# Patient Record
Sex: MALE | Race: WHITE | NOT HISPANIC OR LATINO | Employment: OTHER | URBAN - METROPOLITAN AREA
[De-identification: names, ages, dates, MRNs, and addresses within clinical notes are randomized per-mention and may not be internally consistent; named-entity substitution may affect disease eponyms.]

---

## 2017-01-24 ENCOUNTER — ALLSCRIPTS OFFICE VISIT (OUTPATIENT)
Dept: OTHER | Facility: OTHER | Age: 77
End: 2017-01-24

## 2017-01-24 ENCOUNTER — GENERIC CONVERSION - ENCOUNTER (OUTPATIENT)
Dept: OTHER | Facility: OTHER | Age: 77
End: 2017-01-24

## 2017-01-24 DIAGNOSIS — E11.9 TYPE 2 DIABETES MELLITUS WITHOUT COMPLICATIONS (HCC): ICD-10-CM

## 2017-01-24 DIAGNOSIS — I48.91 ATRIAL FIBRILLATION (HCC): ICD-10-CM

## 2017-01-24 DIAGNOSIS — I10 ESSENTIAL (PRIMARY) HYPERTENSION: ICD-10-CM

## 2017-01-24 DIAGNOSIS — I73.9 PERIPHERAL VASCULAR DISEASE (HCC): ICD-10-CM

## 2017-05-15 ENCOUNTER — ALLSCRIPTS OFFICE VISIT (OUTPATIENT)
Dept: OTHER | Facility: OTHER | Age: 77
End: 2017-05-15

## 2017-05-15 DIAGNOSIS — I10 ESSENTIAL (PRIMARY) HYPERTENSION: ICD-10-CM

## 2017-05-15 DIAGNOSIS — E78.5 HYPERLIPIDEMIA: ICD-10-CM

## 2017-05-15 DIAGNOSIS — E11.9 TYPE 2 DIABETES MELLITUS WITHOUT COMPLICATIONS (HCC): ICD-10-CM

## 2017-09-22 DIAGNOSIS — I48.91 ATRIAL FIBRILLATION (HCC): ICD-10-CM

## 2017-10-03 ENCOUNTER — GENERIC CONVERSION - ENCOUNTER (OUTPATIENT)
Dept: OTHER | Facility: OTHER | Age: 77
End: 2017-10-03

## 2017-12-11 ENCOUNTER — GENERIC CONVERSION - ENCOUNTER (OUTPATIENT)
Dept: OTHER | Facility: OTHER | Age: 77
End: 2017-12-11

## 2017-12-12 ENCOUNTER — GENERIC CONVERSION - ENCOUNTER (OUTPATIENT)
Dept: OTHER | Facility: OTHER | Age: 77
End: 2017-12-12

## 2017-12-18 LAB
LEFT EYE DIABETIC RETINOPATHY: NORMAL
RIGHT EYE DIABETIC RETINOPATHY: NORMAL

## 2018-01-09 ENCOUNTER — GENERIC CONVERSION - ENCOUNTER (OUTPATIENT)
Dept: OTHER | Facility: OTHER | Age: 78
End: 2018-01-09

## 2018-01-13 VITALS
WEIGHT: 248.25 LBS | TEMPERATURE: 98.1 F | BODY MASS INDEX: 33.62 KG/M2 | HEART RATE: 73 BPM | OXYGEN SATURATION: 98 % | HEIGHT: 72 IN | SYSTOLIC BLOOD PRESSURE: 140 MMHG | DIASTOLIC BLOOD PRESSURE: 80 MMHG

## 2018-01-15 VITALS
WEIGHT: 252.8 LBS | RESPIRATION RATE: 18 BRPM | OXYGEN SATURATION: 98 % | HEIGHT: 72 IN | HEART RATE: 78 BPM | BODY MASS INDEX: 34.24 KG/M2 | SYSTOLIC BLOOD PRESSURE: 146 MMHG | TEMPERATURE: 98.3 F | DIASTOLIC BLOOD PRESSURE: 80 MMHG

## 2018-01-15 DIAGNOSIS — E11.9 TYPE 2 DIABETES MELLITUS WITHOUT COMPLICATIONS (HCC): ICD-10-CM

## 2018-01-22 VITALS
TEMPERATURE: 98.1 F | SYSTOLIC BLOOD PRESSURE: 128 MMHG | OXYGEN SATURATION: 98 % | WEIGHT: 254 LBS | DIASTOLIC BLOOD PRESSURE: 70 MMHG | HEART RATE: 79 BPM | BODY MASS INDEX: 33.66 KG/M2 | HEIGHT: 73 IN

## 2018-01-23 NOTE — MISCELLANEOUS
Message   Recorded as Task   Date: 12/11/2017 09:23 AM, Created By: Michael Chirinos   Task Name: Follow Up   Assigned To: Lorena yAoub   Regarding Patient: Rosalia Etienne, Status: In Progress   Raymon Peña - 11 Dec 2017 9:23 AM     TASK CREATED  Caller: Self; [phone] (Home); [phone] (Work)  Pt  insurance no longer covers Lantus RX, please change prescription over to Levemir, and switch pharmacy to [facility]  Thanks   Jayde Engle - 11 Dec 2017 12:27 PM     TASK IN PROGRESS   Jayde Engle - 11 Dec 2017 12:30 PM     TASK REASSIGNED: Previously Assigned To [facility] pa,team  I switched the patient from Lantus to Levemir  Please let the patient know that the instructions remain the same, with the same dosing  Thank you   Sharath Jin - 11 Dec 2017 1:08 PM     TASK REASSIGNED: Previously Assigned To Sravanthi Hays - 11 Dec 2017 5:02 PM     TASK EDITED  Called patient with these instructions  He verbalized understanding and will start Levemir after the New Year  Active Problems    1  Advance directive in chart (V49 89) (Z78 9)   2  Atrial fibrillation (427 31) (I48 91)   3  Benign essential hypertension (401 1) (I10)   4  Bilateral impacted cerumen (380 4) (H61 23)   5  Hyperlipidemia (272 4) (E78 5)   6  Peripheral vascular disease (443 9) (I73 9)   7  Persistent proteinuria (791 0) (R80 1)   8  Screening for other and unspecified genitourinary condition (V81 6) (Z13 89)   9  Special screening for other neurological conditions (V80 09) (Z13 89)   10  Type 2 diabetes mellitus (250 00) (E11 9)    Current Meds   1  Juan Carlos Contour Next Test In [employer]; test blood sugar qd dx:250 00; Therapy: [id number] to (Last Rx:19Oct2017)  Requested for: 19Oct2017 Ordered   2  Levemir FlexTouch 100 UNIT/ML Subcutaneous Solution Pen-injector; INJECT 40 UNIT   Daily before dinner;    Therapy: 70XIE9014 to (Last Rx:[id number])  Requested for: 53Wwe2973 Ordered 3  Lisinopril-Hydrochlorothiazide 20-12 5 MG Oral Tablet; one in the am and half in the pm    Requested for: 82HXF1575; Last Rx:[id number] Ordered   4  MetFORMIN HCl ER (OSM) 500 MG Oral Tablet Extended Release 24 Hour; TAKE 1   TABLET BY MOUTH TWICE DAILY  Requested for: 07VTE5383; Last Rx:[id number]   Ordered   5  NovoFine 32G X 6 MM Miscellaneous; Use to inject insulin once daily dx:250 00; Therapy: [id number] to (Last Rx:[id number])  Requested for: 33BVY8342 Ordered   6  Simvastatin 40 MG Oral Tablet; TAKE 1 TABLET DAILY; Therapy: 38YMP5422 to (Evaluate:01Apr2018)  Requested for: 99XSU5559; Last   Rx:03Oct2017 Ordered   7  Warfarin Sodium 5 MG Oral Tablet; take as directed  Requested for: 47DOI7190; Last   Rx:[id number] Ordered    Allergies    1  No Known Drug Allergies    2  No Known Environmental Allergies   3   No Known Food Allergies    Signatures   Electronically signed by : Axel Spears RN; Dec 11 2017  5:06PM EST                       (Author)

## 2018-01-25 ENCOUNTER — OFFICE VISIT (OUTPATIENT)
Dept: INTERNAL MEDICINE CLINIC | Age: 78
End: 2018-01-25
Payer: COMMERCIAL

## 2018-01-25 VITALS
HEART RATE: 61 BPM | SYSTOLIC BLOOD PRESSURE: 124 MMHG | WEIGHT: 248.2 LBS | OXYGEN SATURATION: 97 % | TEMPERATURE: 97.2 F | HEIGHT: 72 IN | BODY MASS INDEX: 33.62 KG/M2 | DIASTOLIC BLOOD PRESSURE: 62 MMHG

## 2018-01-25 DIAGNOSIS — E78.2 MIXED HYPERLIPIDEMIA: ICD-10-CM

## 2018-01-25 DIAGNOSIS — E11.8 TYPE 2 DIABETES MELLITUS WITH COMPLICATION, UNSPECIFIED LONG TERM INSULIN USE STATUS: ICD-10-CM

## 2018-01-25 DIAGNOSIS — I10 HYPERTENSION, UNSPECIFIED TYPE: Primary | ICD-10-CM

## 2018-01-25 PROCEDURE — 99214 OFFICE O/P EST MOD 30 MIN: CPT | Performed by: INTERNAL MEDICINE

## 2018-01-25 RX ORDER — METFORMIN HYDROCHLORIDE EXTENDED-RELEASE TABLETS 500 MG/1
500 TABLET, FILM COATED, EXTENDED RELEASE ORAL 2 TIMES DAILY
Qty: 180 TABLET | Refills: 3 | Status: SHIPPED | OUTPATIENT
Start: 2018-01-25 | End: 2018-04-02 | Stop reason: SDUPTHER

## 2018-01-25 RX ORDER — LISINOPRIL AND HYDROCHLOROTHIAZIDE 20; 12.5 MG/1; MG/1
1.5 TABLET ORAL 2 TIMES DAILY
Qty: 180 TABLET | Refills: 3 | Status: ON HOLD | OUTPATIENT
Start: 2018-01-25 | End: 2018-04-27

## 2018-01-25 RX ORDER — SIMVASTATIN 40 MG
1 TABLET ORAL DAILY
COMMUNITY
Start: 2013-10-08 | End: 2018-04-27 | Stop reason: HOSPADM

## 2018-01-25 RX ORDER — LISINOPRIL AND HYDROCHLOROTHIAZIDE 20; 12.5 MG/1; MG/1
1.5 TABLET ORAL 2 TIMES DAILY
COMMUNITY
End: 2018-01-25 | Stop reason: SDUPTHER

## 2018-01-25 RX ORDER — METFORMIN HYDROCHLORIDE EXTENDED-RELEASE TABLETS 500 MG/1
1 TABLET, FILM COATED, EXTENDED RELEASE ORAL 2 TIMES DAILY
COMMUNITY
End: 2018-01-25 | Stop reason: SDUPTHER

## 2018-01-25 RX ORDER — WARFARIN SODIUM 5 MG/1
1.5 TABLET ORAL DAILY
COMMUNITY
End: 2018-04-27 | Stop reason: HOSPADM

## 2018-01-25 NOTE — PATIENT INSTRUCTIONS
Colonoscopy is suggested risk and benefit discussed  But patient is not interested to pursue at this time

## 2018-01-25 NOTE — PROGRESS NOTES
Assessment/Plan:      Hyperlipidemia   continue present dose of statins  Increase physical activity at least 30 minutes exercise 3 times a week  Low-fat diet recommended  Diagnoses and all orders for this visit:    Hypertension, unspecified type  -     lisinopril-hydrochlorothiazide (PRINZIDE,ZESTORETIC) 20-12 5 MG per tablet; Take 1 5 tablets by mouth 2 (two) times a day  -     Basic metabolic panel; Future  -     CBC; Future  -     Basic metabolic panel  -     CBC    Type 2 diabetes mellitus with complication, unspecified long term insulin use status (HCC)  -     metFORMIN (FORTAMET) 500 MG (OSM) 24 hr tablet; Take 1 tablet by mouth 2 (two) times a day  -     Insulin Pen Needle (NOVOFINE) 32G X 6 MM MISC; 1 strip by Does not apply route daily  -     Hemoglobin A1c; Future  -     Lipid panel; Future  -     Hemoglobin A1c  -     Lipid panel    Mixed hyperlipidemia    Other orders  -     Discontinue: lisinopril-hydrochlorothiazide (PRINZIDE,ZESTORETIC) 20-12 5 MG per tablet; Take 1 5 tablets by mouth 2 (two) times a day  -     Discontinue: metFORMIN (FORTAMET) 500 MG (OSM) 24 hr tablet; Take 1 tablet by mouth 2 (two) times a day  -     warfarin (COUMADIN) 5 mg tablet; Take 1 5 mg by mouth daily  -     simvastatin (ZOCOR) 40 mg tablet; Take 1 tablet by mouth daily  -     insulin detemir (LEVEMIR) 100 units/mL subcutaneous injection; Inject 40 Int'l Units/100 mL under the skin Daily  -     JEANNE CONTOUR NEXT TEST test strip; 3 applicators by Does not apply route 3 (three) times a week  -     Discontinue: Insulin Pen Needle (NOVOFINE) 32G X 6 MM MISC; 1 strip by Does not apply route daily          Subjective:      Patient ID: Kirsten Burnett is a 69 y o  male  Patient is here for routine medical follow-up  Denied any particular complaints  Hypertension   This is a chronic problem  The current episode started more than 1 year ago  The problem is controlled   Pertinent negatives include no chest pain, headaches, malaise/fatigue, neck pain, palpitations, peripheral edema or shortness of breath  Risk factors for coronary artery disease include dyslipidemia and obesity  Compliance problems include exercise and diet  Identifiable causes of hypertension include chronic renal disease  Diabetes   He presents for his follow-up diabetic visit  He has type 2 diabetes mellitus  His disease course has been fluctuating  There are no hypoglycemic associated symptoms  Pertinent negatives for hypoglycemia include no dizziness, headaches or nervousness/anxiousness  There are no diabetic associated symptoms  Pertinent negatives for diabetes include no chest pain, no fatigue, no polyuria and no weakness  There are no hypoglycemic complications  Symptoms are stable  Risk factors for coronary artery disease include dyslipidemia and diabetes mellitus  Current diabetic treatment includes oral agent (monotherapy) and insulin injections  He is compliant with treatment most of the time  He is following a diabetic and low fat/cholesterol diet  He has not had a previous visit with a dietitian  He rarely participates in exercise  His home blood glucose trend is fluctuating minimally  An ACE inhibitor/angiotensin II receptor blocker is being taken  He does not see a podiatrist Eye exam is current  The following portions of the patient's history were reviewed and updated as appropriate: allergies, current medications, past family history, past medical history, past social history, past surgical history and problem list     Review of Systems   Constitutional: Negative for fatigue, fever and malaise/fatigue  HENT: Negative for congestion, ear discharge, ear pain, postnasal drip, sinus pressure, sore throat, tinnitus and trouble swallowing  Eyes: Negative for discharge, itching and visual disturbance  Respiratory: Negative for cough and shortness of breath  Cardiovascular: Negative for chest pain and palpitations  Gastrointestinal: Negative for abdominal pain, diarrhea, nausea and vomiting  Endocrine: Negative for cold intolerance and polyuria  Genitourinary: Negative for difficulty urinating, dysuria and urgency  Musculoskeletal: Negative for arthralgias and neck pain  Skin: Negative for rash  Allergic/Immunologic: Negative for environmental allergies  Neurological: Negative for dizziness, weakness and headaches  Hematological: Does not bruise/bleed easily  Psychiatric/Behavioral: Negative  The patient is not nervous/anxious  Objective:      Zeina Burow Zeina Burow /62 (BP Location: Left arm, Patient Position: Sitting, Cuff Size: Standard)   Pulse 61   Temp (!) 97 2 °F (36 2 °C) (Tympanic)   Ht 6' (1 829 m)   Wt 113 kg (248 lb 3 2 oz)   SpO2 97%   BMI 33 66 kg/m²      Physical Exam   Constitutional: He appears well-developed  HENT:   Head: Normocephalic  Mouth/Throat: Oropharynx is clear and moist    Eyes: Pupils are equal, round, and reactive to light  No scleral icterus  Neck: Normal range of motion  Neck supple  No tracheal deviation present  No thyromegaly present  Cardiovascular: Normal rate, regular rhythm and normal heart sounds  Pulmonary/Chest: Effort normal and breath sounds normal  No respiratory distress  He exhibits no tenderness  Abdominal: Soft  Bowel sounds are normal  He exhibits no mass  There is no tenderness  Musculoskeletal: Normal range of motion  Lymphadenopathy:     He has no cervical adenopathy  Neurological: He is alert  No cranial nerve deficit  Skin: Skin is warm  No erythema  Psychiatric: He has a normal mood and affect   His behavior is normal  Judgment and thought content normal

## 2018-02-13 LAB — INR PPP: 3 (ref 0.86–1.16)

## 2018-02-15 ENCOUNTER — ANTICOAG VISIT (OUTPATIENT)
Dept: INTERNAL MEDICINE CLINIC | Age: 78
End: 2018-02-15

## 2018-02-15 DIAGNOSIS — I48.20 CHRONIC ATRIAL FIBRILLATION (HCC): Primary | ICD-10-CM

## 2018-03-12 LAB — INR PPP: 2.9 (ref 0.86–1.16)

## 2018-03-13 ENCOUNTER — ANTICOAG VISIT (OUTPATIENT)
Dept: INTERNAL MEDICINE CLINIC | Age: 78
End: 2018-03-13

## 2018-03-26 LAB
HBA1C MFR BLD HPLC: 8.8 %
MICROALBUM.,U,RANDOM (HISTORICAL): 55.8 MG/L
MICROALBUMIN/CREATININE RATIO (HISTORICAL): 192 MG/G CREATININE

## 2018-03-27 ENCOUNTER — OFFICE VISIT (OUTPATIENT)
Dept: INTERNAL MEDICINE CLINIC | Age: 78
End: 2018-03-27
Payer: COMMERCIAL

## 2018-03-27 VITALS
BODY MASS INDEX: 33.6 KG/M2 | RESPIRATION RATE: 24 BRPM | WEIGHT: 240 LBS | DIASTOLIC BLOOD PRESSURE: 70 MMHG | TEMPERATURE: 97.3 F | OXYGEN SATURATION: 96 % | HEART RATE: 82 BPM | HEIGHT: 71 IN | SYSTOLIC BLOOD PRESSURE: 134 MMHG

## 2018-03-27 DIAGNOSIS — J06.9 URTI (ACUTE UPPER RESPIRATORY INFECTION): ICD-10-CM

## 2018-03-27 DIAGNOSIS — J20.9 ACUTE BRONCHITIS, UNSPECIFIED ORGANISM: Primary | ICD-10-CM

## 2018-03-27 PROCEDURE — 99213 OFFICE O/P EST LOW 20 MIN: CPT | Performed by: INTERNAL MEDICINE

## 2018-03-27 RX ORDER — ALBUTEROL SULFATE 90 UG/1
2 AEROSOL, METERED RESPIRATORY (INHALATION) EVERY 6 HOURS PRN
Qty: 1 INHALER | Refills: 0 | Status: SHIPPED | OUTPATIENT
Start: 2018-03-27 | End: 2018-07-16 | Stop reason: ALTCHOICE

## 2018-03-27 RX ORDER — GUAIFENESIN 600 MG
600 TABLET, EXTENDED RELEASE 12 HR ORAL EVERY 12 HOURS SCHEDULED
Qty: 20 TABLET | Refills: 0 | Status: SHIPPED | OUTPATIENT
Start: 2018-03-27 | End: 2018-04-27 | Stop reason: HOSPADM

## 2018-03-27 RX ORDER — GUAIFENESIN 600 MG
600 TABLET, EXTENDED RELEASE 12 HR ORAL EVERY 12 HOURS SCHEDULED
Qty: 20 TABLET | Refills: 0 | Status: SHIPPED | OUTPATIENT
Start: 2018-03-27 | End: 2018-03-27 | Stop reason: SDUPTHER

## 2018-03-27 RX ORDER — AZITHROMYCIN 250 MG/1
TABLET, FILM COATED ORAL
Qty: 6 TABLET | Refills: 0 | Status: SHIPPED | OUTPATIENT
Start: 2018-03-27 | End: 2018-03-27

## 2018-03-27 RX ORDER — CEPHALEXIN 500 MG/1
500 CAPSULE ORAL EVERY 12 HOURS SCHEDULED
Qty: 20 CAPSULE | Refills: 0 | Status: SHIPPED | OUTPATIENT
Start: 2018-03-27 | End: 2018-04-06

## 2018-03-27 NOTE — PATIENT INSTRUCTIONS
- Drink plenty of fluids  - Get plenty of rest  - You may use salt water gargles for your sore throat  - You may use over the counter tylenol or Ibuprofen (motrin or Advil) for any headache, muscle aches and fever  - Call the office if your symptoms persist beyond a total of 7-10 days        Acute Bronchitis   WHAT YOU NEED TO KNOW:   Acute bronchitis is swelling and irritation in the air passages of your lungs  This irritation may cause you to cough or have other breathing problems  Acute bronchitis often starts because of another illness, such as a cold or the flu  The illness spreads from your nose and throat to your windpipe and airways  Bronchitis is often called a chest cold  Acute bronchitis lasts about 3 to 6 weeks and is usually not a serious illness  Your cough can last for several weeks  DISCHARGE INSTRUCTIONS:   Return to the emergency department if:   · You cough up blood  · Your lips or fingernails turn blue  · You feel like you are not getting enough air when you breathe  Contact your healthcare provider if:   · You have a fever  · Your breathing problems do not go away or get worse  · Your cough does not get better within 4 weeks  · You have questions or concerns about your condition or care  Self-care:   · Get more rest   Rest helps your body to heal  Slowly start to do more each day  Rest when you feel it is needed  · Avoid irritants in the air  Avoid chemicals, fumes, and dust  Wear a face mask if you must work around dust or fumes  Stay inside on days when air pollution levels are high  If you have allergies, stay inside when pollen counts are high  Do not use aerosol products, such as spray-on deodorant, bug spray, and hair spray  · Do not smoke or be around others who smoke  Nicotine and other chemicals in cigarettes and cigars damages the cilia that move mucus out of your lungs   Ask your healthcare provider for information if you currently smoke and need help to quit  E-cigarettes or smokeless tobacco still contain nicotine  Talk to your healthcare provider before you use these products  · Drink liquids as directed  Liquids help keep your air passages moist and help you cough up mucus  You may need to drink more liquids when you have acute bronchitis  Ask how much liquid to drink each day and which liquids are best for you  · Use a humidifier or vaporizer  Use a cool mist humidifier or a vaporizer to increase air moisture in your home  This may make it easier for you to breathe and help decrease your cough  Decrease risk for acute bronchitis:   · Get the vaccinations you need  Ask your healthcare provider if you should get vaccinated against the flu or pneumonia  · Prevent the spread of germs  You can decrease your risk of acute bronchitis and other illnesses by doing the following:     [facility] your hands often with soap and water  Carry germ-killing hand lotion or gel with you  You can use the lotion or gel to clean your hands when soap and water are not available  ¨ Do not touch your eyes, nose, or mouth unless you have washed your hands first     ¨ Always cover your mouth when you cough to prevent the spread of germs  It is best to cough into a tissue or your shirt sleeve instead of into your hand  Ask those around you cover their mouths when they cough  ¨ Try to avoid people who have a cold or the flu  If you are sick, stay away from others as much as possible  Medicines: Your healthcare provider may  give you any of the following:  · Ibuprofen or acetaminophen  are medicines that help lower your fever  They are available without a doctor's order  Ask your healthcare provider which medicine is right for you  Ask how much to take and how often to take it  Follow directions  These medicines can cause stomach bleeding if not taken correctly  Ibuprofen can cause kidney damage   Do not take ibuprofen if you have kidney disease, an ulcer, or allergies to aspirin  Acetaminophen can cause liver damage  Do not take more than 4,000 milligrams in 24 hours  · Decongestants  help loosen mucus in your lungs and make it easier to cough up  This can help you breathe easier  · Cough suppressants  decrease your urge to cough  If your cough produces mucus, do not take a cough suppressant unless your healthcare provider tells you to  Your healthcare provider may suggest that you take a cough suppressant at night so you can rest     · Inhalers  may be given  Your healthcare provider may give you one or more inhalers to help you breathe easier and cough less  An inhaler gives your medicine to open your airways  Ask your healthcare provider to show you how to use your inhaler correctly  · Take your medicine as directed  Contact your healthcare provider if you think your medicine is not helping or if you have side effects  Tell him of her if you are allergic to any medicine  Keep a list of the medicines, vitamins, and herbs you take  Include the amounts, and when and why you take them  Bring the list or the pill bottles to follow-up visits  Carry your medicine list with you in case of an emergency  Follow up with your healthcare provider as directed:  Write down questions you have so you will remember to ask them during your follow-up visits  © 2017 [employer] Information is for End User's use only and may not be sold, redistributed or otherwise used for commercial purposes  All illustrations and images included in CareNotes® are the copyrighted property of [employer]  or Jigar Jenkins  The above information is an  only  It is not intended as medical advice for individual conditions or treatments  Talk to your doctor, nurse or pharmacist before following any medical regimen to see if it is safe and effective for you

## 2018-03-27 NOTE — PROGRESS NOTES
Assessment/Plan:   Acute bronchitis with upper respiratory tract infection  -  Patient is on warfarin which will interact with azithromycin  -  So will give him Keflex,  Mucinex for cough, saline nasal spray for nasal congestion  -  We have counseled him to:  - Drink plenty of fluids  - Get plenty of rest  -  use salt water gargles for  sore throat  -  use over the counter tylenol  any headache, muscle aches and fever  - Call the office if  symptoms persist beyond a total of 7-10 days       Diagnoses and all orders for this visit:    Acute bronchitis, unspecified organism  -     albuterol (VENTOLIN HFA) 90 mcg/act inhaler; Inhale 2 puffs every 6 (six) hours as needed for wheezing  -     Discontinue: azithromycin (ZITHROMAX) 250 mg tablet; Take two tablets on day one and then take one table daily till you finish the medicine  -     cephalexin (KEFLEX) 500 mg capsule; Take 1 capsule (500 mg total) by mouth every 12 (twelve) hours for 10 days  -     Discontinue: guaiFENesin (MUCINEX) 600 mg 12 hr tablet; Take 1 tablet (600 mg total) by mouth every 12 (twelve) hours  -     guaiFENesin (MUCINEX) 600 mg 12 hr tablet; Take 1 tablet (600 mg total) by mouth every 12 (twelve) hours    URTI (acute upper respiratory infection)  -     sodium chloride (OCEAN) 0 65 % nasal spray; 1 spray into each nostril as needed for congestion  -     cephalexin (KEFLEX) 500 mg capsule; Take 1 capsule (500 mg total) by mouth every 12 (twelve) hours for 10 days  -     Discontinue: guaiFENesin (MUCINEX) 600 mg 12 hr tablet; Take 1 tablet (600 mg total) by mouth every 12 (twelve) hours  -     guaiFENesin (MUCINEX) 600 mg 12 hr tablet; Take 1 tablet (600 mg total) by mouth every 12 (twelve) hours          Subjective:      Patient ID: Chidi Tracey is a 69 y o  male  HPI  Patient complains of cough productive of yellow phlegm that has been going on for the past 2 weeks    There is associated rhinorrhea, bilateral  back pain,  Wheezing, Occasional chest pain when he coughs a lot  He denies sore throat, headache, fever, chills, night sweats,  Sneezing,  Itchy nose , eyes and throat  The following portions of the patient's history were reviewed and updated as appropriate: allergies, current medications, past family history, past medical history, past social history, past surgical history and problem list     Review of Systems   Constitutional: Positive for fatigue  Negative for activity change, chills, fever and unexpected weight change  HENT: Positive for nosebleeds and rhinorrhea  Negative for ear pain, postnasal drip, sinus pressure and sore throat  Eyes: Negative for pain  Respiratory: Positive for cough, shortness of breath and wheezing  Negative for choking and chest tightness  Cough is productive of yellow phlegm  Patient has occasional shortness of breath when he coughs a lot  Occasional wheezing   Cardiovascular: Negative for chest pain, palpitations and leg swelling  Gastrointestinal: Negative for abdominal pain, constipation, diarrhea, nausea and vomiting  Genitourinary: Negative for dysuria and hematuria  Musculoskeletal: Positive for back pain  Negative for arthralgias, gait problem, joint swelling, myalgias and neck stiffness  He has bilateral back pain which he suspects is because of the position he has been sleeping  in since he cannot lie down flat to sleep because of excessive coughing  any time he lies down flat  Skin: Negative for pallor and rash  Neurological: Negative for dizziness, tremors, seizures, syncope, light-headedness and headaches  Hematological: Negative for adenopathy  Psychiatric/Behavioral: Negative for behavioral problems           Past Medical History:   Diagnosis Date    Diabetes mellitus ([facility] [address] )     Hypercholesteremia     Hypertension     Proteinuria     LAST ASSESSED [id number]         Current Outpatient Prescriptions:     JEANNE CONTOUR NEXT TEST test strip, 3 applicators by Roque not apply route 3 (three) times a week, Disp: , Rfl:     insulin detemir (LEVEMIR) 100 units/mL subcutaneous injection, Inject 40 Int'l Units/100 mL under the skin Daily, Disp: , Rfl:     Insulin Pen Needle (NOVOFINE) 32G X 6 MM MISC, 1 strip by Does not apply route daily, Disp: 100 each, Rfl: 1    lisinopril-hydrochlorothiazide (PRINZIDE,ZESTORETIC) 20-12 5 MG per tablet, Take 1 5 tablets by mouth 2 (two) times a day, Disp: 180 tablet, Rfl: 3    metFORMIN (FORTAMET) 500 MG (OSM) 24 hr tablet, Take 1 tablet by mouth 2 (two) times a day, Disp: 180 tablet, Rfl: 3    simvastatin (ZOCOR) 40 mg tablet, Take 1 tablet by mouth daily, Disp: , Rfl:     warfarin (COUMADIN) 5 mg tablet, Take 1 5 mg by mouth daily, Disp: , Rfl:     albuterol (VENTOLIN HFA) 90 mcg/act inhaler, Inhale 2 puffs every 6 (six) hours as needed for wheezing, Disp: 1 Inhaler, Rfl: 0    cephalexin (KEFLEX) 500 mg capsule, Take 1 capsule (500 mg total) by mouth every 12 (twelve) hours for 10 days, Disp: 20 capsule, Rfl: 0    guaiFENesin (MUCINEX) 600 mg 12 hr tablet, Take 1 tablet (600 mg total) by mouth every 12 (twelve) hours, Disp: 20 tablet, Rfl: 0    sodium chloride (OCEAN) 0 65 % nasal spray, 1 spray into each nostril as needed for congestion, Disp: 15 mL, Rfl: 0    No Known Allergies    Social History   No past surgical history on file  Family History   Problem Relation Age of Onset    Diabetes Mother     Diabetes Father     Diabetes Family        Objective:  /70 (BP Location: Left arm, Patient Position: Sitting, Cuff Size: Adult)   Pulse 82   Temp (!) 97 3 °F (36 3 °C) (Oral)   Resp (!) 24   Ht 5' 11 42" (1 814 m)   Wt 109 kg (240 lb)   SpO2 96% Comment: Room Air  BMI 33 08 kg/m²        Physical Exam   Constitutional: He is oriented to person, place, and time  He appears well-developed and well-nourished  No distress  Obese   HENT:   Head: Normocephalic and atraumatic     Nose: Nose normal    Mouth/Throat: Oropharynx is clear and moist  No oropharyngeal exudate  Swollen nasal turbinates, nasal mucosa erythema bilaterally  Bilateral cerumen with almost obstructed tympanic membranes  Mild oropharyngeal erythema   Eyes: Conjunctivae and EOM are normal  Pupils are equal, round, and reactive to light  Right eye exhibits no discharge  Left eye exhibits no discharge  No scleral icterus  Neck: Normal range of motion  Neck supple  No JVD present  No tracheal deviation present  No thyromegaly present  Cardiovascular: Normal rate, regular rhythm, normal heart sounds and intact distal pulses  Exam reveals no gallop and no friction rub  No murmur heard  Pulmonary/Chest: Effort normal  No respiratory distress  He has wheezes  He has no rales  He exhibits no tenderness  few scattered wheezes   Abdominal: Soft  Bowel sounds are normal  He exhibits no distension and no mass  There is no tenderness  There is no rebound and no guarding  Musculoskeletal: Normal range of motion  He exhibits no edema, tenderness or deformity  Lymphadenopathy:     He has cervical adenopathy  Neurological: He is alert and oriented to person, place, and time  He has normal reflexes  No cranial nerve deficit  He exhibits normal muscle tone  Coordination normal    Skin: Skin is warm and dry  No rash noted  He is not diaphoretic  No erythema  No pallor  Psychiatric: He has a normal mood and affect   His behavior is normal

## 2018-04-02 DIAGNOSIS — E11.8 TYPE 2 DIABETES MELLITUS WITH COMPLICATION, UNSPECIFIED LONG TERM INSULIN USE STATUS: ICD-10-CM

## 2018-04-02 RX ORDER — METFORMIN HYDROCHLORIDE EXTENDED-RELEASE TABLETS 500 MG/1
500 TABLET, FILM COATED, EXTENDED RELEASE ORAL 2 TIMES DAILY
Qty: 180 TABLET | Refills: 1 | Status: SHIPPED | OUTPATIENT
Start: 2018-04-02 | End: 2018-07-16 | Stop reason: ALTCHOICE

## 2018-04-18 DIAGNOSIS — I48.91 ATRIAL FIBRILLATION, UNSPECIFIED TYPE (HCC): Primary | ICD-10-CM

## 2018-04-18 LAB — INR PPP: 3.5 (ref 0.86–1.16)

## 2018-04-20 ENCOUNTER — ANTICOAG VISIT (OUTPATIENT)
Dept: INTERNAL MEDICINE CLINIC | Age: 78
End: 2018-04-20

## 2018-04-21 ENCOUNTER — HOSPITAL ENCOUNTER (INPATIENT)
Facility: HOSPITAL | Age: 78
LOS: 6 days | Discharge: HOME | DRG: 167 | End: 2018-04-27
Attending: EMERGENCY MEDICINE | Admitting: INTERNAL MEDICINE
Payer: COMMERCIAL

## 2018-04-21 ENCOUNTER — APPOINTMENT (EMERGENCY)
Dept: RADIOLOGY | Facility: HOSPITAL | Age: 78
DRG: 167 | End: 2018-04-21
Payer: COMMERCIAL

## 2018-04-21 DIAGNOSIS — R06.00 DYSPNEA: ICD-10-CM

## 2018-04-21 DIAGNOSIS — C79.31 BRAIN METASTASES (HCC): ICD-10-CM

## 2018-04-21 DIAGNOSIS — I10 HYPERTENSION, UNSPECIFIED TYPE: ICD-10-CM

## 2018-04-21 DIAGNOSIS — D49.1 LUNG NEOPLASM: ICD-10-CM

## 2018-04-21 DIAGNOSIS — J90 PLEURAL EFFUSION: Primary | ICD-10-CM

## 2018-04-21 DIAGNOSIS — C79.9 METASTATIC DISEASE (HCC): ICD-10-CM

## 2018-04-21 DIAGNOSIS — D49.1 NEOPLASM OF LUNG: ICD-10-CM

## 2018-04-21 DIAGNOSIS — C34.90 METASTATIC PRIMARY LUNG CANCER (HCC): ICD-10-CM

## 2018-04-21 PROBLEM — N17.9 ACUTE KIDNEY INJURY (HCC): Status: ACTIVE | Noted: 2018-04-21

## 2018-04-21 PROBLEM — R80.1 PERSISTENT PROTEINURIA: Status: ACTIVE | Noted: 2017-01-24

## 2018-04-21 PROBLEM — J93.9 PNEUMOTHORAX: Status: ACTIVE | Noted: 2018-04-21

## 2018-04-21 PROBLEM — R06.02 SHORTNESS OF BREATH: Status: ACTIVE | Noted: 2018-04-21

## 2018-04-21 LAB
ALBUMIN SERPL BCP-MCNC: 3.4 G/DL (ref 3.5–5)
ALP SERPL-CCNC: 128 U/L (ref 46–116)
ALT SERPL W P-5'-P-CCNC: 18 U/L (ref 12–78)
ANION GAP SERPL CALCULATED.3IONS-SCNC: 6 MMOL/L (ref 4–13)
APTT PPP: 49 SECONDS (ref 23–35)
AST SERPL W P-5'-P-CCNC: 61 U/L (ref 5–45)
BASOPHILS # BLD AUTO: 0.01 THOUSANDS/ΜL (ref 0–0.1)
BASOPHILS NFR BLD AUTO: 0 % (ref 0–1)
BILIRUB SERPL-MCNC: 0.49 MG/DL (ref 0.2–1)
BUN SERPL-MCNC: 26 MG/DL (ref 5–25)
CALCIUM SERPL-MCNC: 9.2 MG/DL (ref 8.3–10.1)
CHLORIDE SERPL-SCNC: 96 MMOL/L (ref 100–108)
CO2 SERPL-SCNC: 30 MMOL/L (ref 21–32)
CREAT SERPL-MCNC: 1.46 MG/DL (ref 0.6–1.3)
EOSINOPHIL # BLD AUTO: 0.13 THOUSAND/ΜL (ref 0–0.61)
EOSINOPHIL NFR BLD AUTO: 2 % (ref 0–6)
ERYTHROCYTE [DISTWIDTH] IN BLOOD BY AUTOMATED COUNT: 15 % (ref 11.6–15.1)
GFR SERPL CREATININE-BSD FRML MDRD: 46 ML/MIN/1.73SQ M
GLUCOSE SERPL-MCNC: 113 MG/DL (ref 65–140)
GLUCOSE SERPL-MCNC: 184 MG/DL (ref 65–140)
GLUCOSE SERPL-MCNC: 228 MG/DL (ref 65–140)
GLUCOSE SERPL-MCNC: 230 MG/DL (ref 65–140)
HCT VFR BLD AUTO: 35.7 % (ref 36.5–49.3)
HGB BLD-MCNC: 11.9 G/DL (ref 12–17)
INR PPP: 2.98 (ref 0.86–1.16)
LYMPHOCYTES # BLD AUTO: 0.91 THOUSANDS/ΜL (ref 0.6–4.47)
LYMPHOCYTES NFR BLD AUTO: 11 % (ref 14–44)
MCH RBC QN AUTO: 28.3 PG (ref 26.8–34.3)
MCHC RBC AUTO-ENTMCNC: 33.3 G/DL (ref 31.4–37.4)
MCV RBC AUTO: 85 FL (ref 82–98)
MONOCYTES # BLD AUTO: 0.69 THOUSAND/ΜL (ref 0.17–1.22)
MONOCYTES NFR BLD AUTO: 9 % (ref 4–12)
NEUTROPHILS # BLD AUTO: 6.31 THOUSANDS/ΜL (ref 1.85–7.62)
NEUTS SEG NFR BLD AUTO: 78 % (ref 43–75)
NRBC BLD AUTO-RTO: 0 /100 WBCS
NT-PROBNP SERPL-MCNC: 772 PG/ML
PLATELET # BLD AUTO: 262 THOUSANDS/UL (ref 149–390)
PMV BLD AUTO: 9.1 FL (ref 8.9–12.7)
POTASSIUM SERPL-SCNC: 4.2 MMOL/L (ref 3.5–5.3)
PROT SERPL-MCNC: 7 G/DL (ref 6.4–8.2)
PROTHROMBIN TIME: 31.4 SECONDS (ref 12.1–14.4)
RBC # BLD AUTO: 4.2 MILLION/UL (ref 3.88–5.62)
SODIUM SERPL-SCNC: 132 MMOL/L (ref 136–145)
TROPONIN I SERPL-MCNC: <0.02 NG/ML
WBC # BLD AUTO: 8.07 THOUSAND/UL (ref 4.31–10.16)

## 2018-04-21 PROCEDURE — 71046 X-RAY EXAM CHEST 2 VIEWS: CPT

## 2018-04-21 PROCEDURE — 82948 REAGENT STRIP/BLOOD GLUCOSE: CPT

## 2018-04-21 PROCEDURE — 80053 COMPREHEN METABOLIC PANEL: CPT | Performed by: EMERGENCY MEDICINE

## 2018-04-21 PROCEDURE — 85610 PROTHROMBIN TIME: CPT | Performed by: EMERGENCY MEDICINE

## 2018-04-21 PROCEDURE — 99223 1ST HOSP IP/OBS HIGH 75: CPT | Performed by: INTERNAL MEDICINE

## 2018-04-21 PROCEDURE — 36415 COLL VENOUS BLD VENIPUNCTURE: CPT | Performed by: EMERGENCY MEDICINE

## 2018-04-21 PROCEDURE — 83880 ASSAY OF NATRIURETIC PEPTIDE: CPT | Performed by: EMERGENCY MEDICINE

## 2018-04-21 PROCEDURE — 74176 CT ABD & PELVIS W/O CONTRAST: CPT

## 2018-04-21 PROCEDURE — 93005 ELECTROCARDIOGRAM TRACING: CPT | Performed by: EMERGENCY MEDICINE

## 2018-04-21 PROCEDURE — 71250 CT THORAX DX C-: CPT

## 2018-04-21 PROCEDURE — 85025 COMPLETE CBC W/AUTO DIFF WBC: CPT | Performed by: EMERGENCY MEDICINE

## 2018-04-21 PROCEDURE — 85730 THROMBOPLASTIN TIME PARTIAL: CPT | Performed by: EMERGENCY MEDICINE

## 2018-04-21 PROCEDURE — 84484 ASSAY OF TROPONIN QUANT: CPT | Performed by: EMERGENCY MEDICINE

## 2018-04-21 PROCEDURE — 99285 EMERGENCY DEPT VISIT HI MDM: CPT

## 2018-04-21 PROCEDURE — 94760 N-INVAS EAR/PLS OXIMETRY 1: CPT

## 2018-04-21 RX ORDER — ALBUTEROL SULFATE 2.5 MG/3ML
2.5 SOLUTION RESPIRATORY (INHALATION) EVERY 6 HOURS PRN
Status: DISCONTINUED | OUTPATIENT
Start: 2018-04-21 | End: 2018-04-21

## 2018-04-21 RX ORDER — ACETAMINOPHEN 325 MG/1
650 TABLET ORAL EVERY 6 HOURS PRN
Status: DISCONTINUED | OUTPATIENT
Start: 2018-04-21 | End: 2018-04-27 | Stop reason: HOSPADM

## 2018-04-21 RX ORDER — LABETALOL HYDROCHLORIDE 5 MG/ML
10 INJECTION, SOLUTION INTRAVENOUS EVERY 6 HOURS PRN
Status: DISCONTINUED | OUTPATIENT
Start: 2018-04-21 | End: 2018-04-27 | Stop reason: HOSPADM

## 2018-04-21 RX ORDER — SIMVASTATIN 40 MG
40 TABLET ORAL DAILY
Status: DISCONTINUED | OUTPATIENT
Start: 2018-04-21 | End: 2018-04-23

## 2018-04-21 RX ORDER — FUROSEMIDE 10 MG/ML
20 INJECTION INTRAMUSCULAR; INTRAVENOUS ONCE
Status: COMPLETED | OUTPATIENT
Start: 2018-04-21 | End: 2018-04-21

## 2018-04-21 RX ORDER — ALBUTEROL SULFATE 90 UG/1
2 AEROSOL, METERED RESPIRATORY (INHALATION) EVERY 6 HOURS PRN
Status: DISCONTINUED | OUTPATIENT
Start: 2018-04-21 | End: 2018-04-27 | Stop reason: HOSPADM

## 2018-04-21 RX ORDER — HEPARIN SODIUM 5000 [USP'U]/ML
5000 INJECTION, SOLUTION INTRAVENOUS; SUBCUTANEOUS EVERY 8 HOURS SCHEDULED
Status: DISCONTINUED | OUTPATIENT
Start: 2018-04-21 | End: 2018-04-27 | Stop reason: HOSPADM

## 2018-04-21 RX ORDER — ECHINACEA PURPUREA EXTRACT 125 MG
1 TABLET ORAL AS NEEDED
Status: DISCONTINUED | OUTPATIENT
Start: 2018-04-21 | End: 2018-04-27 | Stop reason: HOSPADM

## 2018-04-21 RX ORDER — WARFARIN SODIUM 1 MG/1
1.5 TABLET ORAL
Status: DISCONTINUED | OUTPATIENT
Start: 2018-04-21 | End: 2018-04-21

## 2018-04-21 RX ORDER — ONDANSETRON 2 MG/ML
4 INJECTION INTRAMUSCULAR; INTRAVENOUS EVERY 6 HOURS PRN
Status: DISCONTINUED | OUTPATIENT
Start: 2018-04-21 | End: 2018-04-27 | Stop reason: HOSPADM

## 2018-04-21 RX ADMIN — FUROSEMIDE 20 MG: 10 INJECTION, SOLUTION INTRAMUSCULAR; INTRAVENOUS at 14:58

## 2018-04-21 RX ADMIN — HEPARIN SODIUM 5000 UNITS: 5000 INJECTION, SOLUTION INTRAVENOUS; SUBCUTANEOUS at 21:01

## 2018-04-21 RX ADMIN — HEPARIN SODIUM 5000 UNITS: 5000 INJECTION, SOLUTION INTRAVENOUS; SUBCUTANEOUS at 17:32

## 2018-04-21 NOTE — RESPIRATORY THERAPY NOTE
RT Protocol Note  Don Ferrer 69 y o  male MRN: [id number]  Unit/Bed#: ED 05 Encounter: [id number]    Assessment    Principal Problem:    Lung neoplasm  Active Problems:    Hypertension    Type 2 diabetes mellitus with complication (HCC)    Mixed hyperlipidemia    Atrial fibrillation (HCC)    Peripheral vascular disease (HCC)    Shortness of breath    Pleural effusion    Acute kidney injury ([facility] [address] )    Metastatic disease (HCC)      Home Pulmonary Medications:  Albuterol MDI PRN - recently prescribed <1 month ago       Past Medical History:   Diagnosis Date    Diabetes mellitus ([facility] )     Hypercholesteremia     Hypertension     Proteinuria     LAST ASSESSED [id number]     Social History     Social History    Marital status: /Civil Union     Spouse name: N/A    Number of children: N/A    Years of education: N/A     Occupational History    RETIRED      Social History Main Topics    Smoking status: Former Smoker    Smokeless tobacco: Never Used    Alcohol use No      Comment: OCCASIONAL     Drug use: No    Sexual activity: Not Asked     Other Topics Concern    None     Social History Narrative    ADVANCED DIRECTIVE IN CHART     CA[employer]EINE USE VIA COFFEE 3 SERVINGS PER DAY            Subjective    Subjective Data: (P) Patient states his breathing is better on the oxygen  Objective    Physical Exam:   Assessment Type: Assess only  General Appearance: Alert, Awake  Respiratory Pattern: Dyspnea with exertion  Chest Assessment: Chest expansion symmetrical  Bilateral Breath Sounds: Clear  R Breath Sounds: Diminished (Decreased RLL)  Cough: (P) None  O2 Device: NC    Vitals:  Blood pressure 132/85, pulse 91, temperature (!) 97 4 °F (36 3 °C), resp  rate 22, weight 109 kg (240 lb), SpO2 93 %  Imaging and other studies: I have personally reviewed pertinent reports        O2 Device: NC     Plan    Respiratory Plan: No distress/Pulmonary history        Resp Comments: (P) Patient was assessed per Respiratory Protocol  Patient has no pulmonary history and was recently prescribed an Albuterol MDI PRN for SOB a little less than a month ago  BS are clear, but decreased in RLL  Patient has a large right pleural effusion on today's CXR (4/21/18)  Will continue Albuterol MDI PRN at this time

## 2018-04-21 NOTE — ED ATTENDING ATTESTATION
Sridhar Soriano MD, saw and evaluated the patient  I have discussed the patient with the resident/non-physician practitioner and agree with the resident's/non-physician practitioner's findings, Plan of Care, and MDM as documented in the resident's/non-physician practitioner's note, except where noted  All available labs and Radiology studies were reviewed  At this point I agree with the current assessment done in the Emergency Department  I have conducted an independent evaluation of this patient a history and physical is as follows:      Critical Care Time  CritCare Time    Procedures     68 yo male c/o two months of worsening sob, worse with exertion  Pt states he was shopping today and it worsened  Pt with orthopnea, cough  No cp, no fever, no abdominal pain  Pt with increased leg swelling  pmh afib, dm coumadin, no chf   Pt recently put on keflex for these symptoms with no relief  Vss, afebrile, lungs with decreased sounds on right, rrr, abdomen soft nontender, no pedal edema  Ct c/a/p, right pleural effusion on right, cardiac workup

## 2018-04-21 NOTE — ED PROVIDER NOTES
History  Chief Complaint   Patient presents with    Shortness of Breath     Sob for months, worse on exertion , +cough     27-year-old male presenting for evaluation of 3 months of worsening shortness of breath  Patient reports worsening dyspnea on exertion, orthopnea and paroxysmal nocturnal dyspnea  He also reports a cough, occasionally productive of clear/yellow mucus  He reports that he was out shopping today and had worsening of these symptoms and therefore came in for evaluation  Patient was seen in clinic on  for the symptoms and was prescribed Keflex/Mucinex/saline nasal spray and he reports having no improvement of his symptoms  He denies any fevers, chills, CP, abdominal pain, nausea, vomiting, changes in stool, urinary complaints  He does report that he has noticed some bilateral lower extremity swelling which he has never had the past   No prior history of heart failure  H/o insulin-dependent diabetic, atrial fibrillation on Coumadin, HLD  No known lung disease  Not a smoker  A/P:  27-year-old male with worsening respiratory systems, will get EKG to rule out STEMI/ischemic changes, troponin to evaluate for ischemia, CBC to rule out anemia, BMP to assess renal function/electrolytes, CXR to rule out pneumonia/CHF/effusion, admit           3/27 Clinic note: started on Keflex/Mucinex/saline nasal spray    Prior to Admission Medications   Prescriptions Last Dose Informant Patient Reported? Taking?    JEANNE CONTOUR NEXT TEST test strip Unknown at Unknown time Self Yes No   Sig: 3 applicators by Does not apply route 3 (three) times a week   Insulin Pen Needle (NOVOFINE) 32G X 6 MM MISC 2018 at Unknown time Self No Yes   Si strip by Does not apply route daily   albuterol (VENTOLIN HFA) 90 mcg/act inhaler Unknown at Unknown time  No No   Sig: Inhale 2 puffs every 6 (six) hours as needed for wheezing   guaiFENesin (MUCINEX) 600 mg 12 hr tablet Past Week at Unknown time  No Yes   Sig: Take 1 tablet (600 mg total) by mouth every 12 (twelve) hours   insulin detemir (LEVEMIR) 100 units/mL subcutaneous injection 2018 at Unknown time Self Yes Yes   Sig: Inject 34 Int'l Units/100 mL under the skin Daily     lisinopril-hydrochlorothiazide (PRINZIDE,ZESTORETIC) 20-12 5 MG per tablet 2018 at Unknown time Self No Yes   Sig: Take 1 5 tablets by mouth 2 (two) times a day   metFORMIN (FORTAMET) 500 MG (OSM) 24 hr tablet 2018 at Unknown time  No Yes   Sig: Take 1 tablet (500 mg total) by mouth 2 (two) times a day   simvastatin (ZOCOR) 40 mg tablet 2018 at Unknown time Self Yes Yes   Sig: Take 1 tablet by mouth daily   sodium chloride (OCEAN) 0 65 % nasal spray 2018 at Unknown time  No Yes   Si spray into each nostril as needed for congestion   warfarin (COUMADIN) 5 mg tablet Past Week at Unknown time Self Yes Yes   Sig: Take 1 5 mg by mouth daily      Facility-Administered Medications: None       Past Medical History:   Diagnosis Date    Diabetes mellitus ([facility] [address] )     Hypercholesteremia     Hypertension     Proteinuria     LAST ASSESSED [id number]       History reviewed  No pertinent surgical history  Family History   Problem Relation Age of Onset    Diabetes Mother     Diabetes Father     Diabetes Family      I have reviewed and agree with the history as documented  Social History   Substance Use Topics    Smoking status: Former Smoker    Smokeless tobacco: Never Used    Alcohol use No      Comment: OCCASIONAL         Review of Systems   Constitutional: Negative for chills and fever  HENT: Negative for rhinorrhea and sore throat  Respiratory: Positive for cough and shortness of breath  Cardiovascular: Negative for chest pain and leg swelling  Gastrointestinal: Negative for abdominal pain, diarrhea, nausea and vomiting  Genitourinary: Negative for hematuria and urgency  Musculoskeletal: Negative for back pain and neck pain     Skin: Negative for color change and rash    Allergic/Immunologic: Negative for environmental allergies and immunocompromised state  Neurological: Negative for dizziness, weakness, light-headedness, numbness and headaches  Hematological: Negative for adenopathy  Does not bruise/bleed easily  Psychiatric/Behavioral: Negative for agitation and confusion  All other systems reviewed and are negative  Physical Exam  ED Triage Vitals   Temperature Pulse Respirations Blood Pressure SpO2   04/21/18 1355 04/21/18 1337 04/21/18 1337 04/21/18 1337 04/21/18 1337   (!) 97 4 °F (36 3 °C) 88 20 (!) 188/84 94 %      Temp src Heart Rate Source Patient Position - Orthostatic VS BP Location FiO2 (%)   -- 04/21/18 1345 04/21/18 1345 04/21/18 1345 --    Monitor Lying Right arm       Pain Score       04/21/18 1337       No Pain           Orthostatic Vital Signs  Vitals:    04/21/18 1345 04/21/18 1415 04/21/18 1459 04/21/18 1553   BP: (!) 188/84 138/77 126/64 132/85   Pulse: 86 94 92 91   Patient Position - Orthostatic VS: Lying Lying Lying Lying       Physical Exam   Constitutional: He is oriented to person, place, and time  He appears well-developed and well-nourished  HENT:   Head: Normocephalic and atraumatic  Nose: Nose normal    Mouth/Throat: Oropharynx is clear and moist    Eyes: Conjunctivae and EOM are normal    Neck: Normal range of motion  Neck supple  Cardiovascular: Normal rate, regular rhythm, normal heart sounds and intact distal pulses  Pulmonary/Chest: No respiratory distress  He exhibits no tenderness  Decreased breath sounds on R  Slight tachypnea  No respiratory distress   Abdominal: Soft  He exhibits no distension  There is no tenderness  Musculoskeletal: He exhibits no edema or deformity  Neurological: He is alert and oriented to person, place, and time  He exhibits normal muscle tone  Coordination normal    Skin: Skin is warm and dry  No rash noted  Psychiatric: He has a normal mood and affect   Thought content normal  Nursing note and vitals reviewed  ED Medications  Medications   albuterol (PROVENTIL HFA,VENTOLIN HFA) inhaler 2 puff (not administered)   insulin detemir (LEVEMIR) subcutaneous injection 34 Units (not administered)   simvastatin (ZOCOR) tablet 40 mg (not administered)   sodium chloride (OCEAN) 0 65 % nasal spray 1 spray (not administered)   warfarin (COUMADIN) tablet 1 5 mg (not administered)   ondansetron (ZOFRAN) injection 4 mg (not administered)   heparin (porcine) subcutaneous injection 5,000 Units (not administered)   acetaminophen (TYLENOL) tablet 650 mg (not administered)   furosemide (LASIX) injection 20 mg (20 mg Intravenous Given 4/21/18 9191)       Diagnostic Studies  Results Reviewed     Procedure Component Value Units Date/Time    Platelet count [[id number]]     Lab Status:  No result Specimen:  Blood     Body fluid culture and Gram stain [[id number]]     Lab Status:  No result Specimen: Body Fluid from Pleural, Right     Total Protein, Fluid [[id number]]     Lab Status:  No result Specimen: Body Fluid     Albumin, fluid [[id number]]     Lab Status:  No result Specimen: Body Fluid     Albumin [[id number]]     Lab Status:  No result Specimen:  Blood     LD (LDH), Body Fluid [[id number]]     Lab Status:  No result Specimen:   Body Fluid     LD,Blood [[id number]]     Lab Status:  No result Specimen:  Blood     NT-BNP PRO [[id number]]  (Abnormal) Collected:  04/21/18 1343    Lab Status:  Final result Specimen:  Blood from Arm, Right Updated:  04/21/18 1514     NT-proBNP 772 (H) pg/mL     Protime-INR [[id number]]  (Abnormal) Collected:  04/21/18 1343    Lab Status:  Final result Specimen:  Blood from Arm, Right Updated:  04/21/18 1417     Protime 31 4 (H) seconds      INR 2 98 (H)    APTT [[id number]]  (Abnormal) Collected:  04/21/18 1343    Lab Status:  Final result Specimen:  Blood from Arm, Right Updated:  04/21/18 1417     PTT 49 (H) seconds     Troponin I [[id number]]  (Normal) Collected:  04/21/18 1343    Lab Status:  Final result Specimen:  Blood from Arm, Right Updated:  04/21/18 1417     Troponin I <0 02 ng/mL     Narrative:         [employer] Chemistry analyzer 99% cutoff is > 0 04 ng/mL in network labs    o cTnI 99% cutoff is useful only when applied to patients in the clinical setting of myocardial ischemia  o cTnI 99% cutoff should be interpreted in the context of clinical history, ECG findings and possibly cardiac imaging to establish correct diagnosis  o cTnI 99% cutoff may be suggestive but clearly not indicative of a coronary event without the clinical setting of myocardial ischemia  Comprehensive metabolic panel [[id number]]  (Abnormal) Collected:  04/21/18 1343    Lab Status:  Final result Specimen:  Blood from Arm, Right Updated:  04/21/18 1414     Sodium 132 (L) mmol/L      Potassium 4 2 mmol/L      Chloride 96 (L) mmol/L      CO2 30 mmol/L      Anion Gap 6 mmol/L      BUN 26 (H) mg/dL      Creatinine 1 46 (H) mg/dL      Glucose 230 (H) mg/dL      Calcium 9 2 mg/dL      AST 61 (H) U/L      ALT 18 U/L      Alkaline Phosphatase 128 (H) U/L      Total Protein 7 0 g/dL      Albumin 3 4 (L) g/dL      Total Bilirubin 0 49 mg/dL      eGFR 46 ml/min/1 73sq m     Narrative:         National Kidney Disease Education Program recommendations are as follows:  GFR calculation is accurate only with a steady state creatinine  Chronic Kidney disease less than 60 ml/min/1 73 sq  meters  Kidney failure less than 15 ml/min/1 73 sq  meters      CBC and differential [[id number]]  (Abnormal) Collected:  04/21/18 1343    Lab Status:  Final result Specimen:  Blood from Arm, Right Updated:  04/21/18 1404     WBC 8 07 Thousand/uL      RBC 4 20 Million/uL      Hemoglobin 11 9 (L) g/dL      Hematocrit 35 7 (L) %      MCV 85 fL      MCH 28 3 pg      MCHC 33 3 g/dL      RDW 15 0 %      MPV 9 1 fL      Platelets 205 Thousands/uL      nRBC 0 /100 WBCs      Neutrophils Relative 78 (H) %      Lymphocytes Relative 11 (L) %      Monocytes Relative 9 %      Eosinophils Relative 2 %      Basophils Relative 0 %      Neutrophils Absolute 6 31 Thousands/µL      Lymphocytes Absolute 0 91 Thousands/µL      Monocytes Absolute 0 69 Thousand/µL      Eosinophils Absolute 0 13 Thousand/µL      Basophils Absolute 0 01 Thousands/µL     Fingerstick Glucose (POCT) [[id number]]  (Abnormal) Collected:  04/21/18 1341    Lab Status:  Final result Updated:  04/21/18 1341     POC Glucose 228 (H) mg/dl                  CT chest abdomen pelvis wo contrast   ED Interpretation by Deandre Villalta DO (04/21 1550)   CT CHEST, ABDOMEN AND PELVIS WITHOUT IV CONTRAST       INDICATION:   Right pleural effusion        COMPARISON: CT of the abdomen/pelvis dated 12/24/2004        TECHNIQUE: CT examination of the chest, abdomen and pelvis was performed without intravenous contrast   Axial, sagittal, and coronal 2D reformatted images were created from the source data and submitted for interpretation         Radiation dose length product (DLP) for this visit:  1637 02 mGy-cm   This examination, like all CT scans performed in the [facility], was performed utilizing techniques to minimize radiation dose exposure, including the use of    iterative reconstruction and automated exposure control         Enteric contrast was administered        Note: Image numbers reported for findings (if any) are taken from axial series 2 and 3 unless otherwise indicated        FINDINGS:       CHEST       LUNGS:  Soft tissue filling both the right middle and lower lobar arteries  Perihilar masslike consolidative opacity, obscured by consolidations in the right middle lobe  There appears to be associated satellite nodularity measuring up to 8 mm  Associated interstitial thickening involving both the interlobular septae and the peribronchial vascular interstitium  Findings consistent with lymphangitic carcinomatosis         3 mm pulmonary nodules in the anterior left upper lobe on image 18   4 mm nodule in the left upper lobe on image 23   4 mm nodule in the left lower lobe on image 28  Multiple larger lobular nodules in the left lower lobe measuring up to 19 mm x 14 mm on    image 44        PLEURA:  Moderate sized pleural effusion  Posttraumatic changes of the left pleural from prior healed rib fractures        HEART/GREAT VESSELS:  Small pericardial effusion  Aortic valvular and coronary calcifications  Heart size is normal   Ascending aortic aneurysm measuring 4 6 cm        MEDIASTINUM AND DWIGHT:  Pathologically enlarged lymph nodes in the mediastinum  Largest index nodes measure 28 mm x 20 mm in the left paratracheal location on image 24 and in a subcarinal location measuring 32 mm x 20 mm on image 34        CHEST WALL AND LOWER NECK:   Unremarkable        ABDOMEN       LIVER/BILIARY TREE:  Unremarkable        GALLBLADDER:  Stones or sludge layering at the gallbladder fundus  No pericholecystic inflammation or wall thickening        SPLEEN:  Unremarkable        PANCREAS:  Unremarkable        ADRENAL GLANDS:  Diffuse nodular thickening of left adrenal gland up to 2 5 cm in thickness  The gland  Mildly thickened in 2004, this represents a significant increase  Right gland is unremarkable        KIDNEYS/URETERS:  Right and left atrophic changes  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       Right renal lower pole  Renal cysts left kidney  No suspicious renal masses  No hydronephrosis        STOMACH AND BOWEL:  Unremarkable        APPENDIX:  A normal appendix was visualized        ABDOMINOPELVIC CAVITY:  No ascites or free intraperitoneal air  No lymphadenopathy        VESSELS:  Unremarkable for patient's age        PELVIS       REPRODUCTIVE ORGANS:  Unremarkable for patient's age        URINARY BLADDER:  Unremarkable        ABDOMINAL WALL/INGUINAL REGIONS:  Diastasis recti no hernias  No inguinal lymphadenopathy        OSSEOUS STRUCTURES:  Multiple healed left-sided rib fractures    No acute fractures demonstrated  Lytic lesions demonstrated in the lateral 1, L2, L4, and L5 vertebral bodies with areas of anterior cortical erosion and low-grade fragmentation at L5        IMPRESSION:       1  Right perihilar mass with occlusion of the right middle and lower lobe bronchi consistent with primary pulmonary malignancy  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, pathologic mediastinal lymphadenopathy, and    multiple metastatic nodules in the left lung        2  Obstructive collapse of the right middle lobe and lower lobe        3  Increasing thickened, nodular appearance of the left adrenal gland  This could represent progression of hypertrophy or metastasis  Recommend further evaluation on PET/CT        4  Ascending aortic aneurysm measuring up to 4 5 cm        5   Small pericardial effusion        6   Osseous metastases in the L1, L2, L4, and L5 vertebral bodies  Final Result by Zeke Kinsey MD (04/21 8745)      1  Right perihilar mass with occlusion of the right middle and lower lobe bronchi consistent with primary pulmonary malignancy  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, pathologic mediastinal lymphadenopathy, and    multiple metastatic nodules in the left lung  2   Obstructive collapse of the right middle lobe and lower lobe  3   Increasing thickened, nodular appearance of the left adrenal gland  This could represent progression of hypertrophy or metastasis  Recommend further evaluation on PET/CT  4   Ascending aortic aneurysm measuring up to 4 5 cm       5   Small pericardial effusion  6   Osseous metastases in the L1, L2, L4, and L5 vertebral bodies           I personally discussed this study with Samina Jefferson on 4/21/2018 at 3:42 PM             Workstation performed: [id number]         X-ray chest 2 views   ED Interpretation by Siddharth Pathak DO (04/21 3168)   Interpreted by myself: large R pleural effusion      IR consult    (Results Pending)   MRI inpatient order    (Results Pending)         Procedures  ECG 12 Lead Documentation  Date/Time: 4/21/2018 2:04 PM  Performed by: Ashu Servin  Authorized by: Gibran MENCHACA     Indications / Diagnosis:  Sob  ECG reviewed by me, the ED Provider: yes    Patient location:  ED  Previous ECG:     Previous ECG:  Unavailable  Rate:     ECG rate:  89  Rhythm:     Rhythm: atrial fibrillation    QRS:     QRS axis:  Normal  ST segments:     ST segments:  Normal  T waves:     T waves: normal              Phone Consults  ED Phone Contact    ED Course  ED Course as of Apr 21 1602   Sat Apr 21, 2018   1419 Therapeutic for afib INR: (!) 2 98   1420 Creatinine: (!) 1 46   1533 NT-proBNP: (!) 772                               MDM  Number of Diagnoses or Management Options  Dyspnea:   Lung neoplasm:   Metastatic disease ([facility] [address] ):   Metastatic primary lung cancer [facility]):   Pleural effusion:   Diagnosis management comments: 70 yo M with worsening dyspnea/BULL/PND/orthopnea, found to have large R pleural effusion from newly found metastatic lung disease  - admitted to [place] for further workup/management       Amount and/or Complexity of Data Reviewed  Clinical lab tests: reviewed and ordered  Tests in the radiology section of CPT®: ordered and reviewed  Tests in the medicine section of CPT®: ordered and reviewed      CritCare Time    Disposition  Final diagnoses:   Pleural effusion   Dyspnea   Metastatic primary lung cancer ([facility] [address] )   Lung neoplasm   Metastatic disease ([facility] [address] )     Time reflects when diagnosis was documented in both MDM as applicable and the Disposition within this note     Time User Action Codes Description Comment    4/21/2018  2:53 PM [facility] [place] A Add [J90] Pleural effusion     4/21/2018  2:54 PM [facility] [place] A Add [R06 00] Dyspnea     4/21/2018  3:52 PM [facility] [place] A Add [C34 90] Metastatic primary lung cancer ([facility] [address] )     4/21/2018  3:54 PM [facility], [address] [D49 1] Lung neoplasm     4/21/2018  3:54 PM [facility] Modify [D49 1] Lung neoplasm     4/21/2018  3:54 PM [facility] Add [C79 9] Metastatic disease ([address] )     4/21/2018  3:54 PM [facility] Modify [C79 9] Metastatic disease [facility])       ED Disposition     ED Disposition Condition Comment    Admit  Case was discussed with SOD and the patient's admission status was agreed to be Admission Status: inpatient status to the service of Dr Alejandro Houston    None       Patient's Medications   Discharge Prescriptions    No medications on file     No discharge procedures on file  ED Provider  Attending physically available and evaluated [facility] managed the patient along with the ED Attending      Electronically Signed by         Andrés Verduzco DO  04/21/18 7209

## 2018-04-21 NOTE — H&P
INTERNAL MEDICINE HISTORY AND PHYSICAL  ED 05 SOD Team C     NAME: Imelda Perea  AGE: 69 y o  SEX: male  : 1940   MRN: [id number]  ENCOUNTER: [id number]    DATE: 2018  TIME: 4:21 PM    Primary Care Physician: July Schreiber MD  Admitting Provider: Sharon Suh MD    Chief complaint:  Shortness of breath with exertion    History of Present Illness     Imelda Perea is a 69 y o  male with a past medical history history of hypertension, hyperlipidemia, diabetes mellitus, atrial fibrillation on Coumadin the comes to the emergency department today due to increasing shortness of breath for the past 2 months  Patient was seen in his doctor's office outpatient very was diagnosed with acute bronchitis  Azithromycin was not given at the time as the patient is on Coumadin and this can affect levels in the blood the patient was placed on Keflex and Mucinex  On evaluation, patient refers dyspnea on exertion which improves with rest   Patient states he is only able to walk 10 ft before becoming short of breath and having to sit down  He also refers orthopnea and states this improves with lying on his side  Patient also refers swelling of his ankles which is not regular for him  Leonaelyssa Solanokatty He states he gets lightheaded and dizzy when he becomes dyspneic  Patient refers a cough during this 2 months that is associated with yellow sputum production  He denies hemoptysis  He also states a right-sided chest pain that radiates to the left side  He states that this pain is worse at rest and gets better with movement  Patient denies any recent illness, sick contacts, syncope, recent falls, trauma, headache, palpitations, diarrhea, constipation, abdominal pain, abdominal distension, fever, weight loss, chills    Patient states he does not use illicit drugs, only drinks alcohol very occasionally and has not smoked cigarettes for the past 40 years though he did smoke cigarettes 40 years ago for a total of 20 years at a rate of 2 packs per day  Family history positive for maternal grandfather with multiple strokes  In the emergency department, patient was found to be hypertensive with a blood pressure of 188/84 but otherwise hemodynamically stable  Labs were notable for INR 2 98, proBNP 772, hemoglobin 11 9 with MCV 85, sodium 132, chloride 96, creatinine 1 46 (unknown baseline), BUN 26, glucose 230, AST 61, ALT 18, alk-phos 128, albumin 3 4, GFR 46  Troponin negative x1  EKG showed atrial fibrillation with ventricular rate of 89  There is low voltage QRS  No acute ischemic changes were found  Chest x-ray showed large right pleural effusion  Given findings of chest x-ray, CT of the chest abdomen and pelvis without contrast was ordered and found right perihilar mass with occlusion of the right middle and lower lobe bronchi consistent with primary pulmonary malignancy  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 extended into the right apex, pathologic mediastinal lymphadenopathy and multiple metastatic nodules of in the left lung  CT also found obstructive collapse of the right middle lobe and lower lobe  There is increasing thickened/nodular appearing left adrenal gland which could represent progression of hypertrophy or metastasis  There is an ascending aortic aneurysm measuring 4 5 cm  Also noted was a small pericardial effusion  Finally, there are osseous metastases in the L1, L2, L4, L5 vertebral bodies  Review of Systems   Review of Systems   Constitutional: Positive for activity change and fatigue  Negative for chills, diaphoresis, fever and unexpected weight change  Eyes: Negative for visual disturbance  Respiratory: Positive for cough and shortness of breath  Negative for chest tightness and wheezing  Cardiovascular: Positive for chest pain and leg swelling  Negative for palpitations     Gastrointestinal: Negative for abdominal distention, abdominal pain, blood in stool, constipation, diarrhea, nausea and vomiting  Genitourinary: Negative for dysuria, frequency, hematuria and urgency  Musculoskeletal: Negative for arthralgias and myalgias  Skin: Negative for color change, pallor and rash  Neurological: Positive for dizziness, weakness and light-headedness  Negative for syncope, numbness and headaches  Past Medical History     Past Medical History:   Diagnosis Date    Diabetes mellitus ([facility] [address] )     Hypercholesteremia     Hypertension     Proteinuria     LAST ASSESSED [id number]       Past Surgical History   History reviewed  No pertinent surgical history  Social History     History   Alcohol Use No     Comment: OCCASIONAL      History   Drug Use No     History   Smoking Status    Former Smoker   Smokeless Tobacco    Never Used       Family History     Family History   Problem Relation Age of Onset    Diabetes Mother     Diabetes Father     Diabetes Family        Medications Prior to Admission     Prior to Admission medications    Medication Sig Start Date End Date Taking?  Authorizing Provider   guaiFENesin (MUCINEX) 600 mg 12 hr tablet Take 1 tablet (600 mg total) by mouth every 12 (twelve) hours 3/27/18  Yes Erica Bettencourt DO   insulin detemir (LEVEMIR) 100 units/mL subcutaneous injection Inject 34 Int'l Units/100 mL under the skin Daily   12/11/17  Yes Historical Provider, MD   Insulin Pen Needle (NOVOFINE) 32G X 6 MM MISC 1 strip by Does not apply route daily 1/25/18  Yes Michi Amezquita MD   lisinopril-hydrochlorothiazide (PRINZIDE,ZESTORETIC) 20-12 5 MG per tablet Take 1 5 tablets by mouth 2 (two) times a day 1/25/18  Yes Michi Amezquita MD   metFORMIN (FORTAMET) 500 MG (OSM) 24 hr tablet Take 1 tablet (500 mg total) by mouth 2 (two) times a day 4/2/18  Yes Jasson Rizzo DO   simvastatin (ZOCOR) 40 mg tablet Take 1 tablet by mouth daily 10/8/13  Yes Historical Provider, MD   sodium chloride (OCEAN) 0 65 % nasal spray 1 spray into each nostril as needed for congestion 3/27/18  Yes Erica Bettencourt DO   warfarin (COUMADIN) 5 mg tablet Take 1 5 mg by mouth daily   Yes Historical Provider, MD   albuterol (VENTOLIN HFA) 90 mcg/act inhaler Inhale 2 puffs every 6 (six) hours as needed for wheezing 3/27/18   Erica Bettencourt DO   JEANNE CONTOUR NEXT TEST test strip 3 applicators by Does not apply route 3 (three) times a week 12/5/17   Historical Provider, MD       Allergies   No Known Allergies    Objective     Vitals:    04/21/18 1355 04/21/18 1415 04/21/18 1459 04/21/18 1553   BP:  138/77 126/64 132/85   BP Location:  Right arm     Pulse:  94 92 91   Resp:  22 (!) 24 22   Temp: (!) 97 4 °F (36 3 °C)      SpO2:  94% 90% 93%   Weight:         Body mass index is 33 08 kg/m²  No intake or output data in the 24 hours ending 04/21/18 1621  Invasive Devices          No matching active lines, drains, or airways          Physical Exam   Constitutional: He is oriented to person, place, and time  He appears well-developed and well-nourished  No distress  Obese   HENT:   Head: Normocephalic and atraumatic  Mouth/Throat: No oropharyngeal exudate  Eyes: Conjunctivae and EOM are normal  Pupils are equal, round, and reactive to light  No scleral icterus  Neck: Normal range of motion  Neck supple  No JVD present  No thyromegaly present  Cardiovascular: Normal rate, normal heart sounds and intact distal pulses  Exam reveals no gallop and no friction rub  No murmur heard  Irregularly irregular rhythm with a heart rate of 98   Pulmonary/Chest: Effort normal and breath sounds normal  He has no wheezes  He has no rales  He exhibits no tenderness  Diminished breath sounds on right side   Abdominal: Soft  Bowel sounds are normal  He exhibits distension and mass (Small nodule right mid abdominal wall)  There is no tenderness  There is no guarding  Musculoskeletal: Normal range of motion  He exhibits edema (1 to 2+ pitting edema around the ankles bilaterally)  He exhibits no tenderness or deformity  Neurological: He is alert and oriented to person, place, and time  No cranial nerve deficit  Skin: Skin is warm and dry  No rash noted  He is not diaphoretic  No erythema  No pallor  Psychiatric: He has a normal mood and affect  His behavior is normal    Nursing note and vitals reviewed  Lab Results: I have personally reviewed pertinent reports  CBC:   Results from last 7 days  Lab Units 04/21/18  1343   WBC Thousand/uL 8 07   RBC Million/uL 4 20   HEMOGLOBIN g/dL 11 9*   HEMATOCRIT % 35 7*   MCV fL 85   MCH pg 28 3   MCHC g/dL 33 3   RDW % 15 0   MPV fL 9 1   PLATELETS Thousands/uL 262   NRBC AUTO /100 WBCs 0   NEUTROS PCT % 78*   LYMPHS PCT % 11*   MONOS PCT % 9   EOS PCT % 2   BASOS PCT % 0   NEUTROS ABS Thousands/µL 6 31   LYMPHS ABS Thousands/µL 0 91   MONOS ABS Thousand/µL 0 69   EOS ABS Thousand/µL 0 13   , Chemistry Profile:   Results from last 7 days  Lab Units 04/21/18  1343   SODIUM mmol/L 132*   POTASSIUM mmol/L 4 2   CHLORIDE mmol/L 96*   CO2 mmol/L 30   ANION GAP mmol/L 6   BUN mg/dL 26*   CREATININE mg/dL 1 46*   GLUCOSE RANDOM mg/dL 230*   CALCIUM mg/dL 9 2   AST U/L 61*   ALT U/L 18   ALK PHOS U/L 128*   TOTAL PROTEIN g/dL 7 0   BILIRUBIN TOTAL mg/dL 0 49   EGFR ml/min/1 73sq m 46   , Coagulation Studies:   Results from last 7 days  Lab Units 04/21/18  1343   PROTIME seconds 31 4*   INR  2 98*   PTT seconds 49*   , Cardiac Studies:   Results from last 7 days  Lab Units 04/21/18  1343   TROPONIN I ng/mL <0 02       Imaging: I have personally reviewed pertinent films in PACS  Ct Chest Abdomen Pelvis Wo Contrast    Result Date: 4/21/2018  Narrative: CT CHEST, ABDOMEN AND PELVIS WITHOUT IV CONTRAST INDICATION:   Right pleural effusion  COMPARISON: CT of the abdomen/pelvis dated 12/24/2004   TECHNIQUE: CT examination of the chest, abdomen and pelvis was performed without intravenous contrast   Axial, sagittal, and coronal 2D reformatted images were created from the source data and submitted for interpretation  Radiation dose length product (DLP) for this visit:  1637 02 mGy-cm   This examination, like all CT scans performed in the [facility], was performed utilizing techniques to minimize radiation dose exposure, including the use of iterative reconstruction and automated exposure control  Enteric contrast was administered  Note: Image numbers reported for findings (if any) are taken from axial series 2 and 3 unless otherwise indicated  FINDINGS: CHEST LUNGS:  Soft tissue filling both the right middle and lower lobar arteries  Perihilar masslike consolidative opacity, obscured by consolidations in the right middle lobe  There appears to be associated satellite nodularity measuring up to 8 mm  Associated interstitial thickening involving both the interlobular septae and the peribronchial vascular interstitium  Findings consistent with lymphangitic carcinomatosis  3 mm pulmonary nodules in the anterior left upper lobe on image 18   4 mm nodule in the left upper lobe on image 23   4 mm nodule in the left lower lobe on image 28  Multiple larger lobular nodules in the left lower lobe measuring up to 19 mm x 14 mm on  image 44  PLEURA:  Moderate sized pleural effusion  Posttraumatic changes of the left pleural from prior healed rib fractures  HEART/GREAT VESSELS:  Small pericardial effusion  Aortic valvular and coronary calcifications  Heart size is normal   Ascending aortic aneurysm measuring 4 6 cm  MEDIASTINUM AND DWIGHT:  Pathologically enlarged lymph nodes in the mediastinum  Largest index nodes measure 28 mm x 20 mm in the left paratracheal location on image 24 and in a subcarinal location measuring 32 mm x 20 mm on image 34  CHEST WALL AND LOWER NECK:   Unremarkable  ABDOMEN LIVER/BILIARY TREE:  Unremarkable  GALLBLADDER:  Stones or sludge layering at the gallbladder fundus  No pericholecystic inflammation or wall thickening  SPLEEN:  Unremarkable  PANCREAS:  Unremarkable  ADRENAL GLANDS:  Diffuse nodular thickening of left adrenal gland up to 2 5 cm in thickness  The gland  Mildly thickened in 2004, this represents a significant increase  Right gland is unremarkable  KIDNEYS/URETERS:  Right and left atrophic changes  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  Renal cysts left kidney  No suspicious renal masses  No hydronephrosis  STOMACH AND BOWEL:  Unremarkable  APPENDIX:  A normal appendix was visualized  ABDOMINOPELVIC CAVITY:  No ascites or free intraperitoneal air  No lymphadenopathy  VESSELS:  Unremarkable for patient's age  PELVIS REPRODUCTIVE ORGANS:  Unremarkable for patient's age  URINARY BLADDER:  Unremarkable  ABDOMINAL WALL/INGUINAL REGIONS:  Diastasis recti no hernias  No inguinal lymphadenopathy  OSSEOUS STRUCTURES:  Multiple healed left-sided rib fractures  No acute fractures demonstrated  Lytic lesions demonstrated in the lateral 1, L2, L4, and L5 vertebral bodies with areas of anterior cortical erosion and low-grade fragmentation at L5  Impression: 1  Right perihilar mass with occlusion of the right middle and lower lobe bronchi consistent with primary pulmonary malignancy  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, pathologic mediastinal lymphadenopathy, and multiple metastatic nodules in the left lung  2   Obstructive collapse of the right middle lobe and lower lobe  3   Increasing thickened, nodular appearance of the left adrenal gland  This could represent progression of hypertrophy or metastasis  Recommend further evaluation on PET/CT  4   Ascending aortic aneurysm measuring up to 4 5 cm  5   Small pericardial effusion  6   Osseous metastases in the L1, L2, L4, and L5 vertebral bodies  I personally discussed this study with Neida Castillo on 4/21/2018 at 3:42 PM  Workstation performed: [id number]       EKG, Pathology, and Other Studies: I have personally reviewed pertinent reports        Medications given in Emergency Department     Medication Administration - last 24 hours from 04/20/2018 1621 to 04/21/2018 1621       Date/Time Order Dose Route Action Action by     04/21/2018 5701 furosemide (LASIX) injection 20 mg 20 mg Intravenous Given Harika Jackson RN          Assessment and Plan     Problem List     * (Principal)Shortness of breath    Hypertension    Type 2 diabetes mellitus with complication ([facility] )    Mixed hyperlipidemia    Atrial fibrillation ([facility] )    Peripheral vascular disease ([facility] )    Persistent proteinuria    Pleural effusion    Acute kidney injury ([facility] )    Lung neoplasm    Metastatic disease ([facility] )        1  Lung neoplasm - patient with shortness of breath, dizziness, lightheadedness, cough with sputum production for the past 2 months  Chest x-ray shows large pleural effusion on the right side  CT scan of the chest abdomen pelvis without contrast showed right perihilar mass with occlusion of the right middle and lower lobe bronchi consistent with primary pulmonary malignancy  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 extended into the right apex, pathologic mediastinal lymphadenopathy and multiple metastatic nodules of in the left lung  CT also found obstructive collapse of the right middle lobe and lower lobe  There is increasing thickened/nodular appearing left adrenal gland which could represent progression of hypertrophy or metastasis  Also noted was a small pericardial effusion  Finally, there are osseous metastases in the L1, L2, L4, L5 vertebral bodies    -will consult IR for thoracentesis for diagnostic and therapeutic purposes  -patient may need chest tube as patient with pneumothorax which may not expand after fluid drainage and concern for quick reaccumulation if malignant effusion  -patient given 1 time dose of 20 mg IV Lasix in the emergency department with suspicion of acute decompensated heart failure  -will not diurese further as patient does not clinically appear to be in heart failure and proBNP only 772  -will not consult pulmonology at this point as bronchoscopy is not a valid source of tissue collection given this is not an endobronchial lesion  -will order Tylenol p r n  for pain and Zofran p r n  for nausea and vomiting  -will order respiratory protocol  -will continue home albuterol p r n   -consult with PT/OT  -consult with Oncology; patient will require formal tissue diagnosis and possible PET scan to further evaluate adrenal lesions  -will order MRI for the lung neoplasm with metastases to bone and possible abdomen and to rule out metastases to brain  -out of bed with assistance to prevent falls while patient is on Coumadin given that patient is lightheaded with standing  -will order CBC with differential and CMP for morning labs  -will order the following exams from pleural fluid:  Total protein, blood fluid culture and Gram stain, lactate dehydrogenase, cytology, pH; serum labs will also be collected at the time of thoracentesis including total protein and lactate dehydrogenase to evaluate for Light's criteria      2  Elevated creatinine  -creatinine 1 46 on admission with unknown baseline  -possible component of CKD stage III given underlying diabetes and hypertension  -will hold home lisinopril/hydrochlorothiazide for now  -will will not order IV fluids for now to prevent worsening of pleural effusion    3  Atrial fibrillation  -patient currently on Coumadin 1 5 mg every day except for Monday and Friday on which days he takes 5 mg  -patient is not taking Coumadin for past 2 days given elevated INR  -will hold Coumadin for now for thoracentesis; unable to discuss with IR department at this time   -patient will currently not on rate or rhythm control medication    4    Hypertension  -patient takes hydrochlorothiazide/lisinopril at home  -will hold for now in the setting of possible JUANA  -will order p r n  labetalol for SBP greater than 160 with hold parameters of heart rate less than 50    5  Diabetes mellitus  -patient takes metformin in the morning and Levemir 34 units daily around dinnertime at home  -will order Levemir 34 units basal insulin daily at dinnertime  -will order sliding scale correctional insulin with meals and at bedtime  -will order POCT blood glucose testing with meals at bedtime    6  Hyperlipidemia  -patient takes simvastatin 40 mg daily at home  -will continue home regimen    7  Ascending aortic aneurysm  -incidental finding found on CT of the chest/abdomen/pelvis  -currently measures 4 5 cm  -biannual CTA or MRA recommended for surveillance, may also consider echocardiogram  -patient also should be started on beta-blocker when possible      Code Status: Level 1 - Full Code  VTE Pharmacologic Prophylaxis: Warfarin (Coumadin)   VTE Mechanical Prophylaxis: sequential compression device  Admission Status: INPATIENT     Admission Time  I spent 45 minutes admitting the patient  This involved direct patient contact where I performed a full history and physical, reviewing previous records, and reviewing laboratory and other diagnostic studies      Monroe Aguirre MD  Internal Medicine  PGY-1

## 2018-04-21 NOTE — PROGRESS NOTES
[facility] Admission Note   Unit/Bed # @DBLINK (AUS,31396)@ Encounter: [id number]  SOD Team C           Davis Patterson 69 y o  male [id number]       Patient seen and examined  Reviewed H&P per Dr Anastaica Vick   Please see Dr Alie Sheikh H&P  Agree with the assessment and plan as outlined in his H&P    Assessment/Plan:   Principal Problem:    Lung neoplasm  Active Problems:    Hypertension    Type 2 diabetes mellitus with complication (HCC)    Mixed hyperlipidemia    Atrial fibrillation (HCC)    Peripheral vascular disease (HCC)    Shortness of breath    Pleural effusion    Acute kidney injury ([address] )    Metastatic disease ([facility] [address] )    Pneumothorax        Disposition:  INPATIENT     Expected LOS: Erika Canales MD

## 2018-04-21 NOTE — ED NOTES
Pt provided dinner menu, pt sitting at edge of bed, wife remains at bedside  VSS, pt continues to deny any complaints    Pt provided medication which is available in ED, pharmacy aware of other medications needed     Kota Chacon RN  04/21/18 4071

## 2018-04-22 ENCOUNTER — APPOINTMENT (INPATIENT)
Dept: RADIOLOGY | Facility: HOSPITAL | Age: 78
DRG: 167 | End: 2018-04-22
Payer: COMMERCIAL

## 2018-04-22 LAB
ALBUMIN SERPL BCP-MCNC: 3.1 G/DL (ref 3.5–5)
ALP SERPL-CCNC: 108 U/L (ref 46–116)
ALT SERPL W P-5'-P-CCNC: 16 U/L (ref 12–78)
ANION GAP SERPL CALCULATED.3IONS-SCNC: 5 MMOL/L (ref 4–13)
AST SERPL W P-5'-P-CCNC: 61 U/L (ref 5–45)
BASOPHILS # BLD AUTO: 0.02 THOUSANDS/ΜL (ref 0–0.1)
BASOPHILS NFR BLD AUTO: 0 % (ref 0–1)
BILIRUB SERPL-MCNC: 0.71 MG/DL (ref 0.2–1)
BUN SERPL-MCNC: 25 MG/DL (ref 5–25)
CALCIUM SERPL-MCNC: 9.1 MG/DL (ref 8.3–10.1)
CHLORIDE SERPL-SCNC: 96 MMOL/L (ref 100–108)
CO2 SERPL-SCNC: 32 MMOL/L (ref 21–32)
CREAT SERPL-MCNC: 1.17 MG/DL (ref 0.6–1.3)
EOSINOPHIL # BLD AUTO: 0.16 THOUSAND/ΜL (ref 0–0.61)
EOSINOPHIL NFR BLD AUTO: 2 % (ref 0–6)
ERYTHROCYTE [DISTWIDTH] IN BLOOD BY AUTOMATED COUNT: 15 % (ref 11.6–15.1)
GFR SERPL CREATININE-BSD FRML MDRD: 60 ML/MIN/1.73SQ M
GLUCOSE SERPL-MCNC: 120 MG/DL (ref 65–140)
GLUCOSE SERPL-MCNC: 133 MG/DL (ref 65–140)
GLUCOSE SERPL-MCNC: 148 MG/DL (ref 65–140)
GLUCOSE SERPL-MCNC: 155 MG/DL (ref 65–140)
GLUCOSE SERPL-MCNC: 160 MG/DL (ref 65–140)
HCT VFR BLD AUTO: 38 % (ref 36.5–49.3)
HGB BLD-MCNC: 12.1 G/DL (ref 12–17)
INR PPP: 2.24 (ref 0.86–1.16)
LYMPHOCYTES # BLD AUTO: 0.93 THOUSANDS/ΜL (ref 0.6–4.47)
LYMPHOCYTES NFR BLD AUTO: 13 % (ref 14–44)
MCH RBC QN AUTO: 28 PG (ref 26.8–34.3)
MCHC RBC AUTO-ENTMCNC: 31.8 G/DL (ref 31.4–37.4)
MCV RBC AUTO: 88 FL (ref 82–98)
MONOCYTES # BLD AUTO: 0.79 THOUSAND/ΜL (ref 0.17–1.22)
MONOCYTES NFR BLD AUTO: 11 % (ref 4–12)
NEUTROPHILS # BLD AUTO: 5 THOUSANDS/ΜL (ref 1.85–7.62)
NEUTS SEG NFR BLD AUTO: 74 % (ref 43–75)
NRBC BLD AUTO-RTO: 0 /100 WBCS
PLATELET # BLD AUTO: 253 THOUSANDS/UL (ref 149–390)
PMV BLD AUTO: 8.9 FL (ref 8.9–12.7)
POTASSIUM SERPL-SCNC: 4.2 MMOL/L (ref 3.5–5.3)
PROT SERPL-MCNC: 6.7 G/DL (ref 6.4–8.2)
PROTHROMBIN TIME: 25 SECONDS (ref 12.1–14.4)
RBC # BLD AUTO: 4.32 MILLION/UL (ref 3.88–5.62)
SODIUM SERPL-SCNC: 133 MMOL/L (ref 136–145)
WBC # BLD AUTO: 6.93 THOUSAND/UL (ref 4.31–10.16)

## 2018-04-22 PROCEDURE — 85025 COMPLETE CBC W/AUTO DIFF WBC: CPT | Performed by: INTERNAL MEDICINE

## 2018-04-22 PROCEDURE — 99233 SBSQ HOSP IP/OBS HIGH 50: CPT | Performed by: INTERNAL MEDICINE

## 2018-04-22 PROCEDURE — 99253 IP/OBS CNSLTJ NEW/EST LOW 45: CPT | Performed by: INTERNAL MEDICINE

## 2018-04-22 PROCEDURE — 94760 N-INVAS EAR/PLS OXIMETRY 1: CPT | Performed by: SOCIAL WORKER

## 2018-04-22 PROCEDURE — A9585 GADOBUTROL INJECTION: HCPCS | Performed by: INTERNAL MEDICINE

## 2018-04-22 PROCEDURE — 82948 REAGENT STRIP/BLOOD GLUCOSE: CPT

## 2018-04-22 PROCEDURE — 70553 MRI BRAIN STEM W/O & W/DYE: CPT

## 2018-04-22 PROCEDURE — 99222 1ST HOSP IP/OBS MODERATE 55: CPT | Performed by: INTERNAL MEDICINE

## 2018-04-22 PROCEDURE — 80053 COMPREHEN METABOLIC PANEL: CPT | Performed by: INTERNAL MEDICINE

## 2018-04-22 PROCEDURE — 85610 PROTHROMBIN TIME: CPT | Performed by: INTERNAL MEDICINE

## 2018-04-22 RX ADMIN — INSULIN DETEMIR 34 UNITS: 100 INJECTION, SOLUTION SUBCUTANEOUS at 10:38

## 2018-04-22 RX ADMIN — INSULIN LISPRO 1 UNITS: 100 INJECTION, SOLUTION INTRAVENOUS; SUBCUTANEOUS at 21:54

## 2018-04-22 RX ADMIN — HEPARIN SODIUM 5000 UNITS: 5000 INJECTION, SOLUTION INTRAVENOUS; SUBCUTANEOUS at 21:53

## 2018-04-22 RX ADMIN — HEPARIN SODIUM 5000 UNITS: 5000 INJECTION, SOLUTION INTRAVENOUS; SUBCUTANEOUS at 15:01

## 2018-04-22 RX ADMIN — INSULIN LISPRO 1 UNITS: 100 INJECTION, SOLUTION INTRAVENOUS; SUBCUTANEOUS at 18:08

## 2018-04-22 RX ADMIN — GADOBUTROL 10 ML: 604.72 INJECTION INTRAVENOUS at 17:18

## 2018-04-22 RX ADMIN — HEPARIN SODIUM 5000 UNITS: 5000 INJECTION, SOLUTION INTRAVENOUS; SUBCUTANEOUS at 05:18

## 2018-04-22 NOTE — PROGRESS NOTES
IM Residency Progress Note   [facility]: [id number]  SOD Team C       Shaylee Pearson 69 y o  male [id number]    Hospital Stay Days: 1      Assessment/Plan:    Principal Problem:    Lung neoplasm  Active Problems:    Hypertension    Type 2 diabetes mellitus with complication (HCC)    Mixed hyperlipidemia    Atrial fibrillation (HCC)    Peripheral vascular disease (HCC)    Shortness of breath    Pleural effusion    Acute kidney injury ([address] )    Metastatic disease (HCC)    Pneumothorax    1  Lung neoplasm - stage IV   -consult IR for thoracentesis for diagnostic and therapeutic purposes  -will order Tylenol p r n  for pain and Zofran p r n  for nausea and vomiting  -respiratory protocol  -albuterol p r n   -consult with PT/OT  -consult with Oncology; patient will require formal tissue diagnosis and possible PET scan to further evaluate adrenal lesions  -out of bed with assistance to prevent falls while patient is on Coumadin given that patient is lightheaded with standing  -evaluate pleural fluid         2  Elevated creatinine  -creatinine 1 46 on admission with unknown baseline  -possible component of CKD stage III given underlying diabetes and hypertension  -will hold home lisinopril/hydrochlorothiazide for now  -trend daily      3  Atrial fibrillation  -patient currently on Coumadin 1 5 mg every day except for Monday and Friday on which days he takes 5 mg  -not taking Coumadin for past 2 days given elevated INR  -hold Coumadin for now for thoracentesis; unable to discuss with IR department at this time   -patient will currently not on rate or rhythm control medication     4  Hypertension  -patient takes hydrochlorothiazide/lisinopril at home  -ACEI held due to elevated creatinine   - p r n  labetalol for SBP greater than 160 with hold parameters of heart rate less than 50     5    Diabetes mellitus  -patient takes metformin in the morning and Levemir 34 units daily around dinnertime at home  -Levemir 34 units basal insulin daily at dinnertime  -sliding scale correctional insulin with meals and at bedtime  - POCT blood glucose testing with meals at bedtime     6  Hyperlipidemia  -patient takes simvastatin 40 mg daily at home  -will continue home regimen     7  Ascending aortic aneurysm  -incidental finding found on CT of the chest/abdomen/pelvis  -currently measures 4 5 cm  -biannual CTA or MRA recommended for surveillance, may also consider echocardiogram       Disposition: Oncology consult, biopsy, possible need for head imaging  Subjective:   Patient with few complaints this morning  Mild dyspnea  No fever or chills  No nausea or vomiting  Vitals: Temp (24hrs), Av 8 °F (36 6 °C), Min:97 4 °F (36 3 °C), Max:98 2 °F (36 8 °C)  Current: Temperature: 97 8 °F (36 6 °C)  Vitals:    18 1800 18 1815 18 1901 18 2256   BP: 121/70  130/69 118/57   BP Location: Right arm  Left arm Left arm   Pulse: 90 90 89 78   Resp: (!) 24 18 19 20   Temp:   98 2 °F (36 8 °C) 97 8 °F (36 6 °C)   TempSrc:   Oral Oral   SpO2: 94% 95% 94% 98%   Weight:   107 kg (235 lb 14 3 oz)    Height:   6' 2" (1 88 m)     Body mass index is 30 29 kg/m²  I/O last 24 hours: In: 400 [P O :400]  Out: 1875 [Urine:1875]      Physical Exam:   Gen: Sitting, NC in place, NAD  HEENT: Anicteric, no injection, membranes moist  CV: RRR  Pulm: Transmitted breath sounds right lung with ronchi  Abd: NT/ND  Extr: No C/C/E       Invasive Devices     Peripheral Intravenous Line            Peripheral IV 18 Right Forearm 1 day                      Labs:   Recent Results (from the past 24 hour(s))   Fingerstick Glucose (POCT)    Collection Time: 18  1:41 PM   Result Value Ref Range    POC Glucose 228 (H) 65 - 140 mg/dl   Comprehensive metabolic panel    Collection Time: 18  1:43 PM   Result Value Ref Range    Sodium 132 (L) 136 - 145 mmol/L    Potassium 4 2 3 5 - 5 3 mmol/L    Chloride 96 (L) 100 - 108 mmol/L    CO2 30 21 - 32 mmol/L    Anion Gap 6 4 - 13 mmol/L    BUN 26 (H) 5 - 25 mg/dL    Creatinine 1 46 (H) 0 60 - 1 30 mg/dL    Glucose 230 (H) 65 - 140 mg/dL    Calcium 9 2 8 3 - 10 1 mg/dL    AST 61 (H) 5 - 45 U/L    ALT 18 12 - 78 U/L    Alkaline Phosphatase 128 (H) 46 - 116 U/L    Total Protein 7 0 6 4 - 8 2 g/dL    Albumin 3 4 (L) 3 5 - 5 0 g/dL    Total Bilirubin 0 49 0 20 - 1 00 mg/dL    eGFR 46 ml/min/1 73sq m   CBC and differential    Collection Time: 04/21/18  1:43 PM   Result Value Ref Range    WBC 8 07 4 31 - 10 16 Thousand/uL    RBC 4 20 3 88 - 5 62 Million/uL    Hemoglobin 11 9 (L) 12 0 - 17 0 g/dL    Hematocrit 35 7 (L) 36 5 - 49 3 %    MCV 85 82 - 98 fL    MCH 28 3 26 8 - 34 3 pg    MCHC 33 3 31 4 - 37 4 g/dL    RDW 15 0 11 6 - 15 1 %    MPV 9 1 8 9 - 12 7 fL    Platelets 003 101 - 316 Thousands/uL    nRBC 0 /100 WBCs    Neutrophils Relative 78 (H) 43 - 75 %    Lymphocytes Relative 11 (L) 14 - 44 %    Monocytes Relative 9 4 - 12 %    Eosinophils Relative 2 0 - 6 %    Basophils Relative 0 0 - 1 %    Neutrophils Absolute 6 31 1 85 - 7 62 Thousands/µL    Lymphocytes Absolute 0 91 0 60 - 4 47 Thousands/µL    Monocytes Absolute 0 69 0 17 - 1 22 Thousand/µL    Eosinophils Absolute 0 13 0 00 - 0 61 Thousand/µL    Basophils Absolute 0 01 0 00 - 0 10 Thousands/µL   Protime-INR    Collection Time: 04/21/18  1:43 PM   Result Value Ref Range    Protime 31 4 (H) 12 1 - 14 4 seconds    INR 2 98 (H) 0 86 - 1 16   APTT    Collection Time: 04/21/18  1:43 PM   Result Value Ref Range    PTT 49 (H) 23 - 35 seconds   Troponin I    Collection Time: 04/21/18  1:43 PM   Result Value Ref Range    Troponin I <0 02 <=0 04 ng/mL   NT-BNP PRO    Collection Time: 04/21/18  1:43 PM   Result Value Ref Range    NT-proBNP 772 (H) <450 pg/mL   Fingerstick Glucose (POCT)    Collection Time: 04/21/18  6:42 PM   Result Value Ref Range    POC Glucose 113 65 - 140 mg/dl   Fingerstick Glucose (POCT)    Collection Time: 04/21/18  9:29 PM   Result Value Ref Range    POC Glucose 184 (H) 65 - 140 mg/dl       Radiology Results: I have personally reviewed pertinent reports  Ct Chest Abdomen Pelvis Wo Contrast    Result Date: 4/21/2018  Impression: 1  Right perihilar mass with occlusion of the right middle and lower lobe bronchi consistent with primary pulmonary malignancy  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, pathologic mediastinal lymphadenopathy, and multiple metastatic nodules in the left lung  2   Obstructive collapse of the right middle lobe and lower lobe  3   Increasing thickened, nodular appearance of the left adrenal gland  This could represent progression of hypertrophy or metastasis  Recommend further evaluation on PET/CT  4   Ascending aortic aneurysm measuring up to 4 5 cm  5   Small pericardial effusion  6   Osseous metastases in the L1, L2, L4, and L5 vertebral bodies   I personally discussed this study with HERSON BAJWA on 4/21/2018 at 3:42 PM  Workstation performed: [id number]       Active Meds:   Current Facility-Administered Medications   Medication Dose Route Frequency    acetaminophen (TYLENOL) tablet 650 mg  650 mg Oral Q6H PRN    albuterol (PROVENTIL HFA,VENTOLIN HFA) inhaler 2 puff  2 puff Inhalation Q6H PRN    heparin (porcine) subcutaneous injection 5,000 Units  5,000 Units Subcutaneous Q8H [facility]    insulin detemir (LEVEMIR) subcutaneous injection 34 Units  34 Units Subcutaneous Daily    insulin lispro (HumaLOG) 100 units/mL subcutaneous injection 1-6 Units  1-6 Units Subcutaneous TID AC    insulin lispro (HumaLOG) 100 units/mL subcutaneous injection 1-6 Units  1-6 Units Subcutaneous HS    labetalol (NORMODYNE) injection 10 mg  10 mg Intravenous Q6H PRN    ondansetron (ZOFRAN) injection 4 mg  4 mg Intravenous Q6H PRN    simvastatin (ZOCOR) tablet 40 mg  40 mg Oral Daily    sodium chloride (OCEAN) 0 65 % nasal spray 1 spray  1 spray Each Nare PRN VTE Pharmacologic Prophylaxis: Held for possible biopsy    VTE Mechanical Prophylaxis: sequential compression device    Erenest Homans

## 2018-04-22 NOTE — PLAN OF CARE
Problem: DISCHARGE PLANNING - CARE MANAGEMENT  Goal: Discharge to post-acute care or home with appropriate resources  INTERVENTIONS:  - Conduct assessment to determine patient/family and health care team treatment goals, and need for post-acute services based on payer coverage, community resources, and patient preferences, and barriers to discharge  - Address psychosocial, clinical, and financial barriers to discharge as identified in assessment in conjunction with the patient/family and health care team  - Arrange appropriate level of post-acute services according to patient's   needs and preference and payer coverage in collaboration with the physician and health care team  - Communicate with and update the patient/family, physician, and health care team regarding progress on the discharge plan  - Arrange appropriate transportation to post-acute venues  Pt will go home to family when medically clear for discharge  Outcome: Progressing

## 2018-04-22 NOTE — CONSULTS
Oncology Consult Note  Roxie Villegas 69 y o  male MRN: [id number]  Unit/Bed#: [facility] 425-83 Encounter: [id number]      Presenting Complaint:  Right hilar mass and possible lumbar spine metastatic disease  History of Presenting Illness:  A 71-year-old gentleman who smoked 2 pack per day for 20 years until 40 years ago  In the last 2 months, he had progressive shortness of breath  He has no hemoptysis  He lost 8-10 lb in the last 1 month  He also has intermittent anterior chest pain shifted to the right  Therefore, he was hospitalized  CT scan showed right hilar mass which caused bronchial obstruction as well as some right pleural effusion  CT also showed probable multiple lumbar spine metastatic disease  Therefore, I was asked to see him  He appeared to be well  He has no distress  He has mild shortness of breath at rest   He has intermittent low back pain  His performance status is 1/4 on the ECOG scale  IR was consulted for right thoracentesis  Review of Systems - As stated in the HPI otherwise the fourteen point review of systems was negative      Past Medical History:   Diagnosis Date    [facility]     Hypercholesteremia     Hypertension     Proteinuria     LAST ASSESSED [id number]       Social History     Social History    Marital status: /Civil Union     Spouse name: N/A    Number of children: N/A    Years of education: N/A     Occupational History    RETIRED      Social History Main Topics    Smoking status: Former Smoker    Smokeless tobacco: Never Used    Alcohol use No      Comment: OCCASIONAL     Drug use: No    Sexual activity: Not Asked     Other Topics Concern    None     Social History Narrative    ADVANCED DIRECTIVE IN CHART     CAFFEINE USE VIA COFFEE 3 SERVINGS PER DAY            Family History   Problem Relation Age of Onset    Diabetes Mother     Diabetes Father     Diabetes Family        No Known Allergies      Current Facility-Administered Medications:     acetaminophen (TYLENOL) tablet 650 mg, 650 mg, Oral, Q6H PRN, Corina Martines MD    albuterol (PROVENTIL HFA,VENTOLIN HFA) inhaler 2 puff, 2 puff, Inhalation, Q6H PRN, Corina Martines MD    heparin (porcine) subcutaneous injection 5,000 Units, 5,000 Units, Subcutaneous, Q8H [facility], 5,000 Units at 04/22/18 0518 **AND** Platelet count, , , Once, Corina Martines MD    insulin detemir (LEVEMIR) subcutaneous injection 34 Units, 34 Units, Subcutaneous, Daily, Corina Martines MD    insulin lispro (HumaLOG) 100 units/mL subcutaneous injection 1-6 Units, 1-6 Units, Subcutaneous, TID AC **AND** Fingerstick Glucose (POCT), , , TID AC, Samantha Sykes MD    insulin lispro (HumaLOG) 100 units/mL subcutaneous injection 1-6 Units, 1-6 Units, Subcutaneous, HS, Samantha Sykes MD    labetalol (NORMODYNE) injection 10 mg, 10 mg, Intravenous, Q6H PRN, Corina Martines MD    ond[facility]etron [facility]) injection 4 mg, 4 mg, Intravenous, Q6H PRN, Corina Martines MD    simvastatin (ZOCOR) tablet 40 mg, 40 mg, Oral, Daily, Corina Martines MD    sodium chloride (OCEAN) 0 65 % nasal spray 1 spray, 1 spray, Each Nare, PRN, Corina Martines MD      /71 (BP Location: Right arm)   Pulse 70   Temp 97 9 °F (36 6 °C) (Oral)   Resp 18   Ht 6' 2" (1 88 m)   Wt 108 kg (238 lb 8 6 oz)   SpO2 95%   BMI 30 63 kg/m²     General Appearance:    Alert, oriented        Eyes:    PERRL   Ears:    Normal external ear canals, both ears   Nose:   Nares normal, septum midline   Throat:   Mucosa moist  Pharynx without injection  Neck:   Supple       Lungs:     Slightly decreased year intake in the right lung base  Chest Wall:    No tenderness or deformity    Heart:    Regular rate and rhythm       Abdomen:     Soft, non-tender, bowel sounds +, no organomegaly           Extremities:   Extremities no cyanosis or edema       Skin:   no rash or icterus      Lymph nodes:   Cervical, supraclavicular, and axillary nodes normal   Neurologic:   CNII-XII intact, normal strength, sensation and reflexes     Throughout               Recent Results (from the past 48 hour(s))   Fingerstick Glucose (POCT)    Collection Time: 04/21/18  1:41 PM   Result Value Ref Range    POC Glucose 228 (H) 65 - 140 mg/dl   Comprehensive metabolic panel    Collection Time: 04/21/18  1:43 PM   Result Value Ref Range    Sodium 132 (L) 136 - 145 mmol/L    Potassium 4 2 3 5 - 5 3 mmol/L    Chloride 96 (L) 100 - 108 mmol/L    CO2 30 21 - 32 mmol/L    Anion Gap 6 4 - 13 mmol/L    BUN 26 (H) 5 - 25 mg/dL    Creatinine 1 46 (H) 0 60 - 1 30 mg/dL    Glucose 230 (H) 65 - 140 mg/dL    Calcium 9 2 8 3 - 10 1 mg/dL    AST 61 (H) 5 - 45 U/L    ALT 18 12 - 78 U/L    Alkaline Phosphatase 128 (H) 46 - 116 U/L    Total Protein 7 0 6 4 - 8 2 g/dL    Albumin 3 4 (L) 3 5 - 5 0 g/dL    Total Bilirubin 0 49 0 20 - 1 00 mg/dL    eGFR 46 ml/min/1 73sq m   CBC and differential    Collection Time: 04/21/18  1:43 PM   Result Value Ref Range    WBC 8 07 4 31 - 10 16 Thousand/uL    RBC 4 20 3 88 - 5 62 Million/uL    Hemoglobin 11 9 (L) 12 0 - 17 0 g/dL    Hematocrit 35 7 (L) 36 5 - 49 3 %    MCV 85 82 - 98 fL    MCH 28 3 26 8 - 34 3 pg    MCHC 33 3 31 4 - 37 4 g/dL    RDW 15 0 11 6 - 15 1 %    MPV 9 1 8 9 - 12 7 fL    Platelets 908 948 - 507 Thousands/uL    nRBC 0 /100 WBCs    Neutrophils Relative 78 (H) 43 - 75 %    Lymphocytes Relative 11 (L) 14 - 44 %    Monocytes Relative 9 4 - 12 %    Eosinophils Relative 2 0 - 6 %    Basophils Relative 0 0 - 1 %    Neutrophils Absolute 6 31 1 85 - 7 62 Thousands/µL    Lymphocytes Absolute 0 91 0 60 - 4 47 Thousands/µL    Monocytes Absolute 0 69 0 17 - 1 22 Thousand/µL    Eosinophils Absolute 0 13 0 00 - 0 61 Thousand/µL    Basophils Absolute 0 01 0 00 - 0 10 Thousands/µL   Protime-INR    Collection Time: 04/21/18  1:43 PM   Result Value Ref Range    Protime 31 4 (H) 12 1 - 14 4 seconds    INR 2 98 (H) 0 86 - 1 16   APTT    Collection Time: 04/21/18  1:43 PM   Result Value Ref Range    PTT 49 (H) 23 - 35 seconds   Troponin I    Collection Time: 04/21/18  1:43 PM   Result Value Ref Range    Troponin I <0 02 <=0 04 ng/mL   NT-BNP PRO    Collection Time: 04/21/18  1:43 PM   Result Value Ref Range    NT-proBNP 772 (H) <450 pg/mL   Fingerstick Glucose (POCT)    Collection Time: 04/21/18  6:42 PM   Result Value Ref Range    POC Glucose 113 65 - 140 mg/dl   Fingerstick Glucose (POCT)    Collection Time: 04/21/18  9:29 PM   Result Value Ref Range    POC Glucose 184 (H) 65 - 140 mg/dl   Comprehensive metabolic panel    Collection Time: 04/22/18  5:16 AM   Result Value Ref Range    Sodium 133 (L) 136 - 145 mmol/L    Potassium 4 2 3 5 - 5 3 mmol/L    Chloride 96 (L) 100 - 108 mmol/L    CO2 32 21 - 32 mmol/L    Anion Gap 5 4 - 13 mmol/L    BUN 25 5 - 25 mg/dL    Creatinine 1 17 0 60 - 1 30 mg/dL    Glucose 120 65 - 140 mg/dL    Calcium 9 1 8 3 - 10 1 mg/dL    AST 61 (H) 5 - 45 U/L    ALT 16 12 - 78 U/L    Alkaline Phosphatase 108 46 - 116 U/L    Total Protein 6 7 6 4 - 8 2 g/dL    Albumin 3 1 (L) 3 5 - 5 0 g/dL    Total Bilirubin 0 71 0 20 - 1 00 mg/dL    eGFR 60 ml/min/1 73sq m   CBC and differential    Collection Time: 04/22/18  5:16 AM   Result Value Ref Range    WBC 6 93 4 31 - 10 16 Thousand/uL    RBC 4 32 3 88 - 5 62 Million/uL    Hemoglobin 12 1 12 0 - 17 0 g/dL    Hematocrit 38 0 36 5 - 49 3 %    MCV 88 82 - 98 fL    MCH 28 0 26 8 - 34 3 pg    MCHC 31 8 31 4 - 37 4 g/dL    RDW 15 0 11 6 - 15 1 %    MPV 8 9 8 9 - 12 7 fL    Platelets 265 317 - 630 Thousands/uL    nRBC 0 /100 WBCs    Neutrophils Relative 74 43 - 75 %    Lymphocytes Relative 13 (L) 14 - 44 %    Monocytes Relative 11 4 - 12 %    Eosinophils Relative 2 0 - 6 %    Basophils Relative 0 0 - 1 %    Neutrophils Absolute 5 00 1 85 - 7 62 Thousands/µL    Lymphocytes Absolute 0 93 0 60 - 4 47 Thousands/µL    Monocytes Absolute 0 79 0 17 - 1 22 Thousand/µL    Eosinophils Absolute 0 16 0 00 - 0 61 Thousand/µL    Basophils Absolute 0 02 0 00 - 0 10 Thousands/µL   Protime-INR    Collection Time: 04/22/18  5:16 AM   Result Value Ref Range    Protime 25 0 (H) 12 1 - 14 4 seconds    INR 2 24 (H) 0 86 - 1 16   Fingerstick Glucose (POCT)    Collection Time: 04/22/18  8:37 AM   Result Value Ref Range    POC Glucose 133 65 - 140 mg/dl         Ct Chest Abdomen Pelvis Wo Contrast    Result Date: 4/21/2018  Narrative: CT CHEST, ABDOMEN AND PELVIS WITHOUT IV CONTRAST INDICATION:   Right pleural effusion  COMPARISON: CT of the abdomen/pelvis dated 12/24/2004  TECHNIQUE: CT examination of the chest, abdomen and pelvis was performed without intravenous contrast   Axial, sagittal, and coronal 2D reformatted images were created from the source data and submitted for interpretation  Radiation dose length product (DLP) for this visit:  1637 02 mGy-cm   This examination, like all CT scans performed in the [facility], was performed utilizing techniques to minimize radiation dose exposure, including the use of iterative reconstruction and automated exposure control  Enteric contrast was administered  Note: Image numbers reported for findings (if any) are taken from axial series 2 and 3 unless otherwise indicated  FINDINGS: CHEST LUNGS:  Soft tissue filling both the right middle and lower lobar arteries  Perihilar masslike consolidative opacity, obscured by consolidations in the right middle lobe  There appears to be associated satellite nodularity measuring up to 8 mm  Associated interstitial thickening involving both the interlobular septae and the peribronchial vascular interstitium  Findings consistent with lymphangitic carcinomatosis  3 mm pulmonary nodules in the anterior left upper lobe on image 18   4 mm nodule in the left upper lobe on image 23   4 mm nodule in the left lower lobe on image 28  Multiple larger lobular nodules in the left lower lobe measuring up to 19 mm x 14 mm on  image 44  PLEURA:  Moderate sized pleural effusion  Posttraumatic changes of the left pleural from prior healed rib fractures  HEART/GREAT VESSELS:  Small pericardial effusion  Aortic valvular and coronary calcifications  Heart size is normal   Ascending aortic aneurysm measuring 4 6 cm  MEDIASTINUM AND DWIGHT:  Pathologically enlarged lymph nodes in the mediastinum  Largest index nodes measure 28 mm x 20 mm in the left paratracheal location on image 24 and in a subcarinal location measuring 32 mm x 20 mm on image 34  CHEST WALL AND LOWER NECK:   Unremarkable  ABDOMEN LIVER/BILIARY TREE:  Unremarkable  GALLBLADDER:  Stones or sludge layering at the gallbladder fundus  No pericholecystic inflammation or wall thickening  SPLEEN:  Unremarkable  PANCREAS:  Unremarkable  ADRENAL GLANDS:  Diffuse nodular thickening of left adrenal gland up to 2 5 cm in thickness  The gland  Mildly thickened in 2004, this represents a significant increase  Right gland is unremarkable  KIDNEYS/URETERS:  Right and left atrophic changes  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  Renal cysts left kidney  No suspicious renal masses  No hydronephrosis  STOMACH AND BOWEL:  Unremarkable  APPENDIX:  A normal appendix was visualized  ABDOMINOPELVIC CAVITY:  No ascites or free intraperitoneal air  No lymphadenopathy  VESSELS:  Unremarkable for patient's age  PELVIS REPRODUCTIVE ORGANS:  Unremarkable for patient's age  URINARY BLADDER:  Unremarkable  ABDOMINAL WALL/INGUINAL REGIONS:  Diastasis recti no hernias  No inguinal lymphadenopathy  OSSEOUS STRUCTURES:  Multiple healed left-sided rib fractures  No acute fractures demonstrated  Lytic lesions demonstrated in the lateral 1, L2, L4, and L5 vertebral bodies with areas of anterior cortical erosion and low-grade fragmentation at L5  Impression: 1  Right perihilar mass with occlusion of the right middle and lower lobe bronchi consistent with primary pulmonary malignancy    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, pathologic mediastinal lymphadenopathy, and multiple metastatic nodules in the left lung  2   Obstructive collapse of the right middle lobe and lower lobe  3   Increasing thickened, nodular appearance of the left adrenal gland  This could represent progression of hypertrophy or metastasis  Recommend further evaluation on PET/CT  4   Ascending aortic aneurysm measuring up to 4 5 cm  5   Small pericardial effusion  6   Osseous metastases in the L1, L2, L4, and L5 vertebral bodies  I personally discussed this study with Purvi Cook on 4/21/2018 at 3:42 PM  Workstation performed: [id number]       Assessment:  Right hilar mass and multiple lumbar spine metastatic disease, highly suspicious for an lung neoplasm  Plan:  A 67-year-old gentleman with history as described above  I agree with IR consultation for thoracentesis to evaluate cytology  However, he appeared to have right bronchial obstruction which may be the cause of right pleural effusion  Cytology of right pleural effusion may or may not be diagnostic for cancer  Therefore, I am going to ask Pulmonary specialist for possible bronchoscopy and biopsy  I would also order bone scan  Once we obtain final pathology report of any biopsy report, we will discuss treatment options  He and his wife are in agreement with my recommendations

## 2018-04-22 NOTE — CASE MANAGEMENT
Initial Clinical Review    Admission: Date/Time/Statement: 4/21/18 @ 1455     Orders Placed This Encounter   Procedures    Inpatient Admission (expected length of stay for this patient is greater than two midnights)     Standing Status:   Standing     Number of Occurrences:   1     Order Specific Question:   Admitting Physician     Answer:   Apple La     Order Specific Question:   Level of Care     Answer:   Med Surg [16]     Order Specific Question:   Estimated length of stay     Answer:   More than 2 Midnights     Order Specific Question:   Certification     Answer:   I certify that inpatient services are medically necessary for this patient for a duration of greater than two midnights  See H&P and MD Progress Notes for additional information about the patient's course of treatment  ED: Date/Time/Mode of Arrival:   ED Arrival Information     Expected Arrival Acuity Means of Arrival Escorted By Service Admission Type    - 4/21/2018 13:21 Emergent Walk-In Self General Medicine Emergency    Arrival Complaint    SOB          Chief Complaint:   Chief Complaint   Patient presents with    Shortness of Breath     Sob for months, worse on exertion , +cough       History of Illness: 69 y o  male with a past medical history history of hypertension, hyperlipidemia, diabetes mellitus, atrial fibrillation on Coumadin the comes to the emergency department today due to increasing shortness of breath for the past 2 months  Patient was seen in his doctor's office outpatient office was diagnosed with acute bronchitis  Azithromycin was not given at the time as the patient is on Coumadin and this can affect levels in the blood the patient was placed on Keflex and Mucinex  On evaluation, patient refers dyspnea on exertion which improves with rest   Patient states he is only able to walk 10 ft before becoming short of breath and having to sit down    He also refers orthopnea and states this improves with lying on his side  Patient also refers swelling of his ankles which is not regular for him  Bartholome Pinks He states he gets lightheaded and dizzy when he becomes dyspneic  Patient refers a cough during this 2 months that is associated with yellow sputum production  He denies hemoptysis  He also states a right-sided chest pain that radiates to the left side  He states that this pain is worse at rest and gets better with movement  ED Vital Signs:   ED Triage Vitals   Temperature Pulse Respirations Blood Pressure SpO2   04/21/18 1355 04/21/18 1337 04/21/18 1337 04/21/18 1337 04/21/18 1337   (!) 97 4 °F (36 3 °C) 88 20 (!) 188/84 94 %      Temp Source Heart Rate Source Patient Position - Orthostatic VS BP Location FiO2 (%)   04/21/18 1901 04/21/18 1345 04/21/18 1345 04/21/18 1345 --   Oral Monitor Lying Right arm       Pain Score       04/21/18 1337       No Pain        Wt Readings from Last 1 Encounters:   04/22/18 108 kg (238 lb 8 6 oz)       Vital Signs:  04/21 0701  04/22 0700 04/22 0701  04/22 1844  Most Recent     Temperature (°F) 97 498 2 97 697 9  97 6 (36 4)    Pulse 7894 7087  87    Respirations 1831 1820  20    Blood Pressure 118/57188/84 110/71147/63  147/63    SpO2 (%) 9098 9495  94        Abnormal Labs/Diagnostic Test Results: INR 2 98, proBNP 772, hemoglobin 11 9 with MCV 85, sodium 132, chloride 96, creatinine 1 46 (unknown baseline), BUN 26, glucose 230, AST 61, ALT 18, alk-phos 128, albumin 3 4, GFR 46  Troponin negative x1  EKG -- atrial fibrillation with ventricular rate of 89  There is low voltage QRS  No acute ischemic changes were found  CXR -- large right pleural effusion  Ct c/a/p --  1  Right perihilar mass with occlusion of the right middle and lower lobe bronchi consistent with primary pulmonary malignancy  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, pathologic mediastinal lymphadenopathy, and multiple metastatic nodules in the left lung   2  Obstructive collapse of the right middle lobe and lower lobe  3   Increasing thickened, nodular appearance of the left adrenal gland  This could represent progression of hypertrophy or metastasis  Recommend further evaluation on PET/CT  4   Ascending aortic aneurysm measuring up to 4 5 cm  5   Small pericardial effusion  6   Osseous metastases in the L1, L2, L4, and L5 vertebral bodies  ED Treatment:   Medication Administration from 04/21/2018 1321 to 04/21/2018 1837       Date/Time Order Dose Route Action Action by Comments     04/21/2018 1458 furosemide (LASIX) injection 20 mg 20 mg Intravenous Given Elizabeth Arrington RN      04/21/2018 1732 heparin (porcine) subcutaneous injection 5,000 Units 5,000 Units Subcutaneous Given Patrick Ramos RN           Past Medical/Surgical History: Active Ambulatory Problems     Diagnosis Date Noted    Hypertension 01/25/2018    Type 2 diabetes mellitus with complication ([facility] [address] ) 12/98/0248    Mixed hyperlipidemia 01/25/2018    Persistent proteinuria 01/24/2017     Past Medical History:   Diagnosis Date    Diabetes mellitus ([address] )     Hypercholesteremia     Hypertension     Proteinuria        Admitting Diagnosis: Dyspnea [R06 00]  SOB (shortness of breath) [R06 02]  Pleural effusion [J90]  Lung neoplasm [D49 1]  Metastatic primary lung cancer ([address] ) [C34 90]  Metastatic disease (HCC) [C79 9]    Age/Sex: 69 y o  male    Assessment/Plan:   Problem List      * (Principal)Shortness of breath     Hypertension     Type 2 diabetes mellitus with complication (HCC)     Mixed hyperlipidemia     Atrial fibrillation (HCC)     Peripheral vascular disease (HCC)     Persistent proteinuria     Pleural effusion     Acute kidney injury ([address] )     Lung neoplasm     Metastatic disease ([address] )          1  Lung neoplasm - patient with shortness of breath, dizziness, lightheadedness, cough with sputum production for the past 2 months    Chest x-ray shows large pleural effusion on the right side   CT scan of the chest abdomen pelvis without contrast showed right perihilar mass with occlusion of the right middle and lower lobe bronchi consistent with primary pulmonary malignancy  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 extended into the right apex, pathologic mediastinal lymphadenopathy and multiple metastatic nodules of in the left lung  CT also found obstructive collapse of the right middle lobe and lower lobe  There is increasing thickened/nodular appearing left adrenal gland which could represent progression of hypertrophy or metastasis  Also noted was a small pericardial effusion  Finally, there are osseous metastases in the L1, L2, L4, L5 vertebral bodies    -will consult IR for thoracentesis for diagnostic and therapeutic purposes  -patient may need chest tube as patient with pneumothorax which may not expand after fluid drainage and concern for quick reaccumulation if malignant effusion  -patient given 1 time dose of 20 mg IV Lasix in the emergency department with suspicion of acute decompensated heart failure  -will not diurese further as patient does not clinically appear to be in heart failure and proBNP only 772  -will not consult pulmonology at this point as bronchoscopy is not a valid source of tissue collection given this is not an endobronchial lesion  -will order Tylenol p r n  for pain and Zofran p r n  for nausea and vomiting  -will order respiratory protocol  -will continue home albuterol p r n   -consult with PT/OT  -consult with Oncology; patient will require formal tissue diagnosis and possible PET scan to further evaluate adrenal lesions  -will order MRI for the lung neoplasm with metastases to bone and possible abdomen and to rule out metastases to brain  -out of bed with assistance to prevent falls while patient is on Coumadin given that patient is lightheaded with standing  -will order CBC with differential and CMP for morning labs  -will order the following exams from pleural fluid:  Total protein, blood fluid culture and Gram stain, lactate dehydrogenase, cytology, pH; serum labs will also be collected at the time of thoracentesis including total protein and lactate dehydrogenase to evaluate for Light's criteria        2  Elevated creatinine  -creatinine 1 46 on admission with unknown baseline  -possible component of CKD stage III given underlying diabetes and hypertension  -will hold home lisinopril/hydrochlorothiazide for now  -will will not order IV fluids for now to prevent worsening of pleural effusion     3  Atrial fibrillation  -patient currently on Coumadin 1 5 mg every day except for Monday and Friday on which days he takes 5 mg  -patient is not taking Coumadin for past 2 days given elevated INR  -will hold Coumadin for now for thoracentesis; unable to discuss with IR department at this time   -patient will currently not on rate or rhythm control medication     4  Hypertension  -patient takes hydrochlorothiazide/lisinopril at home  -will hold for now in the setting of possible JUANA  -will order p r n  labetalol for SBP greater than 160 with hold parameters of heart rate less than 50     5  Diabetes mellitus  -patient takes metformin in the morning and Levemir 34 units daily around dinnertime at home  -will order Levemir 34 units basal insulin daily at dinnertime  -will order sliding scale correctional insulin with meals and at bedtime  -will order POCT blood glucose testing with meals at bedtime     6  Hyperlipidemia  -patient takes simvastatin 40 mg daily at home  -will continue home regimen     7    Ascending aortic aneurysm  -incidental finding found on CT of the chest/abdomen/pelvis  -currently measures 4 5 cm  -biannual CTA or MRA recommended for surveillance, may also consider echocardiogram  -patient also should be started on beta-blocker when possible          Admission Orders:  M/S/Tele unit  Telem  O2 to keep sat >90% currently on 2lpm nc  IS q 1h  Respiratory protocol  Cons carb diet  accuchecks qid w/ ssi  SCD's  Up with assistance  PT/OT angelo  MRI brain ordered  Bone scan ordered  Consult oncology  Consult -- pulmonology -- possible bronchoscopy w/ biopsy  Consult IR -- possible thoracentesis    Scheduled Meds:   Current Facility-Administered Medications:  acetaminophen 650 mg Oral Q6H PRN Jasmeet Self MD   albuterol 2 puff Inhalation Q6H PRN Jasmeet Self MD   heparin (porcine) 5,000 Units Subcutaneous Q8H [address] [facility] [address], MD   insulin detemir 34 Units Subcutaneous Daily Jasmeet Self MD   insulin lispro 1-6 Units Subcutaneous TID AC Mahesh Ayala MD   insulin lispro 1-6 Units Subcutaneous HS Mahesh Ayala MD   labetalol 10 mg Intravenous Q6H PRN Jasmeet Self MD   ondansetron 4 mg Intravenous Q6H PRN Jasmeet Self MD   simvastatin 40 mg Oral Daily Jasmeet Self MD   sodium chloride 1 spray Each Nare PRN Jasmeet Self MD     Continuous Infusions:    PRN Meds:   acetaminophen    Albuterol udn x1 4/22    labetalol    ondansetron    sodium chloride      Oncology consult 4/22 --   Assessment:  Right hilar mass and multiple lumbar spine metastatic disease, highly suspicious for an lung neoplasm      Plan:  A 56-year-old gentleman with history as described above  I agree with IR consultation for thoracentesis to evaluate cytology  However, he appeared to have right bronchial obstruction which may be the cause of right pleural effusion  Cytology of right pleural effusion may or may not be diagnostic for cancer  Therefore, I am going to ask Pulmonary specialist for possible bronchoscopy and biopsy  I would also order bone scan  Once we obtain final pathology report of any biopsy report, we will discuss treatment options    He and his wife are in agreement with my recommendations

## 2018-04-22 NOTE — SOCIAL WORK
CM met pt at bedside and made aware of CM role at d/c  Pt denies having a LW or POA  Pt reported that he lived with his wife Sierra Chacko in a 1 story house with 3 MIN  Pt was IPTA with all ADL's, drive and retired  Pt has a walker at home but does not use it  Pt denies hx with HHC, STR, alc, drug and psych tx  Pharmacy is [facility] in [place]  Pt's wife will transprtort pt home when d/c    CM reviewed d/c planning process including the following: identifying help at home, patient preference for d/c planning needs, Discharge Lounge, [facility] Meds to Bed program, availability of treatment team to discuss questions or concerns patient and/or family may have regarding understanding medications and recognizing signs and symptoms once discharged  CM also encouraged patient to follow up with all recommended appointments after discharge  Patient advised of importance for patient and family to participate in managing patients medical well being

## 2018-04-22 NOTE — CONSULTS
Pulmonary Consultation   Marisol Cook 69 y o  male MRN: [id number]  Unit/Bed#: [facility] 073-73 Encounter: [id number]      Reason for consultation: Lung mass and effusion     Requesting physician: Dr Blanquita Torres    Impressions/Recommendations:     · Right perihilar mass with endobronchial obstruction and right effusion  With mediastinal adenopathy and metastatic nodules in the lung most concerning for lung cancer  He will need an airway inspection, biopsy and thoracentesis for diagnostic and therapeutic reasons  I will plan for this early next week off coumadin when INR is 1 5  Discussed with patient and nurse in the room  · Remote history of smoking  · Atrial fibrillation- rate control but hold on coumadin   I would hold off on lasix administration as it is unlikely to help in this scenario  History of Present Illness   HPI:  Marisol Cook is a 69 y o  male who presents with 2 month history of 6lb weight loss, cough without hemoptysis , dyspnea and right sided dull chest pain  No fevers chills palpitations   no previous pulmonary history to report  Good appetite and sleep  Review of systems:  Patient denies headache or vision changes  Denies sore throat or runny nose  Denies fever, chills or sweats  Denies palpitations  Denies nausea, vomiting or diarrhea  Denies hemoptysis hematemesis or melena    Denies lower extremity edema  Denies skin rashes  Denies dysuria or hematuria  All other 12-point review of systems are negative  Historical Information   Past Medical History:   Diagnosis Date    Diabetes mellitus ([address] )     Hypercholesteremia     Hypertension     Proteinuria     LAST ASSESSED [id number]     History reviewed  No pertinent surgical history  Family History   Problem Relation Age of Onset    Diabetes Mother     Diabetes Father     Diabetes Family        Tobacco history: 25 pack year quit 30 years ago  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    Retired many years   Family history: [place]    Meds/Allergies   Current Facility-Administered Medications   Medication Dose Route Frequency    acetaminophen (TYLENOL) tablet 650 mg  650 mg Oral Q6H PRN    albuterol (PROVENTIL HFA,VENTOLIN HFA) inhaler 2 puff  2 puff Inhalation Q6H PRN    heparin (porcine) subcutaneous injection 5,000 Units  5,000 Units Subcutaneous Q8H [address]    insulin detemir (LEVEMIR) subcutaneous injection 34 Units  34 Units Subcutaneous Daily    insulin lispro (HumaLOG) 100 units/mL subcutaneous injection 1-6 Units  1-6 Units Subcutaneous TID AC    insulin lispro (HumaLOG) 100 units/mL subcutaneous injection 1-6 Units  1-6 Units Subcutaneous HS    labetalol (NORMODYNE) injection 10 mg  10 mg Intravenous Q6H PRN    ondansetron (ZOFRAN) injection 4 mg  4 mg Intravenous Q6H PRN    simvastatin (ZOCOR) tablet 40 mg  40 mg Oral Daily    sodium chloride (OCEAN) 0 65 % nasal spray 1 spray  1 spray Each Nare PRN     No Known Allergies    Vitals: Blood pressure 110/71, pulse 70, temperature 97 9 °F (36 6 °C), temperature source Oral, resp  rate 18, height 6' 2" (1 88 m), weight 108 kg (238 lb 8 6 oz), SpO2 95 % , , Body mass index is 30 63 kg/m²  Intake/Output Summary (Last 24 hours) at 04/22/18 1245  Last data filed at 04/22/18 1001   Gross per 24 hour   Intake              760 ml   Output             2525 ml   Net            -1765 ml       Physical exam:    General Appearance:    Alert, cooperative, no conversational dyspnea or   accessory muscle use       Uses oxygen    Head/eyes:    Normocephalic, without obvious abnormality, atraumatic,         PERRL, extraocular muscles intact, no scleral icterus    Nose:   Nares normal, septum midline, mucosa normal, no drainage    or sinus tenderness   Throat:   Moist mucous membranes, no thrush   Neck:   Supple, trachea midline, no adenopathy; no carotid    bruit or JVD   Lungs:      Diminished breath sounds entire right chest except in the right supraclavicular area    Chest Wall:    No tenderness or deformity    Heart:    Regular rate and rhythm, S1 and S2 normal, no murmur, rub   or gallop   Abdomen:     Soft, non-tender, bowel sounds active all four quadrants,     no masses, no organomegaly   Extremities:   Extremities normal, atraumatic, no cyanosis or edema   Skin:   Warm, dry, turgor normal, no rashes or lesions   Lymph nodes:   Cervical and supraclavicular nodes normal         Labs: I have personally reviewed pertinent lab results  Results from last 7 days  Lab Units 04/22/18  0516 04/21/18  1343   WBC Thousand/uL 6 93 8 07   HEMOGLOBIN g/dL 12 1 11 9*   HEMATOCRIT % 38 0 35 7*   PLATELETS Thousands/uL 253 262           Results from last 7 days  Lab Units 04/22/18  0516 04/21/18  1343   SODIUM mmol/L 133* 132*   POTASSIUM mmol/L 4 2 4 2   CHLORIDE mmol/L 96* 96*   CO2 mmol/L 32 30   BUN mg/dL 25 26*   CREATININE mg/dL 1 17 1 46*   CALCIUM mg/dL 9 1 9 2   TOTAL PROTEIN g/dL 6 7 7 0   BILIRUBIN TOTAL mg/dL 0 71 0 49   ALK PHOS U/L 108 128*   ALT U/L 16 18   AST U/L 61* 61*   GLUCOSE RANDOM mg/dL 120 230*       Results from last 7 days  Lab Units 04/22/18  0516 04/21/18  1343 04/18/18   INR  2 24* 2 98* 3 50*   PTT seconds  --  49*  --          1  Right perihilar mass with occlusion of the right middle and lower lobe bronchi consistent with primary pulmonary malignancy  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, pathologic mediastinal lymphadenopathy, and   multiple metastatic nodules in the left lung      2  Obstructive collapse of the right middle lobe and lower lobe      3  Increasing thickened, nodular appearance of the left adrenal gland  This could represent progression of hypertrophy or metastasis  Recommend further evaluation on PET/CT      4  Ascending aortic aneurysm measuring up to 4 5 cm      5   Small pericardial effusion      6   Osseous metastases in the L1, L2, L4, and L5 vertebral bodies        Imaging and other studies: I have personally reviewed pertinent reports  Code Status: Level 1 - Full Code    Thank you for allowing us to participate in the care of your patient      Cristo Jamison MD

## 2018-04-23 ENCOUNTER — APPOINTMENT (INPATIENT)
Dept: RADIOLOGY | Facility: HOSPITAL | Age: 78
DRG: 167 | End: 2018-04-23
Payer: COMMERCIAL

## 2018-04-23 PROBLEM — R06.00 DYSPNEA: Status: ACTIVE | Noted: 2018-04-21

## 2018-04-23 LAB
ANION GAP SERPL CALCULATED.3IONS-SCNC: 4 MMOL/L (ref 4–13)
ATRIAL RATE: 115 BPM
BUN SERPL-MCNC: 26 MG/DL (ref 5–25)
CALCIUM SERPL-MCNC: 8.9 MG/DL (ref 8.3–10.1)
CHLORIDE SERPL-SCNC: 97 MMOL/L (ref 100–108)
CO2 SERPL-SCNC: 33 MMOL/L (ref 21–32)
CREAT SERPL-MCNC: 1.17 MG/DL (ref 0.6–1.3)
GFR SERPL CREATININE-BSD FRML MDRD: 60 ML/MIN/1.73SQ M
GLUCOSE SERPL-MCNC: 108 MG/DL (ref 65–140)
GLUCOSE SERPL-MCNC: 123 MG/DL (ref 65–140)
GLUCOSE SERPL-MCNC: 186 MG/DL (ref 65–140)
GLUCOSE SERPL-MCNC: 232 MG/DL (ref 65–140)
GLUCOSE SERPL-MCNC: 88 MG/DL (ref 65–140)
INR PPP: 1.46 (ref 0.86–1.16)
POTASSIUM SERPL-SCNC: 4.4 MMOL/L (ref 3.5–5.3)
PROTHROMBIN TIME: 17.8 SECONDS (ref 12.1–14.4)
QRS AXIS: -11 DEGREES
QRSD INTERVAL: 82 MS
QT INTERVAL: 334 MS
QTC INTERVAL: 406 MS
SODIUM SERPL-SCNC: 134 MMOL/L (ref 136–145)
T WAVE AXIS: 60 DEGREES
VENTRICULAR RATE: 89 BPM

## 2018-04-23 PROCEDURE — 78306 BONE IMAGING WHOLE BODY: CPT

## 2018-04-23 PROCEDURE — 85610 PROTHROMBIN TIME: CPT | Performed by: INTERNAL MEDICINE

## 2018-04-23 PROCEDURE — 99232 SBSQ HOSP IP/OBS MODERATE 35: CPT | Performed by: INTERNAL MEDICINE

## 2018-04-23 PROCEDURE — G8988 SELF CARE GOAL STATUS: HCPCS

## 2018-04-23 PROCEDURE — 80048 BASIC METABOLIC PNL TOTAL CA: CPT | Performed by: INTERNAL MEDICINE

## 2018-04-23 PROCEDURE — 82948 REAGENT STRIP/BLOOD GLUCOSE: CPT

## 2018-04-23 PROCEDURE — 93010 ELECTROCARDIOGRAM REPORT: CPT | Performed by: INTERNAL MEDICINE

## 2018-04-23 PROCEDURE — 0B9F8ZX DRAINAGE OF RIGHT LOWER LUNG LOBE, VIA NATURAL OR ARTIFICIAL OPENING ENDOSCOPIC, DIAGNOSTIC: ICD-10-PCS | Performed by: INTERNAL MEDICINE

## 2018-04-23 PROCEDURE — A9503 TC99M MEDRONATE: HCPCS

## 2018-04-23 PROCEDURE — 97166 OT EVAL MOD COMPLEX 45 MIN: CPT

## 2018-04-23 PROCEDURE — G8987 SELF CARE CURRENT STATUS: HCPCS

## 2018-04-23 PROCEDURE — 99233 SBSQ HOSP IP/OBS HIGH 50: CPT | Performed by: INTERNAL MEDICINE

## 2018-04-23 RX ORDER — SIMVASTATIN 40 MG
40 TABLET ORAL
Status: DISCONTINUED | OUTPATIENT
Start: 2018-04-23 | End: 2018-04-27 | Stop reason: HOSPADM

## 2018-04-23 RX ADMIN — HEPARIN SODIUM 5000 UNITS: 5000 INJECTION, SOLUTION INTRAVENOUS; SUBCUTANEOUS at 21:39

## 2018-04-23 RX ADMIN — Medication 40 MG: at 18:10

## 2018-04-23 RX ADMIN — INSULIN LISPRO 3 UNITS: 100 INJECTION, SOLUTION INTRAVENOUS; SUBCUTANEOUS at 21:39

## 2018-04-23 RX ADMIN — HEPARIN SODIUM 5000 UNITS: 5000 INJECTION, SOLUTION INTRAVENOUS; SUBCUTANEOUS at 13:54

## 2018-04-23 RX ADMIN — INSULIN LISPRO 1 UNITS: 100 INJECTION, SOLUTION INTRAVENOUS; SUBCUTANEOUS at 13:11

## 2018-04-23 RX ADMIN — INSULIN DETEMIR 34 UNITS: 100 INJECTION, SOLUTION SUBCUTANEOUS at 18:10

## 2018-04-23 RX ADMIN — HEPARIN SODIUM 5000 UNITS: 5000 INJECTION, SOLUTION INTRAVENOUS; SUBCUTANEOUS at 06:01

## 2018-04-23 NOTE — PROGRESS NOTES
Progress Note - Pulmonary   Shelton Irizarry 69 y o  male MRN: [id number]  Unit/Bed#: [facility] 607-01 Encounter: [id number]    Assessment:  Acute hypoxic respiratory failure  Metastatic lung cancer  Pleural effusion  Brain and bone metastasis    Plan: We will proceed with thoracentesis for therapeutic purposes the patient clearly has widespread metastasis from this primary lung cancer  Tomorrow 1 o'clock will proceed with bronchoscopy for tissue sampling  Follow up with Heme-Onc the patient will probably require at least radiation for right middle and lower lobe occlusion and possibly brain metastasis as well  Chief Complaint:   Short of breath    Subjective:   Patient denies any new symptoms he still short of breath  He denies any hemoptysis or mucus production but has had ongoing dyspnea and weight loss    Objective:     Vitals: Blood pressure 121/70, pulse 78, temperature (!) 97 4 °F (36 3 °C), temperature source Oral, resp  rate 20, height 6' 2" (1 88 m), weight 107 kg (235 lb 14 3 oz), SpO2 95 %  ,Body mass index is 30 29 kg/m²        Intake/Output Summary (Last 24 hours) at 04/23/18 1439  Last data filed at 04/23/18 1315   Gross per 24 hour   Intake             1920 ml   Output             2750 ml   Net             -830 ml       Invasive Devices     Peripheral Intravenous Line            Peripheral IV 04/21/18 Right Forearm 2 days                Physical Exam: General appearance: alert and oriented, in no acute distress  Lungs: Clear to auscultation on the left decreased and diminished throughout the right lung  Heart: regular rate and rhythm, S1, S2 normal, no murmur, click, rub or gallop  Abdomen: soft, non-tender; bowel sounds normal; no masses,  no organomegaly  Extremities: extremities normal, warm and well-perfused; no cyanosis, clubbing, or edema     Labs:   CBC: No results found for: WBC, HGB, HCT, MCV, PLT, ADJUSTEDWBC, MCH, MCHC, RDW, MPV, NRBC, CMP:   Lab Results   Component Value Date     (L) 04/23/2018    K 4 4 04/23/2018    CL 97 (L) 04/23/2018    CO2 33 (H) 04/23/2018    ANIONGAP 4 04/23/2018    BUN 26 (H) 04/23/2018    CREATININE 1 17 04/23/2018    GLUCOSE 88 04/23/2018    CALCIUM 8 9 04/23/2018    EGFR 60 04/23/2018   , PT/INR:   Lab Results   Component Value Date    INR 1 46 (H) 04/23/2018     Imaging and other studies: I have personally reviewed pertinent films in PACS

## 2018-04-23 NOTE — PLAN OF CARE
Problem: OCCUPATIONAL THERAPY ADULT  Goal: Performs self-care activities at highest level of function for planned discharge setting  See evaluation for individualized goals  Treatment Interventions: ADL retraining, Functional transfer training, Endurance training, Patient/family training, Equipment evaluation/education, Compensatory technique education, Energy conservation          See flowsheet documentation for full assessment, interventions and recommendations  Limitation: Decreased ADL status, Decreased endurance, Decreased self-care trans, Decreased high-level ADLs  Prognosis: Fair  Assessment: Pt is a 69 y o  male who was [facility] 4/21[facility]2018 with Lung neoplasm w/ probable lumbar spine metastatic diease  Pt pending bronchoscopy for tissue sampling  Pt's active problems include HTN, DM2, HLD, AFIB, PVD, SOB, pleural effusion, JUANA, and pneumothorax  At baseline pt I with ADLs, IADLs, and driving  No DME use at baseline  Pt lives w/ spouse in one story home  Currently pt requires S-min A for overall ADLS and S for functional mobility/transfers w/ Rw  Educated pt on energy conservation strategies and pt verbalized understanding  Pt currently presents with impairments in the following categories -difficulty performing ADLS, difficulty performing IADLS, activity tolerance, endurance, standing balance/tolerance, and sitting balance/tolerance  These impairments, as well as pt's fatigue and risk for falls limit pt's ability to safely engage in all baseline areas of occupation, including grooming, bathing, dressing, toileting, functional mobility/transfers and leisure activities  From OT standpoint, recommend inpatient rehab vs home w/ family support and home therapy pending pt's prorgress upon D/C  OT will continue to follow to address the below stated goals        OT Discharge Recommendation: Other (Comment) (STR vs home w/ support and home therapy pending progress)

## 2018-04-23 NOTE — CASE MANAGEMENT
Initial Clinical Review    Admission: Date/Time/Statement: 4/21/18 @ 1455     Orders Placed This Encounter   Procedures    Inpatient Admission (expected length of stay for this patient is greater than two midnights)     Standing Status:   Standing     Number of Occurrences:   1     Order Specific Question:   Admitting Physician     Answer:   Jamie Holm     Order Specific Question:   Level of Care     Answer:   Med Surg [16]     Order Specific Question:   Estimated length of stay     Answer:   More than 2 Midnights     Order Specific Question:   Certification     Answer:   I certify that inpatient services are medically necessary for this patient for a duration of greater than two midnights  See H&P and MD Progress Notes for additional information about the patient's course of treatment  ED: Date/Time/Mode of Arrival:   ED Arrival Information     Expected Arrival Acuity Means of Arrival Escorted By Service Admission Type    - 4/21/2018 13:21 Emergent Walk-In Self General Medicine Emergency    Arrival Complaint    SOB          Chief Complaint:   Chief Complaint   Patient presents with    Shortness of Breath     Sob for months, worse on exertion , +cough       History of Illness: 69 y o  male who comes to the emergency department today due to increasing shortness of breath for the past 2 months  Patient was seen in his doctor's office outpatient office was diagnosed with acute bronchitis  Azithromycin was not given at the time as the patient is on Coumadin and this can affect levels in the blood the patient was placed on Keflex and Mucinex  On evaluation, patient refers dyspnea on exertion which improves with rest   Patient states he is only able to walk 10 ft before becoming short of breath and having to sit down  He also refers orthopnea and states this improves with lying on his side  Patient also refers swelling of his ankles which is not regular for him  Rosella Goldmann   He states he gets lightheaded and dizzy when he becomes dyspneic  Patient refers a cough during this 2 months that is associated with yellow sputum production  He also states a right-sided chest pain that radiates to the left side  He states that this pain is worse at rest and gets better with movement  ED Vital Signs:   ED Triage Vitals   Temperature Pulse Respirations Blood Pressure SpO2   04/21/18 1355 04/21/18 1337 04/21/18 1337 04/21/18 1337 04/21/18 1337   (!) 97 4 °F (36 3 °C) 88 20 (!) 188/84 94 %      Temp Source Heart Rate Source Patient Position - Orthostatic VS BP Location FiO2 (%)   04/21/18 1901 04/21/18 1345 04/21/18 1345 04/21/18 1345 --   Oral Monitor Lying Right arm       Pain Score       04/21/18 1337       No Pain          Wt Readings from Last 1 Encounters:   04/23/18 107 kg (235 lb 14 3 oz)       Vital Signs:  04/21 0701  04/22 0700 04/22 0701  04/22 1844  4/23    Temperature (°F) 97 498 2 97 697 9 97 4 (36 3)    Pulse 7894 7087 78    Respirations 1831 1820 20    Blood Pressure 118/57188/84 110/71147/63 121/70    SpO2 (%) 9098 on 2 L NC O2  9495 95 on 2 L NC O2     RA sat to low of 90%  Placed on 2L NC O2     Abnormal Labs/Diagnostic Test Results: INR 2 98, proBNP 772, hemoglobin 11 9 with MCV 85, sodium 132, chloride 96, creatinine 1 46 (unknown baseline), BUN 26, glucose 230, AST 61, ALT 18, alk-phos 128, albumin 3 4, GFR 46  Troponin negative x1  Pt/Inr 4/21 - 31 4/2 98 - 4/22 - 25 0/2 24 - 4/23 - 17 8/1 46    EKG -- atrial fibrillation with ventricular rate of 89  There is low voltage QRS  No acute ischemic changes were found  CXR -- large right pleural effusion  Ct c/a/p --  1  Right perihilar mass with occlusion of the right middle and lower lobe bronchi consistent with primary pulmonary malignancy  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, pathologic mediastinal lymphadenopathy, and multiple metastatic nodules in the left lung   2   Obstructive collapse of the right middle lobe and lower lobe  3   Increasing thickened, nodular appearance of the left adrenal gland  This could represent progression of hypertrophy or metastasis  Recommend further evaluation on PET/CT  4   Ascending aortic aneurysm measuring up to 4 5 cm  5   Small pericardial effusion  6   Osseous metastases in the L1, L2, L4, and L5 vertebral bodies  MRI Brain - Widespread brain metastases involving the supra and infratentorial compartments of the brain    ED Treatment:   Medication Administration from 04/21/2018 1321 to 04/21/2018 1837       Date/Time Order Dose Route Action     04/21/2018 1458 furosemide (LASIX) injection 20 mg 20 mg Intravenous Given     04/21/2018 1732 heparin (porcine) subcutaneous injection 5,000 Units 5,000 Units Subcutaneous Given       Past Medical/Surgical History:   Diagnosis    Diabetes mellitus ([address] )    Hypercholesteremia    Hypertension    Proteinuria       Admitting Diagnosis: Dyspnea [R06 00]  SOB (shortness of breath) [R06 02]  Pleural effusion [J90]  Lung neoplasm [D49 1]  Metastatic primary lung cancer ([address] ) [C34 90]  Metastatic disease ([address] ) [C79 9]    Age/Sex: 69 y o  male    Assessment/Plan:   Problem List      * (Principal)Shortness of breath     Hypertension     Type 2 diabetes mellitus with complication (HCC)     Mixed hyperlipidemia     Atrial fibrillation (HCC)     Peripheral vascular disease (HCC)     Persistent proteinuria     Pleural effusion     Acute kidney injury ([address] )     Lung neoplasm     Metastatic disease ([address] )          1  Lung neoplasm - patient with shortness of breath, dizziness, lightheadedness, cough with sputum production for the past 2 months  Chest x-ray shows large pleural effusion on the right side  CT scan of the chest abdomen pelvis without contrast showed right perihilar mass with occlusion of the right middle and lower lobe bronchi consistent with primary pulmonary malignancy    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 extended into the right apex, pathologic mediastinal lymphadenopathy and multiple metastatic nodules of in the left lung  CT also found obstructive collapse of the right middle lobe and lower lobe  There is increasing thickened/nodular appearing left adrenal gland which could represent progression of hypertrophy or metastasis  Also noted was a small pericardial effusion  Finally, there are osseous metastases in the L1, L2, L4, L5 vertebral bodies    -will consult IR for thoracentesis for diagnostic and therapeutic purposes  -patient may need chest tube as patient with pneumothorax which may not expand after fluid drainage and concern for quick reaccumulation if malignant effusion  -patient given 1 time dose of 20 mg IV Lasix in the emergency department with suspicion of acute decompensated heart failure  -will not diurese further as patient does not clinically appear to be in heart failure and proBNP only 772  -will not consult pulmonology at this point as bronchoscopy is not a valid source of tissue collection given this is not an endobronchial lesion  -will order Tylenol p r n  for pain and Zofran p r n  for nausea and vomiting  -will order respiratory protocol  -will continue home albuterol p r n   -consult with PT/OT  -consult with Oncology; patient will require formal tissue diagnosis and possible PET scan to further evaluate adrenal lesions  -will order MRI for the lung neoplasm with metastases to bone and possible abdomen and to rule out metastases to brain  -out of bed with assistance to prevent falls while patient is on Coumadin given that patient is lightheaded with standing  -will order CBC with differential and CMP for morning labs  -will order the following exams from pleural fluid:  Total protein, blood fluid culture and Gram stain, lactate dehydrogenase, cytology, pH; serum labs will also be collected at the time of thoracentesis including total protein and lactate dehydrogenase to evaluate for Light's criteria        2  Elevated creatinine  -creatinine 1 46 on admission with unknown baseline  -possible component of CKD stage III given underlying diabetes and hypertension  -will hold home lisinopril/hydrochlorothiazide for now  -will will not order IV fluids for now to prevent worsening of pleural effusion     3  Atrial fibrillation  -patient currently on Coumadin 1 5 mg every day except for Monday and Friday on which days he takes 5 mg  -patient is not taking Coumadin for past 2 days given elevated INR  -will hold Coumadin for now for thoracentesis; unable to discuss with IR department at this time   -patient will currently not on rate or rhythm control medication     4  Hypertension  -patient takes hydrochlorothiazide/lisinopril at home  -will hold for now in the setting of possible JUANA  -will order p r n  labetalol for SBP greater than 160 with hold parameters of heart rate less than 50     5  Diabetes mellitus  -patient takes metformin in the morning and Levemir 34 units daily around dinnertime at home  -will order Levemir 34 units basal insulin daily at dinnertime  -will order sliding scale correctional insulin with meals and at bedtime  -will order POCT blood glucose testing with meals at bedtime     6  Hyperlipidemia  -patient takes simvastatin 40 mg daily at home  -will continue home regimen     7    Ascending aortic aneurysm  -incidental finding found on CT of the chest/abdomen/pelvis  -currently measures 4 5 cm  -biannual CTA or MRA recommended for surveillance, may also consider echocardiogram  -patient also should be started on beta-blocker when possible          Admission Orders:  M/S/Tele unit  Telem  O2 to keep sat >90% currently on 2lpm nc  IS q 1h  Respiratory protocol  Cons carb diet  accuchecks qid w/ ssi  SCD's  Up with assistance  PT/OT angelo  MRI brain ordered  Bone scan ordered  Consult oncology  Consult -- pulmonology -- possible bronchoscopy w/ biopsy  Consult IR -- possible thoracentesis    Scheduled Meds:     Current Facility-Administered Medications:  acetaminophen 650 mg Oral Q6H PRN   albuterol 2 puff Inhalation Q6H PRN   heparin (porcine) 5,000 Units Subcutaneous Q8H [facility]   insulin detemir 34 Units Subcutaneous Daily   insulin lispro 1-6 Units Subcutaneous TID AC   insulin lispro 1-6 Units Subcutaneous HS   labetalol 10 mg Intravenous Q6H PRN   ondansetron 4 mg Intravenous Q6H PRN   simvastatin 40 mg Oral Daily   sodium chloride 1 spray Each Nare PRN     Continuous Infusions:    PRN Meds:   acetaminophen    Albuterol udn x1 4/22    Labetalol  10 mg iv  - 4/22 X 1    ondansetron    sodium chloride      Oncology consult 4/22 --   Assessment:  Right hilar mass and multiple lumbar spine metastatic disease, highly suspicious for an lung neoplasm      Plan:  A 81-year-old gentleman with history as described above  I agree with IR consultation for thoracentesis to evaluate cytology  However, he appeared to have right bronchial obstruction which may be the cause of right pleural effusion  Cytology of right pleural effusion may or may not be diagnostic for cancer  Therefore, I am going to ask Pulmonary specialist for possible bronchoscopy and biopsy  I would also order bone scan  Once we obtain final pathology report of any biopsy report, we will discuss treatment options  He and his wife are in agreement with my recommendations    Pulmonary Consult - 4/22 - Impressions/Recommendations:      · Right perihilar mass with endobronchial obstruction and right effusion  With mediastinal adenopathy and metastatic nodules in the lung most concerning for lung cancer  He will need an airway inspection, biopsy and thoracentesis for diagnostic and therapeutic reasons  I will plan for this early next week off coumadin when INR is 1 5   Discussed with patient and nurse in the room  · Remote history of smoking  · Atrial fibrillation- rate control but hold on coumadin   I would hold off on lasix administration as it is unlikely to help in this scenario

## 2018-04-23 NOTE — OCCUPATIONAL THERAPY NOTE
[address] Evaluation     Patient Name: Erin Barnes  PMASJ'Z Date: 4/23/2018  Problem List  Patient Active Problem List   Diagnosis    Hypertension    Type [facility] diabetes mellitus with complication ([facility] )    Mixed hyperlipidemia    Atrial fibrillation (HCC)    Peripheral vascular disease (HCC)    Persistent proteinuria    Dyspnea    Pleural effusion    Acute kidney injury ([facility] )    Lung neoplasm    Metastatic disease ([facility] )    Pneumothorax    Metastatic primary lung cancer ([facility] )     Past Medical History  Past Medical History:   Diagnosis Date    Diabetes mellitus ([place] [address] )     Hypercholesteremia     Hypertension     Proteinuria     LAST ASSESSED [id number]      04/23/18 1715   Note Type   Note type Eval/Treat   Restrictions/Precautions   Weight Bearing Precautions Per Order No   Other Precautions Fall Risk;O2   Pain Assessment   Pain Assessment No/denies pain   Pain Score No Pain   Home Living   Type of [address] [place] [address] One level;Stairs to enter with rails  (3 MIN)   Bathroom Shower/Tub Tub/shower unit   Bathroom Toilet Standard   Bathroom Equipment Commode   Bathroom Accessibility Accessible   Home Equipment Walker   Prior Function   Level of [place] Independent with ADLs and functional mobility   Lives With Dottie Help From Family   ADL Assistance Independent   IADLs Independent   Falls in the last 6 months 0   Vocational Retired   Lifestyle   Autonomy Pt reports I with ADLs, IADLs, and driving  NO DME use  Pt reports he has become increasingly SOB w/ activity the past couple of weeks  He was unable to carry a light grocery bag into the house without needing a rest break      Reciprocal Relationships Pt lives w/ spouse who can assist PRN   Service to Others Retired   Intrinsic Gratification Going for walks   Psychosocial   Psychosocial (WDL) WDL   ADL   Where Assessed Chair   Eating Assistance 5  Supervision/Setup   Eating Deficit Setup   Grooming Assistance 5 Supervision/Setup   UB Bathing Assistance 5  Supervision/Setup   LB Bathing Assistance 4  Minimal Assistance   UB Dressing Assistance 5  Supervision/Setup   LB Dressing Assistance 4  [address] Assistance  4  Minimal Assistance   Functional Assistance 4  Minimal Assistance   Bed Mobility   Additional Comments Pt seated in chair upon arrival   Transfers   Sit to Stand 5  Supervision   Additional items Increased time required   Stand to Sit 5  Supervision   Additional items Increased time required   Additional Comments Pt able to take a few steps forward and back from chair  Functional Mobility   Additional items Rolling walker   Balance   Static Sitting Fair +   Dynamic Sitting Fair   Static Standing Fair -   Dynamic Standing Poor +   Activity Tolerance   Activity Tolerance Patient limited by fatigue   Nurse Made Aware Okay to see per RN   RUE Assessment   RUE Assessment WFL   LUE Assessment   LUE Assessment WFL   Hand Function   Gross Motor Coordination Functional   Fine Motor Coordination Functional   Vision-Basic Assessment   Current Vision Wears glasses only for reading   Cognition   Overall Cognitive Status [facility]   Arousal/Participation Alert; Responsive; Cooperative   Attention Within functional limits   Orientation Level Oriented X4   Memory Within functional limits   Following Commands Follows all commands and directions without difficulty   Comments Pt is pleasant and cooperative  He was tearful t/o eval 2* news of new diagnosis and its metastatic nature  Provided pt w/ emotional support  Assessment   Limitation Decreased ADL status; Decreased endurance;Decreased self-care trans;Decreased high-level ADLs   Prognosis Fair   Assessment Pt is a 69 y o  male who was [facility] to [facility] 4/21/2018 with Lung neoplasm w/ probable lumbar spine metastatic diease  Pt pending bronchoscopy for tissue sampling   Pt's active problems include HTN, DM2, HLD, AFIB, PVD, SOB, pleural effusion, JUANA, and pneumothorax  At baseline pt I with ADLs, IADLs, and driving  No DME use at baseline  Pt lives w/ spouse in one story home  Currently pt requires S-min A for overall ADLS and S for functional mobility/transfers w/ Rw  Educated pt on energy conservation strategies and pt verbalized understanding  Pt currently presents with impairments in the following categories -difficulty performing ADLS, difficulty performing IADLS, activity tolerance, endurance, standing balance/tolerance, and sitting balance/tolerance  These impairments, as well as pt's fatigue and risk for falls limit pt's ability to safely engage in all baseline areas of occupation, including grooming, bathing, dressing, toileting, functional mobility/transfers and leisure activities  From OT standpoint, recommend inpatient rehab vs home w/ family support and home therapy pending pt's prorgress upon D/C  OT will continue to follow to address the below stated goals  Goals   Patient Goals none expressed   LTG Time Frame 7-10   Long Term Goal see below goals    Plan   Treatment Interventions ADL retraining;Functional transfer training; Endurance training;Patient/family training;Equipment evaluation/education; Compensatory technique education; Energy conservation   Goal Expiration Date 05/03/18   OT Frequency 3-5x/wk   Recommendation   OT Discharge Recommendation Other (Comment)  (STR vs home w/ support and home therapy pending progress)   Barthel Index   Feeding 10   Bathing 0   Grooming Score 5   Dressing Score 5   Bladder Score 10   Bowels Score 10   Toilet Use Score 5   Transfers (Bed/Chair) Score 10   Mobility (Level Surface) Score 0   Stairs Score 0   Barthel Index Score 55   Modified Pallavi Scale   Modified [place] Scale 4     Pt will perform all ADL's at mod I level with G balance and DME/AE as needed      Pt will perform functional transfers, including toilet transfer, at mod I level w/ use of DME/AE as needed       Pt will perform functional mobility at a mod I level w/ use of DME and G balance      Pt will demonstrate good carry over of RW safety and energy conservation techniques      Pt will demonstrate good carry over of pt/family education and training w/ 100% attention to task to assist w/ safe d/c planning      Pt will improve activity tolerance to G for 30 min treatment session      Pt will tolerate standing at sink for 8-10 minutes w/ G balance and endurance for grooming activity            Kalia Buck, OTR/L

## 2018-04-23 NOTE — PROGRESS NOTES
IM Residency Progress Note   Unit/Bed#: [facility] 607-01 Encounter: [id number]  SOD Team C       Ignacia Hudson 69 y o  male [id number]    Hospital Stay Days: 2      Assessment/Plan:    Principal Problem:    Lung neoplasm  Active Problems:    Hypertension    Type 2 diabetes mellitus with complication (HCC)    Mixed hyperlipidemia    Atrial fibrillation (HCC)    Peripheral vascular disease (HCC)    Shortness of breath    Pleural effusion    Acute kidney injury ([address] )    Metastatic disease (HCC)    Pneumothorax    1  Lung neoplasm - stage IV  -Planned biopsy when INR < 1 5   - Tylenol p r n  for pain and Zofran p r n  for nausea and vomiting  -out of bed with assistance to prevent falls while patient is on Coumadin given that patient is lightheaded with standing  -evaluate pleural fluid         2  Elevated creatinine  -creatinine 1 46 on admission with unknown baseline  -possible component of CKD stage III given underlying diabetes and hypertension  -Continue to hold home lisinopril/hydrochlorothiazide  -Improved with IVF, continue to trend      3  Atrial fibrillation  -At home currently on Coumadin 1 5 mg every day except for Monday and Friday on which days he takes 5 mg  -not takeing 2 days prior to admission given high INR  -hold Coumadin for now for thoracentesis  -Check INR in AM  -patient will currently not on rate or rhythm control medication     4  Hypertension  -patient takes hydrochlorothiazide/lisinopril at home  -ACEI held due to elevated creatinine   - p r n  labetalol for SBP greater than 160 with hold parameters of heart rate less than 50     5  Diabetes mellitus  -patient takes metformin in the morning and Levemir 34 units daily around dinnertime at home  -Levemir 34 units basal insulin daily at dinnertime  -sliding scale correctional insulin with meals and at bedtime  - POCT blood glucose testing with meals at bedtime     6    Hyperlipidemia  -patient takes simvastatin 40 mg daily at home  -will continue home regimen     7  Ascending aortic aneurysm  -incidental finding found on CT of the chest/abdomen/pelvis  -currently measures 4 5 cm  -biannual CTA or MRA recommended for surveillance, may also consider echocardiogram       Disposition: Need for biopsy, trend INR  Subjective:   Patient with stable dyspnea and fatigue  He reports some anxiety related to his probable diagnosis  Vitals: Temp (24hrs), Av 1 °F (36 7 °C), Min:97 4 °F (36 3 °C), Max:99 4 °F (37 4 °C)  Current: Temperature: (!) 97 4 °F (36 3 °C)  Vitals:    18 1500 18 2250 18 0600 18 0610   BP: 147/63 118/55  121/70   BP Location: Right arm Left arm  Right arm   Pulse: 87 78  78   Resp: 20 18  20   Temp: 97 6 °F (36 4 °C) 99 4 °F (37 4 °C)  (!) 97 4 °F (36 3 °C)   TempSrc: Oral Oral  Oral   SpO2: 94% 96%  95%   Weight:   107 kg (235 lb 14 3 oz)    Height:        Body mass index is 30 29 kg/m²  I/O last 24 hours: In: 1440 [P O :1440]  Out: 2700 [Urine:2700]      Physical Exam:   Gen: Sitting in chair, NC, no distress  HEENT: No injection, membranes moist   Pulm: Dull sound RLL, sounds right lung with ronchi, cough on deep inspiration  Abd: No distention    Extr: No edema    Invasive Devices     Peripheral Intravenous Line            Peripheral IV 18 Right Forearm 2 days                      Labs:   Recent Results (from the past 24 hour(s))   Fingerstick Glucose (POCT)    Collection Time: 18  8:37 AM   Result Value Ref Range    POC Glucose 133 65 - 140 mg/dl   Fingerstick Glucose (POCT)    Collection Time: 18 12:17 PM   Result Value Ref Range    POC Glucose 148 (H) 65 - 140 mg/dl   Fingerstick Glucose (POCT)    Collection Time: 18  5:37 PM   Result Value Ref Range    POC Glucose 155 (H) 65 - 140 mg/dl   Fingerstick Glucose (POCT)    Collection Time: 18  9:17 PM   Result Value Ref Range    POC Glucose 160 (H) 65 - 140 mg/dl   Basic metabolic panel    Collection Time: 04/23/18  5:02 AM   Result Value Ref Range    Sodium 134 (L) 136 - 145 mmol/L    Potassium 4 4 3 5 - 5 3 mmol/L    Chloride 97 (L) 100 - 108 mmol/L    CO2 33 (H) 21 - 32 mmol/L    Anion Gap 4 4 - 13 mmol/L    BUN 26 (H) 5 - 25 mg/dL    Creatinine 1 17 0 60 - 1 30 mg/dL    Glucose 88 65 - 140 mg/dL    Calcium 8 9 8 3 - 10 1 mg/dL    eGFR 60 ml/min/1 73sq m       Radiology Results: I have personally reviewed pertinent reports  Ct Chest Abdomen Pelvis Wo Contrast    Result Date: 4/21/2018  Impression: 1  Right perihilar mass with occlusion of the right middle and lower lobe bronchi consistent with primary pulmonary malignancy  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, pathologic mediastinal lymphadenopathy, and multiple metastatic nodules in the left lung  2   Obstructive collapse of the right middle lobe and lower lobe  3   Increasing thickened, nodular appearance of the left adrenal gland  This could represent progression of hypertrophy or metastasis  Recommend further evaluation on PET/CT  4   Ascending aortic aneurysm measuring up to 4 5 cm  5   Small pericardial effusion  6   Osseous metastases in the L1, L2, L4, and L5 vertebral bodies   I personally discussed this study with HERSON BAJWA on 4/21/2018 at 3:42 PM  Workstation performed: [id number]       Active Meds:   Current Facility-Administered Medications   Medication Dose Route Frequency    acetaminophen (TYLENOL) tablet 650 mg  650 mg Oral Q6H PRN    albuterol (PROVENTIL HFA,VENTOLIN HFA) inhaler 2 puff  2 puff Inhalation Q6H PRN    heparin (porcine) subcutaneous injection 5,000 Units  5,000 Units Subcutaneous Q8H [address]    insulin detemir (LEVEMIR) subcutaneous injection 34 Units  34 Units Subcutaneous Daily    insulin lispro (HumaLOG) 100 units/mL subcutaneous injection 1-6 Units  1-6 Units Subcutaneous TID AC    insulin lispro (HumaLOG) 100 units/mL subcutaneous injection 1-6 Units  1-6 Units Subcutaneous HS  labetalol (NORMODYNE) injection 10 mg  10 mg Intravenous Q6H PRN    ondansetron (ZOFRAN) injection 4 mg  4 mg Intravenous Q6H PRN    simvastatin (ZOCOR) tablet 40 mg  40 mg Oral Daily    sodium chloride (OCEAN) 0 65 % nasal spray 1 spray  1 spray Each Nare PRN       VTE Pharmacologic Prophylaxis: Held for possible biopsy    VTE Mechanical Prophylaxis: sequential compression device    Ted Godinez

## 2018-04-24 ENCOUNTER — ANESTHESIA (INPATIENT)
Dept: GASTROENTEROLOGY | Facility: HOSPITAL | Age: 78
DRG: 167 | End: 2018-04-24
Payer: COMMERCIAL

## 2018-04-24 ENCOUNTER — ANESTHESIA EVENT (INPATIENT)
Dept: GASTROENTEROLOGY | Facility: HOSPITAL | Age: 78
DRG: 167 | End: 2018-04-24
Payer: COMMERCIAL

## 2018-04-24 ENCOUNTER — APPOINTMENT (INPATIENT)
Dept: RADIOLOGY | Facility: HOSPITAL | Age: 78
DRG: 167 | End: 2018-04-24
Payer: COMMERCIAL

## 2018-04-24 LAB
ANION GAP SERPL CALCULATED.3IONS-SCNC: 2 MMOL/L (ref 4–13)
BUN SERPL-MCNC: 25 MG/DL (ref 5–25)
CALCIUM SERPL-MCNC: 9.6 MG/DL (ref 8.3–10.1)
CHLORIDE SERPL-SCNC: 96 MMOL/L (ref 100–108)
CO2 SERPL-SCNC: 34 MMOL/L (ref 21–32)
CREAT SERPL-MCNC: 1.19 MG/DL (ref 0.6–1.3)
GFR SERPL CREATININE-BSD FRML MDRD: 59 ML/MIN/1.73SQ M
GLUCOSE SERPL-MCNC: 104 MG/DL (ref 65–140)
GLUCOSE SERPL-MCNC: 116 MG/DL (ref 65–140)
GLUCOSE SERPL-MCNC: 76 MG/DL (ref 65–140)
GLUCOSE SERPL-MCNC: 82 MG/DL (ref 65–140)
GLUCOSE SERPL-MCNC: 85 MG/DL (ref 65–140)
INR PPP: 1.32 (ref 0.86–1.16)
LDH FLD L TO P-CCNC: 313 U/L
PH BODY FLUID: 7.6
POTASSIUM SERPL-SCNC: 4.5 MMOL/L (ref 3.5–5.3)
PROT FLD-MCNC: 4 G/DL
PROTHROMBIN TIME: 16.5 SECONDS (ref 12.1–14.4)
SODIUM SERPL-SCNC: 132 MMOL/L (ref 136–145)

## 2018-04-24 PROCEDURE — 88342 IMHCHEM/IMCYTCHM 1ST ANTB: CPT | Performed by: PATHOLOGY

## 2018-04-24 PROCEDURE — 88305 TISSUE EXAM BY PATHOLOGIST: CPT | Performed by: PATHOLOGY

## 2018-04-24 PROCEDURE — 0BD68ZX EXTRACTION OF RIGHT LOWER LOBE BRONCHUS, VIA NATURAL OR ARTIFICIAL OPENING ENDOSCOPIC, DIAGNOSTIC: ICD-10-PCS | Performed by: INTERNAL MEDICINE

## 2018-04-24 PROCEDURE — 80048 BASIC METABOLIC PNL TOTAL CA: CPT | Performed by: INTERNAL MEDICINE

## 2018-04-24 PROCEDURE — 88112 CYTOPATH CELL ENHANCE TECH: CPT | Performed by: PATHOLOGY

## 2018-04-24 PROCEDURE — 82948 REAGENT STRIP/BLOOD GLUCOSE: CPT

## 2018-04-24 PROCEDURE — 31623 DX BRONCHOSCOPE/BRUSH: CPT | Performed by: INTERNAL MEDICINE

## 2018-04-24 PROCEDURE — 88341 IMHCHEM/IMCYTCHM EA ADD ANTB: CPT | Performed by: PATHOLOGY

## 2018-04-24 PROCEDURE — 85610 PROTHROMBIN TIME: CPT | Performed by: INTERNAL MEDICINE

## 2018-04-24 PROCEDURE — 87070 CULTURE OTHR SPECIMN AEROBIC: CPT | Performed by: INTERNAL MEDICINE

## 2018-04-24 PROCEDURE — 84157 ASSAY OF PROTEIN OTHER: CPT | Performed by: INTERNAL MEDICINE

## 2018-04-24 PROCEDURE — 87205 SMEAR GRAM STAIN: CPT | Performed by: INTERNAL MEDICINE

## 2018-04-24 PROCEDURE — 83986 ASSAY PH BODY FLUID NOS: CPT | Performed by: STUDENT IN AN ORGANIZED HEALTH CARE EDUCATION/TRAINING PROGRAM

## 2018-04-24 PROCEDURE — 31629 BRONCHOSCOPY/NEEDLE BX EACH: CPT | Performed by: INTERNAL MEDICINE

## 2018-04-24 PROCEDURE — 99233 SBSQ HOSP IP/OBS HIGH 50: CPT | Performed by: INTERNAL MEDICINE

## 2018-04-24 PROCEDURE — 97163 PT EVAL HIGH COMPLEX 45 MIN: CPT

## 2018-04-24 PROCEDURE — 32555 ASPIRATE PLEURA W/ IMAGING: CPT | Performed by: RADIOLOGY

## 2018-04-24 PROCEDURE — 83615 LACTATE (LD) (LDH) ENZYME: CPT | Performed by: INTERNAL MEDICINE

## 2018-04-24 PROCEDURE — 99232 SBSQ HOSP IP/OBS MODERATE 35: CPT | Performed by: PHYSICIAN ASSISTANT

## 2018-04-24 PROCEDURE — G8978 MOBILITY CURRENT STATUS: HCPCS

## 2018-04-24 PROCEDURE — 0W994ZX DRAINAGE OF RIGHT PLEURAL CAVITY, PERCUTANEOUS ENDOSCOPIC APPROACH, DIAGNOSTIC: ICD-10-PCS | Performed by: INTERNAL MEDICINE

## 2018-04-24 PROCEDURE — 31624 DX BRONCHOSCOPE/LAVAGE: CPT | Performed by: INTERNAL MEDICINE

## 2018-04-24 PROCEDURE — 32555 ASPIRATE PLEURA W/ IMAGING: CPT

## 2018-04-24 PROCEDURE — G8979 MOBILITY GOAL STATUS: HCPCS

## 2018-04-24 RX ORDER — SODIUM CHLORIDE 9 MG/ML
INJECTION, SOLUTION INTRAVENOUS CONTINUOUS PRN
Status: DISCONTINUED | OUTPATIENT
Start: 2018-04-24 | End: 2018-04-24 | Stop reason: SURG

## 2018-04-24 RX ORDER — PROPOFOL 10 MG/ML
INJECTION, EMULSION INTRAVENOUS AS NEEDED
Status: DISCONTINUED | OUTPATIENT
Start: 2018-04-24 | End: 2018-04-24 | Stop reason: SURG

## 2018-04-24 RX ORDER — SODIUM CHLORIDE 9 MG/ML
125 INJECTION, SOLUTION INTRAVENOUS CONTINUOUS
Status: DISCONTINUED | OUTPATIENT
Start: 2018-04-24 | End: 2018-04-24

## 2018-04-24 RX ORDER — FENTANYL CITRATE 50 UG/ML
INJECTION, SOLUTION INTRAMUSCULAR; INTRAVENOUS AS NEEDED
Status: DISCONTINUED | OUTPATIENT
Start: 2018-04-24 | End: 2018-04-24 | Stop reason: SURG

## 2018-04-24 RX ADMIN — Medication 40 MG: at 18:53

## 2018-04-24 RX ADMIN — PROPOFOL 10 MG: 10 INJECTION, EMULSION INTRAVENOUS at 13:40

## 2018-04-24 RX ADMIN — SODIUM CHLORIDE: 0.9 INJECTION, SOLUTION INTRAVENOUS at 13:01

## 2018-04-24 RX ADMIN — PROPOFOL 50 MG: 10 INJECTION, EMULSION INTRAVENOUS at 13:20

## 2018-04-24 RX ADMIN — HEPARIN SODIUM 5000 UNITS: 5000 INJECTION, SOLUTION INTRAVENOUS; SUBCUTANEOUS at 15:18

## 2018-04-24 RX ADMIN — HEPARIN SODIUM 5000 UNITS: 5000 INJECTION, SOLUTION INTRAVENOUS; SUBCUTANEOUS at 22:36

## 2018-04-24 RX ADMIN — PROPOFOL 10 MG: 10 INJECTION, EMULSION INTRAVENOUS at 13:35

## 2018-04-24 RX ADMIN — PROPOFOL 10 MG: 10 INJECTION, EMULSION INTRAVENOUS at 13:44

## 2018-04-24 RX ADMIN — HEPARIN SODIUM 5000 UNITS: 5000 INJECTION, SOLUTION INTRAVENOUS; SUBCUTANEOUS at 05:26

## 2018-04-24 RX ADMIN — PROPOFOL 10 MG: 10 INJECTION, EMULSION INTRAVENOUS at 13:27

## 2018-04-24 RX ADMIN — PROPOFOL 10 MG: 10 INJECTION, EMULSION INTRAVENOUS at 13:31

## 2018-04-24 RX ADMIN — SODIUM CHLORIDE 125 ML/HR: 0.9 INJECTION, SOLUTION INTRAVENOUS at 11:57

## 2018-04-24 RX ADMIN — FENTANYL CITRATE 25 MCG: 50 INJECTION, SOLUTION INTRAMUSCULAR; INTRAVENOUS at 13:20

## 2018-04-24 NOTE — BRIEF OP NOTE (RAD/CATH)
Thoracentesis Procedure Note    PATIENT NAME: Marisol Murillor  : 1940  MRN: [id number]     Pre-op Diagnosis:   1  Pleural effusion    2  Dyspnea    3  Metastatic primary lung cancer ([address] )    4  Lung neoplasm    5  Metastatic disease ([address] )    6  Neoplasm of lung      Post-op Diagnosis:   1  Pleural effusion    2  Dyspnea    3  Metastatic primary lung cancer ([address] )    4  Lung neoplasm    5  Metastatic disease ([facility] )    6   Neoplasm of lung        Surgeon:   Chon Brumfield MD  Assistants:     No qualified resident was available, Resident is only observing    Estimated Blood Loss: none  Findings: 1500 ml cloudy, yellow fluid from right chest    Specimens: pleural fluid    Complications:  none    Anesthesia: Local    Chon Brumfield MD     Date: 2018  Time: 4:29 PM

## 2018-04-24 NOTE — ANESTHESIA PREPROCEDURE EVALUATION
Review of Systems/Medical History  Patient summary reviewed  Chart reviewed  No history of anesthetic complications     Cardiovascular  EKG reviewed, Hyperlipidemia, Hypertension , Dysrhythmias, atrial fibrillation,   Comment: Atrial fibrillation    Confirmed by JONE ALANIZ MD ([id number]) on 4/23/2018 4:43:38 PM    Specimen Collected: 04/21/18 13:46  Last Resulted: 04/23/18 16:43      ,  Pulmonary  Smoker ex-smoker  , Shortness of breath, Pneumothorax:   Comment: H o lung cancer     GI/Hepatic       Kidney disease ARF,        Endo/Other  Diabetes poorly controlled type 2 ,      GYN       Hematology   Musculoskeletal       Neurology   Psychology           Physical Exam    Airway    Mallampati score: III  TM Distance: >3 FB  Neck ROM: full     Dental   upper dentures,     Cardiovascular  Cardiovascular exam normal    Pulmonary  Pulmonary exam normal Breath sounds clear to auscultation,     Other Findings        Anesthesia Plan  ASA Score- 3     Anesthesia Type- IV sedation with anesthesia with ASA Monitors  Additional Monitors:   Airway Plan:         Plan Factors- Patient instructed to abstain from smoking on day of procedure  Patient did not smoke on day of surgery  Induction- intravenous  Postoperative Plan-     Informed Consent- Anesthetic plan and risks discussed with patient  I personally reviewed this patient with the CRNA  Discussed and agreed on the Anesthesia Plan with the CRNA           Lab Results   Component Value Date    WBC 6 93 04/22/2018    HGB 12 1 04/22/2018    HCT 38 0 04/22/2018    MCV 88 04/22/2018     04/22/2018     Lab Results   Component Value Date    GLUCOSE 85 04/24/2018    CALCIUM 9 6 04/24/2018     (L) 04/24/2018    K 4 5 04/24/2018    CO2 34 (H) 04/24/2018    CL 96 (L) 04/24/2018    BUN 25 04/24/2018    CREATININE 1 19 04/24/2018     Lab Results   Component Value Date    INR 1 32 (H) 04/24/2018    INR 1 46 (H) 04/23/2018    INR 2 24 (H) 04/22/2018    PROTIME 16 5 (H) 04/24/2018    PROTIME 17 8 (H) 04/23/2018    PROTIME 25 0 (H) 04/22/2018     Lab Results   Component Value Date    PTT 49 (H) 04/21/2018         I, Dr Nii Aguilar, the attending physician, have personally seen and evaluated the patient prior to anesthetic care  I have reviewed the pre-anesthetic record, and other medical records if appropriate to the anesthetic care  If a CRNA is involved in the case, I have reviewed the CRNA assessment, if present, and agree  The patient is in a suitable condition to proceed with my formulated anesthetic plan

## 2018-04-24 NOTE — ANESTHESIA POSTPROCEDURE EVALUATION
Post-Op Assessment Note      CV Status:  Stable    Mental Status:  Alert and awake    Hydration Status:  Euvolemic    PONV Controlled:  Controlled    Airway Patency:  Patent    Post Op Vitals Reviewed: Yes          Staff: JULIET           BP (!) 153/109 (04/24/18 1400)    Temp 98 °F (36 7 °C) (04/24/18 1400)    Pulse 84 (04/24/18 1400)   Resp 16 (04/24/18 1400)    SpO2 96 % (04/24/18 1400)

## 2018-04-24 NOTE — SEDATION DOCUMENTATION
Report given to [facility] RN  Vital signs stable and 1500 ml of cloudy yellow fluid drained from right lung

## 2018-04-24 NOTE — OP NOTE
OPERATIVE REPORT  PATIENT NAME: Caridad Grider    :  1940  MRN: [id number]  Pt Location: [facility] GI ROOM 01    SURGERY DATE: 2018    Surgeon(s) and Role:     * Elian Hernandez MD - Primary    Preop Diagnosis:   RLL lung mass and pleural effusion    Post op diagnosis:  Same    Procedure(s) (LRB):  BRONCHOSCOPY FLEXIBLE (N/A)  THORACENTESIS (N/A)    Specimen(s):  ID Type Source Tests Collected by Time Destination   1 :  Washing Lung, Right Lower Lobe Bronchial Washing NON-GYNECOLOGIC CYTOLOGY Elian Hernandez MD 2018 1351    2 :  Brushing Lung, Right Lower Lobe Bronchial Brushing NON-GYNECOLOGIC CYTOLOGY Elian Hernandez MD 2018 1351    3 :  FNA Lung, Right Lower Lobe FINE NEEDLE ASPIRATION Elian Hernandez MD 2018 1352        Estimated Blood Loss:   <50 mL      Anesthesia Type:   IV sedation under anesthesia supervision    Operative Indications:   See above pre op diagnosis     Operative Findings:  Significant extrinsic compression of the right lower lobe distal airways , right middle lobe and posterior right upper lobe  No endobronchial disease noted  All airways patent - no indication for interventions    Complications:   None    MONITORING:  Cardiac rhythm, pulse and pulse oximetry were monitored continuously during the procedure  Blood pressure was monitored every 3 minutes during the procedure  Informed consent was obtained  Images reviewed prior to the procedure  A Time Out was performed  PROCEDURE:  Standard airway preparation completed per respiratory therapy protocol  After appropriate level of sedation was achieved, a standard bronchoscope was inserted thru mouth with a  Bite block in place  and advanced to the level of the vocal cords  The vocal cords were normal in appearance and with symmetric motion during phonation  Topical lidocaine 2% applied to the vocal cords   The bronchoscope was then advanced past the vocal cords into the trachea and advanced to the main pepper   1 % lidocaine used for the lower airways  See totals in Rt/nursing records  The trachea was normal in appearance  The main pepper was normal in appearance  Airway survey was completed bilaterally to at least the second segmental level  Left sided airway anatomy was normal  Right upper lobe airway airway was extrinsically compressed posteriorly as was the right middle lobe airway however airways were patent >50%  Right lower lobe basilar segments were significantly extrinsically compressed with what looked like submucosal disease  No gross endobronchial lesions, secretions or blood  Bronchoalveolar Lavage performed: RLL basilar segments     Cytobrush performed: RLL basilar segments     Transbronchial Needle Aspiration performed: RLL basilar segments   21 gauge needle     Bronchoscope removed  PLAN:  · Sedation recovery per protocol  · Await results of specimens obtained  · Return to prior diet once topical analgesia has resolved  · Standard post bronchoscopy recovery instructions          Garry Borges MD    Patient Disposition:  hemodynamically stable and returned to the floor     SIGNATURE: Garry Borges MD  DATE: April 24, 2018  TIME: 3:09 PM

## 2018-04-24 NOTE — PROGRESS NOTES
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        *  Likely lung primary as pt with right perihilar mass with endobronchial obstruction & mediastinal adenopathy & lung noduels        *  For bronchoscopy with biopsy this afternoon        *  Will need follow up with oncology as well as possible radiation oncology for XRT to brain  3  Right pleural effusion        *  S/P right thoracentesis by IR today --> 1500mL removed        *  Appears exudative        *  Await final cytology    ~Family updated at bedside    Subjective:   Mr Mary Montague is seen sitting on the edge of his bed with family at his bedside  He is hungry as he is currently NPO for schedule bronchoscopy this afternoon  Earlier this morning, he underwent a right-sided thoracentesis in Interventional Radiology that yielded 1500 mL of cloudy yellow fluid  Fluid was sent for analysis  He does have some increased coughed after being tapped  His cough is nonproductive  He still complains of dyspnea on exertion as well as some pain in his lower back and lower right-sided ribs  He denies hemoptysis, chest pain, resting shortness of breath, bronchospasm or fevers  Objective:     Vitals: Blood pressure 102/58, pulse 93, temperature (!) 97 4 °F (36 3 °C), temperature source Oral, resp  rate 18, height 6' 2" (1 88 m), weight 107 kg (235 lb 14 3 oz), SpO2 94 %  , 2LNC, Body mass index is 30 29 kg/m²        Intake/Output Summary (Last 24 hours) at 04/24/18 1054  Last data filed at 04/24/18 0021   Gross per 24 hour   Intake             1020 ml   Output             1200 ml   Net -180 ml         Physical Exam  Gen: Awake, alert, oriented x 3, no acute distress  HEENT: Mucous membranes moist, no oral lesions, no thrush, wearing O2 via NC  NECK: No accessory muscle use, JVP not elevated  Cardiac: Regular, single S1, single S2, no murmurs, no rubs, no gallops  Lungs: Decreased breath sounds on the right  Left lung is clear to ausculation  No wheezes, rhonchi or rales are heard  Abdomen: normoactive bowel sounds, soft nontender, nondistended, no rebound or rigidity, no guarding  Extremities: no cyanosis, no clubbing, no edema    Labs: I have personally reviewed pertinent lab results  , ABG: No results found for: PHART, TKO4XYE, PO2ART, QWA3DZV, Z5UDLYKT, BEART, SOURCE, BNP: No results found for: BNP, CBC: No results found for: WBC, HGB, HCT, MCV, PLT, ADJUSTEDWBC, MCH, MCHC, RDW, MPV, NRBC, CMP:   Lab Results   Component Value Date     (L) 04/24/2018    K 4 5 04/24/2018    CL 96 (L) 04/24/2018    CO2 34 (H) 04/24/2018    ANIONGAP 2 (L) 04/24/2018    BUN 25 04/24/2018    CREATININE 1 19 04/24/2018    GLUCOSE 85 04/24/2018    CALCIUM 9 6 04/24/2018    EGFR 59 04/24/2018   , PT/INR:   Lab Results   Component Value Date    INR 1 32 (H) 04/24/2018   , Troponin: No results found for: TROPONINI     Pleural Fluid analysis  LDH - 313  TP - 4 0  Culture & gram stain - PENDING  PH - 7 6  Cytology - PENDING    Serum TP - 6 7    Imaging and other studies: I have personally reviewed pertinent films in PACS    No new pulmonary imaging since April 21, 2018    [place], [place]

## 2018-04-24 NOTE — PROGRESS NOTES
IM Residency Progress Note   Unit/Bed#: [facility] 607-01 Encounter: [id number]  SOD Team C       Arthur Ash 69 y o  male [id number]    Hospital Stay Days: 3      Assessment/Plan:    Principal Problem:    Lung neoplasm  Active Problems:    Hypertension    Type 2 diabetes mellitus with complication (HCC)    Mixed hyperlipidemia    Atrial fibrillation (HCC)    Peripheral vascular disease (HCC)    Dyspnea    Pleural effusion    Acute kidney injury ([facility] )    Metastatic disease (HCC)    Pneumothorax    Metastatic primary lung cancer ([facility] )    1  Lung neoplasm - stage IV   -metastasis to bone per bone scan, as well as diffusely in brain per MRI  -Planned biopsy by bronchoscopy as INR < 1 5   -Pleurocentesis with cytology  - Tylenol p r n  for pain and Zofran p r n  for nausea and vomiting  -out of bed with assistance to prevent falls while patient is on Coumadin given that patient is lightheaded with standing        2  Elevated creatinine  -creatinine 1 46 on admission with unknown baseline  -possible component of CKD stage III given underlying diabetes and hypertension  -Continue to hold home lisinopril/hydrochlorothiazide  -Improved with IVF, continue to trend      3  Atrial fibrillation  -At home currently on Coumadin 1 5 mg every day except for Monday and Friday on which days he takes 5 mg  -not takeing 2 days prior to admission given high INR  -hold Coumadin for now for thoracentesis  -patient will currently not on rate or rhythm control medication     4  Hypertension  -patient takes hydrochlorothiazide/lisinopril at home  -ACEI held due to elevated creatinine   - p r n  labetalol for SBP greater than 160 with hold parameters of heart rate less than 50     5    Diabetes mellitus  -patient takes metformin in the morning and Levemir 34 units daily around dinnertime at home  -Levemir 34 units basal insulin daily at dinnertime  -sliding scale correctional insulin with meals and at bedtime  - POCT blood glucose testing with meals at bedtime     6  Hyperlipidemia  -patient takes simvastatin 40 mg daily at home  -will continue home regimen     7  Ascending aortic aneurysm  -incidental finding found on CT of the chest/abdomen/pelvis  -currently measures 4 5 cm  -biannual CTA or MRA recommended for surveillance, may also consider echocardiogram       Disposition: Biopsy, continued medical care  Subjective:   Patient reports continued dyspnea on exertion  He understands that he has lesions on his brain and in his bones  He has minimal pain  He denies focal weakness or paresthesias  Vitals: Temp (24hrs), Av 9 °F (36 6 °C), Min:97 4 °F (36 3 °C), Max:98 4 °F (36 9 °C)  Current: Temperature: 97 8 °F (36 6 °C)  Vitals:    18 1400 18 1413 18 1422 18 1500   BP: (!) 153/109 128/59 128/59 144/68   BP Location:    Right arm   Pulse: 84 93 80 98   Resp: 16 16 20 20   Temp: 98 °F (36 7 °C)   97 8 °F (36 6 °C)   TempSrc: Temporal   Oral   SpO2: 96% 95% 96% 96%   Weight:       Height:        Body mass index is 30 29 kg/m²  I/O last 24 hours: In: 1700 [P O :1500; I V :200]  Out: 1700 [Urine:1700]      Physical Exam:   Gen: Sitting, NAD  HEENT: Membranes Moist  Pulm: Dull sound RLL  Extr: No peripheral edema      Invasive Devices     Peripheral Intravenous Line            Peripheral IV 18 Right Forearm 3 days                      Labs:   Recent Results (from the past 24 hour(s))   Fingerstick Glucose (POCT)    Collection Time: 18  9:38 PM   Result Value Ref Range    POC Glucose 232 (H) 65 - 140 mg/dl   Protime-INR    Collection Time: 18  6:11 AM   Result Value Ref Range    Protime 16 5 (H) 12 1 - 14 4 seconds    INR 1 32 (H) 0 86 - 7 48   Basic metabolic panel    Collection Time: 18  6:11 AM   Result Value Ref Range    Sodium 132 (L) 136 - 145 mmol/L    Potassium 4 5 3 5 - 5 3 mmol/L    Chloride 96 (L) 100 - 108 mmol/L    CO2 34 (H) 21 - 32 mmol/L    Anion Gap 2 (L) 4 - 13 mmol/L    BUN 25 5 - 25 mg/dL    Creatinine 1 19 0 60 - 1 30 mg/dL    Glucose 85 65 - 140 mg/dL    Calcium 9 6 8 3 - 10 1 mg/dL    eGFR 59 ml/min/1 73sq m   Fingerstick Glucose (POCT)    Collection Time: 04/24/18  8:09 AM   Result Value Ref Range    POC Glucose 82 65 - 140 mg/dl   Body fluid culture and Gram stain    Collection Time: 04/24/18  9:19 AM   Result Value Ref Range    Gram Stain Result Rare Polys     Gram Stain Result No organisms seen    Total Protein, Fluid    Collection Time: 04/24/18  9:19 AM   Result Value Ref Range    Protein, Fluid 4 0 g/dL   LD (LDH), Body Fluid    Collection Time: 04/24/18  9:19 AM   Result Value Ref Range    LD, Fluid 313 U/L   pH, body fluid    Collection Time: 04/24/18  9:19 AM   Result Value Ref Range    PH BODY FLUID 7 6    Fingerstick Glucose (POCT)    Collection Time: 04/24/18 12:17 PM   Result Value Ref Range    POC Glucose 76 65 - 140 mg/dl       Radiology Results: I have personally reviewed pertinent reports  Ct Chest Abdomen Pelvis Wo Contrast    Result Date: 4/21/2018  Impression: 1  Right perihilar mass with occlusion of the right middle and lower lobe bronchi consistent with primary pulmonary malignancy  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, pathologic mediastinal lymphadenopathy, and multiple metastatic nodules in the left lung  2   Obstructive collapse of the right middle lobe and lower lobe  3   Increasing thickened, nodular appearance of the left adrenal gland  This could represent progression of hypertrophy or metastasis  Recommend further evaluation on PET/CT  4   Ascending aortic aneurysm measuring up to 4 5 cm  5   Small pericardial effusion  6   Osseous metastases in the L1, L2, L4, and L5 vertebral bodies   I personally discussed this study with Job Early on 4/21/2018 at 3:42 PM  Workstation performed: [id number]     X-ray Chest 2 Views    Result Date: 4/22/2018  Impression: Right lower lung opacity corresponding to patient's known right perihilar mass with right effusion No acute airspace consolidation seen Mild interlobular septal thickening seen in the right upper lung as seen on the CT of April 21, 2018 corresponding to the suspected lymphangitic spread Workstation performed: [id number]     Mri Brain W Wo Contrast    Result Date: 4/23/2018  Impression: Widespread brain metastases involving the supra and infratentorial compartments of the brain Workstation performed: [id number]     Nm Bone Scan Whole Body    Result Date: 4/23/2018  Impression: 1  Widespread osseous metastases  PET CT evaluation may also be considered  Workstation performed: [id number]     Ir Thoracentesis    Result Date: 4/24/2018  Impression: Impression: Successful ultrasound-guided thoracentesis  Workstation performed: [id number]         Active Meds:   Current Facility-Administered Medications   Medication Dose Route Frequency    acetaminophen (TYLENOL) tablet 650 mg  650 mg Oral Q6H PRN    albuterol (PROVENTIL HFA,VENTOLIN HFA) inhaler 2 puff  2 puff Inhalation Q6H PRN    heparin (porcine) subcutaneous injection 5,000 Units  5,000 Units Subcutaneous Q8H [facility]    insulin detemir (LEVEMIR) subcutaneous injection 34 Units  34 Units Subcutaneous Daily    insulin lispro (HumaLOG) 100 units/mL subcutaneous injection 1-6 Units  1-6 Units Subcutaneous TID AC    insulin lispro (HumaLOG) 100 units/mL subcutaneous injection 1-6 Units  1-6 Units Subcutaneous HS    labetalol (NORMODYNE) injection 10 mg  10 mg Intravenous Q6H PRN    ondansetron (ZOFRAN) injection 4 mg  4 mg Intravenous Q6H PRN    simvastatin (ZOCOR) tablet 40 mg  40 mg Oral Daily With Dinner    sodium chloride (OCEAN) 0 65 % nasal spray 1 spray  1 spray Each Nare PRN       VTE Pharmacologic Prophylaxis: Held for possible biopsy    VTE Mechanical Prophylaxis: sequential compression device    Arjun Soto

## 2018-04-24 NOTE — PHYSICAL THERAPY NOTE
Physical Therapy Evaluation:    2 forms of pt ID verified:name,birthdate and pt ID jessica    Patient's Name: Keke Levi    Admitting Diagnosis  Dyspnea [R06 00]  SOB (shortness of breath) [R06 02]  Pleural effusion [J90]  Lung neoplasm [D49 1]  Metastatic primary lung cancer ([facility] ) [C34 90]  Metastatic disease ([place] [address] ) [C79 9]    Problem List  Patient Active Problem List   Diagnosis    Hypertension    Type 2 diabetes mellitus with complication ([place] [address] )    Mixed hyperlipidemia    Atrial fibrillation (HCC)    Peripheral vascular disease (HCC)    Persistent proteinuria    Dyspnea    Pleural effusion    Acute kidney injury ([place] [address] )    Lung neoplasm    Metastatic disease ([place] [address] )    Pneumothorax    Metastatic primary lung cancer ([place] [address] )       Past Medical History  Past Medical History:   Diagnosis Date    Atrial fibrillation ([place] [address] )     Diabetes mellitus ([place] [address] )     Hypercholesteremia     Hypertension     Lung neoplasm     Proteinuria     LAST ASSESSED [id number]    PVD (peripheral vascular disease) ([place] [address] )        Past Surgical History  History reviewed  No pertinent surgical history  04/24/18 1130   Note Type   Note type Eval only   Pain Assessment   Pain Assessment 0-10   Pain Score 4  (discomfort around rib area and acroos chest)   Pain Type Acute pain  (pt reports removal of excess fluid (procedure) recently)   Pain Location Chest;Rib cage   Pain Orientation Upper   Hospital Pain Intervention(s) Repositioned; Ambulation/increased activity; Emotional support; Environmental changes; Rest   Home Living   Type of [address] [place] [address] One level;Stairs to enter with rails  (3 MIN)   Home Equipment Walker  (owns RW;no use of DME PTA)   Additional Comments pt reports being completely I PTA;no use of DME PTA;pt reports no recent falls;lives with spouse who is available to provide A upon D/C    Prior Function   Level of [place] Independent with ADLs and functional mobility  (per pt PTA)   Lives With Spouse Receives Help From Family  (as needed per pt PTA)   ADL Assistance Independent   IADLs Independent   Falls in the last 6 months 0   Restrictions/Precautions   Other Precautions O2;Fall Risk;Pain; Impulsive;Hard of hearing  (use of 2 L NC O2)   General   Additional Pertinent History lung neoplasm   Family/Caregiver Present Yes  (wife and daughter)   Cognition   Overall Cognitive Status Impaired   Arousal/Participation Cooperative   Attention Attends with cues to redirect   Orientation Level Oriented X4   Following Commands Follows one step commands with increased time or repetition  (2* inc pain and discomfort and impulsive)   RLE Assessment   RLE Assessment (4/5 grossly throughout)   LLE Assessment   LLE Assessment (4/5 grossly throughout)   Coordination   Movements are Fluid and Coordinated 0   Coordination and Movement Description ataxic and unsteady,dec BLE step length,shuffling gait pattern at times   Sensation [facility] [place]   Light Touch   RLE Light Touch Grossly intact   LLE Light Touch Grossly intact   Bed Mobility   Supine to Sit Unable to assess  (pt sitting at EOB pre and post mobility)   Transfers   Sit to Stand 4  Minimal assistance   Additional items Assist x 1;Bedrails; Impulsive;Verbal cues   Stand to Sit 4  Minimal assistance   Additional items Assist x 1;Bedrails; Impulsive;Verbal cues  (for safety and education)   Ambulation/Elevation   Gait pattern Improper Weight shift; Antalgic; Forward Flexion; Shuffling; Inconsistent tori; Foward flexed; Short stride; Ataxia   Gait Assistance 5  Supervision   Additional items Assist x 1;Verbal cues   Assistive Device Rolling walker  (room air SpO2:95% during and following mobility,NSG aware)   Distance 120 feet with use of RW on tile and hardwood nico;slow tori and mobility with flexed posture   Balance   Static Sitting Good  (at EOB)   Dynamic Sitting Poor +   Static Standing Poor +   Dynamic Standing Fair   Ambulatory Fair   Endurance Deficit   Endurance Deficit Yes Endurance Deficit Description weakness,deconditioning,pain   Activity Tolerance   Activity Tolerance Patient limited by fatigue;Patient limited by pain  (good)   Nurse Made Aware yes Kamilla Conn)   Assessment   Prognosis Good   Problem List Decreased strength;Decreased endurance; Impaired balance;Decreased mobility; Decreased cognition;Decreased safety awareness; Impaired hearing;Decreased skin integrity;Pain   Assessment pt is a 70 y/o male admitted to B 2* lung neoplasm  Pt lives with wife in one story home,3 MIN,owns RW,no recent falls,reports being completely I PTA,no use of DME PTA and likes to be outside  Pt currently is not at functional mobility baseline,needs new DME (RW) for mobility,use of 2 L NC O2,ataxic and unsteady gait pattern,multiple lines,reports going for procedure today,ongoing testing and ongoing medical care  Pt demonstrates minimal deficits during functional mobility and gait including dec endurance,dec balance,dec BLE strength,ataxic and unsteady gait pattern,inc pain rib cage and upper chest area (NSG aware) and needs minAx1 for transfers and S for gait with use of RW  Pt would cont to benefit from skilled inpt PT services to maximize functional independence   Barriers to Discharge Inaccessible home environment  (MIN)   Goals   Patient Goals to get this procedure over with   STG Expiration Date 05/04/18   Short Term Goal #1 in 7-10 days:pt will be able to ambulate >250 feet with use of RW on various surfaces S->completely I,activity tolerance:45mins/45mins,inc balance 1 grade,BM and transfers completely I to and from various surfaces consistently,up and down 3 steps with use of rail S,I with BLE HEP   Treatment Day 0   Plan   Treatment/Interventions Functional transfer training; Endurance training;Patient/family training;Equipment eval/education;Gait training;Spoke to nursing;Family   PT Frequency 5x/wk   Recommendation   Recommendation Home with family support;Home PT  (use of personal DME) Equipment Recommended Walker  (RW)   Barthel Index   Feeding 10   Bathing 0   Grooming Score 5   Dressing Score 5   Bladder Score 10   Bowels Score 10   Toilet Use Score 5   Transfers (Bed/Chair) Score 10   Mobility (Level Surface) Score 0   Stairs Score 0   Barthel Index Score 55     @Sveta Llanes, PT, DPT@

## 2018-04-24 NOTE — PLAN OF CARE
Problem: PHYSICAL THERAPY ADULT  Goal: Performs mobility at highest level of function for planned discharge setting  See evaluation for individualized goals  Treatment/Interventions: Functional transfer training, Endurance training, Patient/family training, Equipment eval/education, Gait training, Spoke to nursing, Family  Equipment Recommended: Benoit Letters (RW)       See flowsheet documentation for full assessment, interventions and recommendations  Prognosis: Good  Problem List: Decreased strength, Decreased endurance, Impaired balance, Decreased mobility, Decreased cognition, Decreased safety awareness, Impaired hearing, Decreased skin integrity, Pain  Assessment: pt is a 68 y/o male admitted to \[facility]\"" 2* lung neoplasm  Pt lives with wife in one story home,3 MIN,owns RW,no recent falls,reports being completely I PTA,no use of DME PTA and likes to be outside  Pt currently is not at functional mobility baseline,needs new DME (RW) for mobility,use of 2 L NC O2,ataxic and unsteady gait pattern,multiple lines,reports going for procedure today,ongoing testing and ongoing medical care  Pt demonstrates minimal deficits during functional mobility and gait including dec endurance,dec balance,dec BLE strength,ataxic and unsteady gait pattern,inc pain rib cage and upper chest area (NSG aware) and needs minAx1 for transfers and S for gait with use of RW  Pt would cont to benefit from skilled inpt PT services to maximize functional independence  Barriers to Discharge: Inaccessible home environment (MIN)     Recommendation: Home with family support, Home PT (use of personal DME)          See flowsheet documentation for full assessment

## 2018-04-24 NOTE — SEDATION DOCUMENTATION
Right thoracentesis performed by Dr Anegl Acuña  1500 mL of cloudy yellow fluid drained by right lung

## 2018-04-25 ENCOUNTER — TRANSCRIBE ORDERS (OUTPATIENT)
Dept: OTHER | Facility: HOSPITAL | Age: 78
End: 2018-04-25

## 2018-04-25 ENCOUNTER — APPOINTMENT (INPATIENT)
Dept: RADIOLOGY | Facility: HOSPITAL | Age: 78
DRG: 167 | End: 2018-04-25
Attending: INTERNAL MEDICINE
Payer: COMMERCIAL

## 2018-04-25 DIAGNOSIS — C79.31 BRAIN METASTASES (HCC): Primary | ICD-10-CM

## 2018-04-25 LAB
ANION GAP SERPL CALCULATED.3IONS-SCNC: 7 MMOL/L (ref 4–13)
BUN SERPL-MCNC: 32 MG/DL (ref 5–25)
CALCIUM SERPL-MCNC: 9.3 MG/DL (ref 8.3–10.1)
CHLORIDE SERPL-SCNC: 97 MMOL/L (ref 100–108)
CO2 SERPL-SCNC: 31 MMOL/L (ref 21–32)
CREAT SERPL-MCNC: 1.21 MG/DL (ref 0.6–1.3)
ERYTHROCYTE [DISTWIDTH] IN BLOOD BY AUTOMATED COUNT: 14.9 % (ref 11.6–15.1)
GFR SERPL CREATININE-BSD FRML MDRD: 57 ML/MIN/1.73SQ M
GLUCOSE SERPL-MCNC: 135 MG/DL (ref 65–140)
GLUCOSE SERPL-MCNC: 146 MG/DL (ref 65–140)
GLUCOSE SERPL-MCNC: 148 MG/DL (ref 65–140)
GLUCOSE SERPL-MCNC: 279 MG/DL (ref 65–140)
GLUCOSE SERPL-MCNC: 94 MG/DL (ref 65–140)
HCT VFR BLD AUTO: 38.8 % (ref 36.5–49.3)
HGB BLD-MCNC: 12.5 G/DL (ref 12–17)
MCH RBC QN AUTO: 28.4 PG (ref 26.8–34.3)
MCHC RBC AUTO-ENTMCNC: 32.2 G/DL (ref 31.4–37.4)
MCV RBC AUTO: 88 FL (ref 82–98)
PLATELET # BLD AUTO: 242 THOUSANDS/UL (ref 149–390)
PMV BLD AUTO: 9 FL (ref 8.9–12.7)
POTASSIUM SERPL-SCNC: 4.8 MMOL/L (ref 3.5–5.3)
RBC # BLD AUTO: 4.4 MILLION/UL (ref 3.88–5.62)
SODIUM SERPL-SCNC: 135 MMOL/L (ref 136–145)
WBC # BLD AUTO: 7.92 THOUSAND/UL (ref 4.31–10.16)

## 2018-04-25 PROCEDURE — 71045 X-RAY EXAM CHEST 1 VIEW: CPT

## 2018-04-25 PROCEDURE — 85027 COMPLETE CBC AUTOMATED: CPT | Performed by: INTERNAL MEDICINE

## 2018-04-25 PROCEDURE — G8989 SELF CARE D/C STATUS: HCPCS

## 2018-04-25 PROCEDURE — 80048 BASIC METABOLIC PNL TOTAL CA: CPT | Performed by: INTERNAL MEDICINE

## 2018-04-25 PROCEDURE — 82948 REAGENT STRIP/BLOOD GLUCOSE: CPT

## 2018-04-25 PROCEDURE — 99233 SBSQ HOSP IP/OBS HIGH 50: CPT | Performed by: INTERNAL MEDICINE

## 2018-04-25 PROCEDURE — 97535 SELF CARE MNGMENT TRAINING: CPT

## 2018-04-25 PROCEDURE — 99232 SBSQ HOSP IP/OBS MODERATE 35: CPT | Performed by: INTERNAL MEDICINE

## 2018-04-25 RX ORDER — OXYCODONE HYDROCHLORIDE 10 MG/1
10 TABLET ORAL EVERY 6 HOURS PRN
Status: DISCONTINUED | OUTPATIENT
Start: 2018-04-25 | End: 2018-04-27 | Stop reason: HOSPADM

## 2018-04-25 RX ADMIN — HEPARIN SODIUM 5000 UNITS: 5000 INJECTION, SOLUTION INTRAVENOUS; SUBCUTANEOUS at 06:40

## 2018-04-25 RX ADMIN — HEPARIN SODIUM 5000 UNITS: 5000 INJECTION, SOLUTION INTRAVENOUS; SUBCUTANEOUS at 14:44

## 2018-04-25 RX ADMIN — INSULIN LISPRO 4 UNITS: 100 INJECTION, SOLUTION INTRAVENOUS; SUBCUTANEOUS at 22:10

## 2018-04-25 RX ADMIN — Medication 40 MG: at 16:49

## 2018-04-25 RX ADMIN — ACETAMINOPHEN 650 MG: 325 TABLET, FILM COATED ORAL at 09:28

## 2018-04-25 RX ADMIN — INSULIN DETEMIR 34 UNITS: 100 INJECTION, SOLUTION SUBCUTANEOUS at 18:30

## 2018-04-25 RX ADMIN — HEPARIN SODIUM 5000 UNITS: 5000 INJECTION, SOLUTION INTRAVENOUS; SUBCUTANEOUS at 22:09

## 2018-04-25 NOTE — PROGRESS NOTES
Progress Note - Pulmonary   Ignacia Hudson 69 y o  male MRN: [id number]  Unit/Bed#: [facility] 607-01 Encounter: [id number]    Assessment:  Acute hypoxic respiratory failure  Metastatic cancer to brain and bones  Atrial fibrillation    Plan:  Follow up Xray and cytology  Likley risk of intracranial bleed outweighs benefit with know brain mets  Ambulatory pulse ox prior to d/c to evaluate O2 needs    Chief Complaint:   No complaints    Subjective:   Patient reports breathing is improved after thoracentesis yesterday, no cough or hemoptysis    Objective:     Vitals: Blood pressure 118/65, pulse 89, temperature 97 5 °F (36 4 °C), temperature source Oral, resp  rate 18, height 6' 2" (1 88 m), weight 107 kg (235 lb 14 3 oz), SpO2 96 %  ,Body mass index is 30 29 kg/m²        Intake/Output Summary (Last 24 hours) at 04/25/18 1018  Last data filed at 04/25/18 0943   Gross per 24 hour   Intake              790 ml   Output              850 ml   Net              -60 ml       Invasive Devices     Peripheral Intravenous Line            Peripheral IV 04/21/18 Right Forearm 4 days                Physical Exam: General appearance: alert and oriented, in no acute distress  Neck: no adenopathy, no carotid bruit, no JVD, supple, symmetrical, trachea midline and thyroid not enlarged, symmetric, no tenderness/mass/nodules  Lungs: diminished breath sounds  Heart: regular rate and rhythm, S1, S2 normal, no murmur, click, rub or gallop  Abdomen: soft, non-tender; bowel sounds normal; no masses,  no organomegaly  Extremities: extremities normal, warm and well-perfused; no cyanosis, clubbing, or edema     Labs:   CBC:   Lab Results   Component Value Date    WBC 7 92 04/25/2018    HGB 12 5 04/25/2018    HCT 38 8 04/25/2018    MCV 88 04/25/2018     04/25/2018    MCH 28 4 04/25/2018    MCHC 32 2 04/25/2018    RDW 14 9 04/25/2018    MPV 9 0 04/25/2018   , CMP:   Lab Results   Component Value Date     (L) 04/25/2018    K 4 8 04/25/2018    CL 97 (L) 04/25/2018    CO2 31 04/25/2018    ANIONGAP 7 04/25/2018    BUN 32 (H) 04/25/2018    CREATININE 1 21 04/25/2018    GLUCOSE 94 04/25/2018    CALCIUM 9 3 04/25/2018    EGFR 57 04/25/2018     Imaging and other studies: I have personally reviewed pertinent films in PACS

## 2018-04-25 NOTE — PLAN OF CARE
DISCHARGE PLANNING - CARE MANAGEMENT     Discharge to post-acute care or home with appropriate resources Progressing        INFECTION - ADULT     Absence or prevention of progression during hospitalization Progressing        Knowledge Deficit     Patient/family/caregiver demonstrates understanding of disease process, treatment plan, medications, and discharge instructions Progressing        Potential for Falls     Patient will remain free of falls Progressing        RESPIRATORY - ADULT     Achieves optimal ventilation and oxygenation Progressing        SAFETY ADULT     Patient will remain free of falls Progressing

## 2018-04-25 NOTE — PROGRESS NOTES
Oncology Progress Note  Da Lagos 69 y o  male MRN: [id number]  Unit/Bed#: [facility] 607-01 Encounter: [id number]      /65 (BP Location: Left arm)   Pulse 89   Temp 97 5 °F (36 4 °C) (Oral)   Resp 18   Ht 6' 2" (1 88 m)   Wt 107 kg (235 lb 14 3 oz)   SpO2 96%   BMI 30 29 kg/m²     Subjective:  He underwent a thoracentesis as well as bronchoscopy  Biopsy result is pending at this time  Bone scan showed diffuse bony metastasis  MRI showed brain metastasis  He has very minimal neurological symptoms  He denied double vision or dysarthria  He has no ambulatory dysfunction  He has mild shortness of breath  He denied any pain  Objective:    General Appearance:    Alert, oriented        Eyes:    PERRL   Ears:    Normal external ear canals, both ears   Nose:   Nares normal, septum midline   Throat:   Mucosa moist  Pharynx without injection  Neck:   Supple       Lungs:     Clear to auscultation bilaterally   Chest Wall:    No tenderness or deformity    Heart:    Regular rate and rhythm       Abdomen:     Soft, non-tender, bowel sounds +, no organomegaly           Extremities:   Extremities no cyanosis or edema       Skin:   no rash or icterus      Lymph nodes:   Cervical, supraclavicular, and axillary nodes normal   Neurologic:   CNII-XII intact, normal strength, sensation and reflexes     throughout        Recent Results (from the past 48 hour(s))   Fingerstick Glucose (POCT)    Collection Time: 04/23/18  5:08 PM   Result Value Ref Range    POC Glucose 123 65 - 140 mg/dl   Fingerstick Glucose (POCT)    Collection Time: 04/23/18  9:38 PM   Result Value Ref Range    POC Glucose 232 (H) 65 - 140 mg/dl   Protime-INR    Collection Time: 04/24/18  6:11 AM   Result Value Ref Range    Protime 16 5 (H) 12 1 - 14 4 seconds    INR 1 32 (H) 0 86 - 5 82   Basic metabolic panel    Collection Time: 04/24/18  6:11 AM   Result Value Ref Range    Sodium 132 (L) 136 - 145 mmol/L    Potassium 4 5 3 5 - 5 3 mmol/L Chloride 96 (L) 100 - 108 mmol/L    CO2 34 (H) 21 - 32 mmol/L    Anion Gap 2 (L) 4 - 13 mmol/L    BUN 25 5 - 25 mg/dL    Creatinine 1 19 0 60 - 1 30 mg/dL    Glucose 85 65 - 140 mg/dL    Calcium 9 6 8 3 - 10 1 mg/dL    eGFR 59 ml/min/1 73sq m   Fingerstick Glucose (POCT)    Collection Time: 04/24/18  8:09 AM   Result Value Ref Range    POC Glucose 82 65 - 140 mg/dl   Body fluid culture and Gram stain    Collection Time: 04/24/18  9:19 AM   Result Value Ref Range    Body Fluid Culture, Sterile No growth     Gram Stain Result Rare Polys     Gram Stain Result No organisms seen    Non-gynecologic cytology    Collection Time: 04/24/18  9:19 AM   Result Value Ref Range    Case Report       Non-gynecologic Cytology                          Case: [id number]                                  Authorizing Provider:  Kali Salamanca MD          Collected:           04/24/2018 0919              Ordering Location:     [address] [place] [address]      Received:            04/24/2018 [address] [place] [address] Emergency                                                                                  Department                                                                   Pathologist:           Coral Stoll MD                                                        Specimen:    Pleural Fluid, right                                                                       Final Diagnosis       A  Pleural Fluid, right (ThinPrep): Atypical cellular changes  Rare highly atypical cells present in a background of mixed lymphocytes and histiocytes, cannot exclude neoplasia (additional sampling may be of assistance if clinically indicated)  Satisfactory for evaluation  Gross Description       A  Pleural Fluid, right: 45ml   Yellow, clear, received in [employer]            Additional Information       [employer]'s FDA approved ,  and ThinPrep Imaging System are utilized with strict adherence to the 's instruction manual to prepare gynecologic and non-gynecologic cytology specimens for the production of ThinPrep slides as well as for gynecologic ThinPrep imaging  These processes have been validated by our laboratory and/or by the   These tests were developed and their performance characteristics determined by [facility] [address] [place] [address] Laboratory or [facility]  They may not be cleared or approved by the [place]  Food and Drug Administration  The FDA has determined that such clearance or approval is not necessary  These tests are used for clinical purposes  They should not be regarded as investigational or for research  This laboratory has been approved by [place] [id number], designated as a high-complexity laboratory and is qualified to perform these tests      Interpretation performed at [facility], [address] [place] [id number]       Total Protein, Fluid    Collection Time: 04/24/18  9:19 AM   Result Value Ref Range    Protein, Fluid 4 0 g/dL   LD (LDH), Body Fluid    Collection Time: 04/24/18  9:19 AM   Result Value Ref Range    LD, Fluid 313 U/L   pH, body fluid    Collection Time: 04/24/18  9:19 AM   Result Value Ref Range    PH BODY FLUID 7 6    Fingerstick Glucose (POCT)    Collection Time: 04/24/18 12:17 PM   Result Value Ref Range    POC Glucose 76 65 - 140 mg/dl   Fingerstick Glucose (POCT)    Collection Time: 04/24/18  5:33 PM   Result Value Ref Range    POC Glucose 116 65 - 140 mg/dl   Fingerstick Glucose (POCT)    Collection Time: 04/24/18  9:18 PM   Result Value Ref Range    POC Glucose 104 65 - 140 mg/dl   Basic metabolic panel    Collection Time: 04/25/18  5:48 AM   Result Value Ref Range    Sodium 135 (L) 136 - 145 mmol/L    Potassium 4 8 3 5 - 5 3 mmol/L    Chloride 97 (L) 100 - 108 mmol/L    CO2 31 21 - 32 mmol/L    Anion Gap 7 4 - 13 mmol/L    BUN 32 (H) 5 - 25 mg/dL    Creatinine 1 21 0 60 - 1 30 mg/dL    Glucose 94 65 - 140 mg/dL    Calcium 9 3 8 3 - 10 1 mg/dL eGFR 57 ml/min/1 73sq m   CBC    Collection Time: 04/25/18  5:48 AM   Result Value Ref Range    WBC 7 92 4 31 - 10 16 Thousand/uL    RBC 4 40 3 88 - 5 62 Million/uL    Hemoglobin 12 5 12 0 - 17 0 g/dL    Hematocrit 38 8 36 5 - 49 3 %    MCV 88 82 - 98 fL    MCH 28 4 26 8 - 34 3 pg    MCHC 32 2 31 4 - 37 4 g/dL    RDW 14 9 11 6 - 15 1 %    Platelets 540 342 - 415 Thousands/uL    MPV 9 0 8 9 - 12 7 fL   Fingerstick Glucose (POCT)    Collection Time: 04/25/18  7:44 AM   Result Value Ref Range    POC Glucose 135 65 - 140 mg/dl   Fingerstick Glucose (POCT)    Collection Time: 04/25/18 12:23 PM   Result Value Ref Range    POC Glucose 146 (H) 65 - 140 mg/dl         Ct Chest Abdomen Pelvis Wo Contrast    Result Date: 4/21/2018  Narrative: CT CHEST, ABDOMEN AND PELVIS WITHOUT IV CONTRAST INDICATION:   Right pleural effusion  COMPARISON: CT of the abdomen/pelvis dated 12/24/2004  TECHNIQUE: CT examination of the chest, abdomen and pelvis was performed without intravenous contrast   Axial, sagittal, and coronal 2D reformatted images were created from the source data and submitted for interpretation  Radiation dose length product (DLP) for this visit:  1637 02 mGy-cm   This examination, like all CT scans performed in the [facility], was performed utilizing techniques to minimize radiation dose exposure, including the use of iterative reconstruction and automated exposure control  Enteric contrast was administered  Note: Image numbers reported for findings (if any) are taken from axial series 2 and 3 unless otherwise indicated  FINDINGS: CHEST LUNGS:  Soft tissue filling both the right middle and lower lobar arteries  Perihilar masslike consolidative opacity, obscured by consolidations in the right middle lobe  There appears to be associated satellite nodularity measuring up to 8 mm  Associated interstitial thickening involving both the interlobular septae and the peribronchial vascular interstitium  Findings consistent with lymphangitic carcinomatosis  3 mm pulmonary nodules in the anterior left upper lobe on image 18   4 mm nodule in the left upper lobe on image 23   4 mm nodule in the left lower lobe on image 28  Multiple larger lobular nodules in the left lower lobe measuring up to 19 mm x 14 mm on  image 44  PLEURA:  Moderate sized pleural effusion  Posttraumatic changes of the left pleural from prior healed rib fractures  HEART/GREAT VESSELS:  Small pericardial effusion  Aortic valvular and coronary calcifications  Heart size is normal   Ascending aortic aneurysm measuring 4 6 cm  MEDIASTINUM AND DWIGHT:  Pathologically enlarged lymph nodes in the mediastinum  Largest index nodes measure 28 mm x 20 mm in the left paratracheal location on image 24 and in a subcarinal location measuring 32 mm x 20 mm on image 34  CHEST WALL AND LOWER NECK:   Unremarkable  ABDOMEN LIVER/BILIARY TREE:  Unremarkable  GALLBLADDER:  Stones or sludge layering at the gallbladder fundus  No pericholecystic inflammation or wall thickening  SPLEEN:  Unremarkable  PANCREAS:  Unremarkable  ADRENAL GLANDS:  Diffuse nodular thickening of left adrenal gland up to 2 5 cm in thickness  The gland  Mildly thickened in 2004, this represents a significant increase  Right gland is unremarkable  KIDNEYS/URETERS:  Right and left atrophic changes  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  Renal cysts left kidney  No suspicious renal masses  No hydronephrosis  STOMACH AND BOWEL:  Unremarkable  APPENDIX:  A normal appendix was visualized  ABDOMINOPELVIC CAVITY:  No ascites or free intraperitoneal air  No lymphadenopathy  VESSELS:  Unremarkable for patient's age  PELVIS REPRODUCTIVE ORGANS:  Unremarkable for patient's age  URINARY BLADDER:  Unremarkable  ABDOMINAL WALL/INGUINAL REGIONS:  Diastasis recti no hernias  No inguinal lymphadenopathy  OSSEOUS STRUCTURES:  Multiple healed left-sided rib fractures    No acute fractures demonstrated  Lytic lesions demonstrated in the lateral 1, L2, L4, and L5 vertebral bodies with areas of anterior cortical erosion and low-grade fragmentation at L5  Impression: 1  Right perihilar mass with occlusion of the right middle and lower lobe bronchi consistent with primary pulmonary malignancy  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, pathologic mediastinal lymphadenopathy, and multiple metastatic nodules in the left lung  2   Obstructive collapse of the right middle lobe and lower lobe  3   Increasing thickened, nodular appearance of the left adrenal gland  This could represent progression of hypertrophy or metastasis  Recommend further evaluation on PET/CT  4   Ascending aortic aneurysm measuring up to 4 5 cm  5   Small pericardial effusion  6   Osseous metastases in the L1, L2, L4, and L5 vertebral bodies  I personally discussed this study with Purvi Cook on 4/21/2018 at 3:42 PM  Workstation performed: [id number]     X-ray Chest 2 Views    Result Date: 4/22/2018  Narrative: CHEST INDICATION:   chest pain  Cough with shortness of breath COMPARISON:  January 3, 2005, CT from April 21, 2018 EXAM PERFORMED/VIEWS:  XR CHEST PA & LATERAL FINDINGS: Cardiomediastinal silhouette appears unremarkable  There is a right-sided effusion  The right basilar opacity Multiple old left rib fracture seen     Impression: Right lower lung opacity corresponding to patient's known right perihilar mass with right effusion No acute airspace consolidation seen Mild interlobular septal thickening seen in the right upper lung as seen on the CT of April 21, 2018 corresponding to the suspected lymphangitic spread Workstation performed: [id number]     Mri Brain W Wo Contrast    Result Date: 4/23/2018  Narrative: MRI BRAIN WITH AND WITHOUT CONTRAST INDICATION: new diagnosis of lung cancer  Evaluate for brain metastases   COMPARISON:  12/22/2004 TECHNIQUE: Sagittal T1, axial T2, axial FLAIR, axial T1, axial Destin, axial diffusion  Sagittal, axial and coronal T1 postcontrast   Axial BRAVO post contrast   IV Contrast:  10 mL of gadobutrol injection (MULTI-DOSE)  IMAGE QUALITY:   Diagnostic  FINDINGS: BRAIN PARENCHYMA:  There are at least a dozen peripherally ring enhancing masses throughout the supra and infratentorial brain  Several have associated vasogenic edema especially in the occipital lobes bilaterally  No acute intracranial hemorrhage  No extra-axial fluid collection  Cerebellar tonsils are normally positioned  Elsewhere there are a few scattered subcortical foci of FLAIR hyperintensity, likely superimposed mild, chronic microangiopathy  VENTRICLES:  Normal  SELLA AND PITUITARY GLAND:  Normal  ORBITS:  Normal  PARANASAL SINUSES:  Normal  VASCULATURE:  Evaluation of the major intracranial vasculature demonstrates appropriate flow voids  CALVARIUM AND SKULL BASE:  Normal  EXTRACRANIAL SOFT TISSUES:  Normal      Impression: Widespread brain metastases involving the supra and infratentorial compartments of the brain Workstation performed: [id number]     Nm Bone Scan Whole Body    Result Date: 4/23/2018  Narrative: BONE SCAN  WHOLE BODY INDICATION:  Osseous metastases, D49 1: Neoplasm of unspecified behavior of respiratory system PREVIOUS FILM CORRELATION:    CT 4/21/2018 TECHNIQUE:   This study was performed following the intravenous administration of 27 3 mCi Tc-99m labeled MDP  Delayed, anterior and posterior whole body images were acquired, 2-3 hours after radiopharmaceutical administration  FINDINGS: Widespread osseous metastases are visualized, involving the thoracic and lumbosacral spine, bilateral ribs, pelvis, and possibly proximal femurs  There are likely old left rib fractures as well  Asymmetric intense activity in the distal right  clavicle region may be degenerative or metastatic as well  Symmetric renal uptake  Impression: 1  Widespread osseous metastases    PET CT evaluation may also be considered  Workstation performed: [id number]     Ir Thoracentesis    Result Date: 4/24/2018  Narrative: Ultrasound-guided thoracentesis Clinical History: Right pleural effusion Procedure: After explaining the risks and benefits of the procedure to the patient, informed consent was obtained  Ultrasound was used to localize the pleural effusion  The overlying skin was prepped and draped in usual sterile fashion and local anesthesia was obtained with the 1% lidocaine solution  A 5-Telugu Gamervisioneh needle was advanced until fluid was aspirated  Approximately 1500 cc of cloudy, yellow fluid was aspirated  The fluid was sent for the requested laboratory tests  The patient tolerated the procedure well and left the department in stable condition  Impression: Impression: Successful ultrasound-guided thoracentesis  Workstation performed: [id number]         Assessment :  Clinically, metastatic lung cancer with brain and bone metastasis  Pathology is pending  Plan:  Clinically, he has primary lung cancer with brain and bone metastasis  I discussed with patient regarding radiographic findings  Biopsy result is pending at this time  At this moment, I am going to ask radiation oncologist to evaluate him for possible whole-brain radiation therapy

## 2018-04-25 NOTE — PLAN OF CARE
INFECTION - ADULT     Absence or prevention of progression during hospitalization Progressing        Knowledge Deficit     Patient/family/caregiver demonstrates understanding of disease process, treatment plan, medications, and discharge instructions Progressing        SAFETY ADULT     Patient will remain free of falls Progressing

## 2018-04-25 NOTE — OCCUPATIONAL THERAPY NOTE
[address] Progress Note     Patient Name: Annabel Bailon Date: 4/25/2018  Problem List  Patient Active Problem List   Diagnosis    Hypertension    Type 2 diabetes mellitus with complication ([facility] )    Mixed hyperlipidemia    Atrial fibrillation (HCC)    Peripheral vascular disease (HCC)    Persistent proteinuria    Dyspnea    Pleural effusion    [facility] kidney injury ([facility] )    Lung neoplasm    Metastatic disease ([facility] )    Pneumothorax    Metastatic primary lung cancer ([facility] )           04/25/18 1334   Note Type   Note type Progress   Restrictions/Precautions   Weight Bearing Precautions Per Order No   Other Precautions O2   Pain Assessment   Pain Assessment No/denies pain   Pain Score No Pain   ADL   Where Assessed Chair   Grooming Assistance 6  Modified Independent   Grooming Deficit Increased time to complete   LB Dressing Assistance 6  Modified independent   LB Dressing Deficit Increased time to complete   Additional Comments Pt participated in grooming and LB dressing ADL  Pt stood at sink to wash face and hands unsupported w/ good standing balance  Pt's SPO2 on room air during task was 95%  Pt sat to LB dress  He was able to don/doff socks using foot-to-opposite knee technique and good sitting balance  Pt stood for pants pull up w/ good balance  Bed Mobility   Additional Comments Pt OOB upon arrival   Transfers   Sit to Stand 6  Modified independent   Additional items Armrests   Stand to Sit 6  Modified independent   Additional items Armrests   Toilet transfer 6  Modified independent   Additional items (toilet grab bar)   Balance   Static Sitting Good   Dynamic Sitting Fair +   Static Standing Fair   Dynamic Standing Fair   Activity Tolerance   Activity Tolerance Patient tolerated treatment well   Nurse Made Aware Okay to see per RN   Cognition   Overall Cognitive Status [facility]   Arousal/Participation Alert; Responsive; Cooperative   Attention Within functional limits   Orientation Level Oriented X4   Memory Within functional limits   Following Commands Follows all commands and directions without difficulty   Comments Pt pleasant and agreeable to session  Assessment   Limitation Decreased endurance   Prognosis Fair   Assessment Patient participated in Skilled OT session this date with interventions consisting of ADL re training with the use of correct body mechanics, energy conservation techniques, safety awareness and fall prevention techniques, and increase dynamic sit/ stand balance during functional activity  Patient agreeable to OT treatment session, upon arrival patient was found seated OOB to Recliner  In comparison to previous session, patient with improvements in ADL status, standing balance/tolerance, self care transfers, and activity tolerance  Patient has attained all treatment goals and is now demonstrating ability to complete UB/LB ADLs at mod I level for increased time to complete  Patient was educated on energy conservation strategies and fall prevention strategies  Pt verbalized understanding and denies any questions or concerns from an OT standpoint  Pt's limitation includes decreased endurance, therefore rec pt cont participation in self care w/ nrsg staff as needed while in the hospital, as well as cont func mobility/walking w/ nrsg, restorative, and PT while in the hospital  Pt is functioning close to his baseline and has family support at home if needed upon d/c  No further acute OT needs identified at this time to warrant continuation of services  From OT standpoint, recommendation at time of d/c would be home w/ increased family support  D/C OT     Goals   Patient Goals to go home   Plan   Goal Expiration Date 05/03/18   Treatment Day 1   Recommendation   OT Discharge Recommendation Home with family support   OT - OK to Discharge Yes   Barthel Index   Feeding 10   Bathing 5   Grooming Score 5   Dressing Score 10   Bladder Score 10   Bowels Score 10   Toilet Use Score 10 Transfers (Bed/Chair) Score 15   Mobility (Level Surface) Score 10   Stairs Score 0   Barthel Index Score 85   Modified Pallavi Scale   Modified [place] Scale 3     Leia Nix, OTR/L

## 2018-04-25 NOTE — PROGRESS NOTES
IM Residency Progress Note   Unit/Bed#: [facility] 607-01 Encounter: [id number]  SOD Team C       Marely Wilson 69 y o  male [id number]    Hospital Stay Days: 4      Assessment/Plan:    Principal Problem:    Lung neoplasm  Active Problems:    Hypertension    Type 2 diabetes mellitus with complication (HCC)    Mixed hyperlipidemia    Atrial fibrillation (HCC)    Peripheral vascular disease (HCC)    Dyspnea    Pleural effusion    Acute kidney injury ([facility] [address] )    Metastatic disease (HCC)    Pneumothorax    Metastatic primary lung cancer ([facility] [address] )    1  Lung neoplasm - stage IV   -metastasis to bone per bone scan, as well as diffusely in brain per MRI  -Cytology from bronchoscopy and pleurocentesis pending   - Tylenol p r n  for pain and Zofran p r n  for nausea and vomiting  -Symptoms remain stable         2  Elevated creatinine  -creatinine 1 46 on admission with unknown baseline  -Improved with IVF, continue to trend   -Continue to hold home lisinopril      3  Atrial fibrillation  -At home currently on Coumadin 1 5 mg every day except for Monday and Friday on which days he takes 5 mg  -Coumadin currently held  Will restart anticoagulant today      4  Hypertension  -patient takes hydrochlorothiazide/lisinopril at home  -ACEI held due to elevated creatinine   - p r n  labetalol for SBP greater than 160 with hold parameters of heart rate less than 50     5  Diabetes mellitus  -patient takes metformin in the morning and Levemir 34 units daily around dinnertime at home  -Levemir 34 units basal insulin daily at dinnertime  -sliding scale correctional insulin with meals and at bedtime  - POCT blood glucose testing with meals at bedtime     6  Hyperlipidemia  -patient takes simvastatin 40 mg daily at home  -will continue home regimen     7    Ascending aortic aneurysm  -incidental finding found on CT of the chest/abdomen/pelvis  -currently measures 4 5 cm  -biannual CTA or MRA recommended for surveillance, may also consider echocardiogram       Disposition: Interpretation of biopsy  Possible anticoagulation  Subjective:   Patient feels he has stable dyspnea  He does have some increased discomfort in right anterior chest, but this is mild  Vitals: Temp (24hrs), Av 9 °F (36 6 °C), Min:97 4 °F (36 3 °C), Max:98 8 °F (37 1 °C)  Current: Temperature: 98 8 °F (37 1 °C)  Vitals:    18 1500 18 1540 18 2324 18 0600   BP: 144/68  111/56    BP Location: Right arm  Left arm    Pulse: 98  97    Resp: 20  20    Temp: 97 8 °F (36 6 °C)  98 8 °F (37 1 °C)    TempSrc: Oral  Oral    SpO2: 96% 96% 95%    Weight:    107 kg (235 lb 14 3 oz)   Height:        Body mass index is 30 29 kg/m²  I/O last 24 hours: In: 430 [P O :230; I V :200]  Out: 850 [Urine:850]      Physical Exam:   Gen: Sitting in chair, NC in place, NAD  HEENT: membranes moist   Pulm: Dull sound RLL  Ronchi R>L lung fields  Extr: No peripheral edema      Invasive Devices     Peripheral Intravenous Line            Peripheral IV 18 Right Forearm 4 days                      Labs:   Recent Results (from the past 24 hour(s))   Fingerstick Glucose (POCT)    Collection Time: 18  8:09 AM   Result Value Ref Range    POC Glucose 82 65 - 140 mg/dl   Body fluid culture and Gram stain    Collection Time: 18  9:19 AM   Result Value Ref Range    Gram Stain Result Rare Polys     Gram Stain Result No organisms seen    Total Protein, Fluid    Collection Time: 18  9:19 AM   Result Value Ref Range    Protein, Fluid 4 0 g/dL   LD (LDH), Body Fluid    Collection Time: 18  9:19 AM   Result Value Ref Range    LD, Fluid 313 U/L   pH, body fluid    Collection Time: 18  9:19 AM   Result Value Ref Range    PH BODY FLUID 7 6    Fingerstick Glucose (POCT)    Collection Time: 18 12:17 PM   Result Value Ref Range    POC Glucose 76 65 - 140 mg/dl   Fingerstick Glucose (POCT)    Collection Time: 18  5:33 PM   Result Value Ref Range POC Glucose 116 65 - 140 mg/dl   Fingerstick Glucose (POCT)    Collection Time: 04/24/18  9:18 PM   Result Value Ref Range    POC Glucose 104 65 - 140 mg/dl   Basic metabolic panel    Collection Time: 04/25/18  5:48 AM   Result Value Ref Range    Sodium 135 (L) 136 - 145 mmol/L    Potassium 4 8 3 5 - 5 3 mmol/L    Chloride 97 (L) 100 - 108 mmol/L    CO2 31 21 - 32 mmol/L    Anion Gap 7 4 - 13 mmol/L    BUN 32 (H) 5 - 25 mg/dL    Creatinine 1 21 0 60 - 1 30 mg/dL    Glucose 94 65 - 140 mg/dL    Calcium 9 3 8 3 - 10 1 mg/dL    eGFR 57 ml/min/1 73sq m   CBC    Collection Time: 04/25/18  5:48 AM   Result Value Ref Range    WBC 7 92 4 31 - 10 16 Thousand/uL    RBC 4 40 3 88 - 5 62 Million/uL    Hemoglobin 12 5 12 0 - 17 0 g/dL    Hematocrit 38 8 36 5 - 49 3 %    MCV 88 82 - 98 fL    MCH 28 4 26 8 - 34 3 pg    MCHC 32 2 31 4 - 37 4 g/dL    RDW 14 9 11 6 - 15 1 %    Platelets 001 901 - 467 Thousands/uL    MPV 9 0 8 9 - 12 7 fL       Radiology Results: I have personally reviewed pertinent reports  Ct Chest Abdomen Pelvis Wo Contrast    Result Date: 4/21/2018  Impression: 1  Right perihilar mass with occlusion of the right middle and lower lobe bronchi consistent with primary pulmonary malignancy  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, pathologic mediastinal lymphadenopathy, and multiple metastatic nodules in the left lung  2   Obstructive collapse of the right middle lobe and lower lobe  3   Increasing thickened, nodular appearance of the left adrenal gland  This could represent progression of hypertrophy or metastasis  Recommend further evaluation on PET/CT  4   Ascending aortic aneurysm measuring up to 4 5 cm  5   Small pericardial effusion  6   Osseous metastases in the L1, L2, L4, and L5 vertebral bodies   I personally discussed this study with Watson De La Cruz on 4/21/2018 at 3:42 PM  Workstation performed: [id number]     X-ray Chest 2 Views    Result Date: 4/22/2018  Impression: Right lower lung opacity corresponding to patient's known right perihilar mass with right effusion No acute airspace consolidation seen Mild interlobular septal thickening seen in the right upper lung as seen on the CT of April 21, 2018 corresponding to the suspected lymphangitic spread Workstation performed: [id number]     Mri Brain W Wo Contrast    Result Date: 4/23/2018  Impression: Widespread brain metastases involving the supra and infratentorial compartments of the brain Workstation performed: [id number]     Nm Bone Scan Whole Body    Result Date: 4/23/2018  Impression: 1  Widespread osseous metastases  PET CT evaluation may also be considered  Workstation performed: [id number]     Ir Thoracentesis    Result Date: 4/24/2018  Impression: Impression: Successful ultrasound-guided thoracentesis  Workstation performed: [id number]         Active Meds:   Current Facility-Administered Medications   Medication Dose Route Frequency    acetaminophen (TYLENOL) tablet 650 mg  650 mg Oral Q6H PRN    albuterol (PROVENTIL HFA,VENTOLIN HFA) inhaler 2 puff  2 puff Inhalation Q6H PRN    heparin (porcine) subcutaneous injection 5,000 Units  5,000 Units Subcutaneous Q8H [address]    insulin detemir (LEVEMIR) subcutaneous injection 34 Units  34 Units Subcutaneous Daily    insulin lispro (HumaLOG) 100 units/mL subcutaneous injection 1-6 Units  1-6 Units Subcutaneous TID AC    insulin lispro (HumaLOG) 100 units/mL subcutaneous injection 1-6 Units  1-6 Units Subcutaneous HS    labetalol (NORMODYNE) injection 10 mg  10 mg Intravenous Q6H PRN    ondansetron (ZOFRAN) injection 4 mg  4 mg Intravenous Q6H PRN    simvastatin (ZOCOR) tablet 40 mg  40 mg Oral Daily With Dinner    sodium chloride (OCEAN) 0 65 % nasal spray 1 spray  1 spray Each Nare PRN       VTE Pharmacologic Prophylaxis: Held for possible biopsy    VTE Mechanical Prophylaxis: sequential compression device    Hollice Showers

## 2018-04-25 NOTE — PLAN OF CARE
Problem: OCCUPATIONAL THERAPY ADULT  Goal: Performs self-care activities at highest level of function for planned discharge setting  See evaluation for individualized goals  Treatment Interventions: ADL retraining, Functional transfer training, Endurance training, Patient/family training, Equipment evaluation/education, Compensatory technique education, Energy conservation          See flowsheet documentation for full assessment, interventions and recommendations  Outcome: Completed Date Met: 04/25/18  Limitation: Decreased endurance  Prognosis: Fair  Assessment: Patient participated in Skilled OT session this date with interventions consisting of ADL re training with the use of correct body mechanics, energy conservation techniques, safety awareness and fall prevention techniques, and increase dynamic sit/ stand balance during functional activity  Patient agreeable to OT treatment session, upon arrival patient was found seated OOB to Recliner  In comparison to previous session, patient with improvements in ADL status, standing balance/tolerance, self care transfers, and activity tolerance  Patient has attained all treatment goals and is now demonstrating ability to complete UB/LB ADLs at mod I level for increased time to complete  Patient was educated on energy conservation strategies and fall prevention strategies  Pt verbalized understanding and denies any questions or concerns from an OT standpoint  Pt's limitation includes decreased endurance, therefore rec pt cont participation in self care w/ nrsg staff as needed while in the hospital, as well as cont func mobility/walking w/ nrsg, restorative, and PT while in the hospital  Pt is functioning close to his baseline and has family support at home if needed upon d/c  No further acute OT needs identified at this time to warrant continuation of services  From OT standpoint, recommendation at time of d/c would be home w/ increased family support  D/C OT  OT Discharge Recommendation: Home with family support  OT - OK to Discharge:  Yes

## 2018-04-26 ENCOUNTER — APPOINTMENT (INPATIENT)
Dept: RADIOLOGY | Facility: HOSPITAL | Age: 78
DRG: 167 | End: 2018-04-26
Attending: RADIOLOGY
Payer: COMMERCIAL

## 2018-04-26 LAB
GLUCOSE SERPL-MCNC: 102 MG/DL (ref 65–140)
GLUCOSE SERPL-MCNC: 166 MG/DL (ref 65–140)
GLUCOSE SERPL-MCNC: 170 MG/DL (ref 65–140)
GLUCOSE SERPL-MCNC: 71 MG/DL (ref 65–140)

## 2018-04-26 PROCEDURE — 77334 RADIATION TREATMENT AID(S): CPT | Performed by: RADIOLOGY

## 2018-04-26 PROCEDURE — 77412 RADIATION TX DELIVERY LVL 3: CPT

## 2018-04-26 PROCEDURE — 77014 HB CT SCAN FOR THERAPY GUIDE: CPT

## 2018-04-26 PROCEDURE — 77290 THER RAD SIMULAJ FIELD CPLX: CPT | Performed by: RADIOLOGY

## 2018-04-26 PROCEDURE — 77300 RADIATION THERAPY DOSE PLAN: CPT | Performed by: RADIOLOGY

## 2018-04-26 PROCEDURE — 99233 SBSQ HOSP IP/OBS HIGH 50: CPT | Performed by: INTERNAL MEDICINE

## 2018-04-26 PROCEDURE — 82948 REAGENT STRIP/BLOOD GLUCOSE: CPT

## 2018-04-26 PROCEDURE — 77295 3-D RADIOTHERAPY PLAN: CPT | Performed by: RADIOLOGY

## 2018-04-26 RX ADMIN — INSULIN DETEMIR 34 UNITS: 100 INJECTION, SOLUTION SUBCUTANEOUS at 18:31

## 2018-04-26 RX ADMIN — HEPARIN SODIUM 5000 UNITS: 5000 INJECTION, SOLUTION INTRAVENOUS; SUBCUTANEOUS at 13:18

## 2018-04-26 RX ADMIN — HEPARIN SODIUM 5000 UNITS: 5000 INJECTION, SOLUTION INTRAVENOUS; SUBCUTANEOUS at 21:37

## 2018-04-26 RX ADMIN — INSULIN LISPRO 1 UNITS: 100 INJECTION, SOLUTION INTRAVENOUS; SUBCUTANEOUS at 21:38

## 2018-04-26 RX ADMIN — INSULIN LISPRO 1 UNITS: 100 INJECTION, SOLUTION INTRAVENOUS; SUBCUTANEOUS at 18:32

## 2018-04-26 RX ADMIN — HEPARIN SODIUM 5000 UNITS: 5000 INJECTION, SOLUTION INTRAVENOUS; SUBCUTANEOUS at 06:03

## 2018-04-26 NOTE — PLAN OF CARE
DISCHARGE PLANNING - CARE MANAGEMENT     Discharge to post-acute care or home with appropriate resources Adequate for Discharge        INFECTION - ADULT     Absence or prevention of progression during hospitalization Adequate for Discharge        Knowledge Deficit     Patient/family/caregiver demonstrates understanding of disease process, treatment plan, medications, and discharge instructions Adequate for Discharge        Potential for Falls     Patient will remain free of falls Adequate for Discharge        RESPIRATORY - ADULT     Achieves optimal ventilation and oxygenation Adequate for Discharge        SAFETY ADULT     Patient will remain free of falls Adequate for Discharge

## 2018-04-26 NOTE — CONSULTS
Consultation - Radiation Oncology   Colby Arteaga 69 y o  male MRN: [id number]  Unit/Bed#: [facility] 812-86 Encounter: [id number]        History of Present Illness   Physician Requesting Consult: Alecia Perera MD  Reason for Consult / Principal Problem:  Multiple brain Metastasis  Hx and PE limited by: No limitations  HPI: Colby Arteaga is a 69y o  year old male who presents with the a 3 month history of progressively worsening shortness of breath on exertion with an occasional cough but no hemoptysis  He denies any neurologic symptoms with no headaches, dizziness, visual changes, nor any confusion  He also denies any bony aches or pains  Chest x-ray revealed a right pleural effusion the patient had thoracentesis as well as bronchoscopy performed yesterday  Biopsy results are pending  CT Scans of the chest, abdomen, and pelvis revealed a right perihilar mass with occlusion of the right middle and lower lobe bronchi consistent with a primary pulmonary malignancy  There was 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  There was pathologic mediastinal lymphadenopathy and multiple metastatic nodules in the left lung  There osseous metastases at L1, L2, L4 and L5 vertebral bodies  Whole-body bone scan revealed widespread osseous metastasis  MRI of the brain with and without contrast revealed widespread brain metastasis involving the supra and infratentorial compartments  The patient has no prior history of any malignancy  He stopped smoking tobacco 39 years ago  He did work at the [employer] for many years and was exposed to asbestos  He lives with his wife in [place], [place]  Consults    Review of Systems   Fourteen point review of systems is negative except for as noted in history of the present illness  Historical Information   Previous Oncology History:  No prior radiation therapy nor chemotherapy    Past Medical History:   Diagnosis Date    Atrial fibrillation ([facility] [address] )  Diabetes mellitus ([facility] )     Hypercholesteremia     Hypertension     Lung neoplasm     Proteinuria     LAST ASSESSED [id number]    PVD (peripheral vascular disease) ([facility] )      Past Surgical History:   Procedure Laterality Date    LA BRONCHOSCOPY,DIAGNOSTIC N/A 4/24/2018    Procedure: Malaika Brooksats;  Surgeon: Adelita Leos MD;  Location: [facility]; Service: Pulmonary    THORACENTESIS N/A 4/24/2018    Procedure: Melo Carroll;  Surgeon: Adelita Leos MD;  Location: [facility]; Service: Pulmonary     Family History   Problem Relation Age of Onset    Diabetes Mother     Diabetes Father     Diabetes Family      Social History   History   Alcohol Use No     Comment: OCCASIONAL      History   Drug Use No     History   Smoking Status    Former Smoker   Smokeless Tobacco    Never Used       Meds/Allergies   all current active meds have been reviewed    No Known Allergies    Objective     Intake/Output Summary (Last 24 hours) at 04/25/18 2225  Last data filed at 04/25/18 2024   Gross per 24 hour   Intake              990 ml   Output              500 ml   Net              490 ml     Invasive Devices          No matching active lines, drains, or airways        Physical Exam   Constitutional: He is oriented to person, place, and time  He appears well-developed and well-nourished  No distress  HENT:   Head: Normocephalic and atraumatic  Mouth/Throat: No oropharyngeal exudate  Eyes: Conjunctivae and EOM are normal  Pupils are equal, round, and reactive to light  No scleral icterus  Neck: Normal range of motion  Neck supple  No tracheal deviation present  No thyromegaly present  Cardiovascular: Normal rate, regular rhythm and normal heart sounds  Pulmonary/Chest: Effort normal and breath sounds normal  No stridor  No respiratory distress  He has no wheezes  He has no rales  He exhibits no tenderness  Abdominal: Soft  Bowel sounds are normal  He exhibits no distension and no mass   There is no tenderness  There is no rebound  Musculoskeletal: Normal range of motion  He exhibits no edema or tenderness  Lymphadenopathy:     He has no cervical adenopathy  Neurological: He is alert and oriented to person, place, and time  No cranial nerve deficit  Coordination normal    Skin: Skin is warm and dry  No rash noted  He is not diaphoretic  No erythema  No pallor  Psychiatric: He has a normal mood and affect  His behavior is normal  Judgment and thought content normal    Nursing note and vitals reviewed  Lab Results: I have personally reviewed pertinent reports  Imaging Studies: I have personally reviewed pertinent reports  and I have personally reviewed pertinent films in PACS  EKG, Pathology, and Other Studies: I have personally reviewed pertinent reports  Assessment/Plan     Assessment and Plan:  Ignacia Hudson is a 69y o  year old male who presents with a stage IV right lung carcinoma with widespread metastasis involving the mediastinum, left lung, multiple bones, and multiple brain metastasis  He does have some symptoms from his lung primary with coughing and shortness of breath but does not have symptoms from his bone metastasis nor his multiple brain metastasis  His bronchoscopic biopsy results from yesterday are pending at this time  We discussed with him that he has stage IV disease and that his disease is not curable  We discussed palliative whole brain radiation therapy which will help to slow progression of his multiple brain metastasis and maintain his current cognitive function  We discussed the acute side effects and the potential chronic complications of whole brain radiation therapy and he agrees to proceed with treatment  He will have simulation and start treatment to the whole brain tomorrow  We plan on a dose of 3000 cGy in 10 fractions    He was seen by Dr Shen Espinal to consider his systemic treatment options will which will be determined after his final pathology is available  Code Status: Level 1 - Full Code  Advance Directive and Living Will: Yes    Power of :    POLST:      Counseling / Coordination of Care  Total floor / unit time spent today 60 minutes  Greater than 50% of total time was spent with the patient and / or family counseling and / or coordination of care   A description of the counseling / coordination of care: See Above

## 2018-04-26 NOTE — PROGRESS NOTES
IM Residency Progress Note   Unit/Bed#: [facility] 607-01 Encounter: [id number]  SOD Team C       Adebayo Epps 69 y o  male [id number]    Hospital Stay Days: 5      Assessment/Plan:    Principal Problem:    Lung neoplasm  Active Problems:    Hypertension    Type 2 diabetes mellitus with complication (HCC)    Mixed hyperlipidemia    Atrial fibrillation (HCC)    Peripheral vascular disease (HCC)    Dyspnea    Pleural effusion    Acute kidney injury ([facility] [address] )    Metastatic disease (HCC)    Pneumothorax    Metastatic primary lung cancer ([facility] [address] )    1  Lung neoplasm - stage IV   -metastasis to bone per bone scan, as well as diffusely in brain per MRI  -Planned whole brain radiation to start today   -Cytology from bronchoscopy and pleurocentesis pending   - Tylenol p r n  for pain and Zofran p r n  for nausea and vomiting  -Symptoms remain stable         2  Elevated creatinine  -creatinine 1 46 on admission with unknown baseline  -Improved with IVF, continue to trend   -Continue to hold home lisinopril      3  Atrial fibrillation  -At home currently on Coumadin 1 5 mg every day except for Monday and Friday on which days he takes 5 mg  -Coumadin currently held  Will continue to hold given metastatic disease and radiation treatment with high risk of hemorrhagic conversion      4  Hypertension  -patient takes hydrochlorothiazide/lisinopril at home  -ACEI held due to elevated creatinine   - p r n  labetalol for SBP greater than 160 with hold parameters of heart rate less than 50     5  Diabetes mellitus  -patient takes metformin in the morning and Levemir 34 units daily around dinnertime at home  -Levemir 34 units basal insulin daily at dinnertime  -sliding scale correctional insulin with meals and at bedtime  - POCT blood glucose testing with meals at bedtime     6  Hyperlipidemia  -patient takes simvastatin 40 mg daily at home  -will continue home regimen     7    Ascending aortic aneurysm  -incidental finding found on CT of the chest/abdomen/pelvis  -currently measures 4 5 cm  -biannual CTA or MRA recommended for surveillance, may also consider echocardiogram       Disposition: Starting radiation today  Anticipate discharge home after treatment  Subjective:   Patient feels he has stable dyspnea  He continues to have some discomfort and cough with deep inhalation  Vitals: Temp (24hrs), Av 1 °F (36 7 °C), Min:97 5 °F (36 4 °C), Max:98 5 °F (36 9 °C)  Current: Temperature: 98 5 °F (36 9 °C)  Vitals:    18 0725 18 1500 18 0003 18 0600   BP: 118/65 130/72 156/77    BP Location: Left arm  Left arm    Pulse: 89 84 90    Resp: 18 18 20    Temp: 97 5 °F (36 4 °C) 98 4 °F (36 9 °C) 98 5 °F (36 9 °C)    TempSrc: Oral Oral Oral    SpO2: 96% 95% 98%    Weight:    107 kg (235 lb 14 3 oz)   Height:        Body mass index is 30 29 kg/m²  I/O last 24 hours: In: 200 [P O :1240]  Out: 900 [Urine:900]      Physical Exam:   Gen: Sitting in chair, NAD  HEENT: Anicteric, membranes moist   Extr: No LE edema  Neuro: AAOx3, spontaneously moves extremities  Invasive Devices          No matching active lines, drains, or airways                Labs:   Recent Results (from the past 24 hour(s))   Fingerstick Glucose (POCT)    Collection Time: 18  7:44 AM   Result Value Ref Range    POC Glucose 135 65 - 140 mg/dl   Fingerstick Glucose (POCT)    Collection Time: 18 12:23 PM   Result Value Ref Range    POC Glucose 146 (H) 65 - 140 mg/dl   Fingerstick Glucose (POCT)    Collection Time: 18  5:00 PM   Result Value Ref Range    POC Glucose 148 (H) 65 - 140 mg/dl   Fingerstick Glucose (POCT)    Collection Time: 18  9:12 PM   Result Value Ref Range    POC Glucose 279 (H) 65 - 140 mg/dl       Radiology Results: I have personally reviewed pertinent reports  Ct Chest Abdomen Pelvis Wo Contrast    Result Date: 2018  Impression: 1    Right perihilar mass with occlusion of the right middle and lower lobe bronchi consistent with primary pulmonary malignancy  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, pathologic mediastinal lymphadenopathy, and multiple metastatic nodules in the left lung  2   Obstructive collapse of the right middle lobe and lower lobe  3   Increasing thickened, nodular appearance of the left adrenal gland  This could represent progression of hypertrophy or metastasis  Recommend further evaluation on PET/CT  4   Ascending aortic aneurysm measuring up to 4 5 cm  5   Small pericardial effusion  6   Osseous metastases in the L1, L2, L4, and L5 vertebral bodies  I personally discussed this study with Angely Thomas on 4/21/2018 at 3:42 PM  Workstation performed: [id number]     X-ray Chest 2 Views    Result Date: 4/22/2018  Impression: Right lower lung opacity corresponding to patient's known right perihilar mass with right effusion No acute airspace consolidation seen Mild interlobular septal thickening seen in the right upper lung as seen on the CT of April 21, 2018 corresponding to the suspected lymphangitic spread Workstation performed: [id number]     Mri Brain W Wo Contrast    Result Date: 4/23/2018  Impression: Widespread brain metastases involving the supra and infratentorial compartments of the brain Workstation performed: [id number]     Nm Bone Scan Whole Body    Result Date: 4/23/2018  Impression: 1  Widespread osseous metastases  PET CT evaluation may also be considered  Workstation performed: [id number]     Ir Thoracentesis    Result Date: 4/24/2018  Impression: Impression: Successful ultrasound-guided thoracentesis   Workstation performed: [id number]         Active Meds:   Current Facility-Administered Medications   Medication Dose Route Frequency    acetaminophen (TYLENOL) tablet 650 mg  650 mg Oral Q6H PRN    albuterol (PROVENTIL HFA,VENTOLIN HFA) inhaler 2 puff  2 puff Inhalation Q6H PRN    heparin (porcine) subcutaneous injection 5,000 Units  5,000 Units Subcutaneous Q8H [facility]    insulin detemir (LEVEMIR) subcutaneous injection 34 Units  34 Units Subcutaneous Daily    insulin lispro (HumaLOG) 100 units/mL subcutaneous injection 1-6 Units  1-6 Units Subcutaneous TID AC    insulin lispro (HumaLOG) 100 units/mL subcutaneous injection 1-6 Units  1-6 Units Subcutaneous HS    labetalol (NORMODYNE) injection 10 mg  10 mg Intravenous Q6H PRN    ondansetron (ZOFRAN) injection 4 mg  4 mg Intravenous Q6H PRN    oxyCODONE (ROXICODONE) immediate release tablet 10 mg  10 mg Oral Q6H PRN    simvastatin (ZOCOR) tablet 40 mg  40 mg Oral Daily With Dinner    sodium chloride (OCEAN) 0 65 % nasal spray 1 spray  1 spray Each Nare PRN       VTE Pharmacologic Prophylaxis: SQH  VTE Mechanical Prophylaxis: sequential compression device    [employer]

## 2018-04-27 ENCOUNTER — TELEPHONE (OUTPATIENT)
Dept: CCU | Facility: HOSPITAL | Age: 78
End: 2018-04-27

## 2018-04-27 ENCOUNTER — TRANSITIONAL CARE MANAGEMENT (OUTPATIENT)
Dept: INTERNAL MEDICINE CLINIC | Facility: CLINIC | Age: 78
End: 2018-04-27

## 2018-04-27 VITALS
TEMPERATURE: 97.6 F | RESPIRATION RATE: 18 BRPM | HEART RATE: 86 BPM | WEIGHT: 229.72 LBS | BODY MASS INDEX: 29.48 KG/M2 | SYSTOLIC BLOOD PRESSURE: 133 MMHG | HEIGHT: 74 IN | DIASTOLIC BLOOD PRESSURE: 70 MMHG | OXYGEN SATURATION: 92 %

## 2018-04-27 LAB
BACTERIA SPEC BFLD CULT: NO GROWTH
GLUCOSE SERPL-MCNC: 95 MG/DL (ref 65–140)
GRAM STN SPEC: NORMAL
GRAM STN SPEC: NORMAL

## 2018-04-27 PROCEDURE — 99239 HOSP IP/OBS DSCHRG MGMT >30: CPT | Performed by: INTERNAL MEDICINE

## 2018-04-27 PROCEDURE — 77412 RADIATION TX DELIVERY LVL 3: CPT | Performed by: RADIOLOGY

## 2018-04-27 PROCEDURE — 82948 REAGENT STRIP/BLOOD GLUCOSE: CPT

## 2018-04-27 PROCEDURE — 99232 SBSQ HOSP IP/OBS MODERATE 35: CPT | Performed by: PHYSICIAN ASSISTANT

## 2018-04-27 PROCEDURE — DW011ZZ BEAM RADIATION OF HEAD AND NECK USING PHOTONS 1 - 10 MEV: ICD-10-PCS | Performed by: RADIOLOGY

## 2018-04-27 RX ORDER — LISINOPRIL AND HYDROCHLOROTHIAZIDE 20; 12.5 MG/1; MG/1
1 TABLET ORAL DAILY
Qty: 90 TABLET | Refills: 0 | Status: SHIPPED | OUTPATIENT
Start: 2018-04-27 | End: 2018-07-16 | Stop reason: ALTCHOICE

## 2018-04-27 RX ADMIN — HEPARIN SODIUM 5000 UNITS: 5000 INJECTION, SOLUTION INTRAVENOUS; SUBCUTANEOUS at 05:30

## 2018-04-27 NOTE — SOCIAL WORK
Called [facility] for pt's OP schedule  It will be 1015 M-F for 8 more treatments after today  Family to transport to appointments  Placed schedule in [facility]

## 2018-04-27 NOTE — DISCHARGE SUMMARY
IMR Discharge Summary - Medical Marely Wilson 69 y o  male MRN: [id number]    [address] [facility] 6 Room / Bed: [facility] 607/[facility] 038-02 Encounter: [id number]    BRIEF OVERVIEW    Admitting Provider: Sharath Mcadams MD  Discharge Provider: Magda Martin MD  Primary Care Physician at Discharge: Magda Martin MD    Discharge To: Home    Admission Date: 4/21/2018     Discharge Date: 4/27/2018 12:19 PM    Primary Discharge Diagnosis  Principal Problem:    Lung neoplasm  Active Problems:    Hypertension    Type 2 diabetes mellitus with complication (HCC)    Mixed hyperlipidemia    Atrial fibrillation (HCC)    Peripheral vascular disease (HCC)    Dyspnea    Pleural effusion    Acute kidney injury ([address] )    Metastatic disease ([address] )    Pneumothorax    Metastatic primary lung cancer ([address] )  Resolved Problems:    * No resolved hospital problems  *      Consulting Providers   Oncology  Pulmonology  Radiation Oncology           Diagnostic Procedures Performed    IR Thoracentesis 4/24/18  Bronchoscopy with biopsy, brushing, washing 4/24/18    Ct Chest Abdomen Pelvis Wo Contrast    Result Date: 4/21/2018  Impression: 1  Right perihilar mass with occlusion of the right middle and lower lobe bronchi consistent with primary pulmonary malignancy  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, pathologic mediastinal lymphadenopathy, and multiple metastatic nodules in the left lung  2   Obstructive collapse of the right middle lobe and lower lobe  3   Increasing thickened, nodular appearance of the left adrenal gland  This could represent progression of hypertrophy or metastasis  Recommend further evaluation on PET/CT  4   Ascending aortic aneurysm measuring up to 4 5 cm  5   Small pericardial effusion  6   Osseous metastases in the L1, L2, L4, and L5 vertebral bodies   I personally discussed this study with Marquita Ocampo on 4/21/2018 at 3:42 PM  Workstation performed: [id number]     Xr Chest Portable    Result Date: 4/25/2018  Impression: Stable right lung opacity representing pleural effusion and underlying consolidation  Workstation performed: [id number]     X-ray Chest 2 Views    Result Date: 4/22/2018  Impression: Right lower lung opacity corresponding to patient's known right perihilar mass with right effusion No acute airspace consolidation seen Mild interlobular septal thickening seen in the right upper lung as seen on the CT of April 21, 2018 corresponding to the suspected lymphangitic spread Workstation performed: [id number]     Mri Brain W Wo Contrast    Result Date: 4/23/2018  Impression: Widespread brain metastases involving the supra and infratentorial compartments of the brain Workstation performed: [id number]     Nm Bone Scan Whole Body    Result Date: 4/23/2018  Impression: 1  Widespread osseous metastases  PET CT evaluation may also be considered  Workstation performed: [id number]     Ir Thoracentesis    Result Date: 4/24/2018  Impression: Impression: Successful ultrasound-guided thoracentesis  Workstation performed: [id number]     Ct Radiation Therapy    Result Date: 4/26/2018  Impression: Radiation planning images of the brain show no gross changes or acute abnormality  Known metastatic disease  Workstation performed: [id number]       Discharge Disposition: Home/Self Care  Discharged With Lines: None    Test Results Pending at Discharge: Final pathology on biopsy, cytology, washing and brushing  Outpatient Follow-Up  To be scheduled with PCP within 2 weeks time  Follow up with consulting providers  Oncology   Active Issues Requiring Follow-up   Metastatic cancer  Code Status: Level 1 - Full Code  Advance Directive and Living Will: Received    Medications   See after visit summary for reconciled discharge medications provided to patient and family      Allergies  No Known Allergies  Discharge Diet: diabetic diet  Activity restrictions: none    [address] [facility] Course  Patient presented with insidious dyspnea on exertion and fatigue as noted in HPI  Imagning in the emergency department revealed pleural effusion and hilar mass with likely metastatic disease to the spine  Follow up imagining on day after admission demonstrated metastases to brain on MRI, and diffuse osseous metastasis by bone scan  On admission the patient had noted JUANA, which improved with light IVF and holding of ACEI through 48 hours  After review with oncology consultant and pulmonology a decision for thoracentesis and biopsy by bronchoscopy  This was deferred for 24 hours for correction of INR  The patient tolerated the procedure well  Formal consult by radiation oncology was performed and the patient underwent 2 of 10 scheduled whole brain radiation sessions prior to discharge  On day of discharge his exam is as follows:  Gen: Sitting, NAD  HENT: Anicteric, no injection, MMM  CV: RRR  Pulm: Decreased breath sounds RLL  Abd: NT/ND  Extr: No edema  He is to continue to hold his coumadin anticoagulation for atrial fibrillation at time of discharge, due to brain metastases and active radiation  Presenting Problem/History of Present Illness  Principal Problem:    Lung neoplasm  Active Problems:    Hypertension    Type 2 diabetes mellitus with complication (HCC)    Mixed hyperlipidemia    Atrial fibrillation (HCC)    Peripheral vascular disease (HCC)    Dyspnea    Pleural effusion    Acute kidney injury ([address] )    Metastatic disease ([address] )    Pneumothorax    Metastatic primary lung cancer ([address] )  Resolved Problems:    * No resolved hospital problems  *            Discharge Condition: stable      Discharge  Statement   I spent 45 minutes minutes discharging the patient  This time was spent on the day of discharge  I had direct contact with the patient on the day of discharge   Additional documentation is required if more than 30 minutes were spent on discharge

## 2018-04-27 NOTE — PROGRESS NOTES
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        *  Likely lung primary as pt with right perihilar mass with endobronchial obstruction & mediastinal adenopathy & lung           nodules        *  s/p bronchoscopy with biopsy 4/24/18 --> await final pathology        *  For daily brain XRT  3  Right, exudative pleural effusion        *  S/P right thoracentesis by IR 4/24/18 --> 1500mL removed        *  Cytology shows atypical cellular changes    ~Family updated at bedside  ~Stable for D/C from our standpoint once O2 ambulatory needs are established  ~Outpatient pulmonary follow up as per discharge instructions      Subjective:   Mr Ralf Neumann is seen sitting out of bed in the chair this morning  His family is at his bedside  He is anxious as he is going to be going home later today  He is feeling well and denies resting shortness of breath, chest pain, fevers or bronchospasm  He does have occasional cough that is productive of clear to white sputum  He denies hemoptysis  Pathology from bronchoscopy is still pending and pleural fluid cytology shows atypical cellular changes  He has been scheduled to undergo brain radiation daily for 10 treatments  Objective:     Vitals: Blood pressure 133/70, pulse 86, temperature 97 6 °F (36 4 °C), temperature source Oral, resp  rate 18, height 6' 2" (1 88 m), weight 104 kg (229 lb 11 5 oz), SpO2 93 % , RA, Body mass index is 29 49 kg/m²        Intake/Output Summary (Last 24 hours) at 04/27/18 1013  Last data filed at 04/27/18 0256   Gross per 24 hour   Intake             1020 ml   Output 0 ml   Net             1020 ml         Physical Exam  Gen: Awake, alert, oriented x 3, no acute distress  HEENT: Mucous membranes moist, no oral lesions, no thrush  NECK: No accessory muscle use, JVP not elevated  Cardiac: Regular, single S1, single S2, no murmurs, no rubs, no gallops  Lungs: Decreased breath sounds at the right base  Left lung clear  No wheezes, rhonchi or rales noted  Abdomen: normoactive bowel sounds, soft nontender, nondistended, no rebound or rigidity, no guarding  Extremities: no cyanosis, no clubbing, no edema    Labs: I have personally reviewed pertinent lab results  , ABG: No results found for: PHART, NIC4JLB, PO2ART, RWJ2GWH, J3PTDEQT, BEART, SOURCE, BNP: No results found for: BNP, CBC: No results found for: WBC, HGB, HCT, MCV, PLT, ADJUSTEDWBC, MCH, MCHC, RDW, MPV, NRBC, CMP: No results found for: NA, K, CL, CO2, ANIONGAP, BUN, CREATININE, GLUCOSE, CALCIUM, AST, ALT, ALKPHOS, PROT, ALBUMIN, BILITOT, EGFR, PT/INR: No results found for: PT, INR, Troponin: No results found for: TROPONINI     Imaging and other studies: I have personally reviewed pertinent films in PACS    PCXR 4/25/18  FINDINGS:     Heart shadow is obscured by adjacent opacity      No change in diffuse opacity in the lower two thirds of the right hemithorax, representing pleural effusion and underlying consolidation  Left lung is clear    No pneumothorax      Multiple old left rib fractures again identified      IMPRESSION:     Stable right lung opacity representing pleural effusion and underlying consolidation        Ciara Polk PA-C

## 2018-04-27 NOTE — TELEPHONE ENCOUNTER
I called Mr Moo Adkins to discuss the results of the biopsy which came back today as non small cell lung cancer favoring adenocarcinoma  I informed him of this  I also checked to make sure he had an appointment with oncology  He is seeing radiation oncology to continue treatments but did not have an appointment yet with medical oncology  I called Dr Partida [place] office to let them know and they will call him with an appointment later today  Mr Moo Adkins was informed

## 2018-04-30 ENCOUNTER — APPOINTMENT (OUTPATIENT)
Dept: RADIATION ONCOLOGY | Facility: HOSPITAL | Age: 78
End: 2018-04-30
Attending: RADIOLOGY
Payer: COMMERCIAL

## 2018-04-30 PROCEDURE — 77412 RADIATION TX DELIVERY LVL 3: CPT | Performed by: RADIOLOGY

## 2018-04-30 PROCEDURE — 77331 SPECIAL RADIATION DOSIMETRY: CPT | Performed by: RADIOLOGY

## 2018-05-01 ENCOUNTER — APPOINTMENT (OUTPATIENT)
Dept: RADIATION ONCOLOGY | Facility: HOSPITAL | Age: 78
End: 2018-05-01
Attending: RADIOLOGY
Payer: COMMERCIAL

## 2018-05-01 PROCEDURE — 77412 RADIATION TX DELIVERY LVL 3: CPT | Performed by: RADIOLOGY

## 2018-05-02 PROCEDURE — 77336 RADIATION PHYSICS CONSULT: CPT | Performed by: RADIOLOGY

## 2018-05-02 PROCEDURE — 77412 RADIATION TX DELIVERY LVL 3: CPT | Performed by: RADIOLOGY

## 2018-05-03 PROCEDURE — 77412 RADIATION TX DELIVERY LVL 3: CPT | Performed by: RADIOLOGY

## 2018-05-03 PROCEDURE — 77417 THER RADIOLOGY PORT IMAGE(S): CPT | Performed by: RADIOLOGY

## 2018-05-04 ENCOUNTER — HOSPITAL ENCOUNTER (OUTPATIENT)
Dept: RADIOLOGY | Facility: HOSPITAL | Age: 78
Discharge: HOME | End: 2018-05-04
Attending: INTERNAL MEDICINE | Admitting: RADIOLOGY
Payer: COMMERCIAL

## 2018-05-04 ENCOUNTER — HOSPITAL ENCOUNTER (OUTPATIENT)
Dept: INFUSION CENTER | Facility: HOSPITAL | Age: 78
Discharge: HOME | End: 2018-05-04
Payer: COMMERCIAL

## 2018-05-04 ENCOUNTER — OFFICE VISIT (OUTPATIENT)
Dept: HEMATOLOGY ONCOLOGY | Facility: CLINIC | Age: 78
End: 2018-05-04
Payer: COMMERCIAL

## 2018-05-04 VITALS
OXYGEN SATURATION: 94 % | SYSTOLIC BLOOD PRESSURE: 113 MMHG | DIASTOLIC BLOOD PRESSURE: 55 MMHG | RESPIRATION RATE: 16 BRPM | HEART RATE: 90 BPM

## 2018-05-04 VITALS
HEIGHT: 71 IN | HEART RATE: 83 BPM | WEIGHT: 231 LBS | DIASTOLIC BLOOD PRESSURE: 80 MMHG | OXYGEN SATURATION: 93 % | RESPIRATION RATE: 16 BRPM | SYSTOLIC BLOOD PRESSURE: 140 MMHG | TEMPERATURE: 98.1 F | BODY MASS INDEX: 32.34 KG/M2

## 2018-05-04 DIAGNOSIS — C34.91 PRIMARY MALIGNANT NEOPLASM OF RIGHT LUNG METASTATIC TO OTHER SITE (HCC): ICD-10-CM

## 2018-05-04 DIAGNOSIS — C34.91 PRIMARY MALIGNANT NEOPLASM OF RIGHT LUNG METASTATIC TO OTHER SITE (HCC): Primary | ICD-10-CM

## 2018-05-04 DIAGNOSIS — C79.51 BONE METASTASES (HCC): ICD-10-CM

## 2018-05-04 DIAGNOSIS — C34.91 MALIGNANT NEOPLASM OF RIGHT LUNG, UNSPECIFIED PART OF LUNG (HCC): Primary | ICD-10-CM

## 2018-05-04 DIAGNOSIS — C79.31 BRAIN METASTASES (HCC): ICD-10-CM

## 2018-05-04 PROCEDURE — 1111F DSCHRG MED/CURRENT MED MERGE: CPT | Performed by: INTERNAL MEDICINE

## 2018-05-04 PROCEDURE — 96372 THER/PROPH/DIAG INJ SC/IM: CPT

## 2018-05-04 PROCEDURE — 32555 ASPIRATE PLEURA W/ IMAGING: CPT | Performed by: RADIOLOGY

## 2018-05-04 PROCEDURE — 99215 OFFICE O/P EST HI 40 MIN: CPT | Performed by: INTERNAL MEDICINE

## 2018-05-04 PROCEDURE — 32555 ASPIRATE PLEURA W/ IMAGING: CPT

## 2018-05-04 PROCEDURE — 77412 RADIATION TX DELIVERY LVL 3: CPT | Performed by: RADIOLOGY

## 2018-05-04 RX ORDER — CYANOCOBALAMIN 1000 UG/ML
1000 INJECTION INTRAMUSCULAR; SUBCUTANEOUS ONCE
Status: COMPLETED | OUTPATIENT
Start: 2018-05-04 | End: 2018-05-04

## 2018-05-04 RX ADMIN — CYANOCOBALAMIN 1000 MCG: 1000 INJECTION, SOLUTION INTRAMUSCULAR at 11:08

## 2018-05-04 NOTE — PROGRESS NOTES
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 with obstruction of the right middle and right lower lobe, with right pleural effusion, multiple bony metastases proven by bone scan, brain metastases proven by MRI of the brain involving the supra and the infratentorial compartments, paracentesis was not conclusive the patient underwent bronchoscopy in April 2018, brushing of the right lower lobe showed cells positive for TTF 1, Napsin, consistent with adenocarcinoma of the lung, the patient used to smoke 2 pack of cigarette for 15 years and quit 40 years ago  1  I will send for molecular tests and I will send for liquid biopsy to rule out mutational status  2  Patient is symptomatic he is receiving whole-brain radiation  3  I will start treatment with Alimta at 500 milligram/meter subcutaneously, carboplatin AUC 5, Pembrolizumab 200 mg flat dose every 3 weeks this is an FDA approved dose for first-line metastatic adenocarcinoma of the lung regardless of the PD L1 status  4  The patient to receive vitamin B12 today 1000 mcg IM shot and every chemotherapy, patient to initiate folic acid 1 mg p o  Daily   5  Contact IR for thoracentesis prior of Alimta to prevent pancytopenia and mucositis  6   He is aware this is incurable disease           HPI: 71-year-old  male who used to smoke 2 pack of cigarettes daily for 20 years, quit 40 years ago, he noticed in the beginning of 2018 dyspnea with cough without hemoptysis, he noticed 10 lb weight loss with intermittent anterior chest pain with radiation to the right lateral side, CT scan in April 2018 showed dried large hilar mass with bronchial obstruction, small right pleural effusion, multiple lumbar spine metastatic disease status post right thoracentesis with atypical cellular changes cannot exclude neoplasia, the patient had bronchoscopy and right lower lobe bronchial brushing showed conclusive evidence of malignancy with non-small cell lung cancer favor adenocarcinoma that stained positive for TTF 1, napsin and absent for p 40CT scan of the abdomen and pelvis showed small pericardial effusion, osseous metastases at L1, L2, L4, L5, obstructive collapse of the right middle lobe and the lower lobe  MRI of the brain showed widespread brain metastases involving the supra and the infratentorial compartments  The patient underwent radiation therapy to the brain          Interval History:        Previous Treatment:         Test Results:    Imaging: Ct Chest Abdomen Pelvis Wo Contrast    Result Date: 4/21/2018  Narrative: CT CHEST, ABDOMEN AND PELVIS WITHOUT IV CONTRAST INDICATION:   Right pleural effusion  COMPARISON: CT of the abdomen/pelvis dated 12/24/2004  TECHNIQUE: CT examination of the chest, abdomen and pelvis was performed without intravenous contrast   Axial, sagittal, and coronal 2D reformatted images were created from the source data and submitted for interpretation  Radiation dose length product (DLP) for this visit:  1637 02 mGy-cm   This examination, like all CT scans performed in the [facility], was performed utilizing techniques to minimize radiation dose exposure, including the use of iterative reconstruction and automated exposure control  Enteric contrast was administered  Note: Image numbers reported for findings (if any) are taken from axial series 2 and 3 unless otherwise indicated  FINDINGS: CHEST LUNGS:  Soft tissue filling both the right middle and lower lobar arteries  Perihilar masslike consolidative opacity, obscured by consolidations in the right middle lobe  There appears to be associated satellite nodularity measuring up to 8 mm  Associated interstitial thickening involving both the interlobular septae and the peribronchial vascular interstitium    Findings consistent with lymphangitic carcinomatosis  3 mm pulmonary nodules in the anterior left upper lobe on image 18   4 mm nodule in the left upper lobe on image 23   4 mm nodule in the left lower lobe on image 28  Multiple larger lobular nodules in the left lower lobe measuring up to 19 mm x 14 mm on  image 44  PLEURA:  Moderate sized pleural effusion  Posttraumatic changes of the left pleural from prior healed rib fractures  HEART/GREAT VESSELS:  Small pericardial effusion  Aortic valvular and coronary calcifications  Heart size is normal   Ascending aortic aneurysm measuring 4 6 cm  MEDIASTINUM AND DWIGHT:  Pathologically enlarged lymph nodes in the mediastinum  Largest index nodes measure 28 mm x 20 mm in the left paratracheal location on image 24 and in a subcarinal location measuring 32 mm x 20 mm on image 34  CHEST WALL AND LOWER NECK:   Unremarkable  ABDOMEN LIVER/BILIARY TREE:  Unremarkable  GALLBLADDER:  Stones or sludge layering at the gallbladder fundus  No pericholecystic inflammation or wall thickening  SPLEEN:  Unremarkable  PANCREAS:  Unremarkable  ADRENAL GLANDS:  Diffuse nodular thickening of left adrenal gland up to 2 5 cm in thickness  The gland  Mildly thickened in 2004, this represents a significant increase  Right gland is unremarkable  KIDNEYS/URETERS:  Right and left atrophic changes  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  Renal cysts left kidney  No suspicious renal masses  No hydronephrosis  STOMACH AND BOWEL:  Unremarkable  APPENDIX:  A normal appendix was visualized  ABDOMINOPELVIC CAVITY:  No ascites or free intraperitoneal air  No lymphadenopathy  VESSELS:  Unremarkable for patient's age  PELVIS REPRODUCTIVE ORGANS:  Unremarkable for patient's age  URINARY BLADDER:  Unremarkable  ABDOMINAL WALL/INGUINAL REGIONS:  Diastasis recti no hernias  No inguinal lymphadenopathy  OSSEOUS STRUCTURES:  Multiple healed left-sided rib fractures  No acute fractures demonstrated    Lytic lesions demonstrated in the lateral 1, L2, L4, and L5 vertebral bodies with areas of anterior cortical erosion and low-grade fragmentation at L5  Impression: 1  Right perihilar mass with occlusion of the right middle and lower lobe bronchi consistent with primary pulmonary malignancy  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, pathologic mediastinal lymphadenopathy, and multiple metastatic nodules in the left lung  2   Obstructive collapse of the right middle lobe and lower lobe  3   Increasing thickened, nodular appearance of the left adrenal gland  This could represent progression of hypertrophy or metastasis  Recommend further evaluation on PET/CT  4   Ascending aortic aneurysm measuring up to 4 5 cm  5   Small pericardial effusion  6   Osseous metastases in the L1, L2, L4, and L5 vertebral bodies  I personally discussed this study with Axel Olson on 4/21/2018 at 3:42 PM  Workstation performed: [id number]     Xr Chest Portable    Result Date: 4/25/2018  Narrative: CHEST INDICATION:   hypoxia  COMPARISON:  4/21/2018 EXAM PERFORMED/VIEWS:  XR CHEST PORTABLE FINDINGS: Heart shadow is obscured by adjacent opacity  No change in diffuse opacity in the lower two thirds of the right hemithorax, representing pleural effusion and underlying consolidation  Left lung is clear  No pneumothorax  Multiple old left rib fractures again identified  Impression: Stable right lung opacity representing pleural effusion and underlying consolidation  Workstation performed: [id number]     X-ray Chest 2 Views    Result Date: 4/22/2018  Narrative: CHEST INDICATION:   chest pain  Cough with shortness of breath COMPARISON:  January 3, 2005, CT from April 21, 2018 EXAM PERFORMED/VIEWS:  XR CHEST PA & LATERAL FINDINGS: Cardiomediastinal silhouette appears unremarkable  There is a right-sided effusion    The right basilar opacity Multiple old left rib fracture seen     Impression: Right lower lung opacity corresponding to patient's known right perihilar mass with right effusion No acute airspace consolidation seen Mild interlobular septal thickening seen in the right upper lung as seen on the CT of April 21, 2018 corresponding to the suspected lymphangitic spread Workstation performed: [id number]     Mri Brain W Wo Contrast    Result Date: 4/23/2018  Narrative: MRI BRAIN WITH AND WITHOUT CONTRAST INDICATION: new diagnosis of lung cancer  Evaluate for brain metastases  COMPARISON:  12/22/2004 TECHNIQUE: Sagittal T1, axial T2, axial FLAIR, axial T1, axial [place], axial diffusion  Sagittal, axial and coronal T1 postcontrast   Axial BRAVO post contrast   IV Contrast:  10 mL of gadobutrol injection (MULTI-DOSE)  IMAGE QUALITY:   Diagnostic  FINDINGS: BRAIN PARENCHYMA:  There are at least a dozen peripherally ring enhancing masses throughout the supra and infratentorial brain  Several have associated vasogenic edema especially in the occipital lobes bilaterally  No acute intracranial hemorrhage  No extra-axial fluid collection  Cerebellar tonsils are normally positioned  Elsewhere there are a few scattered subcortical foci of FLAIR hyperintensity, likely superimposed mild, chronic microangiopathy  VENTRICLES:  Normal  SELLA AND PITUITARY GLAND:  Normal  ORBITS:  Normal  PARANASAL SINUSES:  Normal  VASCULATURE:  Evaluation of the major intracranial vasculature demonstrates appropriate flow voids   CALVARIUM AND SKULL BASE:  Normal  EXTRACRANIAL SOFT TISSUES:  Normal      Impression: Widespread brain metastases involving the supra and infratentorial compartments of the brain Workstation performed: [id number]     Nm Bone Scan Whole Body    Result Date: 4/23/2018  Narrative: BONE SCAN  WHOLE BODY INDICATION:  Osseous metastases, D49 1: Neoplasm of unspecified behavior of respiratory system PREVIOUS FILM CORRELATION:    CT 4/21/2018 TECHNIQUE:   This study was performed following the intravenous administration of 27 3 mCi Tc-99m labeled MDP  Delayed, anterior and posterior whole body images were acquired, 2-3 hours after radiopharmaceutical administration  FINDINGS: Widespread osseous metastases are visualized, involving the thoracic and lumbosacral spine, bilateral ribs, pelvis, and possibly proximal femurs  There are likely old left rib fractures as well  Asymmetric intense activity in the distal right  clavicle region may be degenerative or metastatic as well  Symmetric renal uptake  Impression: 1  Widespread osseous metastases  PET CT evaluation may also be considered  Workstation performed: [id number]     Ir Thoracentesis    Result Date: 4/24/2018  Narrative: Ultrasound-guided thoracentesis Clinical History: Right pleural effusion Procedure: After explaining the risks and benefits of the procedure to the patient, informed consent was obtained  Ultrasound was used to localize the pleural effusion  The overlying skin was prepped and draped in usual sterile fashion and local anesthesia was obtained with the 1% lidocaine solution  A 5-English Imindi needle was advanced until fluid was aspirated  Approximately 1500 cc of cloudy, yellow fluid was aspirated  The fluid was sent for the requested laboratory tests  The patient tolerated the procedure well and left the department in stable condition  Impression: Impression: Successful ultrasound-guided thoracentesis  Workstation performed: [id number]     Ct Radiation Therapy    Result Date: 4/26/2018  Narrative: CT BRAIN - WITHOUT CONTRAST INDICATION:   C79 31: Secondary malignant neoplasm of brain  COMPARISON:  April 22, 2018 TECHNIQUE: A CT scan of the brain was performed without intravenous contrast using low radiation dose technique  Examination was performed according to a protocol specifically designed for the purposes of radiation therapy planning and is of limited diagnostic sensitivity  IMAGE QUALITY:  Limited radiation planning images   FINDINGS: PARENCHYMA: Limited imaging through the brain parenchyma again demonstrates none metastatic disease, best appreciated in the right posterior temporal region with focal lesion and edema  Grossly stable compared to prior  No new or acute abnormality grossly evident  VENTRICLES AND EXTRA-AXIAL SPACES:  Normal for the patient's age  VISUALIZED ORBITS AND PARANASAL SINUSES:  Unremarkable  CALVARIUM AND EXTRACRANIAL SOFT TISSUES:  Superior, anterior and right hilar adenopathy and partially imaged dense consolidation right parahilar and throughout the right upper and lower lobe  Moderate dependent right pleural effusion, mildly decreased, intervening thoracentesis     Prior healed rib fractures noted  Impression: Radiation planning images of the brain show no gross changes or acute abnormality  Known metastatic disease  Workstation performed: [id number]       Labs:   Lab Results   Component Value Date    WBC 7 92 04/25/2018    HGB 12 5 04/25/2018    HCT 38 8 04/25/2018    MCV 88 04/25/2018     04/25/2018     Lab Results   Component Value Date     (L) 04/25/2018    K 4 8 04/25/2018    CL 97 (L) 04/25/2018    CO2 31 04/25/2018    ANIONGAP 7 04/25/2018    BUN 32 (H) 04/25/2018    CREATININE 1 21 04/25/2018    GLUCOSE 94 04/25/2018    CALCIUM 9 3 04/25/2018    AST 61 (H) 04/22/2018    ALT 16 04/22/2018    ALKPHOS 108 04/22/2018    PROT 6 7 04/22/2018    BILITOT 0 71 04/22/2018    EGFR 57 04/25/2018       No results found for: IRON, TIBC, FERRITIN    No results found for: [id number]      ROS:   Review of Systems   Constitutional: Positive for unexpected weight change  Negative for activity change, appetite change, chills, diaphoresis, fatigue and fever  HENT: Negative for congestion, dental problem, facial swelling, hearing loss, mouth sores, nosebleeds, postnasal drip, rhinorrhea, sore throat, trouble swallowing and voice change      Eyes: Negative for photophobia, pain, discharge, redness, itching and visual disturbance  Respiratory: Positive for cough, chest tightness and shortness of breath  Negative for choking and wheezing  Cardiovascular: Negative for chest pain, palpitations and leg swelling  Gastrointestinal: Negative for abdominal distention, abdominal pain, anal bleeding, blood in stool, constipation, diarrhea, nausea, rectal pain and vomiting  Endocrine: Negative for cold intolerance and heat intolerance  Genitourinary: Negative for decreased urine volume, difficulty urinating, dysuria, flank pain, frequency, hematuria and urgency  Musculoskeletal: Positive for back pain (Responding to Tylenol intermittently)  Negative for arthralgias, gait problem, joint swelling, myalgias, neck pain and neck stiffness  Skin: Negative for color change, pallor, rash and wound  Allergic/Immunologic: Negative for immunocompromised state  Neurological: Negative for dizziness, tremors, seizures, syncope, facial asymmetry, speech difficulty, weakness, light-headedness, numbness and headaches  Hematological: Negative for adenopathy  Does not bruise/bleed easily  Psychiatric/Behavioral: Negative for agitation, confusion, decreased concentration, dysphoric mood and sleep disturbance  The patient is not nervous/anxious  All other systems reviewed and are negative  Current Medications: Reviewed  Allergies: Reviewed  PMH/FH/SH:  Reviewed      Physical Exam:    Body surface area is 2 24 meters squared      Wt Readings from Last 3 Encounters:   05/04/18 105 kg (231 lb)   04/27/18 104 kg (229 lb 11 5 oz)   03/27/18 109 kg (240 lb)        Temp Readings from Last 3 Encounters:   05/04/18 98 1 °F (36 7 °C) (Tympanic)   04/27/18 97 6 °F (36 4 °C) (Oral)   03/27/18 (!) 97 3 °F (36 3 °C) (Oral)        BP Readings from Last 3 Encounters:   05/04/18 113/55   05/04/18 140/80   04/27/18 133/70         Pulse Readings from Last 3 Encounters:   05/04/18 90   05/04/18 83   04/27/18 86        Physical Exam   Constitutional: He is oriented to person, place, and time  He appears well-developed and well-nourished  No distress  ECOG score 1   HENT:   Head: Normocephalic and atraumatic  Mouth/Throat: Oropharynx is clear and moist  No oropharyngeal exudate  Eyes: Conjunctivae and EOM are normal  Pupils are equal, round, and reactive to light  Neck: Normal range of motion  Neck supple  No tracheal deviation present  No thyromegaly present  Cardiovascular: Normal rate and regular rhythm  Exam reveals no gallop and no friction rub  No murmur heard  Pulmonary/Chest: Effort normal  No respiratory distress  He has no wheezes  He has no rales  He exhibits no tenderness  Decreased breath sounds in the right base   Abdominal: Soft  Bowel sounds are normal  He exhibits no distension and no mass  There is no tenderness  There is no rebound and no guarding  Musculoskeletal: Normal range of motion  He exhibits no edema or tenderness  Lymphadenopathy:     He has no cervical adenopathy  Neurological: He is alert and oriented to person, place, and time  Skin: Skin is warm and dry  No rash noted  He is not diaphoretic  No erythema  No pallor  Psychiatric: He has a normal mood and affect  His behavior is normal  Judgment and thought content normal    Vitals reviewed  Goals and Barriers:  Current Goal: Minimize effects of disease  Barriers: None  Patient's Capacity to Self Care:  Patient is able to self care      Code Status: [unfilled]

## 2018-05-04 NOTE — PLAN OF CARE
Problem: Potential for Falls  Goal: Patient will remain free of falls  INTERVENTIONS:  - Assess patient frequently for physical needs  -  Identify cognitive and physical deficits and behaviors that affect risk of falls    -  [place] fall precautions as indicated by assessment   - Educate patient/family on patient safety including physical limitations  - Instruct patient to call for assistance with activity based on assessment  - Modify environment to reduce risk of injury  - Consider OT/PT consult to assist with strengthening/mobility   Outcome: Progressing

## 2018-05-04 NOTE — LETTER
May 4, 2018     Bruce Machuca MD  [address] [place] [address]    Patient: Jung Comment   YOB: 1940   Date of Visit: 5/4/2018       Dear Dr Eric Oconnell: Thank you for referring Jung Comment to me for evaluation  Below are my notes for this consultation  If you have questions, please do not hesitate to call me  I look forward to following your patient along with you  Sincerely,        Zuleika Leija MD        CC: MD Zuleika Pro MD  5/4/2018 12:51 PM  Sign at close encounter  Hematology Outpatient Follow - Up Note  Jung Comment 69 y o  male MRN: @ Encounter: [id number]        Date:  5/4/2018        Assessment/ Plan:   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 with obstruction of the right middle and right lower lobe, with right pleural effusion, multiple bony metastases proven by bone scan, brain metastases proven by MRI of the brain involving the supra and the infratentorial compartments, paracentesis was not conclusive the patient underwent bronchoscopy in April 2018, brushing of the right lower lobe showed cells positive for TTF 1, Napsin, consistent with adenocarcinoma of the lung, the patient used to smoke 2 pack of cigarette for 15 years and quit 40 years ago  1  I will send for molecular tests and I will send for liquid biopsy to rule out mutational status  2  Patient is symptomatic he is receiving whole-brain radiation  3  I will start treatment with Alimta at 500 milligram/meter subcutaneously, carboplatin AUC 5, Pembrolizumab 200 mg flat dose every 3 weeks this is an FDA approved dose for first-line metastatic adenocarcinoma of the lung regardless of the PD L1 status  4  The patient to receive vitamin B12 today 1000 mcg IM shot and every chemotherapy, patient to initiate folic acid 1 mg p o  Daily   5  Contact IR for thoracentesis prior of Alimta to prevent pancytopenia and mucositis  6   He is aware this is incurable disease           HPI: 71-year-old  male who used to smoke 2 pack of cigarettes daily for 20 years, quit 40 years ago, he noticed in the beginning of 2018 dyspnea with cough without hemoptysis, he noticed 10 lb weight loss with intermittent anterior chest pain with radiation to the right lateral side, CT scan in April 2018 showed dried large hilar mass with bronchial obstruction, small right pleural effusion, multiple lumbar spine metastatic disease status post right thoracentesis with atypical cellular changes cannot exclude neoplasia, the patient had bronchoscopy and right lower lobe bronchial brushing showed conclusive evidence of malignancy with non-small cell lung cancer favor adenocarcinoma that stained positive for TTF 1, napsin and absent for p 40CT scan of the abdomen and pelvis showed small pericardial effusion, osseous metastases at L1, L2, L4, L5, obstructive collapse of the right middle lobe and the lower lobe  MRI of the brain showed widespread brain metastases involving the supra and the infratentorial compartments  The patient underwent radiation therapy to the brain          Interval History:        Previous Treatment:         Test Results:    Imaging: Ct Chest Abdomen Pelvis Wo Contrast    Result Date: 4/21/2018  Narrative: CT CHEST, ABDOMEN AND PELVIS WITHOUT IV CONTRAST INDICATION:   Right pleural effusion  COMPARISON: CT of the abdomen/pelvis dated 12/24/2004  TECHNIQUE: CT examination of the chest, abdomen and pelvis was performed without intravenous contrast   Axial, sagittal, and coronal 2D reformatted images were created from the source data and submitted for interpretation  Radiation dose length product (DLP) for this visit:  1637 02 mGy-cm   This examination, like all CT scans performed in the [facility], was performed utilizing techniques to minimize radiation dose exposure, including the use of iterative reconstruction and automated exposure control   Enteric contrast was administered  Note: Image numbers reported for findings (if any) are taken from axial series 2 and 3 unless otherwise indicated  FINDINGS: CHEST LUNGS:  Soft tissue filling both the right middle and lower lobar arteries  Perihilar masslike consolidative opacity, obscured by consolidations in the right middle lobe  There appears to be associated satellite nodularity measuring up to 8 mm  Associated interstitial thickening involving both the interlobular septae and the peribronchial vascular interstitium  Findings consistent with lymphangitic carcinomatosis  3 mm pulmonary nodules in the anterior left upper lobe on image 18   4 mm nodule in the left upper lobe on image 23   4 mm nodule in the left lower lobe on image 28  Multiple larger lobular nodules in the left lower lobe measuring up to 19 mm x 14 mm on  image 44  PLEURA:  Moderate sized pleural effusion  Posttraumatic changes of the left pleural from prior healed rib fractures  HEART/GREAT VESSELS:  Small pericardial effusion  Aortic valvular and coronary calcifications  Heart size is normal   Ascending aortic aneurysm measuring 4 6 cm  MEDIASTINUM AND DWIGHT:  Pathologically enlarged lymph nodes in the mediastinum  Largest index nodes measure 28 mm x 20 mm in the left paratracheal location on image 24 and in a subcarinal location measuring 32 mm x 20 mm on image 34  CHEST WALL AND LOWER NECK:   Unremarkable  ABDOMEN LIVER/BILIARY TREE:  Unremarkable  GALLBLADDER:  Stones or sludge layering at the gallbladder fundus  No pericholecystic inflammation or wall thickening  SPLEEN:  Unremarkable  PANCREAS:  Unremarkable  ADRENAL GLANDS:  Diffuse nodular thickening of left adrenal gland up to 2 5 cm in thickness  The gland  Mildly thickened in 2004, this represents a significant increase  Right gland is unremarkable  KIDNEYS/URETERS:  Right and left atrophic changes  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  Renal cysts left kidney    No suspicious renal masses  No hydronephrosis  STOMACH AND BOWEL:  Unremarkable  APPENDIX:  A normal appendix was visualized  ABDOMINOPELVIC CAVITY:  No ascites or free intraperitoneal air  No lymphadenopathy  VESSELS:  Unremarkable for patient's age  PELVIS REPRODUCTIVE ORGANS:  Unremarkable for patient's age  URINARY BLADDER:  Unremarkable  ABDOMINAL WALL/INGUINAL REGIONS:  Diastasis recti no hernias  No inguinal lymphadenopathy  OSSEOUS STRUCTURES:  Multiple healed left-sided rib fractures  No acute fractures demonstrated  Lytic lesions demonstrated in the lateral 1, L2, L4, and L5 vertebral bodies with areas of anterior cortical erosion and low-grade fragmentation at L5  Impression: 1  Right perihilar mass with occlusion of the right middle and lower lobe bronchi consistent with primary pulmonary malignancy  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, pathologic mediastinal lymphadenopathy, and multiple metastatic nodules in the left lung  2   Obstructive collapse of the right middle lobe and lower lobe  3   Increasing thickened, nodular appearance of the left adrenal gland  This could represent progression of hypertrophy or metastasis  Recommend further evaluation on PET/CT  4   Ascending aortic aneurysm measuring up to 4 5 cm  5   Small pericardial effusion  6   Osseous metastases in the L1, L2, L4, and L5 vertebral bodies  I personally discussed this study with Neymar Morton on 4/21/2018 at 3:42 PM  Workstation performed: [id number]     Xr Chest Portable    Result Date: 4/25/2018  Narrative: CHEST INDICATION:   hypoxia  COMPARISON:  4/21/2018 EXAM PERFORMED/VIEWS:  XR CHEST PORTABLE FINDINGS: Heart shadow is obscured by adjacent opacity  No change in diffuse opacity in the lower two thirds of the right hemithorax, representing pleural effusion and underlying consolidation  Left lung is clear  No pneumothorax  Multiple old left rib fractures again identified  Impression: Stable right lung opacity representing pleural effusion and underlying consolidation  Workstation performed: [id number]     X-ray Chest 2 Views    Result Date: 4/22/2018  Narrative: CHEST INDICATION:   chest pain  Cough with shortness of breath COMPARISON:  January 3, 2005, CT from April 21, 2018 EXAM PERFORMED/VIEWS:  XR CHEST PA & LATERAL FINDINGS: Cardiomediastinal silhouette appears unremarkable  There is a right-sided effusion  The right basilar opacity Multiple old left rib fracture seen     Impression: Right lower lung opacity corresponding to patient's known right perihilar mass with right effusion No acute airspace consolidation seen Mild interlobular septal thickening seen in the right upper lung as seen on the CT of April 21, 2018 corresponding to the suspected lymphangitic spread Workstation performed: [id number]     Mri Brain W Wo Contrast    Result Date: 4/23/2018  Narrative: MRI BRAIN WITH AND WITHOUT CONTRAST INDICATION: new diagnosis of lung cancer  Evaluate for brain metastases  COMPARISON:  12/22/2004 TECHNIQUE: Sagittal T1, axial T2, axial FLAIR, axial T1, axial [place], axial diffusion  Sagittal, axial and coronal T1 postcontrast   Axial BRAVO post contrast   IV Contrast:  10 mL of gadobutrol injection (MULTI-DOSE)  IMAGE QUALITY:   Diagnostic  FINDINGS: BRAIN PARENCHYMA:  There are at least a dozen peripherally ring enhancing masses throughout the supra and infratentorial brain  Several have associated vasogenic edema especially in the occipital lobes bilaterally  No acute intracranial hemorrhage  No extra-axial fluid collection  Cerebellar tonsils are normally positioned  Elsewhere there are a few scattered subcortical foci of FLAIR hyperintensity, likely superimposed mild, chronic microangiopathy   VENTRICLES:  Normal  SELLA AND PITUITARY GLAND:  Normal  ORBITS:  Normal  PARANASAL SINUSES:  Normal  VASCULATURE:  Evaluation of the major intracranial vasculature demonstrates appropriate flow voids  CALVARIUM AND SKULL BASE:  Normal  EXTRACRANIAL SOFT TISSUES:  Normal      Impression: Widespread brain metastases involving the supra and infratentorial compartments of the brain Workstation performed: [id number]     Nm Bone Scan Whole Body    Result Date: 4/23/2018  Narrative: BONE SCAN  WHOLE BODY INDICATION:  Osseous metastases, D49 1: Neoplasm of unspecified behavior of respiratory system PREVIOUS FILM CORRELATION:    CT 4/21/2018 TECHNIQUE:   This study was performed following the intravenous administration of 27 3 mCi Tc-99m labeled MDP  Delayed, anterior and posterior whole body images were acquired, 2-3 hours after radiopharmaceutical administration  FINDINGS: Widespread osseous metastases are visualized, involving the thoracic and lumbosacral spine, bilateral ribs, pelvis, and possibly proximal femurs  There are likely old left rib fractures as well  Asymmetric intense activity in the distal right  clavicle region may be degenerative or metastatic as well  Symmetric renal uptake  Impression: 1  Widespread osseous metastases  PET CT evaluation may also be considered  Workstation performed: [id number]     Ir Thoracentesis    Result Date: 4/24/2018  Narrative: Ultrasound-guided thoracentesis Clinical History: Right pleural effusion Procedure: After explaining the risks and benefits of the procedure to the patient, informed consent was obtained  Ultrasound was used to localize the pleural effusion  The overlying skin was prepped and draped in usual sterile fashion and local anesthesia was obtained with the 1% lidocaine solution  A 5-[place] Yueh needle was advanced until fluid was aspirated  Approximately 1500 cc of cloudy, yellow fluid was aspirated  The fluid was sent for the requested laboratory tests  The patient tolerated the procedure well and left the department in stable condition  Impression: Impression: Successful ultrasound-guided thoracentesis  Workstation performed: [id number]     Ct Radiation Therapy    Result Date: 4/26/2018  Narrative: CT BRAIN - WITHOUT CONTRAST INDICATION:   C79 31: Secondary malignant neoplasm of brain  COMPARISON:  April 22, 2018 TECHNIQUE: A CT scan of the brain was performed without intravenous contrast using low radiation dose technique  Examination was performed according to a protocol specifically designed for the purposes of radiation therapy planning and is of limited diagnostic sensitivity  IMAGE QUALITY:  Limited radiation planning images  FINDINGS: PARENCHYMA:  Limited imaging through the brain parenchyma again demonstrates none metastatic disease, best appreciated in the right posterior temporal region with focal lesion and edema  Grossly stable compared to prior  No new or acute abnormality grossly evident  VENTRICLES AND EXTRA-AXIAL SPACES:  Normal for the patient's age  VISUALIZED ORBITS AND PARANASAL SINUSES:  Unremarkable  CALVARIUM AND EXTRACRANIAL SOFT TISSUES:  Superior, anterior and right hilar adenopathy and partially imaged dense consolidation right parahilar and throughout the right upper and lower lobe  Moderate dependent right pleural effusion, mildly decreased, intervening thoracentesis     Prior healed rib fractures noted  Impression: Radiation planning images of the brain show no gross changes or acute abnormality  Known metastatic disease   Workstation performed: [id number]       Labs:   Lab Results   Component Value Date    WBC 7 92 04/25/2018    HGB 12 5 04/25/2018    HCT 38 8 04/25/2018    MCV 88 04/25/2018     04/25/2018     Lab Results   Component Value Date     (L) 04/25/2018    K 4 8 04/25/2018    CL 97 (L) 04/25/2018    CO2 31 04/25/2018    ANIONGAP 7 04/25/2018    BUN 32 (H) 04/25/2018    CREATININE 1 21 04/25/2018    GLUCOSE 94 04/25/2018    CALCIUM 9 3 04/25/2018    AST 61 (H) 04/22/2018    ALT 16 04/22/2018    ALKPHOS 108 04/22/2018    PROT 6 7 04/22/2018    BILITOT 0 71 04/22/2018    EGFR 57 04/25/2018       No results found for: IRON, TIBC, FERRITIN    No results found for: [id number]      ROS:   Review of Systems   Constitutional: Positive for unexpected weight change  Negative for activity change, appetite change, chills, diaphoresis, fatigue and fever  HENT: Negative for congestion, dental problem, facial swelling, hearing loss, mouth sores, nosebleeds, postnasal drip, rhinorrhea, sore throat, trouble swallowing and voice change  Eyes: Negative for photophobia, pain, discharge, redness, itching and visual disturbance  Respiratory: Positive for cough, chest tightness and shortness of breath  Negative for choking and wheezing  Cardiovascular: Negative for chest pain, palpitations and leg swelling  Gastrointestinal: Negative for abdominal distention, abdominal pain, anal bleeding, blood in stool, constipation, diarrhea, nausea, rectal pain and vomiting  Endocrine: Negative for cold intolerance and heat intolerance  Genitourinary: Negative for decreased urine volume, difficulty urinating, dysuria, flank pain, frequency, hematuria and urgency  Musculoskeletal: Positive for back pain (Responding to Tylenol intermittently)  Negative for arthralgias, gait problem, joint swelling, myalgias, neck pain and neck stiffness  Skin: Negative for color change, pallor, rash and wound  Allergic/Immunologic: Negative for immunocompromised state  Neurological: Negative for dizziness, tremors, seizures, syncope, facial asymmetry, speech difficulty, weakness, light-headedness, numbness and headaches  Hematological: Negative for adenopathy  Does not bruise/bleed easily  Psychiatric/Behavioral: Negative for agitation, confusion, decreased concentration, dysphoric mood and sleep disturbance  The patient is not nervous/anxious  All other systems reviewed and are negative          Current Medications: Reviewed  Allergies: Reviewed  PMH/FH/SH:  Reviewed      Physical Exam:    Body surface area is 2 24 meters squared  Wt Readings from Last 3 Encounters:   05/04/18 105 kg (231 lb)   04/27/18 104 kg (229 lb 11 5 oz)   03/27/18 109 kg (240 lb)        Temp Readings from Last 3 Encounters:   05/04/18 98 1 °F (36 7 °C) (Tympanic)   04/27/18 97 6 °F (36 4 °C) (Oral)   03/27/18 (!) 97 3 °F (36 3 °C) (Oral)        BP Readings from Last 3 Encounters:   05/04/18 113/55   05/04/18 140/80   04/27/18 133/70         Pulse Readings from Last 3 Encounters:   05/04/18 90   05/04/18 83   04/27/18 86        Physical Exam   Constitutional: He is oriented to person, place, and time  He appears well-developed and well-nourished  No distress  ECOG score 1   HENT:   Head: Normocephalic and atraumatic  Mouth/Throat: Oropharynx is clear and moist  No oropharyngeal exudate  Eyes: Conjunctivae and EOM are normal  Pupils are equal, round, and reactive to light  Neck: Normal range of motion  Neck supple  No tracheal deviation present  No thyromegaly present  Cardiovascular: Normal rate and regular rhythm  Exam reveals no gallop and no friction rub  No murmur heard  Pulmonary/Chest: Effort normal  No respiratory distress  He has no wheezes  He has no rales  He exhibits no tenderness  Decreased breath sounds in the right base   Abdominal: Soft  Bowel sounds are normal  He exhibits no distension and no mass  There is no tenderness  There is no rebound and no guarding  Musculoskeletal: Normal range of motion  He exhibits no edema or tenderness  Lymphadenopathy:     He has no cervical adenopathy  Neurological: He is alert and oriented to person, place, and time  Skin: Skin is warm and dry  No rash noted  He is not diaphoretic  No erythema  No pallor  Psychiatric: He has a normal mood and affect  His behavior is normal  Judgment and thought content normal    Vitals reviewed  Goals and Barriers:  Current Goal: Minimize effects of disease  Barriers: None        Patient's Capacity to Self Care:  Patient is able to self care      Code Status: [unfilled]

## 2018-05-07 ENCOUNTER — TELEPHONE (OUTPATIENT)
Dept: HEMATOLOGY ONCOLOGY | Facility: CLINIC | Age: 78
End: 2018-05-07

## 2018-05-07 DIAGNOSIS — R11.0 NAUSEA: Primary | ICD-10-CM

## 2018-05-07 PROCEDURE — 77412 RADIATION TX DELIVERY LVL 3: CPT | Performed by: RADIOLOGY

## 2018-05-07 NOTE — TELEPHONE ENCOUNTER
Patient calling regarding rx for Prochlorperazine 10mg  He went to the pharmacy to pick it up and they said it wasn't there  He uses [facility]  I didn't see it on his med list either  He said he is going to be starting chemo

## 2018-05-08 PROCEDURE — 77412 RADIATION TX DELIVERY LVL 3: CPT | Performed by: RADIOLOGY

## 2018-05-08 RX ORDER — PROCHLORPERAZINE MALEATE 10 MG
10 TABLET ORAL EVERY 6 HOURS PRN
Qty: 60 TABLET | Refills: 5 | Status: SHIPPED | OUTPATIENT
Start: 2018-05-08 | End: 2018-09-11 | Stop reason: ALTCHOICE

## 2018-05-09 ENCOUNTER — OFFICE VISIT (OUTPATIENT)
Dept: INTERNAL MEDICINE CLINIC | Age: 78
End: 2018-05-09
Payer: COMMERCIAL

## 2018-05-09 ENCOUNTER — DOCUMENTATION (OUTPATIENT)
Dept: RADIATION ONCOLOGY | Facility: HOSPITAL | Age: 78
End: 2018-05-09

## 2018-05-09 VITALS
DIASTOLIC BLOOD PRESSURE: 56 MMHG | HEART RATE: 84 BPM | BODY MASS INDEX: 31.98 KG/M2 | SYSTOLIC BLOOD PRESSURE: 118 MMHG | TEMPERATURE: 98.3 F | OXYGEN SATURATION: 94 % | HEIGHT: 71 IN | WEIGHT: 228.4 LBS | RESPIRATION RATE: 22 BRPM

## 2018-05-09 DIAGNOSIS — C79.49 SECONDARY MALIGNANT NEOPLASM OF BRAIN AND SPINAL CORD (HCC): Primary | ICD-10-CM

## 2018-05-09 DIAGNOSIS — C79.31 SECONDARY MALIGNANT NEOPLASM OF BRAIN AND SPINAL CORD (HCC): Primary | ICD-10-CM

## 2018-05-09 DIAGNOSIS — Z79.4 TYPE 2 DIABETES MELLITUS WITH COMPLICATION, WITH LONG-TERM CURRENT USE OF INSULIN (HCC): Primary | ICD-10-CM

## 2018-05-09 DIAGNOSIS — C79.9 METASTATIC DISEASE (HCC): ICD-10-CM

## 2018-05-09 DIAGNOSIS — E11.8 TYPE 2 DIABETES MELLITUS WITH COMPLICATION, WITH LONG-TERM CURRENT USE OF INSULIN (HCC): Primary | ICD-10-CM

## 2018-05-09 DIAGNOSIS — I10 ESSENTIAL HYPERTENSION: ICD-10-CM

## 2018-05-09 DIAGNOSIS — I48.20 CHRONIC ATRIAL FIBRILLATION (HCC): ICD-10-CM

## 2018-05-09 DIAGNOSIS — C34.91 PRIMARY MALIGNANT NEOPLASM OF RIGHT LUNG METASTATIC TO OTHER SITE (HCC): ICD-10-CM

## 2018-05-09 PROCEDURE — 77412 RADIATION TX DELIVERY LVL 3: CPT | Performed by: RADIOLOGY

## 2018-05-09 PROCEDURE — 77336 RADIATION PHYSICS CONSULT: CPT | Performed by: RADIOLOGY

## 2018-05-09 PROCEDURE — 99495 TRANSJ CARE MGMT MOD F2F 14D: CPT | Performed by: INTERNAL MEDICINE

## 2018-05-09 RX ORDER — PALONOSETRON 0.05 MG/ML
0.25 INJECTION, SOLUTION INTRAVENOUS ONCE
Status: COMPLETED | OUTPATIENT
Start: 2018-05-10 | End: 2018-05-10

## 2018-05-09 RX ORDER — OXYCODONE HYDROCHLORIDE AND ACETAMINOPHEN 5; 325 MG/1; MG/1
1 TABLET ORAL EVERY 4 HOURS PRN
Qty: 60 TABLET | Refills: 0 | Status: SHIPPED | OUTPATIENT
Start: 2018-05-09 | End: 2018-07-06

## 2018-05-09 RX ORDER — SODIUM CHLORIDE 9 MG/ML
20 INJECTION, SOLUTION INTRAVENOUS ONCE
Status: DISCONTINUED | OUTPATIENT
Start: 2018-05-10 | End: 2018-05-13 | Stop reason: HOSPADM

## 2018-05-09 NOTE — PROGRESS NOTES
LSW reviewed pt's distress thermometer completed by pt in rad onc at [facility] on 4/30/2018  PT rated their distress as a 5/10 and named nervousness and worry as emotional problems  Pt was also referred to LSW from [facility] due to pt's emotional distress  LSW met with pt and his significant other in person on 5/9/2018  LSW introduced her role and assessed pt's interest in social work assistance  Pt reported he is "ok" and denied current interest in social work support  LSW provided her contact information in case of pt's future needs

## 2018-05-09 NOTE — PROGRESS NOTES
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   patient just finished radiation therapy to brain and  Now he is scheduled to start his chemotherapy tomorrow  Being managed by a oncologist     2  Recurrent pleural effusion   patient have thoracentesis done twice so far  3  Musculoskeletal pain secondary to meds   will add oxycodone 5 mg q 6 hours p r n  Larlachone Kiesha Patient is already on Tylenol p r n      4  Type 2 diabetes mellitus   his fasting blood sugar at home is in the 60s and 70s  Advised to lower his insulin to 20 units and keep his fasting blood sugar between 110 and 140       Diagnoses and all orders for this visit:    Type 2 diabetes mellitus with complication, with long-term current use of insulin (HCC)  -     insulin detemir (LEVEMIR) 100 units/mL subcutaneous injection; Inject 30 Units under the skin daily    Primary malignant neoplasm of right lung metastatic to other site [facility])    Chronic atrial fibrillation (HCC)    Essential hypertension    Metastatic disease ([address] )  -     oxyCODONE-acetaminophen (PERCOCET) 5-325 mg per tablet; Take 1 tablet by mouth every 4 (four) hours as needed for moderate pain Max Daily Amount: 6 tablets          Subjective:          Patient ID: Jannette Griffin is a 69 y o  male      Date and time hospital follow up call was made:  4/27/2018  2:57 PM  Hospital care reviewed:  Records reviewed  Patient was hopsitalized at:  [facility]  Date of admission:  4/21/18  Date of discharge:  4/27/18  Diagnosis:  lung neoplasm, HTN, DM2, mixed HLD, A-fib, PVD, dyspnea, pleural effusion, JUANA, metastatic disease/primary lung cancer, pneumothorax,   Were the patients medicaitons reviewed and updated:  No (Comment: Pt refused to discuss meds at this time )  Current symptoms:  Cough, Fatigue  Cough Severity:  Mild  Fatigue severity:  Mild  Post hospital issues:  None  Should patient be enrolled in anticoag monitoring?:  No  Scheduled for follow up?: Yes  Patients specialists:  (Comment: Pulmonologist, Oncologist)  Did you obtain your prescribed medications:  Yes  Do you need help managing your perscriptions or medications:  No  Is transportation to your appointments needed:  No  I have advised the patient to call PCP with any new or worsening symptoms (please type in name along with any credentials):  Phillip Luong RN  Are you recieving home care services:  No  Have you fallen in the last 12 months:  No  Interperter language line required?:  No  Counseling:  Patient  Comments:  DEISI scheduled 5/9/18 w/ IA in [facility]  Hypertension   This is a chronic problem  The current episode started more than 1 year ago  The problem is controlled  Associated symptoms include anxiety, malaise/fatigue and shortness of breath  Pertinent negatives include no chest pain, headaches, neck pain, palpitations or peripheral edema  Risk factors for coronary artery disease include diabetes mellitus and dyslipidemia  Past treatments include ACE inhibitors  The following portions of the patient's history were reviewed and updated as appropriate: allergies, current medications, past family history, past medical history, past social history, past surgical history and problem list     Review of Systems   Constitutional: Positive for malaise/fatigue  Negative for fatigue and fever  HENT: Negative for congestion, ear discharge, ear pain, postnasal drip, sinus pressure, sore throat, tinnitus and trouble swallowing  Eyes: Negative for discharge, itching and visual disturbance  Respiratory: Positive for shortness of breath  Negative for cough  Cardiovascular: Negative for chest pain and palpitations  Gastrointestinal: Negative for abdominal pain, diarrhea, nausea and vomiting  Endocrine: Negative for cold intolerance and polyuria  Genitourinary: Negative for difficulty urinating, dysuria and urgency  Musculoskeletal: Positive for arthralgias and back pain   Negative for neck pain  Skin: Negative for rash  Allergic/Immunologic: Negative for environmental allergies  Neurological: Negative for dizziness, weakness and headaches  Psychiatric/Behavioral: The patient is not nervous/anxious  Past Medical History:   Diagnosis Date    At[facility] [address] [facility]     Cancer of lung ([facility] )     brain and bones    Diabetes mellitus ([facility] )     Hypercholesteremia     Hypertension     Lung neoplasm     Proteinuria     LAST ASSESSED [id number]    PVD (peripheral vascular disease) ([facility])          Current Outpatient Prescriptions:     JEANNE CONTOUR NEXT TEST test strip, 3 applicators by Does not apply route 3 (three) times a week, Disp: , Rfl:     insulin detemir (LEVEMIR) 100 units/mL subcutaneous injection, Inject 30 Units under the skin daily, Disp: 15 Units, Rfl: 3    Insulin Pen Needle (NOVOFINE) 32G X 6 MM MISC, 1 strip by Does not apply route daily, Disp: 100 each, Rfl: 1    metFORMIN (FORTAMET) 500 MG (OSM) 24 hr tablet, Take 1 tablet (500 mg total) by mouth 2 (two) times a day, Disp: 180 tablet, Rfl: 1    albuterol (VENTOLIN HFA) 90 mcg/act inhaler, Inhale 2 puffs every 6 (six) hours as needed for wheezing, Disp: 1 Inhaler, Rfl: 0    lisinopril-hydrochlorothiazide (PRINZIDE,ZESTORETIC) 20-12 5 MG per tablet, Take 1 tablet by mouth daily, Disp: 90 tablet, Rfl: 0    oxyCODONE-acetaminophen (PERCOCET) 5-325 mg per tablet, Take 1 tablet by mouth every 4 (four) hours as needed for moderate pain Max Daily Amount: 6 tablets, Disp: 60 tablet, Rfl: 0    prochlorperazine (COMPAZINE) 10 mg tablet, Take 1 tablet (10 mg total) by mouth every 6 (six) hours as needed for nausea or vomiting, Disp: 60 tablet, Rfl: 5    sodium chloride (OCEAN) 0 65 % nasal spray, 1 spray into each nostril as needed for congestion, Disp: 15 mL, Rfl: 0  No current facility-administered medications for this visit       Facility-Administered Medications Ordered in Other Visits:     [START ON 5/10/2018] alteplase (CATHFLO) injection 2 mg, 2 mg, Intracatheter, PRN, MD Eliezer Arambula  [START ON 5/10/2018] dexamethasone (DECADRON) 10 mg in sodium chloride 0 9 % 50 mL IVPB, 10 mg, Intravenous, Once, MD Eliezer Arambula  [START ON 5/10/2018] heparin lock flush 100 units/mL injection 300 Units, 300 Units, Intracatheter, Q1H PRN, MD Eliezer Arambula ON 5/10/2018] palonosetron (ALOXI) injection 0 25 mg, 0 25 mg, Intravenous, Once, MD Eliezer Arambula  [START ON 5/10/2018] pembrolizumab (KEYTRUDA) 200 mg in sodium chloride 0 9 % 50 mL IVPB, 200 mg, Intravenous, Once, MD Eliezer Arambula ON 5/10/2018] PEMEtrexed (ALIMTA) 1,200 mg in sodium chloride 0 9 % 100 mL chemo infusion, 1,200 mg, Intravenous, Once, MD Eliezer Arambula ON 5/10/2018] sodium chloride 0 9 % infusion, 20 mL/hr, Intravenous, Once, Arthur Levy MD    No Known Allergies    Social History   Past Surgical History:   Procedure Laterality Date    [place] BRONCHOSCOPY,DIAGNOSTIC N/A 4/24/2018    Procedure: Jane Mcgarry;  Surgeon: Kt Palacio MD;  Location: [facility]; Service: Pulmonary    THORACENTESIS N/A 4/24/2018    Procedure: Mehrdad Beavers;  Surgeon: Kt Palacio MD;  Location: [facility]; Service: Pulmonary     Family History   Problem Relation Age of Onset    Diabetes Mother     Diabetes Father     Diabetes Family        Objective:  /56 (BP Location: Left arm, Patient Position: Sitting, Cuff Size: Adult)   Pulse 84   Temp 98 3 °F (36 8 °C) (Tympanic)   Resp 22   Ht 5' 11 34" (1 812 m)   Wt 104 kg (228 lb 6 4 oz)   SpO2 94%   BMI 31 55 kg/m²   Body mass index is 31 55 kg/m²  Physical Exam   Constitutional: He appears well-developed  HENT:   Head: Normocephalic  Right Ear: External ear normal    Left Ear: External ear normal    Mouth/Throat: Oropharynx is clear and moist    Eyes: Pupils are equal, round, and reactive to light  No scleral icterus  Neck: Normal range of motion  Neck supple   No tracheal deviation present  No thyromegaly present  Cardiovascular: Normal rate and normal heart sounds  Rhythm is irregularly irregular   Pulmonary/Chest: Effort normal and breath sounds normal  No respiratory distress  He exhibits no tenderness  Decreased breath sounds at bases   Abdominal: Soft  Bowel sounds are normal  He exhibits no mass  There is no tenderness  Musculoskeletal: Normal range of motion  Lymphadenopathy:     He has no cervical adenopathy  Neurological: He is alert  No cranial nerve deficit  Skin: Skin is warm  No erythema  Psychiatric: He has a normal mood and affect   His behavior is normal

## 2018-05-10 ENCOUNTER — HOSPITAL ENCOUNTER (OUTPATIENT)
Dept: INFUSION CENTER | Facility: HOSPITAL | Age: 78
Discharge: HOME | End: 2018-05-10
Payer: COMMERCIAL

## 2018-05-10 VITALS
BODY MASS INDEX: 30.76 KG/M2 | DIASTOLIC BLOOD PRESSURE: 67 MMHG | SYSTOLIC BLOOD PRESSURE: 128 MMHG | HEART RATE: 78 BPM | WEIGHT: 227.07 LBS | HEIGHT: 72 IN | TEMPERATURE: 97.6 F | RESPIRATION RATE: 20 BRPM

## 2018-05-10 LAB
ALBUMIN SERPL BCP-MCNC: 2.9 G/DL (ref 3.5–5)
ALP SERPL-CCNC: 112 U/L (ref 46–116)
ALT SERPL W P-5'-P-CCNC: 15 U/L (ref 12–78)
ANION GAP SERPL CALCULATED.3IONS-SCNC: 4 MMOL/L (ref 4–13)
AST SERPL W P-5'-P-CCNC: 88 U/L (ref 5–45)
BASOPHILS # BLD AUTO: 0.01 THOUSANDS/ΜL (ref 0–0.1)
BASOPHILS NFR BLD AUTO: 0 % (ref 0–1)
BILIRUB SERPL-MCNC: 0.44 MG/DL (ref 0.2–1)
BUN SERPL-MCNC: 40 MG/DL (ref 5–25)
CALCIUM SERPL-MCNC: 9.6 MG/DL (ref 8.3–10.1)
CHLORIDE SERPL-SCNC: 99 MMOL/L (ref 100–108)
CO2 SERPL-SCNC: 30 MMOL/L (ref 21–32)
CREAT SERPL-MCNC: 1.37 MG/DL (ref 0.6–1.3)
EOSINOPHIL # BLD AUTO: 0.14 THOUSAND/ΜL (ref 0–0.61)
EOSINOPHIL NFR BLD AUTO: 2 % (ref 0–6)
ERYTHROCYTE [DISTWIDTH] IN BLOOD BY AUTOMATED COUNT: 15 % (ref 11.6–15.1)
GFR SERPL CREATININE-BSD FRML MDRD: 49 ML/MIN/1.73SQ M
GLUCOSE SERPL-MCNC: 120 MG/DL (ref 65–140)
HCT VFR BLD AUTO: 38.3 % (ref 36.5–49.3)
HGB BLD-MCNC: 12.2 G/DL (ref 12–17)
LYMPHOCYTES # BLD AUTO: 0.89 THOUSANDS/ΜL (ref 0.6–4.47)
LYMPHOCYTES NFR BLD AUTO: 14 % (ref 14–44)
MCH RBC QN AUTO: 27.9 PG (ref 26.8–34.3)
MCHC RBC AUTO-ENTMCNC: 31.9 G/DL (ref 31.4–37.4)
MCV RBC AUTO: 87 FL (ref 82–98)
MONOCYTES # BLD AUTO: 0.49 THOUSAND/ΜL (ref 0.17–1.22)
MONOCYTES NFR BLD AUTO: 8 % (ref 4–12)
NEUTROPHILS # BLD AUTO: 4.92 THOUSANDS/ΜL (ref 1.85–7.62)
NEUTS SEG NFR BLD AUTO: 76 % (ref 43–75)
NRBC BLD AUTO-RTO: 0 /100 WBCS
PLATELET # BLD AUTO: 279 THOUSANDS/UL (ref 149–390)
PMV BLD AUTO: 8.6 FL (ref 8.9–12.7)
POTASSIUM SERPL-SCNC: 4.7 MMOL/L (ref 3.5–5.3)
PROT SERPL-MCNC: 6.6 G/DL (ref 6.4–8.2)
RBC # BLD AUTO: 4.38 MILLION/UL (ref 3.88–5.62)
SODIUM SERPL-SCNC: 133 MMOL/L (ref 136–145)
TSH SERPL DL<=0.05 MIU/L-ACNC: 2.52 UIU/ML (ref 0.36–3.74)
WBC # BLD AUTO: 6.47 THOUSAND/UL (ref 4.31–10.16)

## 2018-05-10 PROCEDURE — 80053 COMPREHEN METABOLIC PANEL: CPT | Performed by: INTERNAL MEDICINE

## 2018-05-10 PROCEDURE — 96413 CHEMO IV INFUSION 1 HR: CPT

## 2018-05-10 PROCEDURE — 85025 COMPLETE CBC W/AUTO DIFF WBC: CPT | Performed by: INTERNAL MEDICINE

## 2018-05-10 PROCEDURE — 96417 CHEMO IV INFUS EACH ADDL SEQ: CPT

## 2018-05-10 PROCEDURE — 84443 ASSAY THYROID STIM HORMONE: CPT | Performed by: INTERNAL MEDICINE

## 2018-05-10 PROCEDURE — 96375 TX/PRO/DX INJ NEW DRUG ADDON: CPT

## 2018-05-10 PROCEDURE — 96411 CHEMO IV PUSH ADDL DRUG: CPT

## 2018-05-10 PROCEDURE — 96367 TX/PROPH/DG ADDL SEQ IV INF: CPT

## 2018-05-10 RX ADMIN — SODIUM CHLORIDE 200 MG: 9 INJECTION, SOLUTION INTRAVENOUS at 12:42

## 2018-05-10 RX ADMIN — DEXAMETHASONE SODIUM PHOSPHATE 10 MG: 10 INJECTION, SOLUTION INTRAMUSCULAR; INTRAVENOUS at 10:23

## 2018-05-10 RX ADMIN — PALONOSETRON HYDROCHLORIDE 0.25 MG: 0.25 INJECTION INTRAVENOUS at 10:23

## 2018-05-10 RX ADMIN — SODIUM CHLORIDE 1200 MG: 9 INJECTION, SOLUTION INTRAVENOUS at 11:02

## 2018-05-10 RX ADMIN — CARBOPLATIN 452 MG: 10 INJECTION, SOLUTION INTRAVENOUS at 11:37

## 2018-05-10 NOTE — PROGRESS NOTES
Unable to obtain lab results for pt drawn on 5/7 via mobil unit at his house  Labs drawn peripherally    Awaiting results

## 2018-05-10 NOTE — PLAN OF CARE
Problem: Potential for Falls  Goal: Patient will remain free of falls  INTERVENTIONS:  - Assess patient frequently for physical needs  -  Identify cognitive and physical deficits and behaviors that affect risk of falls    -  [place] fall precautions as indicated by assessment   - Educate patient/family on patient safety including physical limitations  - Instruct patient to call for assistance with activity based on assessment  - Modify environment to reduce risk of injury  - Consider OT/PT consult to assist with strengthening/mobility   Outcome: Progressing

## 2018-05-21 LAB — HBA1C MFR BLD HPLC: ABNORMAL %

## 2018-05-23 ENCOUNTER — OFFICE VISIT (OUTPATIENT)
Dept: HEMATOLOGY ONCOLOGY | Facility: CLINIC | Age: 78
End: 2018-05-23
Payer: COMMERCIAL

## 2018-05-23 ENCOUNTER — HOSPITAL ENCOUNTER (OUTPATIENT)
Dept: RADIOLOGY | Facility: HOSPITAL | Age: 78
Discharge: HOME | End: 2018-05-23
Attending: INTERNAL MEDICINE
Payer: COMMERCIAL

## 2018-05-23 VITALS
TEMPERATURE: 97.6 F | HEART RATE: 100 BPM | RESPIRATION RATE: 16 BRPM | WEIGHT: 225 LBS | BODY MASS INDEX: 29.82 KG/M2 | OXYGEN SATURATION: 97 % | SYSTOLIC BLOOD PRESSURE: 98 MMHG | HEIGHT: 73 IN | DIASTOLIC BLOOD PRESSURE: 60 MMHG

## 2018-05-23 VITALS
RESPIRATION RATE: 20 BRPM | OXYGEN SATURATION: 100 % | DIASTOLIC BLOOD PRESSURE: 50 MMHG | HEART RATE: 88 BPM | SYSTOLIC BLOOD PRESSURE: 91 MMHG

## 2018-05-23 DIAGNOSIS — C34.91 PRIMARY LUNG CANCER WITH METASTASIS FROM LUNG TO OTHER SITE, RIGHT (HCC): ICD-10-CM

## 2018-05-23 DIAGNOSIS — C34.91 PRIMARY LUNG CANCER WITH METASTASIS FROM LUNG TO OTHER SITE, RIGHT (HCC): Primary | ICD-10-CM

## 2018-05-23 DIAGNOSIS — C34.90 MALIGNANT NEOPLASM OF LUNG, UNSPECIFIED LATERALITY, UNSPECIFIED PART OF LUNG (HCC): Primary | ICD-10-CM

## 2018-05-23 DIAGNOSIS — C34.91 PRIMARY MALIGNANT NEOPLASM OF RIGHT LUNG METASTATIC TO OTHER SITE (HCC): ICD-10-CM

## 2018-05-23 PROCEDURE — 32555 ASPIRATE PLEURA W/ IMAGING: CPT | Performed by: RADIOLOGY

## 2018-05-23 PROCEDURE — 99215 OFFICE O/P EST HI 40 MIN: CPT | Performed by: INTERNAL MEDICINE

## 2018-05-23 PROCEDURE — 32555 ASPIRATE PLEURA W/ IMAGING: CPT

## 2018-05-23 NOTE — SEDATION DOCUMENTATION
R thoracentesis 2000ml clear yellow pleural fluid removed  Tolerated well by patient  Pt assisted by wheelchair to waiting room, denies SOB/pain

## 2018-05-23 NOTE — PROGRESS NOTES
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 in a patient he used to smoke 2 pack of cigarettes daily for 20 years quit 40 years ago, he noticed dyspnea in 2018 with weight loss, CT scan showed large right hilar lung mass with bronchial obstruction, right pleural effusion, multiple lumbar spine metastatic disease status post right thoracentesis bronchoscopy on right lower lobe bronchial brushing showed conclusive evidence of malignancy with adenocarcinoma of the lung positive for TTF1   He had osseous metastases at L1, L2, L4, L5 MRI of the brain showed widespread metastatic brain disease in the supra and infratentorial area status post whole-brain radiation finished in May 2018   Because of the emergency of the treatment patient was treated with Pembrolizumab/ Alimta/carboplatin cycle 1  In May 2018, 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, we will discontinue chemotherapy and will approve Tagrisso  80 mg 1 tab p o   Daily, this is approved by the FDA as first-line treatment for EGFR mediated lung cancer  Side effect of Tagrisso that are but not limited to fatigue, nausea, diarrhea, acneiform rash, elevation of the liver enzyme with told he agree to proceed  Will arrange for IR for thoracentesis and then PleurX catheter placement to prevent accumulation of right-sided pleural effusion  Follow-up every 2 weeks with CBC, CMP and office visit in 1 month  Xgeva 120 mg subcu every other month           HPI:  71-year-old  male who used to smoke 2 pack of cigarettes daily for 20 years, quit 40 years ago, he noticed in the beginning of 2018 dyspnea with cough without hemoptysis, he noticed 10 lb weight loss with intermittent anterior chest pain with radiation to the right lateral side, CT scan in April 2018 showed dried large hilar mass with bronchial obstruction, small right pleural effusion, multiple lumbar spine metastatic disease status post right thoracentesis with atypical cellular changes cannot exclude neoplasia, the patient had bronchoscopy and right lower lobe bronchial brushing showed conclusive evidence of malignancy with non-small cell lung cancer favor adenocarcinoma that stained positive for TTF 1, napsin and absent for p 40CT scan of the abdomen and pelvis showed small pericardial effusion, osseous metastases at L1, L2, L4, L5, obstructive collapse of the right middle lobe and the lower lobe  MRI of the brain showed widespread brain metastases involving the supra and the infratentorial compartments  The patient underwent radiation therapy to the brain finished in May 2018      Molecular tests on the blood 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        Previous Treatment:  Alimta/carboplatin / Pembrolizumab cycle 1  In May 2018 however this will be changed to Osimertinib 80 mg p o  daily        Test Results:    Imaging: Xr Chest Portable    Result Date: 4/25/2018  Narrative: CHEST INDICATION:   hypoxia  COMPARISON:  4/21/2018 EXAM PERFORMED/VIEWS:  XR CHEST PORTABLE FINDINGS: Heart shadow is obscured by adjacent opacity  No change in diffuse opacity in the lower two thirds of the right hemithorax, representing pleural effusion and underlying consolidation  Left lung is clear  No pneumothorax  Multiple old left rib fractures again identified  Impression: Stable right lung opacity representing pleural effusion and underlying consolidation  Workstation performed: [id number]     Ir Thoracentesis    Result Date: 5/4/2018  Narrative: INDICATION: Recurrent right pleural effusion  COMPARISON: Chest x-ray dated 4/25/2018 PROCEDURE: 1  Ultrasound-guided right thoracentesis PROCEDURE DETAILS: Operators: Dr Anne Dey, [id number] attending, performed the procedure  Anesthesia: 1% lidocaine was injected in the skin and subcutaneous tissues overlying the access site  Medications: 1% lidocaine Contrast: None Fluoroscopy time: None COMMENTS: Following the discussion of the risks, benefits and alternatives to the procedure, written informed consent was obtained from the patient  The patient was placed in a sitting position  A preprocedure timeout was performed per [facility] protocol  The right lower back was prepped and draped in the usual sterile fashion  After local anesthesia was administered, a 5-[place] Yueh catheter was advanced under continuous ultrasound guidance into the right pleural space  1700 mL of nadir fluid was obtained  After the procedure, the catheter was removed, the skin was cleansed and sterile dressings were applied  The patient tolerated the procedure well and there were no immediate postprocedure complications  FINDINGS: 1   1700 mL fluid removed from the right pleural space  Impression: Successful thoracentesis  Workstation performed: [id number]     Ir Thoracentesis    Result Date: 4/24/2018  Narrative: Ultrasound-guided thoracentesis Clinical History: Right pleural effusion Procedure: After explaining the risks and benefits of the procedure to the patient, informed consent was obtained  Ultrasound was used to localize the pleural effusion  The overlying skin was prepped and draped in usual sterile fashion and local anesthesia was obtained with the 1% lidocaine solution  A 5-[place] Yueh needle was advanced until fluid was aspirated  Approximately 1500 cc of cloudy, yellow fluid was aspirated  The fluid was sent for the requested laboratory tests  The patient tolerated the procedure well and left the department in stable condition  Impression: Impression: Successful ultrasound-guided thoracentesis  Workstation performed: [id number]     Ct Radiation Therapy    Result Date: 4/26/2018  Narrative: CT BRAIN - WITHOUT CONTRAST INDICATION:   C79 31: Secondary malignant neoplasm of brain   COMPARISON:  April 22, 2018 TECHNIQUE: A CT scan of the brain was performed without intravenous contrast using low radiation dose technique  Examination was performed according to a protocol specifically designed for the purposes of radiation therapy planning and is of limited diagnostic sensitivity  IMAGE QUALITY:  Limited radiation planning images  FINDINGS: PARENCHYMA:  Limited imaging through the brain parenchyma again demonstrates none metastatic disease, best appreciated in the right posterior temporal region with focal lesion and edema  Grossly stable compared to prior  No new or acute abnormality grossly evident  VENTRICLES AND EXTRA-AXIAL SPACES:  Normal for the patient's age  VISUALIZED ORBITS AND PARANASAL SINUSES:  Unremarkable  CALVARIUM AND EXTRACRANIAL SOFT TISSUES:  Superior, anterior and right hilar adenopathy and partially imaged dense consolidation right parahilar and throughout the right upper and lower lobe  Moderate dependent right pleural effusion, mildly decreased, intervening thoracentesis     Prior healed rib fractures noted  Impression: Radiation planning images of the brain show no gross changes or acute abnormality  Known metastatic disease  Workstation performed: [id number]       Labs:   Lab Results   Component Value Date    WBC 6 47 05/10/2018    HGB 12 2 05/10/2018    HCT 38 3 05/10/2018    MCV 87 05/10/2018     05/10/2018     Lab Results   Component Value Date     (L) 05/10/2018    K 4 7 05/10/2018    CL 99 (L) 05/10/2018    CO2 30 05/10/2018    ANIONGAP 4 05/10/2018    BUN 40 (H) 05/10/2018    CREATININE 1 37 (H) 05/10/2018    GLUCOSE 120 05/10/2018    CALCIUM 9 6 05/10/2018    AST 88 (H) 05/10/2018    ALT 15 05/10/2018    ALKPHOS 112 05/10/2018    PROT 6 6 05/10/2018    BILITOT 0 44 05/10/2018    EGFR 49 05/10/2018       No results found for: IRON, TIBC, FERRITIN    No results found for: [id number]      ROS:   Review of Systems   Constitutional: Positive for fatigue   Negative for activity change, appetite change, chills, diaphoresis, fever and unexpected weight change  HENT: Negative for congestion, dental problem, facial swelling, hearing loss, mouth sores, nosebleeds, postnasal drip, rhinorrhea, sore throat, trouble swallowing and voice change  Eyes: Negative for photophobia, pain, discharge, redness, itching and visual disturbance  Respiratory: Positive for cough and shortness of breath  Negative for choking, chest tightness and wheezing  Cardiovascular: Negative for chest pain, palpitations and leg swelling  Gastrointestinal: Negative for abdominal distention, abdominal pain, anal bleeding, blood in stool, constipation, diarrhea, nausea, rectal pain and vomiting  Endocrine: Negative for cold intolerance and heat intolerance  Genitourinary: Negative for decreased urine volume, difficulty urinating, dysuria, flank pain, frequency, hematuria and urgency  Musculoskeletal: Negative for arthralgias, back pain, gait problem, joint swelling, myalgias, neck pain and neck stiffness  Skin: Negative for color change, pallor, rash and wound  Allergic/Immunologic: Negative for immunocompromised state  Neurological: Negative for dizziness, tremors, seizures, syncope, facial asymmetry, speech difficulty, weakness, light-headedness, numbness and headaches  Hematological: Negative for adenopathy  Does not bruise/bleed easily  Psychiatric/Behavioral: Negative for agitation, confusion, decreased concentration, dysphoric mood and sleep disturbance  The patient is not nervous/anxious  All other systems reviewed and are negative  Current Medications: Reviewed  Allergies: Reviewed  PMH/FH/SH:  Reviewed      Physical Exam:    Body surface area is 2 26 meters squared      Wt Readings from Last 3 Encounters:   05/23/18 102 kg (225 lb)   05/10/18 103 kg (227 lb 1 2 oz)   05/09/18 104 kg (228 lb 6 4 oz)        Temp Readings from Last 3 Encounters:   05/23/18 97 6 °F (36 4 °C)   05/10/18 97 6 °F (36 4 °C) (Temporal) 05/09/18 98 3 °F (36 8 °C) (Tympanic)        BP Readings from Last 3 Encounters:   05/23/18 98/60   05/10/18 128/67   05/09/18 118/56         Pulse Readings from Last 3 Encounters:   05/23/18 100   05/10/18 78   05/09/18 84        Physical Exam   Constitutional: He is oriented to person, place, and time  He appears well-developed and well-nourished  No distress  HENT:   Head: Normocephalic and atraumatic  Mouth/Throat: Oropharynx is clear and moist  No oropharyngeal exudate  Eyes: Conjunctivae and EOM are normal  Pupils are equal, round, and reactive to light  Neck: Normal range of motion  Neck supple  No tracheal deviation present  No thyromegaly present  Cardiovascular: Normal rate and regular rhythm  Exam reveals no gallop and no friction rub  No murmur heard  Pulmonary/Chest: He is in respiratory distress ( no breath sounds in the have filled of the right lung)  He has no wheezes  He has no rales  He exhibits no tenderness  Abdominal: Soft  Bowel sounds are normal  He exhibits no distension and no mass  There is no tenderness  There is no rebound and no guarding  Musculoskeletal: Normal range of motion  Lymphadenopathy:     He has no cervical adenopathy  Neurological: He is alert and oriented to person, place, and time  Skin: Skin is warm and dry  No rash noted  He is not diaphoretic  No erythema  No pallor  Psychiatric: He has a normal mood and affect  His behavior is normal  Judgment and thought content normal    Vitals reviewed  Goals and Barriers:  Current Goal: Minimize effects of disease  Barriers: None  Patient's Capacity to Self Care:  Patient is able to self care      Code Status: @[facility]@

## 2018-05-23 NOTE — LETTER
May 23, 2018     Michi Amezquita MD  [address] [place] [address]    Patient: Shaylee Pearson   YOB: 1940   Date of Visit: 5/23/2018       Dear Dr Fernando Tellez: Thank you for referring Shaylee Pearson to me for evaluation  Below are my notes for this consultation  If you have questions, please do not hesitate to call me  I look forward to following your patient along with you  Sincerely,        Sandra Mcdaniel MD        CC: No Recipients  Sandra Mcdaniel MD  5/23/2018  2:29 PM  Sign at close encounter  Hematology Outpatient Follow - Up Note  Shaylee Pearson 69 y o  male MRN: @ Encounter: [id number]        Date:  5/23/2018        Assessment/ Plan:   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 in a patient he used to smoke 2 pack of cigarettes daily for 20 years quit 40 years ago, he noticed dyspnea in 2018 with weight loss, CT scan showed large right hilar lung mass with bronchial obstruction, right pleural effusion, multiple lumbar spine metastatic disease status post right thoracentesis bronchoscopy on right lower lobe bronchial brushing showed conclusive evidence of malignancy with adenocarcinoma of the lung positive for TTF1   He had osseous metastases at L1, L2, L4, L5 MRI of the brain showed widespread metastatic brain disease in the supra and infratentorial area status post whole-brain radiation finished in May 2018   Because of the emergency of the treatment patient was treated with Pembrolizumab/ Alimta/carboplatin cycle 1  In May 2018, 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, we will discontinue chemotherapy and will approve Tagrisso  80 mg 1 tab p o   Daily, this is approved by the FDA as first-line treatment for EGFR mediated lung cancer  Side effect of Tagrisso that are but not limited to fatigue, nausea, diarrhea, acneiform rash, elevation of the liver enzyme with told he agree to proceed  Will arrange for IR for thoracentesis and then PleurX catheter placement to prevent accumulation of right-sided pleural effusion  Follow-up every 2 weeks with CBC, CMP and office visit in 1 month  Xgeva 120 mg subcu every other month           HPI:  60-year-old  male who used to smoke 2 pack of cigarettes daily for 20 years, quit 40 years ago, he noticed in the beginning of 2018 dyspnea with cough without hemoptysis, he noticed 10 lb weight loss with intermittent anterior chest pain with radiation to the right lateral side, CT scan in April 2018 showed dried large hilar mass with bronchial obstruction, small right pleural effusion, multiple lumbar spine metastatic disease status post right thoracentesis with atypical cellular changes cannot exclude neoplasia, the patient had bronchoscopy and right lower lobe bronchial brushing showed conclusive evidence of malignancy with non-small cell lung cancer favor adenocarcinoma that stained positive for TTF 1, napsin and absent for p 40CT scan of the abdomen and pelvis showed small pericardial effusion, osseous metastases at L1, L2, L4, L5, obstructive collapse of the right middle lobe and the lower lobe  MRI of the brain showed widespread brain metastases involving the supra and the infratentorial compartments  The patient underwent radiation therapy to the brain finished in May 2018      Molecular tests on the blood 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        Previous Treatment:  Alimta/carboplatin / Pembrolizumab cycle 1  In May 2018 however this will be changed to Osimertinib 80 mg p o  daily        Test Results:    Imaging: Xr Chest Portable    Result Date: 4/25/2018  Narrative: CHEST INDICATION:   hypoxia  COMPARISON:  4/21/2018 EXAM PERFORMED/VIEWS:  XR CHEST PORTABLE FINDINGS: Heart shadow is obscured by adjacent opacity  No change in diffuse opacity in the lower two thirds of the right hemithorax, representing pleural effusion and underlying consolidation  Left lung is clear  No pneumothorax   Multiple old left rib fractures again identified  Impression: Stable right lung opacity representing pleural effusion and underlying consolidation  Workstation performed: [id number]     Ir Thoracentesis    Result Date: 5/4/2018  Narrative: INDICATION: Recurrent right pleural effusion  COMPARISON: Chest x-ray dated 4/25/2018 PROCEDURE: 1  Ultrasound-guided right thoracentesis PROCEDURE DETAILS: Operators: Dr Antonia Mora, [facility] attending, performed the procedure  Anesthesia: 1% lidocaine was injected in the skin and subcutaneous tissues overlying the access site  Medications: 1% lidocaine Contrast: None Fluoroscopy time: None COMMENTS: Following the discussion of the risks, benefits and alternatives to the procedure, written informed consent was obtained from the patient  The patient was placed in a sitting position  A preprocedure timeout was performed per [facility] protocol  The right lower back was prepped and draped in the usual sterile fashion  After local anesthesia was administered, a 5-[place] Yueh catheter was advanced under continuous ultrasound guidance into the right pleural space  1700 mL of nadir fluid was obtained  After the procedure, the catheter was removed, the skin was cleansed and sterile dressings were applied  The patient tolerated the procedure well and there were no immediate postprocedure complications  FINDINGS: 1   1700 mL fluid removed from the right pleural space  Impression: Successful thoracentesis  Workstation performed: [id number]     Ir Thoracentesis    Result Date: 4/24/2018  Narrative: Ultrasound-guided thoracentesis Clinical History: Right pleural effusion Procedure: After explaining the risks and benefits of the procedure to the patient, informed consent was obtained  Ultrasound was used to localize the pleural effusion  The overlying skin was prepped and draped in usual sterile fashion and local anesthesia was obtained with the 1% lidocaine solution   A 5-[place] Yueh needle was advanced until fluid was aspirated  Approximately 1500 cc of cloudy, yellow fluid was aspirated  The fluid was sent for the requested laboratory tests  The patient tolerated the procedure well and left the department in stable condition  Impression: Impression: Successful ultrasound-guided thoracentesis  Workstation performed: [id number]     Ct Radiation Therapy    Result Date: 4/26/2018  Narrative: CT BRAIN - WITHOUT CONTRAST INDICATION:   C79 31: Secondary malignant neoplasm of brain  COMPARISON:  April 22, 2018 TECHNIQUE: A CT scan of the brain was performed without intravenous contrast using low radiation dose technique  Examination was performed according to a protocol specifically designed for the purposes of radiation therapy planning and is of limited diagnostic sensitivity  IMAGE QUALITY:  Limited radiation planning images  FINDINGS: PARENCHYMA:  Limited imaging through the brain parenchyma again demonstrates none metastatic disease, best appreciated in the right posterior temporal region with focal lesion and edema  Grossly stable compared to prior  No new or acute abnormality grossly evident  VENTRICLES AND EXTRA-AXIAL SPACES:  Normal for the patient's age  VISUALIZED ORBITS AND PARANASAL SINUSES:  Unremarkable  CALVARIUM AND EXTRACRANIAL SOFT TISSUES:  Superior, anterior and right hilar adenopathy and partially imaged dense consolidation right parahilar and throughout the right upper and lower lobe  Moderate dependent right pleural effusion, mildly decreased, intervening thoracentesis     Prior healed rib fractures noted  Impression: Radiation planning images of the brain show no gross changes or acute abnormality  Known metastatic disease   Workstation performed: [id number]       Labs:   Lab Results   Component Value Date    WBC 6 47 05/10/2018    HGB 12 2 05/10/2018    HCT 38 3 05/10/2018    MCV 87 05/10/2018     05/10/2018     Lab Results   Component Value Date     (L) 05/10/2018    K 4 7 05/10/2018    CL 99 (L) 05/10/2018    CO2 30 05/10/2018    ANIONGAP 4 05/10/2018    BUN 40 (H) 05/10/2018    CREATININE 1 37 (H) 05/10/2018    GLUCOSE 120 05/10/2018    CALCIUM 9 6 05/10/2018    AST 88 (H) 05/10/2018    ALT 15 05/10/2018    ALKPHOS 112 05/10/2018    PROT 6 6 05/10/2018    BILITOT 0 44 05/10/2018    EGFR 49 05/10/2018       No results found for: IRON, TIBC, FERRITIN    No results found for: [id number]      ROS:   Review of Systems   Constitutional: Positive for fatigue  Negative for activity change, appetite change, chills, diaphoresis, fever and unexpected weight change  HENT: Negative for congestion, dental problem, facial swelling, hearing loss, mouth sores, nosebleeds, postnasal drip, rhinorrhea, sore throat, trouble swallowing and voice change  Eyes: Negative for photophobia, pain, discharge, redness, itching and visual disturbance  Respiratory: Positive for cough and shortness of breath  Negative for choking, chest tightness and wheezing  Cardiovascular: Negative for chest pain, palpitations and leg swelling  Gastrointestinal: Negative for abdominal distention, abdominal pain, anal bleeding, blood in stool, constipation, diarrhea, nausea, rectal pain and vomiting  Endocrine: Negative for cold intolerance and heat intolerance  Genitourinary: Negative for decreased urine volume, difficulty urinating, dysuria, flank pain, frequency, hematuria and urgency  Musculoskeletal: Negative for arthralgias, back pain, gait problem, joint swelling, myalgias, neck pain and neck stiffness  Skin: Negative for color change, pallor, rash and wound  Allergic/Immunologic: Negative for immunocompromised state  Neurological: Negative for dizziness, tremors, seizures, syncope, facial asymmetry, speech difficulty, weakness, light-headedness, numbness and headaches  Hematological: Negative for adenopathy  Does not bruise/bleed easily     Psychiatric/Behavioral: Negative for agitation, confusion, decreased concentration, dysphoric mood and sleep disturbance  The patient is not nervous/anxious  All other systems reviewed and are negative  Current Medications: Reviewed  Allergies: Reviewed  PMH/FH/SH:  Reviewed      Physical Exam:    Body surface area is 2 26 meters squared  Wt Readings from Last 3 Encounters:   05/23/18 102 kg (225 lb)   05/10/18 103 kg (227 lb 1 2 oz)   05/09/18 104 kg (228 lb 6 4 oz)        Temp Readings from Last 3 Encounters:   05/23/18 97 6 °F (36 4 °C)   05/10/18 97 6 °F (36 4 °C) (Temporal)   05/09/18 98 3 °F (36 8 °C) (Tympanic)        BP Readings from Last 3 Encounters:   05/23/18 98/60   05/10/18 128/67   05/09/18 118/56         Pulse Readings from Last 3 Encounters:   05/23/18 100   05/10/18 78   05/09/18 84        Physical Exam   Constitutional: He is oriented to person, place, and time  He appears well-developed and well-nourished  No distress  HENT:   Head: Normocephalic and atraumatic  Mouth/Throat: Oropharynx is clear and moist  No oropharyngeal exudate  Eyes: Conjunctivae and EOM are normal  Pupils are equal, round, and reactive to light  Neck: Normal range of motion  Neck supple  No tracheal deviation present  No thyromegaly present  Cardiovascular: Normal rate and regular rhythm  Exam reveals no gallop and no friction rub  No murmur heard  Pulmonary/Chest: He is in respiratory distress ( no breath sounds in the have filled of the right lung)  He has no wheezes  He has no rales  He exhibits no tenderness  Abdominal: Soft  Bowel sounds are normal  He exhibits no distension and no mass  There is no tenderness  There is no rebound and no guarding  Musculoskeletal: Normal range of motion  Lymphadenopathy:     He has no cervical adenopathy  Neurological: He is alert and oriented to person, place, and time  Skin: Skin is warm and dry  No rash noted  He is not diaphoretic  No erythema  No pallor  Psychiatric: He has a normal mood and affect  His behavior is normal  Judgment and thought content normal    Vitals reviewed  Goals and Barriers:  Current Goal: Minimize effects of disease  Barriers: None  Patient's Capacity to Self Care:  Patient is able to self care      Code Status: @[facility]@

## 2018-05-23 NOTE — DISCHARGE INSTRUCTIONS
Thoracentesis   WHAT YOU NEED TO KNOW:   A thoracentesis is a procedure to remove extra fluid or air from between your lungs and your inner chest wall  Air or fluid buildup may make it hard for you to breathe  A thoracentesis allows your lungs to expand fully so you can breathe more easily  DISCHARGE INSTRUCTIONS:   Medicines:   · Pain medicine: You may be given a prescription medicine to decrease pain  Do not wait until the pain is severe before you take your medicine  · Antibiotics: This medicine helps fight or prevent an infection  · Take your medicine as directed  Call your healthcare provider if you think your medicine is not helping or if you have side effects  Tell him if you are allergic to any medicine  Keep a list of the medicines, vitamins, and herbs you take  Include the amounts, and when and why you take them  Bring the list or the pill bottles to follow-up visits  Carry your medicine list with you in case of an emergency  Follow up with your healthcare provider as directed:  Write down your questions so you remember to ask them during your visits  Rest:  Rest when you feel it is needed  Slowly start to do more each day  Return to your daily activities as directed  Do not smoke: If you smoke, it is never too late to quit  Ask for information about how to stop smoking if you need help  Contact your healthcare provider if:   · You have a fever  · Your puncture site is red, warm, swollen, or draining pus  · You have questions or concerns about your procedure, medicine, or care  · If you have any questions regarding, call the IR department @ [phone]  Seek care immediately or call 911 if:   · Blood soaks through your bandage  · There is blood in your spit

## 2018-05-29 ENCOUNTER — DOCUMENTATION (OUTPATIENT)
Dept: HEMATOLOGY ONCOLOGY | Facility: CLINIC | Age: 78
End: 2018-05-29

## 2018-05-29 ENCOUNTER — TELEPHONE (OUTPATIENT)
Dept: HEMATOLOGY ONCOLOGY | Facility: CLINIC | Age: 78
End: 2018-05-29

## 2018-05-29 NOTE — PROGRESS NOTES
5/23/2018 received notification from clinical an ord for tagrisso 80 mg was sent to [facility]  Submitted for auth    5/24/2018 received denial from CBC denial from CBC        5/25/2018 an appeal was submitted with supporting documentation  Received an approval letter from [address] [place] [address] #[id number] is approved from 5/25/2018 through 5/25/2019  Per notification, clinical and [facility] were notified    Notified financial in case they did not receive the notification

## 2018-06-01 DIAGNOSIS — C34.91 PRIMARY LUNG CANCER WITH METASTASIS FROM LUNG TO OTHER SITE, RIGHT (HCC): Primary | ICD-10-CM

## 2018-06-14 ENCOUNTER — HOSPITAL ENCOUNTER (OUTPATIENT)
Dept: RADIOLOGY | Facility: HOSPITAL | Age: 78
Discharge: HOME | End: 2018-06-14
Attending: INTERNAL MEDICINE
Payer: COMMERCIAL

## 2018-06-14 ENCOUNTER — HOSPITAL ENCOUNTER (OUTPATIENT)
Dept: RADIOLOGY | Facility: HOSPITAL | Age: 78
Discharge: HOME | End: 2018-06-14
Attending: RADIOLOGY
Payer: COMMERCIAL

## 2018-06-14 VITALS — DIASTOLIC BLOOD PRESSURE: 56 MMHG | HEART RATE: 86 BPM | SYSTOLIC BLOOD PRESSURE: 94 MMHG

## 2018-06-14 DIAGNOSIS — C79.31 SECONDARY MALIGNANT NEOPLASM OF BRAIN AND SPINAL CORD (HCC): ICD-10-CM

## 2018-06-14 DIAGNOSIS — C34.91 PRIMARY LUNG CANCER WITH METASTASIS FROM LUNG TO OTHER SITE, RIGHT (HCC): ICD-10-CM

## 2018-06-14 DIAGNOSIS — C79.49 SECONDARY MALIGNANT NEOPLASM OF BRAIN AND SPINAL CORD (HCC): ICD-10-CM

## 2018-06-14 PROCEDURE — 70470 CT HEAD/BRAIN W/O & W/DYE: CPT

## 2018-06-14 PROCEDURE — 32555 ASPIRATE PLEURA W/ IMAGING: CPT

## 2018-06-14 PROCEDURE — 32555 ASPIRATE PLEURA W/ IMAGING: CPT | Performed by: RADIOLOGY

## 2018-06-14 RX ADMIN — IODIXANOL 100 ML: 320 INJECTION, SOLUTION INTRAVASCULAR at 07:28

## 2018-06-14 NOTE — BRIEF OP NOTE (RAD/CATH)
IR THORACENTESIS  Procedure Note    PATIENT NAME: Keke Levi  : 1940  MRN: [id number]     Pre-op Diagnosis:   1  Primary lung cancer with metastasis from lung to other site, right [facility])      Post-op Diagnosis:   1  Primary lung cancer with metastasis from lung to other site, right [facility])        Surgeon:   Connie Hull MD  Assistants:     No qualified resident was available, Resident is only observing    Estimated Blood Loss:  Minimal, 1 mL or less  Findings:  Large amount anechoic right-sided pleural fluid  Successful placement of needle under ultrasound within the right-sided pleural fluid  Successful removal of 1600 mL clear yellow fluid  Patient report having mild shortness of breath following the previous thoracentesis that totaled 2000 m L  For this reason, we removed a smaller amount of volume today  The patient felt comfortable afterwards, denying chest pain or shortness of breath  Completion images demonstrate small amount of fluid remaining at the right lung base  I did discuss the possibility PleurX catheter placement with the patient  However, the patient is only requiring thoracentesis every 3 weeks and he is not interested in having a drainage catheter coming out of his skin  The patient would rather common for thoracentesis and have an indwelling catheter      Specimens:  None requested    Complications:  Nothing immediately apparent    Anesthesia: Aguilar Puente MD     Date: 2018  Time: 9:04 AM

## 2018-06-15 ENCOUNTER — APPOINTMENT (OUTPATIENT)
Dept: LAB | Age: 78
End: 2018-06-15
Payer: COMMERCIAL

## 2018-06-15 DIAGNOSIS — C79.51 BONE METASTASES (HCC): ICD-10-CM

## 2018-06-15 DIAGNOSIS — C79.31 BRAIN METASTASES (HCC): ICD-10-CM

## 2018-06-15 DIAGNOSIS — C34.91 PRIMARY LUNG CANCER WITH METASTASIS FROM LUNG TO OTHER SITE, RIGHT (HCC): ICD-10-CM

## 2018-06-15 DIAGNOSIS — C34.91 PRIMARY MALIGNANT NEOPLASM OF RIGHT LUNG METASTATIC TO OTHER SITE (HCC): ICD-10-CM

## 2018-06-15 LAB
ALBUMIN SERPL BCP-MCNC: 2.9 G/DL (ref 3.5–5)
ALP SERPL-CCNC: 203 U/L (ref 46–116)
ALT SERPL W P-5'-P-CCNC: 14 U/L (ref 12–78)
ANION GAP SERPL CALCULATED.3IONS-SCNC: 10 MMOL/L (ref 4–13)
AST SERPL W P-5'-P-CCNC: 83 U/L (ref 5–45)
BASOPHILS # BLD AUTO: 0.04 THOUSANDS/ΜL (ref 0–0.1)
BASOPHILS NFR BLD AUTO: 0 % (ref 0–1)
BILIRUB SERPL-MCNC: 0.73 MG/DL (ref 0.2–1)
BUN SERPL-MCNC: 25 MG/DL (ref 5–25)
CALCIUM SERPL-MCNC: 9.2 MG/DL (ref 8.3–10.1)
CHLORIDE SERPL-SCNC: 90 MMOL/L (ref 100–108)
CO2 SERPL-SCNC: 27 MMOL/L (ref 21–32)
CREAT SERPL-MCNC: 1.39 MG/DL (ref 0.6–1.3)
EOSINOPHIL # BLD AUTO: 0.07 THOUSAND/ΜL (ref 0–0.61)
EOSINOPHIL NFR BLD AUTO: 1 % (ref 0–6)
ERYTHROCYTE [DISTWIDTH] IN BLOOD BY AUTOMATED COUNT: 17.9 % (ref 11.6–15.1)
GFR SERPL CREATININE-BSD FRML MDRD: 49 ML/MIN/1.73SQ M
GLUCOSE P FAST SERPL-MCNC: 125 MG/DL (ref 65–99)
HCT VFR BLD AUTO: 32.9 % (ref 36.5–49.3)
HGB BLD-MCNC: 10.8 G/DL (ref 12–17)
IMM GRANULOCYTES # BLD AUTO: 0.09 THOUSAND/UL (ref 0–0.2)
IMM GRANULOCYTES NFR BLD AUTO: 1 % (ref 0–2)
LYMPHOCYTES # BLD AUTO: 1.12 THOUSANDS/ΜL (ref 0.6–4.47)
LYMPHOCYTES NFR BLD AUTO: 12 % (ref 14–44)
MCH RBC QN AUTO: 28.5 PG (ref 26.8–34.3)
MCHC RBC AUTO-ENTMCNC: 32.8 G/DL (ref 31.4–37.4)
MCV RBC AUTO: 87 FL (ref 82–98)
MONOCYTES # BLD AUTO: 1.34 THOUSAND/ΜL (ref 0.17–1.22)
MONOCYTES NFR BLD AUTO: 14 % (ref 4–12)
NEUTROPHILS # BLD AUTO: 6.75 THOUSANDS/ΜL (ref 1.85–7.62)
NEUTS SEG NFR BLD AUTO: 72 % (ref 43–75)
NRBC BLD AUTO-RTO: 0 /100 WBCS
PLATELET # BLD AUTO: 353 THOUSANDS/UL (ref 149–390)
PMV BLD AUTO: 9.3 FL (ref 8.9–12.7)
POTASSIUM SERPL-SCNC: 4.5 MMOL/L (ref 3.5–5.3)
PROT SERPL-MCNC: 6.8 G/DL (ref 6.4–8.2)
RBC # BLD AUTO: 3.79 MILLION/UL (ref 3.88–5.62)
SODIUM SERPL-SCNC: 127 MMOL/L (ref 136–145)
WBC # BLD AUTO: 9.41 THOUSAND/UL (ref 4.31–10.16)

## 2018-06-15 PROCEDURE — 85025 COMPLETE CBC W/AUTO DIFF WBC: CPT

## 2018-06-15 PROCEDURE — 80053 COMPREHEN METABOLIC PANEL: CPT

## 2018-06-15 PROCEDURE — 36415 COLL VENOUS BLD VENIPUNCTURE: CPT

## 2018-06-18 ENCOUNTER — TELEPHONE (OUTPATIENT)
Dept: HEMATOLOGY ONCOLOGY | Facility: CLINIC | Age: 78
End: 2018-06-18

## 2018-06-18 DIAGNOSIS — C34.90 MALIGNANT NEOPLASM OF LUNG, UNSPECIFIED LATERALITY, UNSPECIFIED PART OF LUNG (HCC): Primary | ICD-10-CM

## 2018-06-18 NOTE — TELEPHONE ENCOUNTER
Labs reviewed with Dr Tami Haile to start tagrisso today , directions reviewed with patient who verbalizes understanding  Patient to repeat labs in one week and call for result  Patient verbalizes understanding of plan

## 2018-06-18 NOTE — TELEPHONE ENCOUNTER
Pt had labs 6/15/18  Received Osimertinib  Asking if should start  Also informed pt Na+ level 127  Instructed to drink Gatorade and not water  Also can add salt to food   Please call

## 2018-06-19 DIAGNOSIS — C34.90 MALIGNANT NEOPLASM OF LUNG, UNSPECIFIED LATERALITY, UNSPECIFIED PART OF LUNG (HCC): Primary | ICD-10-CM

## 2018-06-19 NOTE — TELEPHONE ENCOUNTER
Spoke with patient  He started his tagrisso yesterday  [facility] consulted for med management and compliance  Reinforced need for lab work next Monday  Patient verbalizes understanding of the plan

## 2018-06-20 ENCOUNTER — CLINICAL SUPPORT (OUTPATIENT)
Dept: RADIATION ONCOLOGY | Facility: HOSPITAL | Age: 78
End: 2018-06-20
Payer: COMMERCIAL

## 2018-06-20 ENCOUNTER — RADIATION ONCOLOGY FOLLOW-UP (OUTPATIENT)
Dept: RADIATION ONCOLOGY | Facility: HOSPITAL | Age: 78
End: 2018-06-20
Attending: RADIOLOGY
Payer: COMMERCIAL

## 2018-06-20 ENCOUNTER — TELEPHONE (OUTPATIENT)
Dept: HEMATOLOGY ONCOLOGY | Facility: CLINIC | Age: 78
End: 2018-06-20

## 2018-06-20 VITALS
OXYGEN SATURATION: 96 % | DIASTOLIC BLOOD PRESSURE: 58 MMHG | HEIGHT: 73 IN | HEART RATE: 84 BPM | SYSTOLIC BLOOD PRESSURE: 92 MMHG | TEMPERATURE: 97.2 F | RESPIRATION RATE: 18 BRPM

## 2018-06-20 DIAGNOSIS — C79.49 SECONDARY MALIGNANT NEOPLASM OF BRAIN AND SPINAL CORD (HCC): Primary | ICD-10-CM

## 2018-06-20 DIAGNOSIS — C79.31 SECONDARY MALIGNANT NEOPLASM OF BRAIN AND SPINAL CORD (HCC): Primary | ICD-10-CM

## 2018-06-20 DIAGNOSIS — C34.91 PRIMARY MALIGNANT NEOPLASM OF RIGHT LUNG METASTATIC TO OTHER SITE (HCC): ICD-10-CM

## 2018-06-20 DIAGNOSIS — C34.91 PRIMARY MALIGNANT NEOPLASM OF RIGHT LUNG METASTATIC TO OTHER SITE (HCC): Primary | ICD-10-CM

## 2018-06-20 PROCEDURE — G0463 HOSPITAL OUTPT CLINIC VISIT: HCPCS | Performed by: RADIOLOGY

## 2018-06-20 PROCEDURE — 99214 OFFICE O/P EST MOD 30 MIN: CPT | Performed by: RADIOLOGY

## 2018-06-20 NOTE — TELEPHONE ENCOUNTER
Josh Wynne from Dr Zapata Patient (Dentist) called for recent labs and medication list  Mauri Barry to

## 2018-06-20 NOTE — PROGRESS NOTES
Mario Chau  1940   Mr Nilesh Dodson is a 69 y o  male       Chief Complaint   Patient presents with    Follow-up       Cancer Staging  No matching staging information was found for the patient  Oncology History    Patient presents today for first follow-up post whole brain RT completed on 5/9/18  Mario Chau is a 69y o  year old male who presents with a stage IV right lung carcinoma with widespread metastasis involving the mediastinum, left lung, multiple bones, and multiple brain metastasis  He does have some symptoms from his lung primary with coughing and shortness of breath but does not have symptoms from his bone metastasis nor his multiple brain metastasis  His bronchoscopic biopsy results from yesterday are pending at this time  We discussed with him that he has stage IV disease and that his disease is not curable  We discussed palliative whole brain radiation therapy which will help to slow progression of his multiple brain metastasis and maintain his current cognitive function  Patient completed whole brain RT on 5/9/18 5/23/18- Dr Everett Arreaga f/u-   Start Tagrisso 80 mg 1 tab p o daily  Xgeva every other month  Follow-up every 2 weeks  Arrange for IR for thoracentesis and then PleurX catheter placement to prevent accumulation of right-sided pleural effusion  6/14/18- CT head-  1  Dramatic interval treatment response versus differences in imaging modality sensitivity (MR versus CT) for detecting metastasis  No definite enhancing lesion detected currently by contrast enhanced CT technique  2   A few scattered subcortical hypodensities may represent treatment response or involuting vasogenic edema versus microangiopathy  2   No acute intracranial hemorrhage  Thoracentesis performed 6/14/18- 1600 mL clear yellow fluid  Patient gets them every 3 weeks  Coughing has improved      7/6/18- next Dr Everett Arreaga scheduled appointment         Metastatic disease ([facility] [address] )    4/21/2018 Initial Diagnosis     Metastatic disease ([facility] [place] [facility] )         Metastatic primary lung cancer ([facility] [place] [facility] )    4/2018 Initial Diagnosis     Metastatic primary lung cancer ([place] [address] )         4/2018 -  Chemotherapy     Pembrolizumab/ Alimta/carboplatin cycle 1  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, we will discontinue chemotherapy  Patient started Tagrisso 80 mg 1 tab p o daily  6/2018 -  Chemotherapy     Tagrisso 80 mg 1 tab p o daily  Xgeva every other month  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[place] [address] )    4/2018 Initial Diagnosis     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[place] [address] )         4/26/2018 - 5/9/2018 Radiation     Treatment:  Course: C1    Plan ID Energy Fractions Dose per Fraction (cGy) Total Dose Delivered (cGy) Elapsed Days   WHOLE BRAIN 6X 10 / 10 300 3,000 13      Treatment dates:  C1: 4/26/2018 - 5/9/2018              Clinical Trial: No    Screening  Tobacco  Current tobacco user: no  If yes, brief counseling provided: NA    Hypertension  Hypertension screening performed: yes  Normotensive:  yes  If no, referred to PCP: n/a    Depression Screening  Screened for depression using PHQ-2: yes    Screened for depression using PHQ-9:  yes  Screening positive or negative:  negative  If score >4, was any of the following actions taken?    Additional evaluation for depression, suicide risk assesment, referral to PCP or psychiatry, medication started:  n/a    Advanced Care Planning for Patients >65 years  Advanced Care Planning Discussed:  yes  Patient named surrogate decision maker or care plan in chart: No      Health Maintenance   Topic Date Due    SLP PLAN OF CARE  1940    DTaP,Tdap,and Td Vaccines (1 - Tdap) 01/24/1962    PNEUMOCOCCAL POLYSACCHARIDE VACCINE AGE 65 AND OVER  01/24/2006    GLAUCOMA SCREENING 67+ YR  11/26/2014    OPHTHALMOLOGY EXAM  12/14/2016    INFLUENZA VACCINE  09/01/2018    HEMOGLOBIN A1C  10/16/2018    URINE MICROALBUMIN  01/08/2019    Fall Risk  05/09/2019  Depression Screening PHQ-9  05/09/2019    Diabetic Foot Exam  05/09/2019       Patient Active Problem List   Diagnosis    Hypertension    Type 2 diabetes mellitus with complication ([place] [address] )    Mixed hyperlipidemia    Atrial fibrillation (HCC)    Peripheral vascular disease (HCC)    Persistent proteinuria    Dyspnea    Pleural effusion    Acute kidney injury ([place] [address] )    Lung neoplasm    Metastatic disease (HCC)    Pneumothorax    Metastatic primary lung cancer ([place] [address] )    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[place] [address] )     Past Medical History:   Diagnosis Date    Atrial fibrillation ([place] [address] )     Cancer of lung ([place] [address] )     brain and bones    Diabetes mellitus ([place] [address] )     Hypercholesteremia     Hypertension     Lung neoplasm     Proteinuria     LAST ASSESSED [id number]    PVD (peripheral vascular disease) ([place] [address] )      Past Surgical History:   Procedure Laterality Date    ID BRONCHOSCOPY,DIAGNOSTIC N/A 4/24/2018    Procedure: Zeny Ramirez;  Surgeon: Jimmy Galvan MD;  Location: [facility]; Service: Pulmonary    THORACENTESIS N/A 4/24/2018    Procedure: Jose Luis Rocha;  Surgeon: Jimmy Galvan MD;  Location: [facility];   Service: Pulmonary     Family History   Problem Relation Age of Onset    Diabetes Mother     Diabetes Father     Diabetes Family      Social History     Social History    Marital status: /Civil Union     Spouse name: N/A    Number of children: N/A    Years of education: N/A     Occupational History    RETIRED      Social History Main Topics    Smoking status: Former Smoker    Smokeless tobacco: Never Used      Comment: quit 39 yrs ago    Alcohol use Yes      Comment: occasionally    Drug use: No    Sexual activity: Not on file     Other Topics Concern    Not on file     Social History Narrative    ADVANCED DIRECTIVE IN CHART     CAFFEINE USE VIA COFFEE 3 SERVINGS PER DAY            Current Outpatient Prescriptions:     albuterol (VENTOLIN HFA) 90 mcg/act inhaler, Inhale 2 puffs every 6 (six) hours as needed for wheezing, Disp: 1 Inhaler, Rfl: 0    JEANNE CONTOUR NEXT TEST test strip, 3 applicators by Does not apply route 3 (three) times a week, Disp: , Rfl:     insulin detemir (LEVEMIR) 100 units/mL subcutaneous injection, Inject 30 Units under the skin daily, Disp: 15 Units, Rfl: 3    Insulin Pen Needle (NOVOFINE) 32G X 6 MM MISC, 1 strip by Does not apply route daily, Disp: 100 each, Rfl: 1    lisinopril-hydrochlorothiazide (PRINZIDE,ZESTORETIC) 20-12 5 MG per tablet, Take 1 tablet by mouth daily, Disp: 90 tablet, Rfl: 0    metFORMIN (FORTAMET) 500 MG (OSM) 24 hr tablet, Take 1 tablet (500 mg total) by mouth 2 (two) times a day, Disp: 180 tablet, Rfl: 1    Osimertinib Mesylate 80 MG TABS, Take 1 tablet (80 mg total) by mouth daily, Disp: 30 tablet, Rfl: 11    oxyCODONE-acetaminophen (PERCOCET) 5-325 mg per tablet, Take 1 tablet by mouth every 4 (four) hours as needed for moderate pain Max Daily Amount: 6 tablets, Disp: 60 tablet, Rfl: 0    prochlorperazine (COMPAZINE) 10 mg tablet, Take 1 tablet (10 mg total) by mouth every 6 (six) hours as needed for nausea or vomiting, Disp: 60 tablet, Rfl: 5    sodium chloride (OCEAN) 0 65 % nasal spray, 1 spray into each nostril as needed for congestion, Disp: 15 mL, Rfl: 0  No Known Allergies    Review of Systems:  Review of Systems   Constitutional: Positive for appetite change (reports his appetite is decreased)  Patient drinks Boost and Gatorade   HENT: Negative  Eyes: Positive for visual disturbance (floaters at times- since RT)  Respiratory: Positive for cough (prod cough- yellow sputum) and shortness of breath  Cardiovascular: Negative  Gastrointestinal: Positive for constipation (at times)  Endocrine: Negative  Genitourinary: Positive for frequency  Musculoskeletal: Positive for arthralgias (lower back pain at times) and back pain  Skin: Negative  Allergic/Immunologic: Negative  Neurological: Negative  Hematological: Negative  Psychiatric/Behavioral: Negative  Vitals:    06/20/18 0734   BP: 92/58   BP Location: Left arm   Pulse: 84   Resp: 18   Temp: (!) 97 2 °F (36 2 °C)   TempSrc: Temporal   SpO2: 96%   Height: 6' 1" (1 854 m)            Imaging:Ct Head W Wo Contrast    Result Date: 6/14/2018  Narrative: CT BRAIN - WITH AND WITHOUT CONTRAST INDICATION:   C79 31: Secondary malignant neoplasm of brain C79 49: Secondary malignant neoplasm of other parts of nervous system  Metastatic breast cancer with lung and bone metastases  COMPARISON:  4/22/2018; 12/22/2004 TECHNIQUE:  CT examination of the brain was performed both prior to and after the administration of intravenous contrast   In addition to axial images, coronal reformatted images were created and submitted for interpretation  Radiation dose length product (DLP) for this visit:  2090 86 mGy-cm   This examination, like all CT scans performed in the [facility], was performed utilizing techniques to minimize radiation dose exposure, including the use of iterative reconstruction and automated exposure control  IV Contrast:  100 mL of iodixanol (VISIPAQUE)  IMAGE QUALITY:  Diagnostic  FINDINGS: PARENCHYMA:  There are a few subtle areas of subcortical hypodensity in the supratentorial brain most notably in the right occipital lobe image 18, series 2  This is in the region of the larger brain metastases  No definite abnormal enhancing mass lesion  Findings suggest treatment response and/or nonvisualization by CTA technique  No acute intracranial hemorrhage  No mass effect  Atherosclerotic calcifications noted  VENTRICLES AND EXTRA-AXIAL SPACES:  Normal for patient's age  VISUALIZED ORBITS AND PARANASAL SINUSES:  Unremarkable  CALVARIUM:  Normal      Impression: 1  Dramatic interval treatment response versus differences in imaging modality sensitivity (MR versus CT) for detecting metastasis    No definite enhancing lesion detected currently by contrast enhanced CT technique  2   A few scattered subcortical hypodensities may represent treatment response or involuting vasogenic edema versus microangiopathy  2   No acute intracranial hemorrhage  Workstation performed: [id number]     Ir Thoracentesis    Result Date: 6/14/2018  Narrative: Ultrasound-guided thoracentesis Clinical History: 71-year-old male with primary lung cancer and recurrent right pleural effusion  Patient reports shortness of breath with effusion  Patient had prior thoracentesis performed 5/23/2018 that helped improve his breathing  He denies chest  pain, fever, chills  Technique: Patient was brought to the interventional radiology area and placed supine on the stretcher  After a brief ultrasound examination was performed to localize a pocket of fluid, an area on the skin of the right was prepped and draped in the usual  sterile fashion  Under ultrasound guidance, 1% lidocaine was administered to the skin and down to the pleural surface  A small skin incision was made  A 5 [place] multi-sidehole catheter  was advanced into the right pleural effusion under continuous sonographic guidance  A total of 1600 mL clear yellow fluid was aspirated  No labs were requested  After the procedure, the catheter was removed and a bandage applied to the site  The patient tolerated the procedure well and suffered no complications  Findings: Large amount of anechoic pleural fluid  Ultrasound image demonstrates needle placement within the fluid  Completion images demonstrate small amount of fluid at the right lung base remaining  Impression: Impression: 1  Successful ultrasound-guided right chest thoracentesis yielding 1600 mL clear yellow fluid  Completion imaging demonstrates small amount of fluid remaining    However prior to the procedure the patient reported that when he had previously had 2000 mL removed, he then had shortness of breath and discomfort temporarily  For this reason, we opted to remove less than 2000 mL at this time  I did discuss the possibility of arranging for Pleurx catheter should the patient require more frequent thoracenteses  However, he is not interested in such a drainage catheter at this time  Workstation performed: [id number]     Ir Thoracentesis    Result Date: 5/23/2018  Narrative: Ultrasound-guided thoracentesis Clinical History: Right pleural effusion Procedure: After explaining the risks and benefits of the procedure to the patient, informed consent was obtained  Ultrasound was used to localize the pleural effusion  The overlying skin was prepped and draped in usual sterile fashion and local anesthesia was obtained with the 1% lidocaine solution  A 5-Slov[place] Yueh needle was advanced until fluid was aspirated  Approximately 2000 cc of clear, yellow fluid was aspirated  The patient tolerated the procedure well and left the department in stable condition  Impression: Impression: Successful ultrasound-guided thoracentesis   Workstation performed: [id number]

## 2018-06-20 NOTE — PROGRESS NOTES
Follow-up - Radiation Oncology   Napoleon Sargent 1940 69 y o  male [id number]      History of Present Illness   Cancer Staging  No matching staging information was found for the patient  Napoleon Sargent is a 69y o  year old male who presents with a stage IV right lung carcinoma with widespread metastasis involving the mediastinum, left lung, multiple bones, and multiple brain metastasis  He completed radiation therapy to the whole brain on May 9, 2018 and returns today for follow-up visit  Interval History:  He started receiving Tagrisso immunotherapy with Dr Jose Miguel Johnson on June 18, 2018 which he is tolerating well  Repeat CT of the head with contrast June 14, 2018 reveals dramatic interval response to treatment  Patient is very claustrophobic and refuses MRI of the brain  He denies any headaches or dizziness  He has no nausea and no vomiting  He admits to a poor appetite  He is seen today with his wife  Historical Information   Oncology History    Patient presents today for first follow-up post whole brain RT completed on 5/9/18  Napoleon Sargent is a 69y o  year old male who presents with a stage IV right lung carcinoma with widespread metastasis involving the mediastinum, left lung, multiple bones, and multiple brain metastasis  He does have some symptoms from his lung primary with coughing and shortness of breath but does not have symptoms from his bone metastasis nor his multiple brain metastasis  His bronchoscopic biopsy results from yesterday are pending at this time  We discussed with him that he has stage IV disease and that his disease is not curable  We discussed palliative whole brain radiation therapy which will help to slow progression of his multiple brain metastasis and maintain his current cognitive function  Patient completed whole brain RT on 5/9/18 5/23/18- Dr Jose Miguel Johnson f/u-   Start Tagrisso 80 mg 1 tab p o daily  Xgeva every other month  Follow-up every 2 weeks   Arrange for IR for thoracentesis and then PleurX catheter placement to prevent accumulation of right-sided pleural effusion  6/14/18- CT head-  1  Dramatic interval treatment response versus differences in imaging modality sensitivity (MR versus CT) for detecting metastasis  No definite enhancing lesion detected currently by contrast enhanced CT technique  2   A few scattered subcortical hypodensities may represent treatment response or involuting vasogenic edema versus microangiopathy  2   No acute intracranial hemorrhage  Thoracentesis performed 6/14/18- 1600 mL clear yellow fluid  Patient gets them every 3 weeks  Coughing has improved  7/6/18- next Dr Lopez Gist scheduled appointment         Metastatic disease [facility])    4/21/2018 Initial Diagnosis     Metastatic disease ([facility] )         Metastatic primary lung cancer ([facility] )    4/2018 Initial Diagnosis     Metastatic primary lung cancer ([facility] )         4/2018 -  Chemotherapy     Pembrolizumab/ Alimta/carboplatin cycle 1  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, we will discontinue chemotherapy  Patient started Tagrisso 80 mg 1 tab p o daily  6/18/2018 -  Chemotherapy     Tagrisso 80 mg 1 tab p o daily  Xgeva every other month             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[facility] )    4/2018 Initial Diagnosis     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[facility] )         4/26/2018 - 5/9/2018 Radiation     Treatment:  Course: C1    Plan ID Energy Fractions Dose per Fraction (cGy) Total Dose Delivered (cGy) Elapsed Days   WHOLE BRAIN 6X 10 / 10 300 3,000 13      Treatment dates:  C1: 4/26/2018 - 5/9/2018            Clinical Trial: No     Screening  Tobacco  Current tobacco user: no  If yes, brief counseling provided: NA     Hypertension  Hypertension screening performed: yes  Normotensive:  yes  If no, referred to PCP: n/a     Depression Screening  Screened for depression using PHQ-2: yes     Screened for depression using PHQ-9:  yes  Screening positive or negative:  negative  If score >4, was any of the following actions taken? Additional evaluation for depression, suicide risk assesment, referral to PCP or psychiatry, medication started:  n/a     Advanced Care Planning for Patients >65 years  Advanced Care Planning Discussed:  yes  Patient named surrogate decision maker or care plan in chart: No    Past Medical History:   Diagnosis Date    Atrial fibrillation ([facility] )     Cancer of lung ([facility] )     brain and bones    Diabetes mellitus ([place] [address] )     Hypercholesteremia     Hypertension     Lung neoplasm     Proteinuria     LAST ASSESSED [id number]    PVD (peripheral vascular disease) ([place] [address] )      Past Surgical History:   Procedure Laterality Date    MI BRONCHOSCOPY,DIAGNOSTIC N/A 4/24/2018    Procedure: Jane Mcgarry;  Surgeon: Kt Palacio MD;  Location: [facility]; Service: Pulmonary    THORACENTESIS N/A 4/24/2018    Procedure: Mehrdad Beavers;  Surgeon: Kt Palacio MD;  Location: [facility];   Service: Pulmonary       Social History   History   Alcohol Use    Yes     Comment: occasionally     History   Drug Use No     History   Smoking Status    Former Smoker   Smokeless Tobacco    Never Used     Comment: quit 39 yrs ago     Meds/Allergies     Current Outpatient Prescriptions:     albuterol (VENTOLIN HFA) 90 mcg/act inhaler, Inhale 2 puffs every 6 (six) hours as needed for wheezing, Disp: 1 Inhaler, Rfl: 0    JEANNE CONTOUR NEXT TEST test strip, 3 applicators by Does not apply route 3 (three) times a week, Disp: , Rfl:     insulin detemir (LEVEMIR) 100 units/mL subcutaneous injection, Inject 30 Units under the skin daily, Disp: 15 Units, Rfl: 3    Insulin Pen Needle (NOVOFINE) 32G X 6 MM MISC, 1 strip by Does not apply route daily, Disp: 100 each, Rfl: 1    lisinopril-hydrochlorothiazide (PRINZIDE,ZESTORETIC) 20-12 5 MG per tablet, Take 1 tablet by mouth daily, Disp: 90 tablet, Rfl: 0    metFORMIN (FORTAMET) 500 MG (OSM) 24 hr tablet, Take 1 tablet (500 mg total) by mouth 2 (two) times a day, Disp: 180 tablet, Rfl: 1    Osimertinib Mesylate 80 MG TABS, Take 1 tablet (80 mg total) by mouth daily, Disp: 30 tablet, Rfl: 11    oxyCODONE-acetaminophen (PERCOCET) 5-325 mg per tablet, Take 1 tablet by mouth every 4 (four) hours as needed for moderate pain Max Daily Amount: 6 tablets, Disp: 60 tablet, Rfl: 0    prochlorperazine (COMPAZINE) 10 mg tablet, Take 1 tablet (10 mg total) by mouth every 6 (six) hours as needed for nausea or vomiting, Disp: 60 tablet, Rfl: 5    sodium chloride (OCEAN) 0 65 % nasal spray, 1 spray into each nostril as needed for congestion, Disp: 15 mL, Rfl: 0  No Known Allergies    Review of Systems   Constitutional: Positive for appetite change (reports his appetite is decreased)  Patient drinks Boost and Gatorade   HENT: Negative  Eyes: Positive for visual disturbance (floaters at times- since RT)  Respiratory: Positive for cough (prod cough- yellow sputum) and shortness of breath  Cardiovascular: Negative  Gastrointestinal: Positive for constipation (at times)  Endocrine: Negative  Genitourinary: Positive for frequency  Musculoskeletal: Positive for arthralgias (lower back pain at times) and back pain  Skin: Negative  Allergic/Immunologic: Negative  Neurological: Negative  Hematological: Negative  Psychiatric/Behavioral: Negative  OBJECTIVE:   BP 92/58 (BP Location: Left arm)   Pulse 84   Temp (!) 97 2 °F (36 2 °C) (Temporal)   Resp 18   Ht 6' 1" (1 854 m)   SpO2 96%   Pain Assessment:  0  ECOG/Zubrod/WHO: 2 - Symptomatic, <50% confined to bed    Physical Exam   Constitutional: He is oriented to person, place, and time  He appears well-developed and well-nourished  No distress  HENT:   Head: Normocephalic and atraumatic  Mouth/Throat: No oropharyngeal exudate  Eyes: Conjunctivae and EOM are normal  Pupils are equal, round, and reactive to light  No scleral icterus  Neck: Normal range of motion  Neck supple  No tracheal deviation present  No thyromegaly present  Cardiovascular: Normal rate, regular rhythm and normal heart sounds  Pulmonary/Chest: Effort normal  No respiratory distress  He has no wheezes  He has no rales  He exhibits no tenderness  There decreased breath sounds in the right lower lung  Abdominal: Soft  Bowel sounds are normal  He exhibits no distension and no mass  There is no tenderness  Musculoskeletal: Normal range of motion  He exhibits no edema or tenderness  Lymphadenopathy:     He has no cervical adenopathy  Neurological: He is alert and oriented to person, place, and time  He has normal reflexes  No cranial nerve deficit  Coordination normal    Skin: Skin is warm and dry  No rash noted  He is not diaphoretic  No erythema  No pallor  Dry scalp skin with partial alopecia  Psychiatric: He has a normal mood and affect  His behavior is normal  Judgment and thought content normal    Nursing note and vitals reviewed      RESULTS    Lab Results:   Recent Results (from the past 672 hour(s))   CBC and differential    Collection Time: 06/15/18  7:16 AM   Result Value Ref Range    WBC 9 41 4 31 - 10 16 Thousand/uL    RBC 3 79 (L) 3 88 - 5 62 Million/uL    Hemoglobin 10 8 (L) 12 0 - 17 0 g/dL    Hematocrit 32 9 (L) 36 5 - 49 3 %    MCV 87 82 - 98 fL    MCH 28 5 26 8 - 34 3 pg    MCHC 32 8 31 4 - 37 4 g/dL    RDW 17 9 (H) 11 6 - 15 1 %    MPV 9 3 8 9 - 12 7 fL    Platelets 121 563 - 802 Thousands/uL    nRBC 0 /100 WBCs    Neutrophils Relative 72 43 - 75 %    Immat GRANS % 1 0 - 2 %    Lymphocytes Relative 12 (L) 14 - 44 %    Monocytes Relative 14 (H) 4 - 12 %    Eosinophils Relative 1 0 - 6 %    Basophils Relative 0 0 - 1 %    Neutrophils Absolute 6 75 1 85 - 7 62 Thousands/µL    Immature Grans Absolute 0 09 0 00 - 0 20 Thousand/uL    Lymphocytes Absolute 1 12 0 60 - 4 47 Thousands/µL    Monocytes Absolute 1 34 (H) 0 17 - 1 22 Thousand/µL Eosinophils Absolute 0 07 0 00 - 0 61 Thousand/µL    Basophils Absolute 0 04 0 00 - 0 10 Thousands/µL   Comprehensive metabolic panel    Collection Time: 06/15/18  7:16 AM   Result Value Ref Range    Sodium 127 (L) 136 - 145 mmol/L    Potassium 4 5 3 5 - 5 3 mmol/L    Chloride 90 (L) 100 - 108 mmol/L    CO2 27 21 - 32 mmol/L    Anion Gap 10 4 - 13 mmol/L    BUN 25 5 - 25 mg/dL    Creatinine 1 39 (H) 0 60 - 1 30 mg/dL    Glucose, Fasting 125 (H) 65 - 99 mg/dL    Calcium 9 2 8 3 - 10 1 mg/dL    AST 83 (H) 5 - 45 U/L    ALT 14 12 - 78 U/L    Alkaline Phosphatase 203 (H) 46 - 116 U/L    Total Protein 6 8 6 4 - 8 2 g/dL    Albumin 2 9 (L) 3 5 - 5 0 g/dL    Total Bilirubin 0 73 0 20 - 1 00 mg/dL    eGFR 49 ml/min/1 73sq m       Imaging Studies:Ct Head W Wo Contrast    Result Date: 6/14/2018  Narrative: CT BRAIN - WITH AND WITHOUT CONTRAST INDICATION:   C79 31: Secondary malignant neoplasm of brain C79 49: Secondary malignant neoplasm of other parts of nervous system  Metastatic breast cancer with lung and bone metastases  COMPARISON:  4/22/2018; 12/22/2004 TECHNIQUE:  CT examination of the brain was performed both prior to and after the administration of intravenous contrast   In addition to axial images, coronal reformatted images were created and submitted for interpretation  Radiation dose length product (DLP) for this visit:  2090 86 mGy-cm   This examination, like all CT scans performed in the [facility], was performed utilizing techniques to minimize radiation dose exposure, including the use of iterative reconstruction and automated exposure control  IV Contrast:  100 mL of iodixanol (VISIPAQUE)  IMAGE QUALITY:  Diagnostic  FINDINGS: PARENCHYMA:  There are a few subtle areas of subcortical hypodensity in the supratentorial brain most notably in the right occipital lobe image 18, series 2  This is in the region of the larger brain metastases  No definite abnormal enhancing mass lesion  Findings suggest treatment response and/or nonvisualization by CTA technique  No acute intracranial hemorrhage  No mass effect  Atherosclerotic calcifications noted  VENTRICLES AND EXTRA-AXIAL SPACES:  Normal for patient's age  VISUALIZED ORBITS AND PARANASAL SINUSES:  Unremarkable  CALVARIUM:  Normal      Impression: 1  Dramatic interval treatment response versus differences in imaging modality sensitivity (MR versus CT) for detecting metastasis  No definite enhancing lesion detected currently by contrast enhanced CT technique  2   A few scattered subcortical hypodensities may represent treatment response or involuting vasogenic edema versus microangiopathy  2   No acute intracranial hemorrhage  Workstation performed: [id number]     Ir Thoracentesis    Result Date: 6/14/2018  Narrative: Ultrasound-guided thoracentesis Clinical History: 52-year-old male with primary lung cancer and recurrent right pleural effusion  Patient reports shortness of breath with effusion  Patient had prior thoracentesis performed 5/23/2018 that helped improve his breathing  He denies chest  pain, fever, chills  Technique: Patient was brought to the interventional radiology area and placed supine on the stretcher  After a brief ultrasound examination was performed to localize a pocket of fluid, an area on the skin of the right was prepped and draped in the usual  sterile fashion  Under ultrasound guidance, 1% lidocaine was administered to the skin and down to the pleural surface  A small skin incision was made  A 5 [place] multi-sidehole catheter  was advanced into the right pleural effusion under continuous sonographic guidance  A total of 1600 mL clear yellow fluid was aspirated  No labs were requested  After the procedure, the catheter was removed and a bandage applied to the site  The patient tolerated the procedure well and suffered no complications  Findings: Large amount of anechoic pleural fluid   Ultrasound image demonstrates needle placement within the fluid  Completion images demonstrate small amount of fluid at the right lung base remaining  Impression: Impression: 1  Successful ultrasound-guided right chest thoracentesis yielding 1600 mL clear yellow fluid  Completion imaging demonstrates small amount of fluid remaining  However prior to the procedure the patient reported that when he had previously had 2000 mL removed, he then had shortness of breath and discomfort temporarily  For this reason, we opted to remove less than 2000 mL at this time  I did discuss the possibility of arranging for Pleurx catheter should the patient require more frequent thoracenteses  However, he is not interested in such a drainage catheter at this time  Workstation performed: [id number]     Ir Thoracentesis    Result Date: 5/23/2018  Narrative: Ultrasound-guided thoracentesis Clinical History: Right pleural effusion Procedure: After explaining the risks and benefits of the procedure to the patient, informed consent was obtained  Ultrasound was used to localize the pleural effusion  The overlying skin was prepped and draped in usual sterile fashion and local anesthesia was obtained with the 1% lidocaine solution  A 5-Tong[place] Yueh needle was advanced until fluid was aspirated  Approximately 2000 cc of clear, yellow fluid was aspirated  The patient tolerated the procedure well and left the department in stable condition  Impression: Impression: Successful ultrasound-guided thoracentesis  Workstation performed: [id number]       Assessment/Plan:  Orders Placed This Encounter   Procedures    CT head w wo contrast    BUN    Creatinine, serum        Davis Patterson is a 69y o  year old male who presents with a stage IV right lung carcinoma with widespread metastasis involving the mediastinum, left lung, multiple bones, and multiple brain metastasis    He completed radiation therapy to the whole brain on May 9, 2018 and returns today for follow-up visit  He started receiving Tagrisso immunotherapy with Dr Romeo Baires on June 18, 2018 which he is tolerating well  Repeat CT of the head with contrast June 14, 2018 reveals dramatic interval response to treatment  Patient is very claustrophobic and refuses MRI of the brain  We discussed results today with the patient and his wife and are pleased with his good results  We recommend he continue with his immunotherapy  He will return in 3 months with a repeat CT of the head with contrast     Augustus Sanches MD  6/20/2018,8:30 AM    Portions of the record may have been created with voice recognition software   Occasional wrong word or "sound a like" substitutions may have occurred due to the inherent limitations of voice recognition software   Read the chart carefully and recognize, using context, where substitutions have occurred

## 2018-06-25 ENCOUNTER — TRANSCRIBE ORDERS (OUTPATIENT)
Dept: LAB | Age: 78
End: 2018-06-25

## 2018-06-25 ENCOUNTER — APPOINTMENT (OUTPATIENT)
Dept: LAB | Age: 78
End: 2018-06-25
Payer: COMMERCIAL

## 2018-06-25 DIAGNOSIS — C34.90 MALIGNANT NEOPLASM OF BRONCHUS AND LUNG (HCC): ICD-10-CM

## 2018-06-25 DIAGNOSIS — C34.90 MALIGNANT NEOPLASM OF BRONCHUS AND LUNG (HCC): Primary | ICD-10-CM

## 2018-06-25 LAB
ALBUMIN SERPL BCP-MCNC: 3.1 G/DL (ref 3.5–5)
ALP SERPL-CCNC: 171 U/L (ref 46–116)
ALT SERPL W P-5'-P-CCNC: 16 U/L (ref 12–78)
ANION GAP SERPL CALCULATED.3IONS-SCNC: 9 MMOL/L (ref 4–13)
AST SERPL W P-5'-P-CCNC: 34 U/L (ref 5–45)
BASOPHILS # BLD AUTO: 0.04 THOUSANDS/ΜL (ref 0–0.1)
BASOPHILS NFR BLD AUTO: 1 % (ref 0–1)
BILIRUB SERPL-MCNC: 0.54 MG/DL (ref 0.2–1)
BUN SERPL-MCNC: 19 MG/DL (ref 5–25)
CALCIUM SERPL-MCNC: 9.1 MG/DL (ref 8.3–10.1)
CHLORIDE SERPL-SCNC: 94 MMOL/L (ref 100–108)
CO2 SERPL-SCNC: 27 MMOL/L (ref 21–32)
CREAT SERPL-MCNC: 1.46 MG/DL (ref 0.6–1.3)
EOSINOPHIL # BLD AUTO: 0.23 THOUSAND/ΜL (ref 0–0.61)
EOSINOPHIL NFR BLD AUTO: 3 % (ref 0–6)
ERYTHROCYTE [DISTWIDTH] IN BLOOD BY AUTOMATED COUNT: 18.5 % (ref 11.6–15.1)
GFR SERPL CREATININE-BSD FRML MDRD: 46 ML/MIN/1.73SQ M
GLUCOSE SERPL-MCNC: 95 MG/DL (ref 65–140)
HCT VFR BLD AUTO: 34.3 % (ref 36.5–49.3)
HGB BLD-MCNC: 10.9 G/DL (ref 12–17)
IMM GRANULOCYTES # BLD AUTO: 0.06 THOUSAND/UL (ref 0–0.2)
IMM GRANULOCYTES NFR BLD AUTO: 1 % (ref 0–2)
LYMPHOCYTES # BLD AUTO: 1.12 THOUSANDS/ΜL (ref 0.6–4.47)
LYMPHOCYTES NFR BLD AUTO: 16 % (ref 14–44)
MCH RBC QN AUTO: 28 PG (ref 26.8–34.3)
MCHC RBC AUTO-ENTMCNC: 31.8 G/DL (ref 31.4–37.4)
MCV RBC AUTO: 88 FL (ref 82–98)
MONOCYTES # BLD AUTO: 0.92 THOUSAND/ΜL (ref 0.17–1.22)
MONOCYTES NFR BLD AUTO: 13 % (ref 4–12)
NEUTROPHILS # BLD AUTO: 4.62 THOUSANDS/ΜL (ref 1.85–7.62)
NEUTS SEG NFR BLD AUTO: 66 % (ref 43–75)
NRBC BLD AUTO-RTO: 0 /100 WBCS
PLATELET # BLD AUTO: 220 THOUSANDS/UL (ref 149–390)
PMV BLD AUTO: 10.5 FL (ref 8.9–12.7)
POTASSIUM SERPL-SCNC: 4.3 MMOL/L (ref 3.5–5.3)
PROT SERPL-MCNC: 6.9 G/DL (ref 6.4–8.2)
RBC # BLD AUTO: 3.89 MILLION/UL (ref 3.88–5.62)
SODIUM SERPL-SCNC: 130 MMOL/L (ref 136–145)
WBC # BLD AUTO: 6.99 THOUSAND/UL (ref 4.31–10.16)

## 2018-06-25 PROCEDURE — 85025 COMPLETE CBC W/AUTO DIFF WBC: CPT

## 2018-06-25 PROCEDURE — 36415 COLL VENOUS BLD VENIPUNCTURE: CPT

## 2018-06-25 PROCEDURE — 80053 COMPREHEN METABOLIC PANEL: CPT

## 2018-07-06 ENCOUNTER — OFFICE VISIT (OUTPATIENT)
Dept: HEMATOLOGY ONCOLOGY | Facility: CLINIC | Age: 78
End: 2018-07-06
Payer: COMMERCIAL

## 2018-07-06 VITALS
RESPIRATION RATE: 20 BRPM | OXYGEN SATURATION: 97 % | SYSTOLIC BLOOD PRESSURE: 102 MMHG | TEMPERATURE: 97.8 F | BODY MASS INDEX: 26.45 KG/M2 | DIASTOLIC BLOOD PRESSURE: 58 MMHG | HEART RATE: 80 BPM | HEIGHT: 73 IN | WEIGHT: 199.6 LBS

## 2018-07-06 DIAGNOSIS — C34.91 PRIMARY MALIGNANT NEOPLASM OF RIGHT LUNG METASTATIC TO OTHER SITE (HCC): Primary | ICD-10-CM

## 2018-07-06 DIAGNOSIS — C79.49 SECONDARY MALIGNANT NEOPLASM OF BRAIN AND SPINAL CORD (HCC): ICD-10-CM

## 2018-07-06 DIAGNOSIS — C79.31 SECONDARY MALIGNANT NEOPLASM OF BRAIN AND SPINAL CORD (HCC): ICD-10-CM

## 2018-07-06 PROCEDURE — 99214 OFFICE O/P EST MOD 30 MIN: CPT | Performed by: INTERNAL MEDICINE

## 2018-07-06 NOTE — PROGRESS NOTES
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  The therapy was changed to tagersso 80 mg p o   Daily since the end of June 2018, history rating therapy very well without any significant side effect no evidence of diarrhea, proceed with therapy   Xgeva 120 mg subcu every other month  Weight loss might be related to the cancer I will watch and observe at this time, after 2 months of total treatment will do CT scan of the chest to assess response  Pleural effusion of the right side, mild, asymptomatic I will continue watchful observation, the patient has any symptoms will arrange for thoracentesis by IR the last thoracentesis was done in June 14,           HPI:   23-year-old  male who used to smoke 2 pack of cigarettes daily for 20 years, quit 40 years ago, he noticed in the beginning of 2018 dyspnea with cough without hemoptysis, he noticed 10 lb weight loss with intermittent anterior chest pain with radiation to the right lateral side, CT scan in April 2018 showed dried large hilar mass with bronchial obstruction, small right pleural effusion, multiple lumbar spine metastatic disease status post right thoracentesis with atypical cellular changes cannot exclude neoplasia, the patient had bronchoscopy and right lower lobe bronchial brushing showed conclusive evidence of malignancy with non-small cell lung cancer favor adenocarcinoma that stained positive for TTF 1, napsin and absent for p 40  CT scan of the abdomen and pelvis showed small pericardial effusion, osseous metastases at L1, L2, L4, L5, obstructive collapse of the right middle lobe and the lower lobe  MRI of the brain showed widespread brain metastases involving the supra and the infratentorial compartments  The patient underwent radiation therapy to the brain finished in May 2018  He received 1st cycle of Pembrolizumab, Alimta, carboplatin however 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, the therapy was changed to tagresso 80 mg p o  Daily by the end of June 2018    Interval History:   Thoracentesis of the right side the last 1 on 06/14/2018      Previous Treatment:         Test Results:    Imaging: Ct Head W Wo Contrast    Result Date: 6/14/2018  Narrative: CT BRAIN - WITH AND WITHOUT CONTRAST INDICATION:   C79 31: Secondary malignant neoplasm of brain C79 49: Secondary malignant neoplasm of other parts of nervous system  Metastatic breast cancer with lung and bone metastases  COMPARISON:  4/22/2018; 12/22/2004 TECHNIQUE:  CT examination of the brain was performed both prior to and after the administration of intravenous contrast   In addition to axial images, coronal reformatted images were created and submitted for interpretation  Radiation dose length product (DLP) for this visit:  2090 86 mGy-cm   This examination, like all CT scans performed in the [facility], was performed utilizing techniques to minimize radiation dose exposure, including the use of iterative reconstruction and automated exposure control  IV Contrast:  100 mL of iodixanol (VISIPAQUE)  IMAGE QUALITY:  Diagnostic  FINDINGS: PARENCHYMA:  There are a few subtle areas of subcortical hypodensity in the supratentorial brain most notably in the right occipital lobe image 18, series 2  This is in the region of the larger brain metastases  No definite abnormal enhancing mass lesion  Findings suggest treatment response and/or nonvisualization by CTA technique  No acute intracranial hemorrhage  No mass effect  Atherosclerotic calcifications noted   VENTRICLES AND EXTRA-AXIAL SPACES:  Normal for patient's age  VISUALIZED ORBITS AND PARANASAL SINUSES:  Unremarkable  CALVARIUM:  Normal      Impression: 1  Dramatic interval treatment response versus differences in imaging modality sensitivity (MR versus CT) for detecting metastasis  No definite enhancing lesion detected currently by contrast enhanced CT technique  2   A few scattered subcortical hypodensities may represent treatment response or involuting vasogenic edema versus microangiopathy  2   No acute intracranial hemorrhage  Workstation performed: [id number]     Ir Thoracentesis    Result Date: 6/14/2018  Narrative: Ultrasound-guided thoracentesis Clinical History: 46-year-old male with primary lung cancer and recurrent right pleural effusion  Patient reports shortness of breath with effusion  Patient had prior thoracentesis performed 5/23/2018 that helped improve his breathing  He denies chest  pain, fever, chills  Technique: Patient was brought to the interventional radiology area and placed supine on the stretcher  After a brief ultrasound examination was performed to localize a pocket of fluid, an area on the skin of the right was prepped and draped in the usual  sterile fashion  Under ultrasound guidance, 1% lidocaine was administered to the skin and down to the pleural surface  A small skin incision was made  A 5 [place] multi-sidehole catheter  was advanced into the right pleural effusion under continuous sonographic guidance  A total of 1600 mL clear yellow fluid was aspirated  No labs were requested  After the procedure, the catheter was removed and a bandage applied to the site  The patient tolerated the procedure well and suffered no complications  Findings: Large amount of anechoic pleural fluid  Ultrasound image demonstrates needle placement within the fluid  Completion images demonstrate small amount of fluid at the right lung base remaining  Impression: Impression: 1   Successful ultrasound-guided right chest thoracentesis yielding 1600 mL clear yellow fluid  Completion imaging demonstrates small amount of fluid remaining  However prior to the procedure the patient reported that when he had previously had 2000 mL removed, he then had shortness of breath and discomfort temporarily  For this reason, we opted to remove less than 2000 mL at this time  I did discuss the possibility of arranging for Pleurx catheter should the patient require more frequent thoracenteses  However, he is not interested in such a drainage catheter at this time  Workstation performed: [id number]       Labs:   Lab Results   Component Value Date    WBC 6 99 06/25/2018    HGB 10 9 (L) 06/25/2018    HCT 34 3 (L) 06/25/2018    MCV 88 06/25/2018     06/25/2018     Lab Results   Component Value Date     (L) 06/25/2018    K 4 3 06/25/2018    CL 94 (L) 06/25/2018    CO2 27 06/25/2018    ANIONGAP 9 06/25/2018    BUN 19 06/25/2018    CREATININE 1 46 (H) 06/25/2018    GLUCOSE 95 06/25/2018    GLUF 125 (H) 06/15/2018    CALCIUM 9 1 06/25/2018    AST 34 06/25/2018    ALT 16 06/25/2018    ALKPHOS 171 (H) 06/25/2018    PROT 6 9 06/25/2018    BILITOT 0 54 06/25/2018    EGFR 46 06/25/2018       No results found for: IRON, TIBC, FERRITIN    No results found for: [id number]      ROS:   Review of Systems   Constitutional: Positive for fatigue and unexpected weight change ( weight loss 10 lb in 1 month)  Negative for activity change, appetite change, chills, diaphoresis and fever  HENT: Negative for congestion, dental problem, facial swelling, hearing loss, mouth sores, nosebleeds, postnasal drip, rhinorrhea, sore throat, trouble swallowing and voice change  Eyes: Negative for photophobia, pain, discharge, redness, itching and visual disturbance  Respiratory: Positive for choking  Negative for cough, chest tightness, shortness of breath and wheezing      Cardiovascular: Negative for chest pain, palpitations and leg swelling  Gastrointestinal: Negative for abdominal distention, abdominal pain, anal bleeding, blood in stool, constipation, diarrhea, nausea, rectal pain and vomiting  Endocrine: Negative for cold intolerance and heat intolerance  Genitourinary: Negative for decreased urine volume, difficulty urinating, dysuria, flank pain, frequency, hematuria and urgency  Musculoskeletal: Negative for arthralgias, back pain, gait problem, joint swelling, myalgias, neck pain and neck stiffness  Skin: Negative for color change, pallor, rash and wound  Allergic/Immunologic: Negative for immunocompromised state  Neurological: Negative for dizziness, tremors, seizures, syncope, facial asymmetry, speech difficulty, weakness, light-headedness, numbness and headaches  Hematological: Negative for adenopathy  Does not bruise/bleed easily  Psychiatric/Behavioral: Negative for agitation, confusion, decreased concentration, dysphoric mood and sleep disturbance  The patient is not nervous/anxious  All other systems reviewed and are negative  Current Medications: Reviewed  Allergies: Reviewed  PMH/FH/SH:  Reviewed      Physical Exam:    Body surface area is 2 15 meters squared  Wt Readings from Last 3 Encounters:   07/06/18 90 5 kg (199 lb 9 6 oz)   05/23/18 102 kg (225 lb)   05/10/18 103 kg (227 lb 1 2 oz)        Temp Readings from Last 3 Encounters:   07/06/18 97 8 °F (36 6 °C) (Tympanic)   06/20/18 (!) 97 2 °F (36 2 °C) (Temporal)   05/23/18 97 6 °F (36 4 °C)        BP Readings from Last 3 Encounters:   07/06/18 102/58   06/20/18 92/58   06/14/18 94/56         Pulse Readings from Last 3 Encounters:   07/06/18 80   06/20/18 84   06/14/18 86        Physical Exam   Constitutional: He is oriented to person, place, and time  He appears well-developed and well-nourished  No distress  HENT:   Head: Normocephalic and atraumatic  Mouth/Throat: Oropharynx is clear and moist  No oropharyngeal exudate     Eyes: Conjunctivae and EOM are normal  Pupils are equal, round, and reactive to light  Neck: Normal range of motion  Neck supple  No tracheal deviation present  No thyromegaly present  Cardiovascular: Normal rate and regular rhythm  Exam reveals no gallop and no friction rub  No murmur heard  Pulmonary/Chest: Effort normal  No respiratory distress  He has no wheezes  He has no rales ( no breath sounds in the right base in the right base)  He exhibits no tenderness  Abdominal: Soft  Bowel sounds are normal  He exhibits no distension and no mass  There is no tenderness  There is no rebound and no guarding  Musculoskeletal: Normal range of motion  Lymphadenopathy:     He has no cervical adenopathy  Neurological: He is alert and oriented to person, place, and time  Skin: Skin is warm and dry  No rash noted  He is not diaphoretic  No erythema  No pallor  Psychiatric: He has a normal mood and affect  His behavior is normal  Judgment and thought content normal    Vitals reviewed  Goals and Barriers:  Current Goal: Minimize effects of disease  Barriers: None  Patient's Capacity to Self Care:  Patient is able to self care      Code Status: [facility]

## 2018-07-13 ENCOUNTER — TELEPHONE (OUTPATIENT)
Dept: INTERNAL MEDICINE CLINIC | Facility: CLINIC | Age: 78
End: 2018-07-13

## 2018-07-13 NOTE — TELEPHONE ENCOUNTER
Kishore Correia from [facility] visiting nurse called to make aware his bp was 92/58 sitting and 80/50 standing  She told him to not take his lisinopril hctz he usually takes at 10 am  And she advised him not to take this weekend until seen on Monday by dr Kia Purdy  Please call  He is seen for mestataic ca and has been having decreased appetite       Kishore Correia # [phone]

## 2018-07-16 ENCOUNTER — OFFICE VISIT (OUTPATIENT)
Dept: INTERNAL MEDICINE CLINIC | Age: 78
End: 2018-07-16
Payer: COMMERCIAL

## 2018-07-16 VITALS
WEIGHT: 194.6 LBS | TEMPERATURE: 98.7 F | DIASTOLIC BLOOD PRESSURE: 58 MMHG | SYSTOLIC BLOOD PRESSURE: 90 MMHG | BODY MASS INDEX: 26.36 KG/M2 | HEART RATE: 96 BPM | HEIGHT: 72 IN | OXYGEN SATURATION: 98 %

## 2018-07-16 DIAGNOSIS — I48.20 CHRONIC ATRIAL FIBRILLATION (HCC): ICD-10-CM

## 2018-07-16 DIAGNOSIS — E11.8 TYPE 2 DIABETES MELLITUS WITH COMPLICATION, UNSPECIFIED WHETHER LONG TERM INSULIN USE: ICD-10-CM

## 2018-07-16 DIAGNOSIS — D49.1 LUNG NEOPLASM: Primary | ICD-10-CM

## 2018-07-16 DIAGNOSIS — I10 ESSENTIAL HYPERTENSION: ICD-10-CM

## 2018-07-16 PROCEDURE — 3008F BODY MASS INDEX DOCD: CPT | Performed by: INTERNAL MEDICINE

## 2018-07-16 PROCEDURE — 99214 OFFICE O/P EST MOD 30 MIN: CPT | Performed by: INTERNAL MEDICINE

## 2018-07-16 NOTE — PROGRESS NOTES
Assessment/Plan:     1  Type 2 diabetes mellitus   due to recent malignancy patient lost about 54 lb  And his blood sugar at home is in the 90s  Advised to discontinue all the diabetic medicine and will check hemoglobin A1c before next visit     2  History of hypertension   due to weight loss blood pressure is within normal range without any antihypertensive medicine  Will continue to monitor     3  Metastatic lung disease   presently on chemo status post radiation  Being managed by oncologist     Diagnoses and all orders for this visit:    Lung neoplasm    Type 2 diabetes mellitus with complication, unspecified whether long term insulin use ([facility] [address] )  -     Hemoglobin A1C; Future    Chronic atrial fibrillation (HCC)    Essential hypertension          Subjective:          Patient ID: Cordell Bolanos is a 69 y o  male  Patient is here for regular follow-up   Presently undergoing chemo therapy for lung cancer  And patient  His all the diabetic medicine to 3 weeks ago due to hypoglycemia  And he is also off the blood pressure medicine        The following portions of the patient's history were reviewed and updated as appropriate: allergies, current medications, past family history, past medical history, past social history, past surgical history and problem list     Review of Systems   Constitutional: Positive for fatigue  Negative for fever  HENT: Negative for congestion, ear discharge, ear pain, postnasal drip, sinus pressure, sore throat, tinnitus and trouble swallowing  Eyes: Negative for discharge, itching and visual disturbance  Respiratory: Negative for cough and shortness of breath  Cardiovascular: Negative for chest pain and palpitations  Gastrointestinal: Negative for abdominal pain, diarrhea, nausea and vomiting  Endocrine: Negative for cold intolerance and polyuria  Genitourinary: Negative for difficulty urinating, dysuria and urgency     Musculoskeletal: Negative for arthralgias and neck pain  Skin: Negative for rash  Allergic/Immunologic: Negative for environmental allergies  Neurological: Negative for dizziness, weakness and headaches  Psychiatric/Behavioral: Negative for agitation  The patient is not nervous/anxious  Past Medical History:   Diagnosis Date    [facility] fibrillation [facility] [address] )     [facility]     brain and bones    [facility] mellitus [facility]     Hypercholesteremia     Hypertension     Lung neoplasm     Proteinuria     LAST ASSESSED [id number]    PVD (peripheral vascular disease) ([facility])          Current Outpatient Prescriptions:     JEANNE CONTOUR NEXT TEST test strip, 3 applicators by Does not apply route 3 (three) times a week, Disp: , Rfl:     Osimertinib Mesylate 80 MG TABS, Take 1 tablet (80 mg total) by mouth daily, Disp: 30 tablet, Rfl: 11    prochlorperazine (COMPAZINE) 10 mg tablet, Take 1 tablet (10 mg total) by mouth every 6 (six) hours as needed for nausea or vomiting, Disp: 60 tablet, Rfl: 5    No Known Allergies    Social History   Past Surgical History:   Procedure Laterality Date    OK BRONCHOSCOPY,DIAGNOSTIC N/A 4/24/2018    Procedure: Julia Quinn;  Surgeon: Harika Rosales MD;  Location: [facility]; Service: Pulmonary    THORACENTESIS N/A 4/24/2018    Procedure: Protia Alberto;  Surgeon: Harika Rosales MD;  Location: [facility]; Service: Pulmonary     Family History   Problem Relation Age of Onset    Diabetes Mother     Diabetes Father     Diabetes Family        Objective:  BP 90/58 (BP Location: Left arm, Patient Position: Sitting, Cuff Size: Standard)   Pulse 96   Temp 98 7 °F (37 1 °C) (Tympanic)   Ht 5' 11 65" (1 82 m)   Wt 88 3 kg (194 lb 9 6 oz)   SpO2 98%   BMI 26 65 kg/m²   Body mass index is 26 65 kg/m²  Physical Exam   Constitutional: He appears well-developed  HENT:   Head: Normocephalic     Right Ear: External ear normal    Left Ear: External ear normal    Mouth/Throat: Oropharynx is clear and moist  Eyes: Pupils are equal, round, and reactive to light  No scleral icterus  Neck: Normal range of motion  Neck supple  No tracheal deviation present  No thyromegaly present  Cardiovascular: Normal rate and normal heart sounds  No murmur heard  Pulses irregularly irregular   Pulmonary/Chest: Effort normal and breath sounds normal  No respiratory distress  He exhibits no tenderness  Abdominal: Soft  Bowel sounds are normal  He exhibits no mass  There is no tenderness  Musculoskeletal: Normal range of motion  Lymphadenopathy:     He has no cervical adenopathy  Neurological: He is alert  No cranial nerve deficit  Skin: Skin is warm  Psychiatric: He has a normal mood and affect

## 2018-07-25 ENCOUNTER — TELEPHONE (OUTPATIENT)
Dept: HEMATOLOGY ONCOLOGY | Facility: CLINIC | Age: 78
End: 2018-07-25

## 2018-07-25 NOTE — TELEPHONE ENCOUNTER
Dixie Duarte called to check on status on requested records, relating to documentation needed regarding his genetic testing done on 5/12/2018 at [facility]  Dixie Duarte said documentaion is needed because no prior authourization was obtained before genetic testing  The records they specifically need are H &P office note, lab work and family history  Please fax to [phone]  With his ID number [id number]  Dixie Duarte wishes to be called when this is done

## 2018-07-26 NOTE — TELEPHONE ENCOUNTER
As requested by Chastity Vasquez from [facility]  Pt's office notes, labs and history was faxed to [phone]  Will call to let her know

## 2018-08-01 ENCOUNTER — TRANSCRIBE ORDERS (OUTPATIENT)
Dept: ADMINISTRATIVE | Age: 78
End: 2018-08-01

## 2018-08-01 ENCOUNTER — APPOINTMENT (OUTPATIENT)
Dept: LAB | Age: 78
End: 2018-08-01
Payer: COMMERCIAL

## 2018-08-01 DIAGNOSIS — C34.90 MALIGNANT NEOPLASM OF BRONCHUS AND LUNG (HCC): ICD-10-CM

## 2018-08-01 DIAGNOSIS — I10 ESSENTIAL HYPERTENSION, MALIGNANT: ICD-10-CM

## 2018-08-01 DIAGNOSIS — C34.90 MALIGNANT NEOPLASM OF BRONCHUS AND LUNG (HCC): Primary | ICD-10-CM

## 2018-08-01 LAB
ANION GAP SERPL CALCULATED.3IONS-SCNC: 12 MMOL/L (ref 4–13)
BASOPHILS # BLD AUTO: 0.02 THOUSANDS/ΜL (ref 0–0.1)
BASOPHILS NFR BLD AUTO: 1 % (ref 0–1)
BUN SERPL-MCNC: 21 MG/DL (ref 5–25)
CALCIUM SERPL-MCNC: 9 MG/DL (ref 8.3–10.1)
CHLORIDE SERPL-SCNC: 98 MMOL/L (ref 100–108)
CO2 SERPL-SCNC: 22 MMOL/L (ref 21–32)
CREAT SERPL-MCNC: 1.41 MG/DL (ref 0.6–1.3)
EOSINOPHIL # BLD AUTO: 0.11 THOUSAND/ΜL (ref 0–0.61)
EOSINOPHIL NFR BLD AUTO: 3 % (ref 0–6)
ERYTHROCYTE [DISTWIDTH] IN BLOOD BY AUTOMATED COUNT: 17.4 % (ref 11.6–15.1)
GFR SERPL CREATININE-BSD FRML MDRD: 48 ML/MIN/1.73SQ M
GLUCOSE SERPL-MCNC: 81 MG/DL (ref 65–140)
HCT VFR BLD AUTO: 31.5 % (ref 36.5–49.3)
HGB BLD-MCNC: 10.2 G/DL (ref 12–17)
IMM GRANULOCYTES # BLD AUTO: 0.01 THOUSAND/UL (ref 0–0.2)
IMM GRANULOCYTES NFR BLD AUTO: 0 % (ref 0–2)
LYMPHOCYTES # BLD AUTO: 1.03 THOUSANDS/ΜL (ref 0.6–4.47)
LYMPHOCYTES NFR BLD AUTO: 24 % (ref 14–44)
MCH RBC QN AUTO: 28.4 PG (ref 26.8–34.3)
MCHC RBC AUTO-ENTMCNC: 32.4 G/DL (ref 31.4–37.4)
MCV RBC AUTO: 88 FL (ref 82–98)
MONOCYTES # BLD AUTO: 0.58 THOUSAND/ΜL (ref 0.17–1.22)
MONOCYTES NFR BLD AUTO: 13 % (ref 4–12)
NEUTROPHILS # BLD AUTO: 2.63 THOUSANDS/ΜL (ref 1.85–7.62)
NEUTS SEG NFR BLD AUTO: 59 % (ref 43–75)
NRBC BLD AUTO-RTO: 0 /100 WBCS
PLATELET # BLD AUTO: 211 THOUSANDS/UL (ref 149–390)
PMV BLD AUTO: 9.9 FL (ref 8.9–12.7)
POTASSIUM SERPL-SCNC: 4.6 MMOL/L (ref 3.5–5.3)
RBC # BLD AUTO: 3.59 MILLION/UL (ref 3.88–5.62)
SODIUM SERPL-SCNC: 132 MMOL/L (ref 136–145)
WBC # BLD AUTO: 4.38 THOUSAND/UL (ref 4.31–10.16)

## 2018-08-01 PROCEDURE — 36415 COLL VENOUS BLD VENIPUNCTURE: CPT

## 2018-08-01 PROCEDURE — 80048 BASIC METABOLIC PNL TOTAL CA: CPT

## 2018-08-01 PROCEDURE — 85025 COMPLETE CBC W/AUTO DIFF WBC: CPT

## 2018-08-08 ENCOUNTER — HOSPITAL ENCOUNTER (OUTPATIENT)
Dept: INFUSION CENTER | Facility: HOSPITAL | Age: 78
Discharge: HOME | End: 2018-08-08
Payer: COMMERCIAL

## 2018-08-08 ENCOUNTER — OFFICE VISIT (OUTPATIENT)
Dept: HEMATOLOGY ONCOLOGY | Facility: CLINIC | Age: 78
End: 2018-08-08
Payer: COMMERCIAL

## 2018-08-08 VITALS
OXYGEN SATURATION: 100 % | HEART RATE: 101 BPM | TEMPERATURE: 97.6 F | SYSTOLIC BLOOD PRESSURE: 99 MMHG | RESPIRATION RATE: 22 BRPM | DIASTOLIC BLOOD PRESSURE: 60 MMHG

## 2018-08-08 VITALS
TEMPERATURE: 97.5 F | OXYGEN SATURATION: 99 % | SYSTOLIC BLOOD PRESSURE: 102 MMHG | RESPIRATION RATE: 18 BRPM | DIASTOLIC BLOOD PRESSURE: 60 MMHG | HEART RATE: 85 BPM

## 2018-08-08 DIAGNOSIS — C79.49 SECONDARY MALIGNANT NEOPLASM OF BRAIN AND SPINAL CORD (HCC): ICD-10-CM

## 2018-08-08 DIAGNOSIS — C79.31 SECONDARY MALIGNANT NEOPLASM OF BRAIN AND SPINAL CORD (HCC): ICD-10-CM

## 2018-08-08 DIAGNOSIS — C34.90 EGFR-RELATED LUNG CANCER (HCC): Primary | ICD-10-CM

## 2018-08-08 PROCEDURE — 99214 OFFICE O/P EST MOD 30 MIN: CPT | Performed by: INTERNAL MEDICINE

## 2018-08-08 PROCEDURE — 96401 CHEMO ANTI-NEOPL SQ/IM: CPT

## 2018-08-08 RX ORDER — PREDNISONE 10 MG/1
10 TABLET ORAL DAILY
Qty: 30 TABLET | Refills: 3 | Status: SHIPPED | OUTPATIENT
Start: 2018-08-08 | End: 2019-01-22 | Stop reason: ALTCHOICE

## 2018-08-08 RX ADMIN — DENOSUMAB 120 MG: 120 INJECTION SUBCUTANEOUS at 09:56

## 2018-08-08 NOTE — PROGRESS NOTES
Upon arrival patient stated he fell around 0800 on his way to [facility] office  Patient stated he fell on his knees and did not hit his head  Patient stated he is feeling fine  VSS  JOLLY Melgoza aware  Patient seen and examined at [facility] office

## 2018-08-08 NOTE — PROGRESS NOTES
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  He had been on Tagresso 80 mg p o  Daily with dinner since PET of June 2018, physical examination showed no evidence of recurrent pleural effusion  However he has AFib the rate is 100 beats per minute, the patient is fatigued, he has blood pressure of 99/60  I will continue treatment   Hyponatremia most likely secondary to whole-brain radiation and brain metastasis, or adrenal insufficiency, patient to have prednisone 10 mg p o   Daily  Dysphagia to solids in 1 month I will do CT scan of the chest to assess response  Xgeva 120 mg subcu monthly, current calcium of 9          HPI: 78-year-old  male who used to smoke 2 pack of cigarettes daily for 20 years, quit 40 years ago, he noticed in the beginning of 2018 dyspnea with cough without hemoptysis, he noticed 10 lb weight loss with intermittent anterior chest pain with radiation to the right lateral side, CT scan in April 2018 showed dried large hilar mass with bronchial obstruction, small right pleural effusion, multiple lumbar spine metastatic disease status post right thoracentesis with atypical cellular changes cannot exclude neoplasia, the patient had bronchoscopy and right lower lobe bronchial brushing showed conclusive evidence of malignancy with non-small cell lung cancer favor adenocarcinoma that stained positive for TTF 1, napsin and absent for p 40  CT scan of the abdomen and pelvis showed small pericardial effusion, osseous metastases at L1, L2, L4, L5, obstructive collapse of the right middle lobe and the lower lobe  MRI of the brain showed widespread brain metastases involving the supra and the infratentorial compartments  The patient underwent radiation therapy to the brain finished in May 2018  He received 1st cycle of Pembrolizumab, Alimta, carboplatin however 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, the therapy was changed to tagresso 80 mg p o  Daily by the end of June 2018  The last thoracentesis on the right side was done in June 14, 2018     Interval History:  He fell down in the parking lot this morning, his appetite is marginal, he is tolerating liquid diet and he has dysphagia to solids  Denies any headache blurred vision nausea vomiting diarrhea, he has constipation with bowel movements every 3-4 days with laxative, denies any hemoptysis fever, chills, he reported weight loss about 10 lb in the past month       Previous Treatment:         Test Results:    Imaging: No results found  Labs:   Lab Results   Component Value Date    WBC 4 38 08/01/2018    HGB 10 2 (L) 08/01/2018    HCT 31 5 (L) 08/01/2018    MCV 88 08/01/2018     08/01/2018     Lab Results   Component Value Date     (L) 08/01/2018    K 4 6 08/01/2018    CL 98 (L) 08/01/2018    CO2 22 08/01/2018    ANIONGAP 12 08/01/2018    BUN 21 08/01/2018    CREATININE 1 41 (H) 08/01/2018    GLUCOSE 81 08/01/2018    GLUF 125 (H) 06/15/2018    CALCIUM 9 0 08/01/2018    AST 34 06/25/2018    ALT 16 06/25/2018    ALKPHOS 171 (H) 06/25/2018    PROT 6 9 06/25/2018    BILITOT 0 54 06/25/2018    EGFR 48 08/01/2018       No results found for: IRON, TIBC, FERRITIN    No results found for: [id number]      ROS:   Review of Systems   Constitutional: Positive for fatigue  Negative for activity change, appetite change, chills, diaphoresis, fever and unexpected weight change     HENT: Negative for congestion, dental problem, facial swelling, hearing loss, mouth sores, nosebleeds, postnasal drip, rhinorrhea, sore throat, trouble swallowing and voice change  Eyes: Negative for photophobia, pain, discharge, redness, itching and visual disturbance  Respiratory: Negative for cough, choking, chest tightness, shortness of breath and wheezing  Cardiovascular: Negative for chest pain, palpitations and leg swelling  Gastrointestinal: Positive for constipation  Negative for abdominal distention, abdominal pain, anal bleeding, blood in stool, diarrhea, nausea, rectal pain and vomiting  Dysphagia to solids   Endocrine: Negative for cold intolerance and heat intolerance  Genitourinary: Negative for decreased urine volume, difficulty urinating, dysuria, flank pain, frequency, hematuria and urgency  Musculoskeletal: Negative for arthralgias, back pain, gait problem, joint swelling, myalgias, neck pain and neck stiffness  Skin: Negative for color change, pallor, rash and wound  Allergic/Immunologic: Negative for immunocompromised state  Neurological: Negative for dizziness, tremors, seizures, syncope, facial asymmetry, speech difficulty, weakness, light-headedness, numbness and headaches  Hematological: Negative for adenopathy  Does not bruise/bleed easily  Psychiatric/Behavioral: Negative for agitation, confusion, decreased concentration, dysphoric mood and sleep disturbance  The patient is not nervous/anxious  All other systems reviewed and are negative  Current Medications: Reviewed  Allergies: Reviewed  PMH/FH/SH:  Reviewed      Physical Exam:    There is no height or weight on file to calculate BSA      Wt Readings from Last 3 Encounters:   07/16/18 88 3 kg (194 lb 9 6 oz)   07/06/18 90 5 kg (199 lb 9 6 oz)   05/23/18 102 kg (225 lb)        Temp Readings from Last 3 Encounters:   08/08/18 97 6 °F (36 4 °C) (Tympanic)   07/16/18 98 7 °F (37 1 °C) (Tympanic)   07/06/18 97 8 °F (36 6 °C) (Tympanic)        BP Readings from Last 3 Encounters:   08/08/18 99/60   07/16/18 90/58   07/06/18 102/58         Pulse Readings from Last 3 Encounters:   08/08/18 101   07/16/18 96   07/06/18 80        Physical Exam   Constitutional: He is oriented to person, place, and time  He appears well-developed and well-nourished  No distress  He looks chronically ill, on wheelchair   HENT:   Head: Normocephalic and atraumatic  Mouth/Throat: Oropharynx is clear and moist  No oropharyngeal exudate  Eyes: Conjunctivae and EOM are normal  Pupils are equal, round, and reactive to light  Neck: Normal range of motion  Neck supple  No tracheal deviation present  No thyromegaly present  Cardiovascular: Normal rate  Exam reveals no gallop and no friction rub  No murmur heard  Regular rhythm about 100 beats per minute   Pulmonary/Chest: Effort normal and breath sounds normal  No respiratory distress  He has no wheezes  He has no rales  He exhibits no tenderness  Abdominal: Soft  Bowel sounds are normal  He exhibits no distension and no mass  There is no tenderness  There is no rebound and no guarding  Musculoskeletal: Normal range of motion  He exhibits no edema  Lymphadenopathy:     He has no cervical adenopathy  Neurological: He is alert and oriented to person, place, and time  Skin: Skin is warm and dry  No rash noted  He is not diaphoretic  No erythema  No pallor  Psychiatric: He has a normal mood and affect  His behavior is normal  Judgment and thought content normal    Vitals reviewed  Goals and Barriers:  Current Goal: Minimize effects of disease  Barriers: None  Patient's Capacity to Self Care:  Patient is able to self care      Code Status: [unfilled]

## 2018-08-08 NOTE — LETTER
August 8, 2018     MD Marika Martinez [phone]    Patient: Mario Chau   YOB: 1940   Date of Visit: 8/8/2018       Dear Dr Ahmet Duarte: Thank you for referring Mario Chau to me for evaluation  Below are my notes for this consultation  If you have questions, please do not hesitate to call me  I look forward to following your patient along with you  Sincerely,        Nubia Camara MD        CC: No Recipients  Nubia Camara MD  8/8/2018  8:34 AM  Sign at close encounter  Hematology Outpatient Follow - Up Note  Mario Chau 69 y o  male MRN: @ Encounter: [id number]        Date:  8/8/2018        Assessment/ Plan:  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 lobe of the lung with malignant pleural effusion, bone metastases, brain metastasis status post whole-brain radiation therapy, he received chemotherapy initially utilizing Alimta/carboplatin /Pembrolizumab however 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  He had been on Tagresso 80 mg p o  Daily with dinner since PET of June 2018, physical examination showed no evidence of recurrent pleural effusion  However he has AFib the rate is 100 beats per minute, the patient is fatigued, he has blood pressure of 99/60  I will continue treatment   Hyponatremia most likely secondary to whole-brain radiation and brain metastasis, or adrenal insufficiency, patient to have prednisone 10 mg p o   Daily  Dysphagia to solids in 1 month I will do CT scan of the chest to assess response  Xgeva 120 mg subcu monthly, current calcium of 9          HPI: 16-year-old  male who used to smoke 2 pack of cigarettes daily for 20 years, quit 40 years ago, he noticed in the beginning of 2018 dyspnea with cough without hemoptysis, he noticed 10 lb weight loss with intermittent anterior chest pain with radiation to the right lateral side, CT scan in April 2018 showed dried large hilar mass with bronchial obstruction, small right pleural effusion, multiple lumbar spine metastatic disease status post right thoracentesis with atypical cellular changes cannot exclude neoplasia, the patient had bronchoscopy and right lower lobe bronchial brushing showed conclusive evidence of malignancy with non-small cell lung cancer favor adenocarcinoma that stained positive for TTF 1, napsin and absent for p 40  CT scan of the abdomen and pelvis showed small pericardial effusion, osseous metastases at L1, L2, L4, L5, obstructive collapse of the right middle lobe and the lower lobe  MRI of the brain showed widespread brain metastases involving the supra and the infratentorial compartments  The patient underwent radiation therapy to the brain finished in May 2018  He received 1st cycle of Pembrolizumab, Alimta, carboplatin however 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, the therapy was changed to tagresso 80 mg p o  Daily by the end of June 2018  The last thoracentesis on the right side was done in June 14, 2018     Interval History:  He fell down in the parking lot this morning, his appetite is marginal, he is tolerating liquid diet and he has dysphagia to solids  Denies any headache blurred vision nausea vomiting diarrhea, he has constipation with bowel movements every 3-4 days with laxative, denies any hemoptysis fever, chills, he reported weight loss about 10 lb in the past month       Previous Treatment:         Test Results:    Imaging: No results found      Labs:   Lab Results   Component Value Date    WBC 4 38 08/01/2018    HGB 10 2 (L) 08/01/2018    HCT 31 5 (L) 08/01/2018    MCV 88 08/01/2018     08/01/2018     Lab Results   Component Value Date     (L) 08/01/2018    K 4 6 08/01/2018    CL 98 (L) 08/01/2018    CO2 22 08/01/2018    ANIONGAP 12 08/01/2018    BUN 21 08/01/2018    CREATININE 1 41 (H) 08/01/2018    GLUCOSE 81 08/01/2018    GLUF 125 (H) 06/15/2018    CALCIUM 9 0 08/01/2018    AST 34 06/25/2018    ALT 16 06/25/2018    ALKPHOS 171 (H) 06/25/2018    PROT 6 9 06/25/2018    BILITOT 0 54 06/25/2018    EGFR 48 08/01/2018       No results found for: IRON, TIBC, FERRITIN    No results found for: [id number]      ROS:   Review of Systems   Constitutional: Positive for fatigue  Negative for activity change, appetite change, chills, diaphoresis, fever and unexpected weight change  HENT: Negative for congestion, dental problem, facial swelling, hearing loss, mouth sores, nosebleeds, postnasal drip, rhinorrhea, sore throat, trouble swallowing and voice change  Eyes: Negative for photophobia, pain, discharge, redness, itching and visual disturbance  Respiratory: Negative for cough, choking, chest tightness, shortness of breath and wheezing  Cardiovascular: Negative for chest pain, palpitations and leg swelling  Gastrointestinal: Positive for constipation  Negative for abdominal distention, abdominal pain, anal bleeding, blood in stool, diarrhea, nausea, rectal pain and vomiting  Dysphagia to solids   Endocrine: Negative for cold intolerance and heat intolerance  Genitourinary: Negative for decreased urine volume, difficulty urinating, dysuria, flank pain, frequency, hematuria and urgency  Musculoskeletal: Negative for arthralgias, back pain, gait problem, joint swelling, myalgias, neck pain and neck stiffness  Skin: Negative for color change, pallor, rash and wound  Allergic/Immunologic: Negative for immunocompromised state  Neurological: Negative for dizziness, tremors, seizures, syncope, facial asymmetry, speech difficulty, weakness, light-headedness, numbness and headaches  Hematological: Negative for adenopathy  Does not bruise/bleed easily  Psychiatric/Behavioral: Negative for agitation, confusion, decreased concentration, dysphoric mood and sleep disturbance  The patient is not nervous/anxious  All other systems reviewed and are negative          Current Medications: Reviewed  Allergies: Reviewed  PMH/FH/SH:  Reviewed      Physical Exam:    There is no height or weight on file to calculate BSA  Wt Readings from Last 3 Encounters:   07/16/18 88 3 kg (194 lb 9 6 oz)   07/06/18 90 5 kg (199 lb 9 6 oz)   05/23/18 102 kg (225 lb)        Temp Readings from Last 3 Encounters:   08/08/18 97 6 °F (36 4 °C) (Tympanic)   07/16/18 98 7 °F (37 1 °C) (Tympanic)   07/06/18 97 8 °F (36 6 °C) (Tympanic)        BP Readings from Last 3 Encounters:   08/08/18 99/60   07/16/18 90/58   07/06/18 102/58         Pulse Readings from Last 3 Encounters:   08/08/18 101   07/16/18 96   07/06/18 80        Physical Exam   Constitutional: He is oriented to person, place, and time  He appears well-developed and well-nourished  No distress  He looks chronically ill, on wheelchair   HENT:   Head: Normocephalic and atraumatic  Mouth/Throat: Oropharynx is clear and moist  No oropharyngeal exudate  Eyes: Conjunctivae and EOM are normal  Pupils are equal, round, and reactive to light  Neck: Normal range of motion  Neck supple  No tracheal deviation present  No thyromegaly present  Cardiovascular: Normal rate  Exam reveals no gallop and no friction rub  No murmur heard  Regular rhythm about 100 beats per minute   Pulmonary/Chest: Effort normal and breath sounds normal  No respiratory distress  He has no wheezes  He has no rales  He exhibits no tenderness  Abdominal: Soft  Bowel sounds are normal  He exhibits no distension and no mass  There is no tenderness  There is no rebound and no guarding  Musculoskeletal: Normal range of motion  He exhibits no edema  Lymphadenopathy:     He has no cervical adenopathy  Neurological: He is alert and oriented to person, place, and time  Skin: Skin is warm and dry  No rash noted  He is not diaphoretic  No erythema  No pallor  Psychiatric: He has a normal mood and affect   His behavior is normal  Judgment and thought content normal    Vitals reviewed  Goals and Barriers:  Current Goal: Minimize effects of disease  Barriers: None  Patient's Capacity to Self Care:  Patient is able to self care      Code Status: [facility]

## 2018-08-09 ENCOUNTER — TELEPHONE (OUTPATIENT)
Dept: HEMATOLOGY ONCOLOGY | Facility: CLINIC | Age: 78
End: 2018-08-09

## 2018-08-09 NOTE — TELEPHONE ENCOUNTER
ADALI Vo Call from [facility] with [facility] to notify pt met goals and is being discharged

## 2018-08-30 ENCOUNTER — HOSPITAL ENCOUNTER (OUTPATIENT)
Dept: RADIOLOGY | Facility: HOSPITAL | Age: 78
Discharge: HOME | End: 2018-08-30
Attending: INTERNAL MEDICINE
Payer: COMMERCIAL

## 2018-08-30 ENCOUNTER — APPOINTMENT (OUTPATIENT)
Dept: LAB | Facility: HOSPITAL | Age: 78
End: 2018-08-30
Attending: RADIOLOGY
Payer: COMMERCIAL

## 2018-08-30 DIAGNOSIS — C79.49 SECONDARY MALIGNANT NEOPLASM OF BRAIN AND SPINAL CORD (HCC): ICD-10-CM

## 2018-08-30 DIAGNOSIS — C34.90 EGFR-RELATED LUNG CANCER (HCC): ICD-10-CM

## 2018-08-30 DIAGNOSIS — C79.31 SECONDARY MALIGNANT NEOPLASM OF BRAIN AND SPINAL CORD (HCC): ICD-10-CM

## 2018-08-30 LAB
BUN SERPL-MCNC: 17 MG/DL (ref 5–25)
CREAT SERPL-MCNC: 1.17 MG/DL (ref 0.6–1.3)
GFR SERPL CREATININE-BSD FRML MDRD: 60 ML/MIN/1.73SQ M

## 2018-08-30 PROCEDURE — 84520 ASSAY OF UREA NITROGEN: CPT

## 2018-08-30 PROCEDURE — 36415 COLL VENOUS BLD VENIPUNCTURE: CPT

## 2018-08-30 PROCEDURE — 71260 CT THORAX DX C+: CPT

## 2018-08-30 PROCEDURE — 82565 ASSAY OF CREATININE: CPT

## 2018-08-30 RX ADMIN — IOHEXOL 85 ML: 350 INJECTION, SOLUTION INTRAVENOUS at 08:59

## 2018-09-04 ENCOUNTER — APPOINTMENT (OUTPATIENT)
Dept: LAB | Age: 78
End: 2018-09-04
Payer: COMMERCIAL

## 2018-09-04 DIAGNOSIS — E11.8 TYPE 2 DIABETES MELLITUS WITH COMPLICATION, UNSPECIFIED WHETHER LONG TERM INSULIN USE: ICD-10-CM

## 2018-09-04 LAB
EST. AVERAGE GLUCOSE BLD GHB EST-MCNC: 103 MG/DL
HBA1C MFR BLD: 5.2 % (ref 4.2–6.3)

## 2018-09-04 PROCEDURE — 83036 HEMOGLOBIN GLYCOSYLATED A1C: CPT

## 2018-09-04 PROCEDURE — 36415 COLL VENOUS BLD VENIPUNCTURE: CPT

## 2018-09-07 ENCOUNTER — OFFICE VISIT (OUTPATIENT)
Dept: HEMATOLOGY ONCOLOGY | Facility: CLINIC | Age: 78
End: 2018-09-07
Payer: COMMERCIAL

## 2018-09-07 VITALS
RESPIRATION RATE: 18 BRPM | DIASTOLIC BLOOD PRESSURE: 71 MMHG | SYSTOLIC BLOOD PRESSURE: 120 MMHG | TEMPERATURE: 97.4 F | HEART RATE: 88 BPM | OXYGEN SATURATION: 98 %

## 2018-09-07 DIAGNOSIS — J90 PLEURAL EFFUSION: ICD-10-CM

## 2018-09-07 DIAGNOSIS — C34.91 PRIMARY MALIGNANT NEOPLASM OF RIGHT LUNG METASTATIC TO OTHER SITE (HCC): Primary | ICD-10-CM

## 2018-09-07 DIAGNOSIS — C79.31 SECONDARY MALIGNANT NEOPLASM OF BRAIN AND SPINAL CORD (HCC): ICD-10-CM

## 2018-09-07 DIAGNOSIS — C79.49 SECONDARY MALIGNANT NEOPLASM OF BRAIN AND SPINAL CORD (HCC): ICD-10-CM

## 2018-09-07 PROBLEM — J93.9 PNEUMOTHORAX: Status: RESOLVED | Noted: 2018-04-21 | Resolved: 2018-09-07

## 2018-09-07 PROCEDURE — 99214 OFFICE O/P EST MOD 30 MIN: CPT | Performed by: INTERNAL MEDICINE

## 2018-09-07 NOTE — PROGRESS NOTES
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 have EGFR mutation, he was treated initially with Alimta /carboplatin / Pembrolizumab however liquid biopsy showed EGFR mutation   L858R, therapy was changed to Omisertinib 80 mg p o  Daily, in June 2018   Three months of therapy repeat CT scan of the chest showed significant improvement in the mediastinal, subcarinal, right lower lobe lung mass, stable to slightly increased pleural effusion however the patient is asymptomatic, he declined thoracentesis  2  Status post whole radiation, he is scheduled for CT scan of the brain and followed by radiation Oncology   3  Dysphagia, resolved, he is eating much better  4   For bone health, Xgeva 120 mg subcu at this time every other month he is due for the next visit            HPI: 72-year-old  male who used to smoke 2 pack of cigarettes daily for 20 years, quit 40 years ago, he noticed in the beginning of 2018 dyspnea with cough without hemoptysis, he noticed 10 lb weight loss with intermittent anterior chest pain with radiation to the right lateral side, CT scan in April 2018 showed dried large hilar mass with bronchial obstruction, small right pleural effusion, multiple lumbar spine metastatic disease status post right thoracentesis with atypical cellular changes cannot exclude neoplasia, the patient had bronchoscopy and right lower lobe bronchial brushing showed conclusive evidence of malignancy with non-small cell lung cancer favor adenocarcinoma that stained positive for TTF 1, napsin and absent for p 40  CT scan of the abdomen and pelvis showed small pericardial effusion, osseous metastases at L1, L2, L4, L5, obstructive collapse of the right middle lobe and the lower lobe  MRI of the brain showed widespread brain metastases involving the supra and the infratentorial compartments  The patient underwent radiation therapy to the brain finished in May 2018  He received 1st cycle of Pembrolizumab, Alimta, carboplatin however 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, the therapy was changed to tagresso 80 mg p o  Daily by the end of June 2018  The last thoracentesis on the right side was done in June 14, 2018  Repeat CT scan of the chest in September 2018 showed significant decrease in the size of the mediastinal, subcarinal lymphadenopathy decrease in the size of the right lung mass, stable or slightly increased right pleural effusion       ECOG score 2      Previous Treatment:         Test Results:    Imaging: Ct Chest W Contrast    Result Date: 9/1/2018  Narrative: CT CHEST WITH IV CONTRAST INDICATION:   C79 31: Secondary malignant neoplasm of brain C79 49: Secondary malignant neoplasm of other parts of nervous system C34 90: Malignant neoplasm of unspecified part of unspecified bronchus or lung  COMPARISON:  April 21, 2018  TECHNIQUE: CT examination of the chest was performed  Axial, sagittal, and coronal 2D reformatted images were created from the source data and submitted for interpretation  Radiation dose length product (DLP) for this visit:  393 3 mGy-cm   This examination, like all CT scans performed in the [facility], was performed utilizing techniques to minimize radiation dose exposure, including the use of iterative reconstruction and automated exposure control  IV Contrast:  85 mL of iohexol (OMNIPAQUE) FINDINGS: LUNGS:  Subtle interstitial prominence in the lateral right mid and upper chest significantly diminished when compared to prior examination of April 21, 2018  There is compressive atelectasis in the right chest related to right pleural effusion    A 3 mm nodule in the paramediastinal medial left upper lobe seen on prior examination is no longer evident  Nodularity in the right lung is also not seen on the current examination, and this appears to have resolved along with resolving interstitial prominence  No new or suspicious pulmonary mass  Left lung is clear  PLEURA:  A moderate right pleural effusion, most of which is freely layering in the posterior chest is slightly increased in overall volume when compared with April 21, 2018  There is an indeterminate 3 5 x 3 5 x 2 5 cm mass in the major fissure, almost  certainly representing focally loculated fluid but with enhancement at the anterior margin  This crescentic area of focal enhancement almost certainly represents adjacent atelectasis but given the atypical overall appearance, close interval follow-up of this finding is recommended  HEART/GREAT VESSELS:  Coronary artery calcification is noted  Again noted is fusiform aneurysmal enlargement of the ascending thoracic aorta measuring up to 45 mm, unchanged from previous examination  MEDIASTINUM AND DWIGHT:  Mediastinal and right hilar lymphadenopathy is markedly improved, but not entirely resolved when compared with prior examination  A representative prevascular node measuring 8 x 6 mm compared with 1 8 x 1 4 cm on the previous examination  Right hilar tumor mass is somewhat amorphous and could not be measured on the previous examination as a result of postobstructive atelectasis but are significantly decreased from prior examination  A representative subcarinal node measuring 2 2 x 1 9 cm on the current examination is decreased from 3 1 x 2 6 cm when measured using similar technique on the prior exam  CHEST WALL AND LOWER NECK:   Unremarkable  VISUALIZED STRUCTURES IN THE UPPER ABDOMEN:  Unremarkable  OSSEOUS STRUCTURES:  Wide splayed sclerotic and mixed sclerotic and lytic osseous metastatic disease is reidentified    There is significantly increased sclerosis throughout osseous structures and it is unclear whether this represents progression of sclerotic metastatic disease or, alternatively increased sclerosis is result of treated tumor  However, a moderate anterior wedge pathologic compression fracture at T3 with approximate 30% loss of anterior vertebral body height is new since prior examination  Also noted is a new mild anterior superior endplate pathologic compression fracture at L1  A moderate pathologic T5 compression fracture with approximate 50% loss of vertebral body height is progressed since April 21, 2018  Sclerotic lesions which are either new or more not detectable on previous examination are visible on the current examination such as a lesion in the anterior inferior T8 vertebral body measuring approximately 13 mm  Deformity related to healed left rib fractures is reidentified  Impression: Marked improvement in mediastinal and right hilar lymphadenopathy/tumor mass indicative of treatment response  Marked improvement in postobstructive atelectasis and/or tumor burden in the right lung  Right pleural effusion is slightly increased in size when compared to prior examination  An oval structure within the right major fissure almost certainly representing loculated portion of right pleural effusion requires close interval follow-up to the crescentic margin of hyperenhancement, probably atelectasis, but for which associated tumor cannot be entirely excluded  Marked progression of mixed density sclerotic and lytic metastatic disease with new left compression fractures at T3 and L1 as well as worsening pathologic compression fracture at T5  The marked progression of sclerotic osseous tumor could indicate either progression of sclerotic osseous metastatic disease or perhaps newly visible tumor masses which have increased sclerosis is result of treatment  Unchanged 45 mm ascending thoracic aortic aneurysm   Workstation performed: [id number]       Labs: Lab Results   Component Value Date    WBC 4 38 08/01/2018    HGB 10 2 (L) 08/01/2018    HCT 31 5 (L) 08/01/2018    MCV 88 08/01/2018     08/01/2018     Lab Results   Component Value Date     (L) 08/01/2018    K 4 6 08/01/2018    CL 98 (L) 08/01/2018    CO2 22 08/01/2018    BUN 17 08/30/2018    CREATININE 1 17 08/30/2018    GLUF 125 (H) 06/15/2018    CALCIUM 9 0 08/01/2018    AST 34 06/25/2018    ALT 16 06/25/2018    ALKPHOS 171 (H) 06/25/2018    EGFR 60 08/30/2018       No results found for: IRON, TIBC, FERRITIN    No results found for: [id number]      ROS:   Review of Systems   Constitutional: Negative for activity change, appetite change, chills, diaphoresis, fatigue, fever and unexpected weight change  HENT: Negative for congestion, dental problem, facial swelling, hearing loss, mouth sores, nosebleeds, postnasal drip, rhinorrhea, sore throat, trouble swallowing and voice change  Eyes: Negative for photophobia, pain, discharge, redness, itching and visual disturbance  Respiratory: Positive for shortness of breath ( exertional dyspnea)  Negative for cough, choking, chest tightness and wheezing  Cardiovascular: Negative for chest pain, palpitations and leg swelling  Gastrointestinal: Negative for abdominal distention, abdominal pain, anal bleeding, blood in stool, constipation, diarrhea, nausea, rectal pain and vomiting  Endocrine: Negative for cold intolerance and heat intolerance  Genitourinary: Negative for decreased urine volume, difficulty urinating, dysuria, flank pain, frequency, hematuria and urgency  Musculoskeletal: Negative for arthralgias, back pain, gait problem, joint swelling, myalgias, neck pain and neck stiffness  Skin: Negative for color change, pallor, rash and wound  Allergic/Immunologic: Negative for immunocompromised state     Neurological: Negative for dizziness, tremors, seizures, syncope, facial asymmetry, speech difficulty, weakness, light-headedness, numbness and headaches  Hematological: Negative for adenopathy  Does not bruise/bleed easily  Psychiatric/Behavioral: Negative for agitation, confusion, decreased concentration, dysphoric mood and sleep disturbance  The patient is not nervous/anxious  All other systems reviewed and are negative  Current Medications: Reviewed  Allergies: Reviewed  PMH/FH/SH:  Reviewed      Physical Exam:    There is no height or weight on file to calculate BSA  Wt Readings from Last 3 Encounters:   07/16/18 88 3 kg (194 lb 9 6 oz)   07/06/18 90 5 kg (199 lb 9 6 oz)   05/23/18 102 kg (225 lb)        Temp Readings from Last 3 Encounters:   09/07/18 (!) 97 4 °F (36 3 °C) (Tympanic)   08/08/18 97 5 °F (36 4 °C) (Temporal)   08/08/18 97 6 °F (36 4 °C) (Tympanic)        BP Readings from Last 3 Encounters:   09/07/18 120/71   08/08/18 102/60   08/08/18 99/60         Pulse Readings from Last 3 Encounters:   09/07/18 88   08/08/18 85   08/08/18 101        Physical Exam   Constitutional: He is oriented to person, place, and time  He appears well-developed and well-nourished  No distress  HENT:   Head: Normocephalic and atraumatic  Mouth/Throat: Oropharynx is clear and moist  No oropharyngeal exudate  Eyes: Conjunctivae and EOM are normal  Pupils are equal, round, and reactive to light  Neck: Normal range of motion  Neck supple  No JVD present  No tracheal deviation present  No thyromegaly present  Cardiovascular: Normal rate and regular rhythm  Exam reveals no gallop and no friction rub  No murmur heard  Pulmonary/Chest: Effort normal  No respiratory distress  He has no wheezes  He has rales ( in the right base)  He exhibits no tenderness  Abdominal: Soft  Bowel sounds are normal  He exhibits no distension and no mass  There is no tenderness  There is no rebound and no guarding  Musculoskeletal: Normal range of motion  He exhibits no edema  Lymphadenopathy:     He has no cervical adenopathy  Neurological: He is alert and oriented to person, place, and time  Skin: Skin is warm and dry  No rash noted  He is not diaphoretic  No erythema  No pallor  Psychiatric: He has a normal mood and affect  His behavior is normal  Judgment and thought content normal    Vitals reviewed  Goals and Barriers:  Current Goal: Minimize effects of disease  Barriers: None  Patient's Capacity to Self Care:  Patient is able to self care      Code Status: @[facility]@

## 2018-09-07 NOTE — LETTER
September 7, 2018     Selena Mane MD  [address] [place] [address]    Patient: Michael Alexander   YOB: 1940   Date of Visit: 9/7/2018       Dear Dr Fanny Najera: Thank you for referring Michael Alexander to me for evaluation  Below are my notes for this consultation  If you have questions, please do not hesitate to call me  I look forward to following your patient along with you  Sincerely,        Karolyn North MD        CC: MD Karolyn Stanley MD  9/7/2018  8:25 AM  Sign at close encounter  Hematology Outpatient Follow - Up Note  Michael Alexander 69 y o  male MRN: @ Encounter: [id number]        Date:  9/7/2018        Assessment/ Plan:  1  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, bony metastases, brain metastases status post whole-brain radiation therapy, molecular test was found to have EGFR mutation, he was treated initially with Alimta /carboplatin / Pembrolizumab however liquid biopsy showed EGFR mutation   L858R, therapy was changed to Omisertinib 80 mg p o  Daily, in June 2018   Three months of therapy repeat CT scan of the chest showed significant improvement in the mediastinal, subcarinal, right lower lobe lung mass, stable to slightly increased pleural effusion however the patient is asymptomatic, he declined thoracentesis  2  Status post whole radiation, he is scheduled for CT scan of the brain and followed by radiation Oncology   3  Dysphagia, resolved, he is eating much better  4   For bone health, Xgeva 120 mg subcu at this time every other month he is due for the next visit            HPI: 81-year-old  male who used to smoke 2 pack of cigarettes daily for 20 years, quit 40 years ago, he noticed in the beginning of 2018 dyspnea with cough without hemoptysis, he noticed 10 lb weight loss with intermittent anterior chest pain with radiation to the right lateral side, CT scan in April 2018 showed dried large hilar mass with bronchial obstruction, small right pleural effusion, multiple lumbar spine metastatic disease status post right thoracentesis with atypical cellular changes cannot exclude neoplasia, the patient had bronchoscopy and right lower lobe bronchial brushing showed conclusive evidence of malignancy with non-small cell lung cancer favor adenocarcinoma that stained positive for TTF 1, napsin and absent for p 40  CT scan of the abdomen and pelvis showed small pericardial effusion, osseous metastases at L1, L2, L4, L5, obstructive collapse of the right middle lobe and the lower lobe  MRI of the brain showed widespread brain metastases involving the supra and the infratentorial compartments  The patient underwent radiation therapy to the brain finished in May 2018  He received 1st cycle of Pembrolizumab, Alimta, carboplatin however 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, the therapy was changed to tagresso 80 mg p o  Daily by the end of June 2018  The last thoracentesis on the right side was done in June 14, 2018  Repeat CT scan of the chest in September 2018 showed significant decrease in the size of the mediastinal, subcarinal lymphadenopathy decrease in the size of the right lung mass, stable or slightly increased right pleural effusion       ECOG score 2      Previous Treatment:         Test Results:    Imaging: Ct Chest W Contrast    Result Date: 9/1/2018  Narrative: CT CHEST WITH IV CONTRAST INDICATION:   C79 31: Secondary malignant neoplasm of brain C79 49: Secondary malignant neoplasm of other parts of nervous system C34 90: Malignant neoplasm of unspecified part of unspecified bronchus or lung  COMPARISON:  April 21, 2018  TECHNIQUE: CT examination of the chest was performed  Axial, sagittal, and coronal 2D reformatted images were created from the source data and submitted for interpretation  Radiation dose length product (DLP) for this visit:  393 3 mGy-cm   This examination, like all CT scans performed in the [facility], was performed utilizing techniques to minimize radiation dose exposure, including the use of iterative reconstruction and automated exposure control  IV Contrast:  85 mL of iohexol (OMNIPAQUE) FINDINGS: LUNGS:  Subtle interstitial prominence in the lateral right mid and upper chest significantly diminished when compared to prior examination of April 21, 2018  There is compressive atelectasis in the right chest related to right pleural effusion  A 3 mm nodule in the paramediastinal medial left upper lobe seen on prior examination is no longer evident  Nodularity in the right lung is also not seen on the current examination, and this appears to have resolved along with resolving interstitial prominence  No new or suspicious pulmonary mass  Left lung is clear  PLEURA:  A moderate right pleural effusion, most of which is freely layering in the posterior chest is slightly increased in overall volume when compared with April 21, 2018  There is an indeterminate 3 5 x 3 5 x 2 5 cm mass in the major fissure, almost  certainly representing focally loculated fluid but with enhancement at the anterior margin  This crescentic area of focal enhancement almost certainly represents adjacent atelectasis but given the atypical overall appearance, close interval follow-up of this finding is recommended  HEART/GREAT VESSELS:  Coronary artery calcification is noted  Again noted is fusiform aneurysmal enlargement of the ascending thoracic aorta measuring up to 45 mm, unchanged from previous examination  MEDIASTINUM AND DWIGHT:  Mediastinal and right hilar lymphadenopathy is markedly improved, but not entirely resolved when compared with prior examination  A representative prevascular node measuring 8 x 6 mm compared with 1 8 x 1 4 cm on the previous examination    Right hilar tumor mass is somewhat amorphous and could not be measured on the previous examination as a result of postobstructive atelectasis but are significantly decreased from prior examination  A representative subcarinal node measuring 2 2 x 1 9 cm on the current examination is decreased from 3 1 x 2 6 cm when measured using similar technique on the prior exam  CHEST WALL AND LOWER NECK:   Unremarkable  VISUALIZED STRUCTURES IN THE UPPER ABDOMEN:  Unremarkable  OSSEOUS STRUCTURES:  Wide splayed sclerotic and mixed sclerotic and lytic osseous metastatic disease is reidentified  There is significantly increased sclerosis throughout osseous structures and it is unclear whether this represents progression of sclerotic metastatic disease or, alternatively increased sclerosis is result of treated tumor  However, a moderate anterior wedge pathologic compression fracture at T3 with approximate 30% loss of anterior vertebral body height is new since prior examination  Also noted is a new mild anterior superior endplate pathologic compression fracture at L1  A moderate pathologic T5 compression fracture with approximate 50% loss of vertebral body height is progressed since April 21, 2018  Sclerotic lesions which are either new or more not detectable on previous examination are visible on the current examination such as a lesion in the anterior inferior T8 vertebral body measuring approximately 13 mm  Deformity related to healed left rib fractures is reidentified  Impression: Marked improvement in mediastinal and right hilar lymphadenopathy/tumor mass indicative of treatment response  Marked improvement in postobstructive atelectasis and/or tumor burden in the right lung  Right pleural effusion is slightly increased in size when compared to prior examination    An oval structure within the right major fissure almost certainly representing loculated portion of right pleural effusion requires close interval follow-up to the crescentic margin of hyperenhancement, probably atelectasis, but for which associated tumor cannot be entirely excluded  Marked progression of mixed density sclerotic and lytic metastatic disease with new left compression fractures at T3 and L1 as well as worsening pathologic compression fracture at T5  The marked progression of sclerotic osseous tumor could indicate either progression of sclerotic osseous metastatic disease or perhaps newly visible tumor masses which have increased sclerosis is result of treatment  Unchanged 45 mm ascending thoracic aortic aneurysm  Workstation performed: [id number]       Labs:   Lab Results   Component Value Date    WBC 4 38 08/01/2018    HGB 10 2 (L) 08/01/2018    HCT 31 5 (L) 08/01/2018    MCV 88 08/01/2018     08/01/2018     Lab Results   Component Value Date     (L) 08/01/2018    K 4 6 08/01/2018    CL 98 (L) 08/01/2018    CO2 22 08/01/2018    BUN 17 08/30/2018    CREATININE 1 17 08/30/2018    GLUF 125 (H) 06/15/2018    CALCIUM 9 0 08/01/2018    AST 34 06/25/2018    ALT 16 06/25/2018    ALKPHOS 171 (H) 06/25/2018    EGFR 60 08/30/2018       No results found for: IRON, TIBC, FERRITIN    No results found for: [id number]      ROS:   Review of Systems   Constitutional: Negative for activity change, appetite change, chills, diaphoresis, fatigue, fever and unexpected weight change  HENT: Negative for congestion, dental problem, facial swelling, hearing loss, mouth sores, nosebleeds, postnasal drip, rhinorrhea, sore throat, trouble swallowing and voice change  Eyes: Negative for photophobia, pain, discharge, redness, itching and visual disturbance  Respiratory: Positive for shortness of breath ( exertional dyspnea)  Negative for cough, choking, chest tightness and wheezing  Cardiovascular: Negative for chest pain, palpitations and leg swelling  Gastrointestinal: Negative for abdominal distention, abdominal pain, anal bleeding, blood in stool, constipation, diarrhea, nausea, rectal pain and vomiting     Endocrine: Negative for cold intolerance and heat intolerance  Genitourinary: Negative for decreased urine volume, difficulty urinating, dysuria, flank pain, frequency, hematuria and urgency  Musculoskeletal: Negative for arthralgias, back pain, gait problem, joint swelling, myalgias, neck pain and neck stiffness  Skin: Negative for color change, pallor, rash and wound  Allergic/Immunologic: Negative for immunocompromised state  Neurological: Negative for dizziness, tremors, seizures, syncope, facial asymmetry, speech difficulty, weakness, light-headedness, numbness and headaches  Hematological: Negative for adenopathy  Does not bruise/bleed easily  Psychiatric/Behavioral: Negative for agitation, confusion, decreased concentration, dysphoric mood and sleep disturbance  The patient is not nervous/anxious  All other systems reviewed and are negative  Current Medications: Reviewed  Allergies: Reviewed  PMH/FH/SH:  Reviewed      Physical Exam:    There is no height or weight on file to calculate BSA  Wt Readings from Last 3 Encounters:   07/16/18 88 3 kg (194 lb 9 6 oz)   07/06/18 90 5 kg (199 lb 9 6 oz)   05/23/18 102 kg (225 lb)        Temp Readings from Last 3 Encounters:   09/07/18 (!) 97 4 °F (36 3 °C) (Tympanic)   08/08/18 97 5 °F (36 4 °C) (Temporal)   08/08/18 97 6 °F (36 4 °C) (Tympanic)        BP Readings from Last 3 Encounters:   09/07/18 120/71   08/08/18 102/60   08/08/18 99/60         Pulse Readings from Last 3 Encounters:   09/07/18 88   08/08/18 85   08/08/18 101        Physical Exam   Constitutional: He is oriented to person, place, and time  He appears well-developed and well-nourished  No distress  HENT:   Head: Normocephalic and atraumatic  Mouth/Throat: Oropharynx is clear and moist  No oropharyngeal exudate  Eyes: Conjunctivae and EOM are normal  Pupils are equal, round, and reactive to light  Neck: Normal range of motion  Neck supple  No JVD present  No tracheal deviation present   No thyromegaly present  Cardiovascular: Normal rate and regular rhythm  Exam reveals no gallop and no friction rub  No murmur heard  Pulmonary/Chest: Effort normal  No respiratory distress  He has no wheezes  He has rales ( in the right base)  He exhibits no tenderness  Abdominal: Soft  Bowel sounds are normal  He exhibits no distension and no mass  There is no tenderness  There is no rebound and no guarding  Musculoskeletal: Normal range of motion  He exhibits no edema  Lymphadenopathy:     He has no cervical adenopathy  Neurological: He is alert and oriented to person, place, and time  Skin: Skin is warm and dry  No rash noted  He is not diaphoretic  No erythema  No pallor  Psychiatric: He has a normal mood and affect  His behavior is normal  Judgment and thought content normal    Vitals reviewed  Goals and Barriers:  Current Goal: Minimize effects of disease  Barriers: None  Patient's Capacity to Self Care:  Patient is able to self care      Code Status: [unfilled]

## 2018-09-11 ENCOUNTER — OFFICE VISIT (OUTPATIENT)
Dept: INTERNAL MEDICINE CLINIC | Age: 78
End: 2018-09-11
Payer: COMMERCIAL

## 2018-09-11 VITALS
HEART RATE: 94 BPM | OXYGEN SATURATION: 98 % | WEIGHT: 181.4 LBS | DIASTOLIC BLOOD PRESSURE: 66 MMHG | SYSTOLIC BLOOD PRESSURE: 102 MMHG | TEMPERATURE: 97.9 F | BODY MASS INDEX: 24.84 KG/M2

## 2018-09-11 DIAGNOSIS — E11.8 TYPE 2 DIABETES MELLITUS WITH COMPLICATION, UNSPECIFIED WHETHER LONG TERM INSULIN USE: Primary | ICD-10-CM

## 2018-09-11 DIAGNOSIS — C34.91 PRIMARY MALIGNANT NEOPLASM OF RIGHT LUNG METASTATIC TO OTHER SITE (HCC): ICD-10-CM

## 2018-09-11 DIAGNOSIS — I48.20 CHRONIC ATRIAL FIBRILLATION (HCC): ICD-10-CM

## 2018-09-11 DIAGNOSIS — I10 ESSENTIAL HYPERTENSION: ICD-10-CM

## 2018-09-11 DIAGNOSIS — D49.1 LUNG NEOPLASM: ICD-10-CM

## 2018-09-11 PROCEDURE — 3078F DIAST BP <80 MM HG: CPT | Performed by: INTERNAL MEDICINE

## 2018-09-11 PROCEDURE — 3074F SYST BP LT 130 MM HG: CPT | Performed by: INTERNAL MEDICINE

## 2018-09-11 PROCEDURE — 99214 OFFICE O/P EST MOD 30 MIN: CPT | Performed by: INTERNAL MEDICINE

## 2018-09-11 NOTE — PROGRESS NOTES
Assessment/Plan:      1  Type 2 diabetes mellitus   since due to malignancy patient lost lot of weight  He is not on any medicine now  His recent hemoglobin A1c is 5 2  Will continue to observe him without medicine  2  History of paroxysmal atrial fibrillation   patient is off the anticoagulation due to metastatic lung disease to brain  Rate is well controlled and blood pressure is also well controlled  3  Anemia of chronic disease   patient is taking boost almost every day  Hemoglobin is 10 2  Will c  ontinue to monitor hemoglobin  4  Metastatic lung disease   patient is being managed by oncologist      Diagnoses and all orders for this visit:    Type 2 diabetes mellitus with complication, unspecified whether long term insulin use ([facility] [address] )  -     Hemoglobin A1C; Future    Lung neoplasm    Primary malignant neoplasm of right lung metastatic to other site [facility])    Chronic atrial fibrillation (HCC)    Essential hypertension          Subjective:          Patient ID: Delvin Bryan is a 69 y o  male  Patient is here in the office for follow-up visit  He has a history of type 2 diabetes mellitus, hypertension and paroxysmal atrial fibrillation  Presently undergoing chemotherapy treatment for metastatic lung disease  And he lost significant weight  Presently not on any diabetic or antihypertensive medicine  Appetite is fair  And weight is stable now  The following portions of the patient's history were reviewed and updated as appropriate: allergies, current medications, past family history, past medical history, past social history, past surgical history and problem list     Review of Systems   Constitutional: Positive for fatigue  Negative for fever  HENT: Negative for congestion, ear discharge, ear pain, postnasal drip, sinus pressure, sore throat, tinnitus and trouble swallowing  Eyes: Negative for discharge, itching and visual disturbance     Respiratory: Negative for cough and shortness of breath  Cardiovascular: Negative for chest pain and palpitations  Gastrointestinal: Negative for abdominal pain, diarrhea, nausea and vomiting  Endocrine: Negative for cold intolerance and polyuria  Genitourinary: Negative for difficulty urinating, dysuria and urgency  Musculoskeletal: Negative for arthralgias and neck pain  Skin: Negative for rash  Allergic/Immunologic: Negative for environmental allergies  Neurological: Negative for dizziness, weakness and headaches  Psychiatric/Behavioral: Negative for agitation and behavioral problems  The patient is not nervous/anxious  Past Medical History:   Diagnosis Date    Atrial fibrillation ([place] [address] )     Cancer of lung ([place] [address] )     brain and bones    Diabetes mellitus ([place] [address] )     Hypercholesteremia     Hypertension     Lung neoplasm     Proteinuria     LAST ASSESSED [id number]    PVD (peripheral vascular disease) ([facility])          Current Outpatient Prescriptions:     JEANNE CONTOUR NEXT TEST test strip, 3 applicators by Does not apply route 3 (three) times a week, Disp: , Rfl:     Osimertinib Mesylate 80 MG TABS, Take 1 tablet (80 mg total) by mouth daily, Disp: 30 tablet, Rfl: 11    predniSONE 10 mg tablet, Take 1 tablet (10 mg total) by mouth daily, Disp: 30 tablet, Rfl: 3    No Known Allergies    Social History   Past Surgical History:   Procedure Laterality Date    [place] BRONCHOSCOPY,DIAGNOSTIC N/A 4/24/2018    Procedure: Ben Herrlich;  Surgeon: Alfredo Lepe MD;  Location: [facility]; Service: Pulmonary    THORACENTESIS N/A 4/24/2018    Procedure: Feroz Player;  Surgeon: Alfredo Lepe MD;  Location: [facility];   Service: Pulmonary     Family History   Problem Relation Age of Onset    Diabetes Mother     Diabetes Father     Diabetes Family        Objective:  /66 (BP Location: Left arm, Patient Position: Sitting, Cuff Size: Adult)   Pulse 94   Temp 97 9 °F (36 6 °C) (Tympanic)   Wt 82 3 kg (181 lb 6 4 oz) Comment: with sneakers  SpO2 98% Comment: room air  BMI 24 84 kg/m²   Body mass index is 24 84 kg/m²  Physical Exam   Constitutional: He appears well-developed  HENT:   Head: Normocephalic  Right Ear: External ear normal    Left Ear: External ear normal    Mouth/Throat: Oropharynx is clear and moist    Eyes: Pupils are equal, round, and reactive to light  No scleral icterus  Neck: Normal range of motion  Neck supple  No tracheal deviation present  No thyromegaly present  Cardiovascular: Normal rate and normal heart sounds  Pulse is irregularly irregular   Pulmonary/Chest: Effort normal  No respiratory distress  He exhibits no tenderness  Decreased breath sounds at both lung bases   Abdominal: Soft  Bowel sounds are normal  He exhibits no mass  There is no tenderness  Musculoskeletal: Normal range of motion  He exhibits no edema  Lymphadenopathy:     He has no cervical adenopathy  Neurological: He is alert  No cranial nerve deficit  Skin: Skin is warm and dry  No erythema  Psychiatric: He has a normal mood and affect

## 2018-09-21 ENCOUNTER — HOSPITAL ENCOUNTER (OUTPATIENT)
Dept: RADIOLOGY | Facility: HOSPITAL | Age: 78
Discharge: HOME | End: 2018-09-21
Attending: RADIOLOGY
Payer: COMMERCIAL

## 2018-09-21 DIAGNOSIS — C79.31 SECONDARY MALIGNANT NEOPLASM OF BRAIN AND SPINAL CORD (HCC): ICD-10-CM

## 2018-09-21 DIAGNOSIS — C79.49 SECONDARY MALIGNANT NEOPLASM OF BRAIN AND SPINAL CORD (HCC): ICD-10-CM

## 2018-09-21 PROCEDURE — 70470 CT HEAD/BRAIN W/O & W/DYE: CPT

## 2018-09-21 RX ADMIN — IOHEXOL 100 ML: 350 INJECTION, SOLUTION INTRAVENOUS at 07:35

## 2018-09-25 ENCOUNTER — APPOINTMENT (OUTPATIENT)
Dept: LAB | Age: 78
End: 2018-09-25
Payer: COMMERCIAL

## 2018-09-25 DIAGNOSIS — C79.49 SECONDARY MALIGNANT NEOPLASM OF BRAIN AND SPINAL CORD (HCC): ICD-10-CM

## 2018-09-25 DIAGNOSIS — C34.91 PRIMARY MALIGNANT NEOPLASM OF RIGHT LUNG METASTATIC TO OTHER SITE (HCC): ICD-10-CM

## 2018-09-25 DIAGNOSIS — C79.31 SECONDARY MALIGNANT NEOPLASM OF BRAIN AND SPINAL CORD (HCC): ICD-10-CM

## 2018-09-25 DIAGNOSIS — E11.8 TYPE 2 DIABETES MELLITUS WITH COMPLICATION, UNSPECIFIED WHETHER LONG TERM INSULIN USE: ICD-10-CM

## 2018-09-25 DIAGNOSIS — J90 PLEURAL EFFUSION: ICD-10-CM

## 2018-09-25 LAB
ALBUMIN SERPL BCP-MCNC: 3.4 G/DL (ref 3.5–5)
ALP SERPL-CCNC: 69 U/L (ref 46–116)
ALT SERPL W P-5'-P-CCNC: 13 U/L (ref 12–78)
ANION GAP SERPL CALCULATED.3IONS-SCNC: 5 MMOL/L (ref 4–13)
AST SERPL W P-5'-P-CCNC: 15 U/L (ref 5–45)
BASOPHILS # BLD AUTO: 0.03 THOUSANDS/ΜL (ref 0–0.1)
BASOPHILS NFR BLD AUTO: 1 % (ref 0–1)
BILIRUB SERPL-MCNC: 0.76 MG/DL (ref 0.2–1)
BUN SERPL-MCNC: 20 MG/DL (ref 5–25)
CALCIUM SERPL-MCNC: 8.2 MG/DL (ref 8.3–10.1)
CHLORIDE SERPL-SCNC: 107 MMOL/L (ref 100–108)
CO2 SERPL-SCNC: 26 MMOL/L (ref 21–32)
CREAT SERPL-MCNC: 0.94 MG/DL (ref 0.6–1.3)
EOSINOPHIL # BLD AUTO: 0.26 THOUSAND/ΜL (ref 0–0.61)
EOSINOPHIL NFR BLD AUTO: 5 % (ref 0–6)
ERYTHROCYTE [DISTWIDTH] IN BLOOD BY AUTOMATED COUNT: 18.6 % (ref 11.6–15.1)
EST. AVERAGE GLUCOSE BLD GHB EST-MCNC: 111 MG/DL
GFR SERPL CREATININE-BSD FRML MDRD: 78 ML/MIN/1.73SQ M
GLUCOSE P FAST SERPL-MCNC: 79 MG/DL (ref 65–99)
HBA1C MFR BLD: 5.5 % (ref 4.2–6.3)
HCT VFR BLD AUTO: 35.6 % (ref 36.5–49.3)
HGB BLD-MCNC: 11.4 G/DL (ref 12–17)
IMM GRANULOCYTES # BLD AUTO: 0.02 THOUSAND/UL (ref 0–0.2)
IMM GRANULOCYTES NFR BLD AUTO: 0 % (ref 0–2)
LYMPHOCYTES # BLD AUTO: 1.58 THOUSANDS/ΜL (ref 0.6–4.47)
LYMPHOCYTES NFR BLD AUTO: 28 % (ref 14–44)
MCH RBC QN AUTO: 29.9 PG (ref 26.8–34.3)
MCHC RBC AUTO-ENTMCNC: 32 G/DL (ref 31.4–37.4)
MCV RBC AUTO: 93 FL (ref 82–98)
MONOCYTES # BLD AUTO: 0.5 THOUSAND/ΜL (ref 0.17–1.22)
MONOCYTES NFR BLD AUTO: 9 % (ref 4–12)
NEUTROPHILS # BLD AUTO: 3.28 THOUSANDS/ΜL (ref 1.85–7.62)
NEUTS SEG NFR BLD AUTO: 57 % (ref 43–75)
NRBC BLD AUTO-RTO: 0 /100 WBCS
PLATELET # BLD AUTO: 163 THOUSANDS/UL (ref 149–390)
PMV BLD AUTO: 10.1 FL (ref 8.9–12.7)
POTASSIUM SERPL-SCNC: 3.7 MMOL/L (ref 3.5–5.3)
PROT SERPL-MCNC: 6.7 G/DL (ref 6.4–8.2)
RBC # BLD AUTO: 3.81 MILLION/UL (ref 3.88–5.62)
SODIUM SERPL-SCNC: 138 MMOL/L (ref 136–145)
WBC # BLD AUTO: 5.67 THOUSAND/UL (ref 4.31–10.16)

## 2018-09-25 PROCEDURE — 80053 COMPREHEN METABOLIC PANEL: CPT

## 2018-09-25 PROCEDURE — 36415 COLL VENOUS BLD VENIPUNCTURE: CPT

## 2018-09-25 PROCEDURE — 83036 HEMOGLOBIN GLYCOSYLATED A1C: CPT

## 2018-09-25 PROCEDURE — 85025 COMPLETE CBC W/AUTO DIFF WBC: CPT

## 2018-10-01 ENCOUNTER — RADIATION ONCOLOGY FOLLOW-UP (OUTPATIENT)
Dept: RADIATION ONCOLOGY | Facility: HOSPITAL | Age: 78
End: 2018-10-01
Attending: RADIOLOGY
Payer: COMMERCIAL

## 2018-10-01 ENCOUNTER — CLINICAL SUPPORT (OUTPATIENT)
Dept: RADIATION ONCOLOGY | Facility: HOSPITAL | Age: 78
End: 2018-10-01
Attending: RADIOLOGY

## 2018-10-01 VITALS
OXYGEN SATURATION: 97 % | BODY MASS INDEX: 26.57 KG/M2 | DIASTOLIC BLOOD PRESSURE: 70 MMHG | WEIGHT: 194 LBS | SYSTOLIC BLOOD PRESSURE: 140 MMHG | TEMPERATURE: 97.5 F | HEART RATE: 90 BPM | RESPIRATION RATE: 14 BRPM

## 2018-10-01 DIAGNOSIS — C79.31 SECONDARY MALIGNANT NEOPLASM OF BRAIN AND SPINAL CORD (HCC): Primary | ICD-10-CM

## 2018-10-01 DIAGNOSIS — C34.91 PRIMARY MALIGNANT NEOPLASM OF RIGHT LUNG METASTATIC TO OTHER SITE (HCC): ICD-10-CM

## 2018-10-01 DIAGNOSIS — C34.90 PRIMARY MALIGNANT NEOPLASM OF LUNG METASTATIC TO OTHER SITE, UNSPECIFIED LATERALITY (HCC): Primary | ICD-10-CM

## 2018-10-01 DIAGNOSIS — C79.49 SECONDARY MALIGNANT NEOPLASM OF BRAIN AND SPINAL CORD (HCC): Primary | ICD-10-CM

## 2018-10-01 PROCEDURE — 99214 OFFICE O/P EST MOD 30 MIN: CPT | Performed by: RADIOLOGY

## 2018-10-01 PROCEDURE — G0463 HOSPITAL OUTPT CLINIC VISIT: HCPCS | Performed by: RADIOLOGY

## 2018-10-01 NOTE — PROGRESS NOTES
Estefania Jace  1940   Mr Kymberly Cobb is a 69 y o  male       Chief Complaint   Patient presents with    Follow-up       Cancer Staging  No matching staging information was found for the patient  Oncology History    Pt returns for f/u post radiation to the brain for metastatic lung cancer  His treatment was completed 5/9/18, and he was seen last in June 2018  Pt has been switched from Alimta/Carbo/Pembro to now receiving Tagrisso, orally, since June 2018  Per Dr Ryan Torres he is tolerating well  Also he receives Maury-barre, every other month  Has some dysphagia to solids, fatigue, and has had falls in the recent past    8/30/18 Ct scan of the chest, abd pelvis revealed Marked improvement in mediastinal and right hilar lymphadenopathy/tumor mass indicative of treatment response      Marked improvement in postobstructive atelectasis and/or tumor burden in the right lung      Right pleural effusion is slightly increased in size when compared to prior examination  An oval structure within the right major fissure almost certainly representing loculated portion of right pleural effusion requires close interval follow-up to the crescentic margin of hyperenhancement, probably atelectasis, but for which associated tumor cannot be entirely excluded      Marked progression of mixed density sclerotic and lytic metastatic disease with new left compression fractures at T3 and L1 as well as worsening pathologic compression fracture at T5  The marked progression of sclerotic osseous tumor could indicate   either progression of sclerotic osseous metastatic disease or perhaps newly visible tumor masses which have increased sclerosis is result of treatment      Unchanged 45 mm ascending thoracic aortic aneurysm      9/21/18 Ct head: Subtle tiny focus of enhancement right centrum semiovale may represent tiny metastasis noted on MR 4/22/2018  This was not evident on prior study possibly, related to slice selection    Otherwise, no indication of intracranial metastatic disease  Metastatic disease ([facility] )      Metastatic primary lung cancer ([facility] )    4/2018 Initial Diagnosis     Metastatic primary lung cancer (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 )         4/26/2018 - 5/9/2018 Radiation     Treatment:  Course: C1    Plan ID Energy Fractions Dose per Fraction (cGy) Total Dose Delivered (cGy) Elapsed Days   WHOLE BRAIN 6X 10 / 10 300 3,000 13      Treatment dates:  C1: 4/26/2018 - 5/9/2018              Clinical Trial: No      Interval History:Pt returns for f/u post radiation to the brain for metastatic lung cancer  His treatment was completed 5/9/18, and he was seen last in June 2018  Pt has been switched from Alimta/Carbo/Pembro to now receiving Tagrisso, orally, since June 2018  Per Dr Shar Metz he is tolerating well  Also he receives Sherman-barre, every other month  Has some dysphagia to solids, fatigue, and has had falls in the recent past    8/30/18 Ct scan of the chest, abd pelvis revealed Marked improvement in mediastinal and right hilar lymphadenopathy/tumor mass indicative of treatment response      Marked improvement in postobstructive atelectasis and/or tumor burden in the right lung      Right pleural effusion is slightly increased in size when compared to prior examination    An oval structure within the right major fissure almost certainly representing loculated portion of right pleural effusion requires close interval follow-up to the crescentic margin of hyperenhancement, probably atelectasis, but for which associated tumor cannot be entirely excluded      Marked progression of mixed density sclerotic and lytic metastatic disease with new left compression fractures at T3 and L1 as well as worsening pathologic compression fracture at T5  The marked progression of sclerotic osseous tumor could indicate   either progression of sclerotic osseous metastatic disease or perhaps newly visible tumor masses which have increased sclerosis is result of treatment      Unchanged 45 mm ascending thoracic aortic aneurysm      9/21/18 Ct head: Subtle tiny focus of enhancement right centrum semiovale may represent tiny metastasis noted on MR 4/22/2018  This was not evident on prior study possibly, related to slice selection  Otherwise, no indication of intracranial metastatic disease  Overall he feels better, eating a lot, and has been tolerating new med well  Energy is better lately    Screening  Tobacco  Current tobacco user: no  If yes, brief counseling provided: NA    Hypertension  Hypertension screening performed: yes  Normotensive:  yes  If no, referred to PCP: no    Depression Screening  Screened for depression using PHQ-2: yes    Screened for depression using PHQ-9:  yes  Screening positive or negative:  negative  If score >4, was any of the following actions taken?    Additional evaluation for depression, suicide risk assesment, referral to PCP or psychiatry, medication started:  n/a    Advanced Care Planning for Patients >65 years  Advanced Care Planning Discussed:  yes  Patient named surrogate decision maker or care plan in chart: yes    [unfilled]  Health Maintenance   Topic Date Due    SLP PLAN OF CARE  1940    DTaP,Tdap,and Td Vaccines (1 - Tdap) 01/24/1962    Pneumococcal PPSV23/PCV13 65+ Years / High and Highest Risk (1 of 2 - PCV13) 01/24/2006    DM Eye Exam  12/14/2016    INFLUENZA VACCINE  09/01/2018    URINE MICROALBUMIN  01/08/2019    HEMOGLOBIN A1C  03/25/2019    Fall Risk  05/09/2019    Depression Screening PHQ  05/09/2019    Diabetic Foot Exam  05/09/2019       Patient Active Problem List   Diagnosis    Hypertension    Type 2 diabetes mellitus with complication (HCC)    Mixed hyperlipidemia    Atrial fibrillation (HCC)    Peripheral vascular disease (HCC)    Persistent proteinuria    Dyspnea    Pleural effusion    Acute kidney injury ([place] [address] )    Lung neoplasm    Metastatic disease ([place] [address] )    Metastatic primary lung cancer ([place] [address] )    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[place] [address] )     Past Medical History:   Diagnosis Date    Atrial fibrillation ([place] [address] )     Cancer of lung ([place] [address] )     brain and bones    Diabetes mellitus ([place] [address] )     Hypercholesteremia     Hypertension     Lung neoplasm     Proteinuria     LAST ASSESSED [id number]    PVD (peripheral vascular disease) ([place] [address] )      Past Surgical History:   Procedure Laterality Date    TX BRONCHOSCOPY,DIAGNOSTIC N/A 4/24/2018    Procedure: Allison Hendrix;  Surgeon: Rachna Huizar MD;  Location: [facility]; Service: Pulmonary    THORACENTESIS N/A 4/24/2018    Procedure: Lauren Crowder;  Surgeon: Racnha Huizar MD;  Location: [facility];   Service: Pulmonary     Family History   Problem Relation Age of Onset    Diabetes Mother     Diabetes Father     Diabetes Family      Social History     Social History    Marital status: /Civil Union     Spouse name: N/A    Number of children: N/A    Years of education: N/A     Occupational History    RETIRED      Social History Main Topics    Smoking status: Former Smoker    Smokeless tobacco: Never Used      Comment: quit 39 yrs ago as of 2018    Alcohol use Yes      Comment: rare    Drug use: No    Sexual activity: Not on file     Other Topics Concern    Not on file     Social History Narrative    ADVANCED DIRECTIVE IN CHART     CAFFEINE USE VIA COFFEE 3 SERVINGS PER DAY            Current Outpatient Prescriptions:     JEANNE CONTOUR NEXT TEST test strip, 3 applicators by Does not apply route 3 (three) times a week, Disp: , Rfl:     Osimertinib Mesylate 80 MG TABS, Take 1 tablet (80 mg total) by mouth daily, Disp: 30 tablet, Rfl: 11    predniSONE 10 mg tablet, Take 1 tablet (10 mg total) by mouth daily, Disp: 30 tablet, Rfl: 3  No Known Allergies    Review of Systems:  Review of Systems   Constitutional: Positive for activity change (more active), appetite change (much better) and fatigue (rated 2, but varies)  HENT: Negative  Eyes: Positive for photophobia  Respiratory: Positive for cough (rarely) and shortness of breath (with exhertion)  Cardiovascular: Negative  Gastrointestinal: Negative  Endocrine: Positive for heat intolerance (hot temp bothers him )  Genitourinary: Positive for frequency  Nocturia 0-3   Musculoskeletal: Positive for arthralgias (rt hip when he lays on the rt side)  Skin: Positive for wound (skin tears)  Allergic/Immunologic: Negative  Neurological:        Occasionally off balance   Hematological: Bruises/bleeds easily  Psychiatric/Behavioral: Negative  Vitals:    10/01/18 0806   BP: 140/70   BP Location: Left arm   Pulse: 90   Resp: 14   Temp: 97 5 °F (36 4 °C)   SpO2: 97%   Weight: 88 kg (194 lb)            Imaging:Ct Head W Wo Contrast    Result Date: 9/22/2018  Narrative: CT BRAIN - WITH AND WITHOUT CONTRAST INDICATION:   C79 31: Secondary malignant neoplasm of brain C79 49: Secondary malignant neoplasm of other parts of nervous system  COMPARISON:  CT 6/14/2018, MR 4/22/2018 TECHNIQUE:  CT examination of the brain was performed both prior to and after the administration of intravenous contrast   In addition to axial images, coronal reformatted images were created and submitted for interpretation  Radiation dose length product (DLP) for this visit:  2134 33 mGy-cm     This examination, like all CT scans performed in the [facility], was performed utilizing techniques to minimize radiation dose exposure, including the use of iterative reconstruction and automated exposure control  IV Contrast:  100 mL of iohexol (OMNIPAQUE)  IMAGE QUALITY:  Diagnostic  FINDINGS: PARENCHYMA:  Numerous metastases identified on MR 4/22/2018 and largely inapparent on today's exam   There is no mass, mass effect, edema  Low dense subcortical foci likely representing resolving edema are no longer evident  There is a tiny ill-defined focus of enhancement, series 5 image 32 within the right centrum semiovale bilaterally  This was not evident on prior study possibly related to slice selection but appears to correlate relatively well with a tiny metastasis in this location noted 4/22/2018 MR  VENTRICLES AND EXTRA-AXIAL SPACES:  Normal for patient's age  VISUALIZED ORBITS AND PARANASAL SINUSES:  Unremarkable  CALVARIUM:  Normal      Impression: Subtle tiny focus of enhancement right centrum semiovale may represent tiny metastasis noted on MR 4/22/2018  This was not evident on prior study possibly, related to slice selection  Otherwise, no indication of intracranial metastatic disease   Workstation performed: [id number]

## 2018-10-01 NOTE — PROGRESS NOTES
Follow-up - Radiation Oncology   Demetria Ramirez 1940 69 y o  male [id number]      History of Present Illness   Cancer Staging  No matching staging information was found for the patient  Demetria Ramirez is a 69y o  year old male who returns for f/u post radiation to the brain for metastatic lung cancer  His treatment was completed 5/9/18, and he was seen last in June 2018  Interval History:  Pt has been switched from Alimta/Carbo/Pembro to now receiving Tagrisso, orally, since June 18, 2018  Per Dr Greg Hernandez he is tolerating well  Also he receives [place], every other month  Has some dysphagia to solids, fatigue, and has had falls in the recent past    8/30/18 Ct scan of the chest, abd pelvis revealed Marked improvement in mediastinal and right hilar lymphadenopathy/tumor mass indicative of treatment response      Marked improvement in postobstructive atelectasis and/or tumor burden in the right lung      Right pleural effusion is slightly increased in size when compared to prior examination   An oval structure within the right major fissure almost certainly representing loculated portion of right pleural effusion requires close interval follow-up to the crescentic margin of hyperenhancement, probably atelectasis, but for which associated tumor cannot be entirely excluded      Marked progression of mixed density sclerotic and lytic metastatic disease with new left compression fractures at T3 and L1 as well as worsening pathologic compression fracture at T5   The marked progression of sclerotic osseous tumor could indicate   either progression of sclerotic osseous metastatic disease or perhaps newly visible tumor masses which have increased sclerosis is result of treatment      Unchanged 45 mm ascending thoracic aortic aneurysm      9/21/18 Ct head: Subtle tiny focus of enhancement right centrum semiovale may represent tiny metastasis noted on MR 4/22/2018   This was not evident on prior study possibly, related to slice selection   Otherwise, no indication of intracranial metastatic disease      Overall he feels better, eating a lot, and has been tolerating new medication well  Energy level is better lately  He denies any cough, shortness of breath nor hemoptysis  Denies any pain in the back  He also denies any neurologic complaints  He is seen today with his wife  10/3/18 - follow-up appointment with Vamshi Buenrostro  Clinical Trial: No     Screening  Tobacco  Current tobacco user: no  If yes, brief counseling provided: NA     Hypertension  Hypertension screening performed: yes  Normotensive:  yes  If no, referred to PCP: no     Depression Screening  Screened for depression using PHQ-2: yes     Screened for depression using PHQ-9:  yes  Screening positive or negative:  negative  If score >4, was any of the following actions taken? Additional evaluation for depression, suicide risk assesment, referral to PCP or psychiatry, medication started:  n/a     Advanced Care Planning for Patients >65 years  Advanced Care Planning Discussed:  yes  Patient named surrogate decision maker or care plan in chart: yes    Historical Information      Metastatic disease ([facility] )      Metastatic primary lung cancer ([facility] )    4/2018 Initial Diagnosis     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[facility] )         4/26/2018 - 5/9/2018 Radiation     Treatment:  Course: C1    Plan ID Energy Fractions Dose per Fraction (cGy) Total Dose Delivered (cGy) Elapsed Days   WHOLE BRAIN 6X 10 / 10 300 3,000 13      Treatment dates:  C1: 4/26/2018 - 5/9/2018              Past Medical History:   Diagnosis Date    Atrial fibrillation ([facility] )     Cancer of lung ([facility] )     brain and bones    Diabetes mellitus ([facility] )     Hypercholesteremia     Hypertension     Lung neoplasm     Proteinuria     LAST ASSESSED [id number]    PVD (peripheral vascular disease) ([facility] )      Past Surgical History:   Procedure Laterality Date    [place] BRONCHOSCOPY,DIAGNOSTIC N/A 4/24/2018    Procedure: Raynaldo Thelma;  Surgeon: Toribio Hutton MD;  [facility]; Service: Pulmonary    THORACENTESIS N/A 4/24/2018    Procedure: Jack Hannah;  Surgeon: Toribio Hutton MD;  [facility]; Service: Pulmonary       Social History   History   Alcohol Use    Yes     Comment: rare     History   Drug Use No     History   Smoking Status    Former Smoker   Smokeless Tobacco    Never Used     Comment: quit 39 yrs ago as of 2018     Meds/Allergies     Current Outpatient Prescriptions:     JEANNE CONTOUR NEXT TEST test strip, 3 applicators by Does not apply route 3 (three) times a week, Disp: , Rfl:     Osimertinib Mesylate 80 MG TABS, Take 1 tablet (80 mg total) by mouth daily, Disp: 30 tablet, Rfl: 11    predniSONE 10 mg tablet, Take 1 tablet (10 mg total) by mouth daily, Disp: 30 tablet, Rfl: 3  No Known Allergies    Review of Systems   Constitutional: Positive for activity change (more active), appetite change (much better) and fatigue (rated 2, but varies)  HENT: Negative  Eyes: Positive for photophobia  Respiratory: Positive for cough (rarely) and shortness of breath (with exhertion)  Cardiovascular: Negative  Gastrointestinal: Negative  Endocrine: Positive for heat intolerance (hot temp bothers him )  Genitourinary: Positive for frequency  Nocturia 0-3   Musculoskeletal: Positive for arthralgias (rt hip when he lays on the rt side)  Skin: Positive for wound (skin tears)  Allergic/Immunologic: Negative  Neurological:        Occasionally off balance   Hematological: Bruises/bleeds easily  Psychiatric/Behavioral: Negative  OBJECTIVE:   There were no vitals taken for this visit  Pain Assessment:  0  ECOG/Zubrod/WHO: 2 - Symptomatic, <50% confined to bed    Physical Exam   Constitutional: He is oriented to person, place, and time  He appears well-developed and well-nourished  No distress  HENT:   Head: Normocephalic and atraumatic  Mouth/Throat: No oropharyngeal exudate  Eyes: Pupils are equal, round, and reactive to light  Conjunctivae and EOM are normal  No scleral icterus  Neck: Normal range of motion  Neck supple  No tracheal deviation present  No thyromegaly present  Cardiovascular: Normal rate, regular rhythm and normal heart sounds  Pulmonary/Chest: Effort normal and breath sounds normal  No respiratory distress  He has no wheezes  He has no rales  He exhibits no tenderness  Stable decreased breath sounds in the right lower lung field  Abdominal: Soft  Bowel sounds are normal  He exhibits no distension and no mass  There is no tenderness  Musculoskeletal: Normal range of motion  He exhibits no edema or tenderness  Lymphadenopathy:     He has no cervical adenopathy  Right: No supraclavicular adenopathy present  Left: No supraclavicular adenopathy present  Neurological: He is alert and oriented to person, place, and time  No cranial nerve deficit  Coordination normal    Skin: Skin is warm and dry  No rash noted  He is not diaphoretic  No erythema  No pallor  Dryness of the scalp with partial alopecia  Psychiatric: He has a normal mood and affect  His behavior is normal  Judgment and thought content normal    Nursing note and vitals reviewed       RESULTS    Lab Results:   Recent Results (from the past 672 hour(s))   Hemoglobin A1C    Collection Time: 09/04/18  9:10 AM   Result Value Ref Range    Hemoglobin A1C 5 2 4 2 - 6 3 %     mg/dl   CBC and differential    Collection Time: 09/25/18  7:23 AM   Result Value Ref Range    WBC 5 67 4 31 - 10 16 Thousand/uL    RBC 3 81 (L) 3 88 - 5 62 Million/uL    Hemoglobin 11 4 (L) 12 0 - 17 0 g/dL    Hematocrit 35 6 (L) 36 5 - 49 3 %    MCV 93 82 - 98 fL    MCH 29 9 26 8 - 34 3 pg    MCHC 32 0 31 4 - 37 4 g/dL    RDW 18 6 (H) 11 6 - 15 1 %    MPV 10 1 8 9 - 12 7 fL    Platelets 231 942 - 609 Thousands/uL    nRBC 0 /100 WBCs    Neutrophils Relative 57 43 - 75 %    Immat GRANS % 0 0 - 2 %    Lymphocytes Relative 28 14 - 44 %    Monocytes Relative 9 4 - 12 %    Eosinophils Relative 5 0 - 6 %    Basophils Relative 1 0 - 1 %    Neutrophils Absolute 3 28 1 85 - 7 62 Thousands/µL    Immature Grans Absolute 0 02 0 00 - 0 20 Thousand/uL    Lymphocytes Absolute 1 58 0 60 - 4 47 Thousands/µL    Monocytes Absolute 0 50 0 17 - 1 22 Thousand/µL    Eosinophils Absolute 0 26 0 00 - 0 61 Thousand/µL    Basophils Absolute 0 03 0 00 - 0 10 Thousands/µL   Comprehensive metabolic panel    Collection Time: 09/25/18  7:23 AM   Result Value Ref Range    Sodium 138 136 - 145 mmol/L    Potassium 3 7 3 5 - 5 3 mmol/L    Chloride 107 100 - 108 mmol/L    CO2 26 21 - 32 mmol/L    ANION GAP 5 4 - 13 mmol/L    BUN 20 5 - 25 mg/dL    Creatinine 0 94 0 60 - 1 30 mg/dL    Glucose, Fasting 79 65 - 99 mg/dL    Calcium 8 2 (L) 8 3 - 10 1 mg/dL    AST 15 5 - 45 U/L    ALT 13 12 - 78 U/L    Alkaline Phosphatase 69 46 - 116 U/L    Total Protein 6 7 6 4 - 8 2 g/dL    Albumin 3 4 (L) 3 5 - 5 0 g/dL    Total Bilirubin 0 76 0 20 - 1 00 mg/dL    eGFR 78 ml/min/1 73sq m   Hemoglobin A1C    Collection Time: 09/25/18  7:23 AM   Result Value Ref Range    Hemoglobin A1C 5 5 4 2 - 6 3 %     mg/dl       Imaging Studies:Ct Head W Wo Contrast    Result Date: 9/22/2018  Narrative: CT BRAIN - WITH AND WITHOUT CONTRAST INDICATION:   C79 31: Secondary malignant neoplasm of brain C79 49: Secondary malignant neoplasm of other parts of nervous system   COMPARISON:  CT 6/14/2018, MR 4/22/2018 TECHNIQUE:  CT examination of the brain was performed both prior to and after the administration of intravenous contrast   In addition to axial images, coronal reformatted images were created and submitted for interpretation  Radiation dose length product (DLP) for this visit:  2134 33 mGy-cm   This examination, like all CT scans performed in the [facility], was performed utilizing techniques to minimize radiation dose exposure, including the use of iterative reconstruction and automated exposure control  IV Contrast:  100 mL of iohexol (OMNIPAQUE)  IMAGE QUALITY:  Diagnostic  FINDINGS: PARENCHYMA:  Numerous metastases identified on MR 4/22/2018 and largely inapparent on today's exam   There is no mass, mass effect, edema  Low dense subcortical foci likely representing resolving edema are no longer evident  There is a tiny ill-defined focus of enhancement, series 5 image 32 within the right centrum semiovale bilaterally  This was not evident on prior study possibly related to slice selection but appears to correlate relatively well with a tiny metastasis in this location noted 4/22/2018 MR  VENTRICLES AND EXTRA-AXIAL SPACES:  Normal for patient's age  VISUALIZED ORBITS AND PARANASAL SINUSES:  Unremarkable  CALVARIUM:  Normal      Impression: Subtle tiny focus of enhancement right centrum semiovale may represent tiny metastasis noted on MR 4/22/2018  This was not evident on prior study possibly, related to slice selection  Otherwise, no indication of intracranial metastatic disease  Workstation performed: [id number]       Assessment/Plan:  Orders Placed This Encounter   Procedures    CT head w wo contrast    BUN    Creatinine, serum        Ezequiel Becca is a 69y o  year old male who presents with a stage IV right lung carcinoma with widespread metastasis involving the mediastinum, left lung, multiple bones, and multiple brain metastasis    He completed radiation therapy to the whole brain on May 9, 2018 and returns today for follow-up visit  He started receiving Tagrisso immunotherapy with Dr Maurice Pyle on June 18, 2018 which he is tolerating well  Repeat CT of the head with contrast June 14, 2018 reveals dramatic interval response to treatment  His most recent head CT from September 21, 2018 revealed a subtle tiny focus of enhancement in the right centrum semiovale which may represent a tiny metastasis and was noted or prior MRI from April 22, 2018  This was not evident on the prior CT study but could be related to slice selection  Otherwise, there is no indication of intracranial metastatic disease  Patient is having no neurologic symptoms  Patient is very claustrophobic and refuses MRI of the brain  We discussed results today with the patient and his wife and are pleased with his continued good results  We recommend he continue with his immunotherapy  He will return in 3 5 months with a repeat CT of the head with contrast in January 2019  Juan Moctezuma MD  10/1/2018,9:02 AM    Portions of the record may have been created with voice recognition software   Occasional wrong word or "sound a like" substitutions may have occurred due to the inherent limitations of voice recognition software   Read the chart carefully and recognize, using context, where substitutions have occurred

## 2018-10-01 NOTE — PROGRESS NOTES
Narinder White    No diagnosis found  Cancer Staging  No matching staging information was found for the patient  Oncology History    Pt returns for f/u post radiation to the brain for metastatic lung cancer  His treatment was completed 5/9/18, and he was seen last in June 2018  Pt has been switched from Alimta/Carbo/Pembro to now receiving Tagrisso, orally, since June 2018  Per Dr Ramses Connor he is tolerating well  Also he receives Denisse Deems, every other month  Has some dysphagia to solids, fatigue, and has had falls in the recent past    8/30/18 Ct scan of the chest, abd pelvis revealed Marked improvement in mediastinal and right hilar lymphadenopathy/tumor mass indicative of treatment response      Marked improvement in postobstructive atelectasis and/or tumor burden in the right lung      Right pleural effusion is slightly increased in size when compared to prior examination  An oval structure within the right major fissure almost certainly representing loculated portion of right pleural effusion requires close interval follow-up to the crescentic margin of hyperenhancement, probably atelectasis, but for which associated tumor cannot be entirely excluded      Marked progression of mixed density sclerotic and lytic metastatic disease with new left compression fractures at T3 and L1 as well as worsening pathologic compression fracture at T5  The marked progression of sclerotic osseous tumor could indicate   either progression of sclerotic osseous metastatic disease or perhaps newly visible tumor masses which have increased sclerosis is result of treatment      Unchanged 45 mm ascending thoracic aortic aneurysm      9/21/18 Ct head: Subtle tiny focus of enhancement right centrum semiovale may represent tiny metastasis noted on MR 4/22/2018  This was not evident on prior study possibly, related to slice selection  Otherwise, no indication of intracranial metastatic disease          [facility]ast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[facility] )         4/26/2018 - 5/9/2018 Radiation     Treatment:  Course: C1    Plan ID Energy Fractions Dose per Fraction (cGy) Total Dose Delivered (cGy) Elapsed Days   WHOLE BRAIN 6X 10 / 10 300 3,000 13      Treatment dates:  C1: 4/26/2018 - 5/9/2018              Interval History:***    GYN History:***    Patient Active Problem List   Diagnosis    Hypertension    Type 2 diabetes mellitus with complication (HCC)    Mixed hyperlipidemia    Atrial fibrillation (HCC)    Peripheral vascular disease (HCC)    Persistent proteinuria    Dyspnea    Pleural effusion    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[facility] )     Past Medical History:   Diagnosis Date    Atrial fibrillation ([facility] )     Cancer of lung ([facility] )     brain and bones    Diabetes mellitus ([facility] )     Hypercholesteremia     Hypertension     Lung neoplasm     Proteinuria     LAST ASSESSED [id number]    PVD (peripheral vascular disease) ([facility]  )      Past Surgical History:   Procedure Laterality Date    [place] BRONCHOSCOPY,DIAGNOSTIC N/A 4/24/2018    Procedure: Nathan Hurd;  Surgeon: Regulo Taylor MD;  [facility]; Service: Pulmonary    THORACENTESIS N/A 4/24/2018    Procedure: Tyna Bernheim;  Surgeon: Regulo Taylor MD;  [facility];   Service: Pulmonary Family History   Problem Relation Age of Onset    Diabetes Mother     Diabetes Father     Diabetes Family      Social History     Social History    Marital status: /Civil Union     Spouse name: N/A    Number of children: N/A    Years of education: N/A     Occupational History    RETIRED      Social History Main Topics    Smoking status: Former Smoker    Smokeless tobacco: Never Used      Comment: quit 39 yrs ago as of 2018    Alcohol use Yes      Comment: rare    Drug use: No    Sexual activity: Not on file     Other Topics Concern    Not on file     Social History Narrative    ADVANCED DIRECTIVE IN CHART     CAFFEINE USE VIA COFFEE 3 SERVINGS PER DAY            Current Outpatient Prescriptions:     JEANNE CONTOUR NEXT TEST test strip, 3 applicators by Does not apply route 3 (three) times a week, Disp: , Rfl:     Osimertinib Mesylate 80 MG TABS, Take 1 tablet (80 mg total) by mouth daily, Disp: 30 tablet, Rfl: 11    predniSONE 10 mg tablet, Take 1 tablet (10 mg total) by mouth daily, Disp: 30 tablet, Rfl: 3  No Known Allergies    Review of Systems:  Review of Systems    Vitals:    10/01/18 0806   BP: 140/70   BP Location: Left arm   Pulse: 90   Resp: 14   Temp: 97 5 °F (36 4 °C)   SpO2: 97%   Weight: 88 kg (194 lb)       Imaging:Ct Head W Wo Contrast    Result Date: 9/22/2018  Narrative: CT BRAIN - WITH AND WITHOUT CONTRAST INDICATION:   C79 31: Secondary malignant neoplasm of brain C79 49: Secondary malignant neoplasm of other parts of nervous system  COMPARISON:  CT 6/14/2018, MR 4/22/2018 TECHNIQUE:  CT examination of the brain was performed both prior to and after the administration of intravenous contrast   In addition to axial images, coronal reformatted images were created and submitted for interpretation  Radiation dose length product (DLP) for this visit:  2134 33 mGy-cm     This examination, like all CT scans performed in the [facility], was performed utilizing techniques to minimize radiation dose exposure, including the use of iterative reconstruction and automated exposure control  IV Contrast:  100 mL of iohexol (OMNIPAQUE)  IMAGE QUALITY:  Diagnostic  FINDINGS: PARENCHYMA:  Numerous metastases identified on MR 4/22/2018 and largely inapparent on today's exam   There is no mass, mass effect, edema  Low dense subcortical foci likely representing resolving edema are no longer evident  There is a tiny ill-defined focus of enhancement, series 5 image 32 within the right centrum semiovale bilaterally  This was not evident on prior study possibly related to slice selection but appears to correlate relatively well with a tiny metastasis in this location noted 4/22/2018 MR  VENTRICLES AND EXTRA-AXIAL SPACES:  Normal for patient's age  VISUALIZED ORBITS AND PARANASAL SINUSES:  Unremarkable  CALVARIUM:  Normal      Impression: Subtle tiny focus of enhancement right centrum semiovale may represent tiny metastasis noted on MR 4/22/2018  This was not evident on prior study possibly, related to slice selection  Otherwise, no indication of intracranial metastatic disease   Workstation performed: [id number]       Teaching:***

## 2018-10-02 NOTE — PROGRESS NOTES
Hematology/Oncology Outpatient Follow- up Note  Audra Fontenot 69 y o  male MRN: @ Encounter: [id number]        Date:  10/3/2018      Assessment / Plan:      1  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, bony metastases, diagnosed 4/24/2018 via bronchial brushing  molecular test was found to have EGFR mutation, he was treated initially with Alimta /carboplatin / Pembrolizumab however 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, therapy was changed to Omisertinib 80 mg p o  Daily in June 2018  Continue  CT chest 8/30/2018:  Marked improvement in mediastinal and right hilar lymphadenopathy/tumor mass indicative of treatment response  2   Brain metastases status post whole-brain radiation therapy, 5/2018  Followed by Radiation Oncology  3  For bone health, Xgeva 120 mg every other month  Will hold today due to calcium 8 2           HPI: Audra Fontenot is a 16-year-old  male who used to smoke 2 pack of cigarettes daily for 20 years, quit 40 years ago  He noticed dyspnea with cough without hemoptysis, 10 lb weight loss with intermittent anterior chest pain with radiation to the right lateral side in the beginning of 2018  CT scan in April 2018 showed dried large hilar mass with bronchial obstruction, small right pleural effusion, multiple lumbar spine metastatic disease status post right thoracentesis with atypical cellular changes cannot exclude neoplasia, the patient had bronchoscopy and right lower lobe bronchial brushing showed conclusive evidence of malignancy with non-small cell lung cancer favor adenocarcinoma that stained positive for TTF 1, napsin and absent for p40  CT scan of the abdomen and pelvis showed small pericardial effusion, osseous metastases at L1, L2, L4, L5, obstructive collapse of the right middle lobe and the lower lobe    MRI of the brain showed widespread brain metastases involving the supra and the infratentorial compartments  The patient underwent radiation therapy to the brain finished in May 2018  He received 2 cycles of Pembrolizumab, Alimta, carboplatin however 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, the therapy was changed to tagresso 80 mg p o  Daily by the end of June 2018  The last thoracentesis on the right side was done in June 14, 2018  Repeat CT scan of the chest in September 2018 showed significant decrease in the size of the mediastinal, subcarinal lymphadenopathy decrease in the size of the right lung mass, stable or slightly increased right pleural effusion      Interval History:  He feels well  He is able to put weight back on as his appetite is finally improving  Denies any pain  His breathing is been much better than  It had been a few months back  He no longer has the significant cough  He denies any pain         Metastatic disease ([address] )      Metastatic primary lung cancer ([address] )    4/2018 Initial Diagnosis     Metastatic primary lung cancer ([address] )         5/2/2018 - 5/31/2018 Chemotherapy     Pembrolizumab/ Alimta/carboplatin x 2 cycles  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, treatment changed to Tagrisso 80 mg 1 tab p o daily  6/18/2018 -  Chemotherapy     Tagrisso 80 mg 1 tab p o daily  Xgeva every other month             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[address] )    4/2018 Initial Diagnosis     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[address] )         4/26/2018 - 5/9/2018 Radiation     Treatment:  Course: C1    Plan ID Energy Fractions Dose per Fraction (cGy) Total Dose Delivered (cGy) Elapsed Days   WHOLE BRAIN 6X 10 / 10 300 3,000 13      Treatment dates:  C1: 4/26/2018 - 5/9/2018                Test Results:        Labs:   Lab Results   Component Value Date    HGB 11 4 (L) 09/25/2018    HCT 35 6 (L) 09/25/2018    MCV 93 09/25/2018     09/25/2018    WBC 5 67 09/25/2018    NRBC 0 09/25/2018     Lab Results   Component Value Date    NA 138 09/25/2018    K 3 7 09/25/2018     09/25/2018    CO2 26 09/25/2018    BUN 20 09/25/2018    CREATININE 0 94 09/25/2018    GLUF 79 09/25/2018    CALCIUM 8 2 (L) 09/25/2018    AST 15 09/25/2018    ALT 13 09/25/2018    ALKPHOS 69 09/25/2018    EGFR 78 09/25/2018       Imaging: Ct Head W Wo Contrast    Result Date: 9/22/2018  Narrative: CT BRAIN - WITH AND WITHOUT CONTRAST INDICATION:   C79 31: Secondary malignant neoplasm of brain C79 49: Secondary malignant neoplasm of other parts of nervous system  COMPARISON:  CT 6/14/2018, MR 4/22/2018 TECHNIQUE:  CT examination of the brain was performed both prior to and after the administration of intravenous contrast   In addition to axial images, coronal reformatted images were created and submitted for interpretation  Radiation dose length product (DLP) for this visit:  2134 33 mGy-cm   This examination, like all CT scans performed in the [facility], was performed utilizing techniques to minimize radiation dose exposure, including the use of iterative reconstruction and automated exposure control  IV Contrast:  100 mL of iohexol (OMNIPAQUE)  IMAGE QUALITY:  Diagnostic  FINDINGS: PARENCHYMA:  Numerous metastases identified on MR 4/22/2018 and largely inapparent on today's exam   There is no mass, mass effect, edema  Low dense subcortical foci likely representing resolving edema are no longer evident  There is a tiny ill-defined focus of enhancement, series 5 image 32 within the right centrum semiovale bilaterally  This was not evident on prior study possibly related to slice selection but appears to correlate relatively well with a tiny metastasis in this location noted 4/22/2018 MR  VENTRICLES AND EXTRA-AXIAL SPACES:  Normal for patient's age  VISUALIZED ORBITS AND PARANASAL SINUSES:  Unremarkable  CALVARIUM:  Normal      Impression: Subtle tiny focus of enhancement right centrum semiovale may represent tiny metastasis noted on MR 4/22/2018  This was not evident on prior study possibly, related to slice selection  Otherwise, no indication of intracranial metastatic disease  Workstation performed: [id number]           ROS:  As mentioned in HPI & Interval History otherwise 14 point ROS negative  Allergies: No Known Allergies  Current Medications: Reviewed  PMH/FH/SH:  Reviewed      Physical Exam:    There is no height or weight on file to calculate BSA  Ht Readings from Last 3 Encounters:   07/16/18 5' 11 65" (1 82 m)   07/06/18 6' 1" (1 854 m)   06/20/18 6' 1" (1 854 m)        Wt Readings from Last 3 Encounters:   10/01/18 88 kg (194 lb)   09/11/18 82 3 kg (181 lb 6 4 oz)   07/16/18 88 3 kg (194 lb 9 6 oz)        Temp Readings from Last 3 Encounters:   10/01/18 97 5 °F (36 4 °C)   09/11/18 97 9 °F (36 6 °C) (Tympanic)   09/07/18 (!) 97 4 °F (36 3 °C) (Tympanic)        BP Readings from Last 3 Encounters:   10/01/18 140/70   09/11/18 102/66   09/07/18 120/71           Physical Exam   Constitutional: He is oriented to person, place, and time  He appears well-developed and well-nourished  No distress  HENT:   Head: Normocephalic and atraumatic  Mouth/Throat: Oropharynx is clear and moist  No oropharyngeal exudate  Eyes: Pupils are equal, round, and reactive to light  Conjunctivae and EOM are normal    Neck: Normal range of motion  Neck supple  No tracheal deviation present  No thyromegaly present  Cardiovascular: Normal rate and regular rhythm  Exam reveals no gallop and no friction rub  No murmur heard  Pulmonary/Chest: Effort normal and breath sounds normal  No respiratory distress  He has no wheezes  He has no rales  He exhibits no tenderness  Abdominal: Soft  Bowel sounds are normal  He exhibits no distension and no mass  There is no tenderness  There is no rebound and no guarding  Musculoskeletal: He exhibits deformity (arthritic changes)  Lymphadenopathy:     He has no cervical adenopathy     Neurological: He is alert and oriented to person, place, and time  Skin: Skin is warm and dry  No rash noted  He is not diaphoretic  No erythema  No pallor  Psychiatric: He has a normal mood and affect  His behavior is normal  Judgment and thought content normal    Vitals reviewed  ECO      Goals and Barriers:  Current Goal:  Prolong Survival from Cancer/ Have good QOL  Barriers: None  Patient's Capacity to Self Care:  Patient is able to self care      Emergency Contacts:    [address] [place] [address], [phone],

## 2018-10-03 ENCOUNTER — HOSPITAL ENCOUNTER (OUTPATIENT)
Dept: INFUSION CENTER | Facility: HOSPITAL | Age: 78
Discharge: HOME | End: 2018-10-03

## 2018-10-03 ENCOUNTER — OFFICE VISIT (OUTPATIENT)
Dept: HEMATOLOGY ONCOLOGY | Facility: CLINIC | Age: 78
End: 2018-10-03
Payer: COMMERCIAL

## 2018-10-03 VITALS
WEIGHT: 194 LBS | HEART RATE: 91 BPM | DIASTOLIC BLOOD PRESSURE: 80 MMHG | OXYGEN SATURATION: 98 % | HEIGHT: 72 IN | TEMPERATURE: 97.3 F | SYSTOLIC BLOOD PRESSURE: 140 MMHG | BODY MASS INDEX: 26.28 KG/M2

## 2018-10-03 DIAGNOSIS — D49.1 LUNG NEOPLASM: Primary | ICD-10-CM

## 2018-10-03 DIAGNOSIS — C34.90 MALIGNANT NEOPLASM OF LUNG, UNSPECIFIED LATERALITY, UNSPECIFIED PART OF LUNG (HCC): ICD-10-CM

## 2018-10-03 PROCEDURE — 99214 OFFICE O/P EST MOD 30 MIN: CPT | Performed by: PHYSICIAN ASSISTANT

## 2018-10-03 NOTE — TELEPHONE ENCOUNTER
Call from Agata Ferreira at [facility]  Received script for Franny Levy but unable to fill  Informed needs to go to specialty pharmacy      New script pended to [facility] for [id number]

## 2018-10-09 ENCOUNTER — TELEPHONE (OUTPATIENT)
Dept: HEMATOLOGY ONCOLOGY | Facility: CLINIC | Age: 78
End: 2018-10-09

## 2018-10-09 NOTE — TELEPHONE ENCOUNTER
[facility] calls about this patient's [facility]  The co-pay is $2,654 00  When they spoke to the patient today, he said that we are getting him a obinna  Email sent to oncology finance team to call them and let them know about this

## 2018-10-30 ENCOUNTER — APPOINTMENT (OUTPATIENT)
Dept: LAB | Age: 78
End: 2018-10-30
Payer: COMMERCIAL

## 2018-10-30 DIAGNOSIS — D49.1 LUNG NEOPLASM: ICD-10-CM

## 2018-10-30 DIAGNOSIS — C34.90 MALIGNANT NEOPLASM OF LUNG, UNSPECIFIED LATERALITY, UNSPECIFIED PART OF LUNG (HCC): ICD-10-CM

## 2018-10-30 LAB
ALBUMIN SERPL BCP-MCNC: 3.8 G/DL (ref 3.5–5)
ALP SERPL-CCNC: 64 U/L (ref 46–116)
ALT SERPL W P-5'-P-CCNC: 16 U/L (ref 12–78)
ANION GAP SERPL CALCULATED.3IONS-SCNC: 7 MMOL/L (ref 4–13)
AST SERPL W P-5'-P-CCNC: 16 U/L (ref 5–45)
BASOPHILS # BLD AUTO: 0.01 THOUSANDS/ΜL (ref 0–0.1)
BASOPHILS NFR BLD AUTO: 0 % (ref 0–1)
BILIRUB SERPL-MCNC: 0.67 MG/DL (ref 0.2–1)
BUN SERPL-MCNC: 23 MG/DL (ref 5–25)
CALCIUM SERPL-MCNC: 9 MG/DL (ref 8.3–10.1)
CHLORIDE SERPL-SCNC: 105 MMOL/L (ref 100–108)
CO2 SERPL-SCNC: 28 MMOL/L (ref 21–32)
CREAT SERPL-MCNC: 1.14 MG/DL (ref 0.6–1.3)
EOSINOPHIL # BLD AUTO: 0.25 THOUSAND/ΜL (ref 0–0.61)
EOSINOPHIL NFR BLD AUTO: 4 % (ref 0–6)
ERYTHROCYTE [DISTWIDTH] IN BLOOD BY AUTOMATED COUNT: 17.1 % (ref 11.6–15.1)
GFR SERPL CREATININE-BSD FRML MDRD: 62 ML/MIN/1.73SQ M
GLUCOSE P FAST SERPL-MCNC: 82 MG/DL (ref 65–99)
HCT VFR BLD AUTO: 37.4 % (ref 36.5–49.3)
HGB BLD-MCNC: 12 G/DL (ref 12–17)
IMM GRANULOCYTES # BLD AUTO: 0.02 THOUSAND/UL (ref 0–0.2)
IMM GRANULOCYTES NFR BLD AUTO: 0 % (ref 0–2)
LYMPHOCYTES # BLD AUTO: 1.63 THOUSANDS/ΜL (ref 0.6–4.47)
LYMPHOCYTES NFR BLD AUTO: 25 % (ref 14–44)
MCH RBC QN AUTO: 30.1 PG (ref 26.8–34.3)
MCHC RBC AUTO-ENTMCNC: 32.1 G/DL (ref 31.4–37.4)
MCV RBC AUTO: 94 FL (ref 82–98)
MONOCYTES # BLD AUTO: 0.59 THOUSAND/ΜL (ref 0.17–1.22)
MONOCYTES NFR BLD AUTO: 9 % (ref 4–12)
NEUTROPHILS # BLD AUTO: 4.05 THOUSANDS/ΜL (ref 1.85–7.62)
NEUTS SEG NFR BLD AUTO: 62 % (ref 43–75)
NRBC BLD AUTO-RTO: 0 /100 WBCS
PLATELET # BLD AUTO: 167 THOUSANDS/UL (ref 149–390)
PMV BLD AUTO: 10.1 FL (ref 8.9–12.7)
POTASSIUM SERPL-SCNC: 4.1 MMOL/L (ref 3.5–5.3)
PROT SERPL-MCNC: 6.8 G/DL (ref 6.4–8.2)
RBC # BLD AUTO: 3.99 MILLION/UL (ref 3.88–5.62)
SODIUM SERPL-SCNC: 140 MMOL/L (ref 136–145)
WBC # BLD AUTO: 6.55 THOUSAND/UL (ref 4.31–10.16)

## 2018-10-30 PROCEDURE — 85025 COMPLETE CBC W/AUTO DIFF WBC: CPT

## 2018-10-30 PROCEDURE — 36415 COLL VENOUS BLD VENIPUNCTURE: CPT

## 2018-10-30 PROCEDURE — 80053 COMPREHEN METABOLIC PANEL: CPT

## 2018-11-06 ENCOUNTER — HOSPITAL ENCOUNTER (OUTPATIENT)
Dept: INFUSION CENTER | Facility: HOSPITAL | Age: 78
Discharge: HOME | End: 2018-11-06

## 2018-11-06 ENCOUNTER — TELEPHONE (OUTPATIENT)
Dept: HEMATOLOGY ONCOLOGY | Facility: CLINIC | Age: 78
End: 2018-11-06

## 2018-11-06 ENCOUNTER — OFFICE VISIT (OUTPATIENT)
Dept: HEMATOLOGY ONCOLOGY | Facility: CLINIC | Age: 78
End: 2018-11-06
Payer: COMMERCIAL

## 2018-11-06 VITALS
SYSTOLIC BLOOD PRESSURE: 128 MMHG | BODY MASS INDEX: 25.73 KG/M2 | DIASTOLIC BLOOD PRESSURE: 68 MMHG | HEIGHT: 72 IN | RESPIRATION RATE: 18 BRPM | OXYGEN SATURATION: 98 % | HEART RATE: 68 BPM | TEMPERATURE: 97.5 F | WEIGHT: 190 LBS

## 2018-11-06 DIAGNOSIS — C34.91 PRIMARY MALIGNANT NEOPLASM OF RIGHT LUNG METASTATIC TO OTHER SITE (HCC): Primary | ICD-10-CM

## 2018-11-06 DIAGNOSIS — C79.31 SECONDARY MALIGNANT NEOPLASM OF BRAIN AND SPINAL CORD (HCC): ICD-10-CM

## 2018-11-06 DIAGNOSIS — C79.49 SECONDARY MALIGNANT NEOPLASM OF BRAIN AND SPINAL CORD (HCC): ICD-10-CM

## 2018-11-06 PROCEDURE — 99214 OFFICE O/P EST MOD 30 MIN: CPT | Performed by: INTERNAL MEDICINE

## 2018-11-06 NOTE — TELEPHONE ENCOUNTER
Time Out done with Office nurse Marianna Stoner RN and [facility] RN at [facility] [phone] on 11/6/18 12:15 pm  Masoud Goss is discontinued today and thereafter

## 2018-11-06 NOTE — PROGRESS NOTES
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, therapy was changed to Omisertinib 80 mg p o  Daily in June 2018  Complicated with fatigue most likely secondary to Omisertinib at 80 mg, we told the patient we can go down with the dose to 40 mg p o  Daily however he decided to stay on the same dose at this time  Re-evaluate in 6 weeks with CT scan of the chest without IV contrast  2  Discontinue Xgeva for bone metastases by the patient request        HPI:   Asiya Hernandez is a 27-year-old  male who used to smoke 2 pack of cigarettes daily for 20 years, quit 40 years ago  He noticed dyspnea with cough without hemoptysis, 10 lb weight loss with intermittent anterior chest pain with radiation to the right lateral side in the beginning of 2018  CT scan in April 2018 showed dried large hilar mass with bronchial obstruction, small right pleural effusion, multiple lumbar spine metastatic disease status post right thoracentesis with atypical cellular changes cannot exclude neoplasia, the patient had bronchoscopy and right lower lobe bronchial brushing showed conclusive evidence of malignancy with non-small cell lung cancer favor adenocarcinoma that stained positive for TTF 1, napsin and absent for p40  CT scan of the abdomen and pelvis showed small pericardial effusion, osseous metastases at L1, L2, L4, L5, obstructive collapse of the right middle lobe and the lower lobe    MRI of the brain showed widespread brain metastases involving the supra and the infratentorial compartments  The patient underwent radiation therapy to the brain finished in May 2018  He received 2 cycles of Pembrolizumab, Alimta, carboplatin however 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, the therapy was changed to tagresso 80 mg p o  Daily by the end of June 2018  The last thoracentesis on the right side was done in June 14, 2018      Repeat CT scan of the chest in September 2018 showed significant decrease in the size of the mediastinal, subcarinal lymphadenopathy decrease in the size of the right lung mass, stable or slightly increased right pleural effusion      Interval History:    he worked for 2 hr every day and then he called off for the rest of the day because of fatigue       Previous Treatment:         Test Results:    Imaging: No results found  Labs:   Lab Results   Component Value Date    WBC 6 55 10/30/2018    HGB 12 0 10/30/2018    HCT 37 4 10/30/2018    MCV 94 10/30/2018     10/30/2018     Lab Results   Component Value Date    K 4 1 10/30/2018     10/30/2018    CO2 28 10/30/2018    BUN 23 10/30/2018    CREATININE 1 14 10/30/2018    GLUF 82 10/30/2018    CALCIUM 9 0 10/30/2018    AST 16 10/30/2018    ALT 16 10/30/2018    ALKPHOS 64 10/30/2018    EGFR 62 10/30/2018       No results found for: IRON, TIBC, FERRITIN    No results found for: [id number]      ROS:   Review of Systems   Constitutional: Positive for fatigue  Negative for activity change, appetite change, chills, diaphoresis, fever and unexpected weight change  HENT: Negative for congestion, dental problem, facial swelling, hearing loss, mouth sores, nosebleeds, postnasal drip, rhinorrhea, sore throat, trouble swallowing and voice change  Eyes: Negative for photophobia, pain, discharge, redness, itching and visual disturbance  Respiratory: Negative for cough, choking, chest tightness, shortness of breath and wheezing  Cardiovascular: Negative for chest pain, palpitations and leg swelling     Gastrointestinal: Negative for abdominal distention, abdominal pain, anal bleeding, blood in stool, constipation, diarrhea, nausea, rectal pain and vomiting  Endocrine: Negative for cold intolerance and heat intolerance  Genitourinary: Negative for decreased urine volume, difficulty urinating, dysuria, flank pain, frequency, hematuria and urgency  Musculoskeletal: Negative for arthralgias, back pain, gait problem, joint swelling, myalgias, neck pain and neck stiffness  Skin: Negative for color change, pallor, rash and wound  Allergic/Immunologic: Negative for immunocompromised state  Neurological: Negative for dizziness, tremors, seizures, syncope, facial asymmetry, speech difficulty, weakness, light-headedness, numbness and headaches  Hematological: Negative for adenopathy  Does not bruise/bleed easily  Psychiatric/Behavioral: Negative for agitation, confusion, decreased concentration, dysphoric mood and sleep disturbance  The patient is not nervous/anxious  All other systems reviewed and are negative  Current Medications: Reviewed  Allergies: Reviewed  PMH/FH/SH:  Reviewed      Physical Exam:    Body surface area is 2 07 meters squared  Wt Readings from Last 3 Encounters:   11/06/18 86 2 kg (190 lb)   10/03/18 88 kg (194 lb)   10/01/18 88 kg (194 lb)        Temp Readings from Last 3 Encounters:   11/06/18 97 5 °F (36 4 °C) (Tympanic)   10/03/18 (!) 97 3 °F (36 3 °C) (Tympanic)   10/01/18 97 5 °F (36 4 °C)        BP Readings from Last 3 Encounters:   11/06/18 128/68   10/03/18 140/80   10/01/18 140/70         Pulse Readings from Last 3 Encounters:   11/06/18 68   10/03/18 91   10/01/18 90        Physical Exam   Constitutional: He is oriented to person, place, and time  He appears well-developed and well-nourished  No distress  HENT:   Head: Normocephalic and atraumatic  Mouth/Throat: Oropharynx is clear and moist  No oropharyngeal exudate  Eyes: Pupils are equal, round, and reactive to light  Conjunctivae and EOM are normal    Neck: Normal range of motion  Neck supple  No JVD present  No tracheal deviation present  No thyromegaly present  Cardiovascular: Normal rate and regular rhythm  Exam reveals no gallop and no friction rub  No murmur heard  Pulmonary/Chest: Effort normal and breath sounds normal  No respiratory distress  He has no wheezes  He has no rales  He exhibits no tenderness  Abdominal: Soft  Bowel sounds are normal  He exhibits no distension and no mass  There is no tenderness  There is no rebound and no guarding  Musculoskeletal: Normal range of motion  He exhibits no edema  Lymphadenopathy:     He has no cervical adenopathy  Neurological: He is alert and oriented to person, place, and time  Skin: Skin is warm and dry  No rash noted  He is not diaphoretic  No erythema  No pallor  Psychiatric: He has a normal mood and affect  His behavior is normal  Judgment and thought content normal    Vitals reviewed  Goals and Barriers:  Current Goal: Minimize effects of disease  Barriers: None  Patient's Capacity to Self Care:  Patient is able to self care      Code Status: @[facility]@

## 2018-11-06 NOTE — PROGRESS NOTES
TIME OUT, spoke with Liseth Falcon RN, Leslie Molina is discontinued at this time  SPoke with [facility] in pharmacy

## 2018-11-28 ENCOUNTER — HOSPITAL ENCOUNTER (OUTPATIENT)
Dept: RADIOLOGY | Facility: HOSPITAL | Age: 78
Discharge: HOME | End: 2018-11-28
Attending: INTERNAL MEDICINE
Payer: COMMERCIAL

## 2018-11-28 ENCOUNTER — APPOINTMENT (OUTPATIENT)
Dept: LAB | Age: 78
End: 2018-11-28
Payer: COMMERCIAL

## 2018-11-28 ENCOUNTER — TRANSCRIBE ORDERS (OUTPATIENT)
Dept: RADIOLOGY | Facility: HOSPITAL | Age: 78
End: 2018-11-28

## 2018-11-28 DIAGNOSIS — C79.31 SECONDARY MALIGNANT NEOPLASM OF BRAIN AND SPINAL CORD (HCC): ICD-10-CM

## 2018-11-28 DIAGNOSIS — C79.49 SECONDARY MALIGNANT NEOPLASM OF BRAIN AND SPINAL CORD (HCC): ICD-10-CM

## 2018-11-28 DIAGNOSIS — C34.91 PRIMARY MALIGNANT NEOPLASM OF RIGHT LUNG METASTATIC TO OTHER SITE (HCC): ICD-10-CM

## 2018-11-28 LAB
ALBUMIN SERPL BCP-MCNC: 3.7 G/DL (ref 3.5–5)
ALP SERPL-CCNC: 70 U/L (ref 46–116)
ALT SERPL W P-5'-P-CCNC: 18 U/L (ref 12–78)
ANION GAP SERPL CALCULATED.3IONS-SCNC: 4 MMOL/L (ref 4–13)
AST SERPL W P-5'-P-CCNC: 15 U/L (ref 5–45)
BASOPHILS # BLD AUTO: 0.02 THOUSANDS/ΜL (ref 0–0.1)
BASOPHILS NFR BLD AUTO: 0 % (ref 0–1)
BILIRUB SERPL-MCNC: 0.89 MG/DL (ref 0.2–1)
BUN SERPL-MCNC: 23 MG/DL (ref 5–25)
CALCIUM SERPL-MCNC: 8.7 MG/DL (ref 8.3–10.1)
CHLORIDE SERPL-SCNC: 104 MMOL/L (ref 100–108)
CO2 SERPL-SCNC: 29 MMOL/L (ref 21–32)
CREAT SERPL-MCNC: 0.99 MG/DL (ref 0.6–1.3)
EOSINOPHIL # BLD AUTO: 0.2 THOUSAND/ΜL (ref 0–0.61)
EOSINOPHIL NFR BLD AUTO: 3 % (ref 0–6)
ERYTHROCYTE [DISTWIDTH] IN BLOOD BY AUTOMATED COUNT: 15.7 % (ref 11.6–15.1)
GFR SERPL CREATININE-BSD FRML MDRD: 73 ML/MIN/1.73SQ M
GLUCOSE P FAST SERPL-MCNC: 90 MG/DL (ref 65–99)
HCT VFR BLD AUTO: 38.8 % (ref 36.5–49.3)
HGB BLD-MCNC: 12.7 G/DL (ref 12–17)
IMM GRANULOCYTES # BLD AUTO: 0.03 THOUSAND/UL (ref 0–0.2)
IMM GRANULOCYTES NFR BLD AUTO: 0 % (ref 0–2)
LYMPHOCYTES # BLD AUTO: 1.86 THOUSANDS/ΜL (ref 0.6–4.47)
LYMPHOCYTES NFR BLD AUTO: 24 % (ref 14–44)
MCH RBC QN AUTO: 30.9 PG (ref 26.8–34.3)
MCHC RBC AUTO-ENTMCNC: 32.7 G/DL (ref 31.4–37.4)
MCV RBC AUTO: 94 FL (ref 82–98)
MONOCYTES # BLD AUTO: 0.65 THOUSAND/ΜL (ref 0.17–1.22)
MONOCYTES NFR BLD AUTO: 8 % (ref 4–12)
NEUTROPHILS # BLD AUTO: 5.1 THOUSANDS/ΜL (ref 1.85–7.62)
NEUTS SEG NFR BLD AUTO: 65 % (ref 43–75)
NRBC BLD AUTO-RTO: 0 /100 WBCS
PLATELET # BLD AUTO: 146 THOUSANDS/UL (ref 149–390)
PMV BLD AUTO: 10.3 FL (ref 8.9–12.7)
POTASSIUM SERPL-SCNC: 4 MMOL/L (ref 3.5–5.3)
PROT SERPL-MCNC: 7.1 G/DL (ref 6.4–8.2)
RBC # BLD AUTO: 4.11 MILLION/UL (ref 3.88–5.62)
SODIUM SERPL-SCNC: 137 MMOL/L (ref 136–145)
WBC # BLD AUTO: 7.86 THOUSAND/UL (ref 4.31–10.16)

## 2018-11-28 PROCEDURE — 80053 COMPREHEN METABOLIC PANEL: CPT

## 2018-11-28 PROCEDURE — 85025 COMPLETE CBC W/AUTO DIFF WBC: CPT

## 2018-11-28 PROCEDURE — 71250 CT THORAX DX C-: CPT

## 2018-11-28 PROCEDURE — 36415 COLL VENOUS BLD VENIPUNCTURE: CPT

## 2018-12-04 ENCOUNTER — OFFICE VISIT (OUTPATIENT)
Dept: HEMATOLOGY ONCOLOGY | Facility: CLINIC | Age: 78
End: 2018-12-04
Payer: COMMERCIAL

## 2018-12-04 VITALS
WEIGHT: 198 LBS | RESPIRATION RATE: 16 BRPM | HEIGHT: 72 IN | OXYGEN SATURATION: 98 % | HEART RATE: 65 BPM | SYSTOLIC BLOOD PRESSURE: 140 MMHG | BODY MASS INDEX: 26.82 KG/M2 | TEMPERATURE: 98.3 F | DIASTOLIC BLOOD PRESSURE: 80 MMHG

## 2018-12-04 DIAGNOSIS — C34.91 PRIMARY MALIGNANT NEOPLASM OF RIGHT LUNG METASTATIC TO OTHER SITE (HCC): Primary | ICD-10-CM

## 2018-12-04 PROCEDURE — 99214 OFFICE O/P EST MOD 30 MIN: CPT | Performed by: PHYSICIAN ASSISTANT

## 2018-12-04 NOTE — PROGRESS NOTES
Hematology/Oncology Outpatient Follow- up Note  Lou Dutton 69 y o  male MRN: @ Encounter: [id number]        Date:  12/4/2018      Assessment / Plan:    1  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, bony metastases, diagnosed 4/24/2018 via bronchial brushing   Molecular test was found to have EGFR mutation  He was treated initially with Alimta /carboplatin / Pembrolizumab however 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  Therapy was changed to Omisertinib 80 mg p o  Daily in June 2018  Complicated with fatigue most likely secondary to Omisertinib at 80 mg, we discussed decreasing to 40 mg p o  Daily however he has decided to stay on the same dose at this time  2   Appetite improved  Will taper prednisone from 10mg to 5 mg po daily x 2 weeks and then discontinue  F/U in 6 weeks with labs prior  2   Xgeva for bone metastases discontinued by the patient request    3  Brain metastases  Brain imaging scheduled 1/3/2019            HPI:  Lou Dutton is a 66-year-old  male who used to smoke 2 pack of cigarettes daily for 20 years, quit 40 years ago   He noticed dyspnea with cough without hemoptysis, 10 lb weight loss with intermittent anterior chest pain with radiation to the right lateral side in the beginning of 2018  CT scan in April 2018 showed dried large hilar mass with bronchial obstruction, small right pleural effusion, multiple lumbar spine metastatic disease status post right thoracentesis with atypical cellular changes cannot exclude neoplasia, the patient had bronchoscopy and right lower lobe bronchial brushing showed conclusive evidence of malignancy with non-small cell lung cancer favor adenocarcinoma that stained positive for TTF 1, napsin and absent for p40  CT scan of the abdomen and pelvis showed small pericardial effusion, osseous metastases at L1, L2, L4, L5, obstructive collapse of the right middle lobe and the lower lobe  MRI of the brain showed widespread brain metastases involving the supra and the infratentorial compartments  The patient underwent radiation therapy to the brain finished in May 2018  He received 2 cycles of Pembrolizumab, Alimta, carboplatin however 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, the therapy was changed to tagresso 80 mg p o  Daily by the end of June 2018  The last thoracentesis on the right side was done in June 14, 2018      Repeat CT scan of the chest in September 2018 showed significant decrease in the size of the mediastinal, subcarinal lymphadenopathy decrease in the size of the right lung mass, stable or slightly increased right pleural effusion        Interval History:    CT chest 11/28/2018 compared to 8/30/2018 scan:  Stable minimal right hilar soft tissue compatible with treated/residual mass    Decreased size of right hilar and mediastinal nodes  Previously noted right hilar lymph node currently measuring 1 3 cm (previous 1 6 cm)  Other nodes are slightly diminished in size including left paratracheal node measuring 1 8 cm (previous 2 cm) and a prevascular node measuring   7 mm (previous 9 mm)    Stable moderate-sized right pleural effusion    Stable diffuse osseous metastases with most severe compression deformity at T5  Reports his energy and appetite have improved  His exercise tolerance is 30 minutes instead of 15  He denies shortness of breath or chest pain  Denies any pain  No dizziness or lightheadedness            Test Results:        Labs:   Lab Results   Component Value Date    HGB 12 7 11/28/2018    HCT 38 8 11/28/2018    MCV 94 11/28/2018     (L) 11/28/2018    WBC 7 86 11/28/2018    NRBC 0 11/28/2018     Lab Results   Component Value Date    K 4 0 11/28/2018     11/28/2018    CO2 29 11/28/2018    BUN 23 11/28/2018    CREATININE 0 99 11/28/2018    GLUF 90 11/28/2018    CALCIUM 8 7 11/28/2018    AST 15 11/28/2018    ALT 18 11/28/2018 ALKPHOS 70 11/28/2018    EGFR 73 11/28/2018       Imaging: Ct Chest Wo Contrast    Result Date: 11/29/2018  Narrative: CT CHEST WITHOUT IV CONTRAST INDICATION:   C34 91: Malignant neoplasm of unspecified part of right bronchus or lung C79 31: Secondary malignant neoplasm of brain C79 49: Secondary malignant neoplasm of other parts of nervous system  COMPARISON:  CT chest 8/30/2018  TECHNIQUE: CT examination of the chest was performed without intravenous contrast   Axial, sagittal, and coronal 2D reformatted images were created from the source data and submitted for interpretation  Radiation dose length product (DLP) for this visit:  529 09 mGy-cm   This examination, like all CT scans performed in the [facility], was performed utilizing techniques to minimize radiation dose exposure, including the use of iterative  reconstruction and automated exposure control  FINDINGS: LUNGS:  Scarring in right lower lobe and compressive atelectasis  No endobronchial or endotracheal abnormality  Stable minimal right hilar soft tissue on image 3/41, previously the known mass  PLEURA:  Stable moderate-sized right pleural effusion  HEART/GREAT VESSELS:  Stable heart size  Stable ascending thoracic aortic aneurysm measuring 4 5 cm  MEDIASTINUM AND DWIGHT:  Previously noted right hilar lymph node currently measuring 1 3 cm (previous 1 6 cm)  Other nodes are slightly diminished in size including left paratracheal node measuring 1 8 cm (previous 2 cm) and a prevascular node measuring 7 mm (previous 9 mm)  CHEST WALL AND LOWER NECK:   Unremarkable  VISUALIZED STRUCTURES IN THE UPPER ABDOMEN:  Unremarkable  OSSEOUS STRUCTURES:  Multiple healed left-sided rib fractures  Lytic and sclerotic lesions throughout the vertebral bodies appear similar with stable compression deformities most severe at T5  Impression: Stable minimal right hilar soft tissue on image 3/41 compatible with treated/residual mass   Decreased size of right hilar and mediastinal nodes  Stable moderate-sized right pleural effusion  Stable diffuse osseous metastases with most severe compression deformity at T5  Workstation performed: [id number]           ROS:  As mentioned in HPI & Interval History otherwise 14 point ROS negative  Allergies: No Known Allergies  Current Medications: Reviewed  PMH/FH/SH:  Reviewed      Physical Exam:    There is no height or weight on file to calculate BSA  Ht Readings from Last 3 Encounters:   11/06/18 5' 11 5" (1 816 m)   10/03/18 5' 11 5" (1 816 m)   07/16/18 5' 11 65" (1 82 m)        Wt Readings from Last 3 Encounters:   11/06/18 86 2 kg (190 lb)   10/03/18 88 kg (194 lb)   10/01/18 88 kg (194 lb)        Temp Readings from Last 3 Encounters:   11/06/18 97 5 °F (36 4 °C) (Tympanic)   10/03/18 (!) 97 3 °F (36 3 °C) (Tympanic)   10/01/18 97 5 °F (36 4 °C)        BP Readings from Last 3 Encounters:   11/06/18 128/68   10/03/18 140/80   10/01/18 140/70           Physical Exam   Constitutional: He is oriented to person, place, and time  He appears well-developed and well-nourished  No distress  HENT:   Head: Normocephalic and atraumatic  Mouth/Throat: Oropharynx is clear and moist  No oropharyngeal exudate  Eyes: Pupils are equal, round, and reactive to light  Conjunctivae and EOM are normal    Neck: Normal range of motion  Neck supple  No tracheal deviation present  No thyromegaly present  Cardiovascular: Normal rate and regular rhythm  Exam reveals no gallop and no friction rub  No murmur heard  Pulmonary/Chest: Effort normal and breath sounds normal  No respiratory distress  He has no wheezes  He has no rales  He exhibits no tenderness  Abdominal: Soft  Bowel sounds are normal  He exhibits no distension  There is no tenderness  Lymphadenopathy:     He has no cervical adenopathy  Neurological: He is alert and oriented to person, place, and time  Skin: Skin is warm and dry  No rash noted   He is not diaphoretic  No erythema  No pallor  Psychiatric: He has a normal mood and affect  His behavior is normal  Judgment and thought content normal    Vitals reviewed        ECO      Emergency Contacts:    [address] [facility], [phone],

## 2018-12-18 ENCOUNTER — APPOINTMENT (OUTPATIENT)
Dept: LAB | Age: 78
End: 2018-12-18
Payer: COMMERCIAL

## 2018-12-18 DIAGNOSIS — E11.8 DIABETIC COMPLICATION (HCC): Primary | ICD-10-CM

## 2018-12-18 LAB
EST. AVERAGE GLUCOSE BLD GHB EST-MCNC: 137 MG/DL
HBA1C MFR BLD: 6.4 % (ref 4.2–6.3)

## 2018-12-18 PROCEDURE — 83036 HEMOGLOBIN GLYCOSYLATED A1C: CPT

## 2018-12-18 PROCEDURE — 36415 COLL VENOUS BLD VENIPUNCTURE: CPT

## 2018-12-31 ENCOUNTER — OFFICE VISIT (OUTPATIENT)
Dept: INTERNAL MEDICINE CLINIC | Age: 78
End: 2018-12-31
Payer: COMMERCIAL

## 2018-12-31 VITALS
SYSTOLIC BLOOD PRESSURE: 112 MMHG | HEIGHT: 72 IN | TEMPERATURE: 98.2 F | WEIGHT: 205.2 LBS | HEART RATE: 90 BPM | DIASTOLIC BLOOD PRESSURE: 60 MMHG | BODY MASS INDEX: 27.79 KG/M2 | OXYGEN SATURATION: 98 %

## 2018-12-31 DIAGNOSIS — C34.91 PRIMARY MALIGNANT NEOPLASM OF RIGHT LUNG METASTATIC TO OTHER SITE (HCC): ICD-10-CM

## 2018-12-31 DIAGNOSIS — E11.8 TYPE 2 DIABETES MELLITUS WITH COMPLICATION, UNSPECIFIED WHETHER LONG TERM INSULIN USE: Primary | ICD-10-CM

## 2018-12-31 DIAGNOSIS — E78.2 MIXED HYPERLIPIDEMIA: ICD-10-CM

## 2018-12-31 DIAGNOSIS — D49.1 LUNG NEOPLASM: ICD-10-CM

## 2018-12-31 PROBLEM — R06.00 DYSPNEA: Status: RESOLVED | Noted: 2018-04-21 | Resolved: 2018-12-31

## 2018-12-31 PROBLEM — N17.9 ACUTE KIDNEY INJURY (HCC): Status: RESOLVED | Noted: 2018-04-21 | Resolved: 2018-12-31

## 2018-12-31 PROCEDURE — 99214 OFFICE O/P EST MOD 30 MIN: CPT | Performed by: INTERNAL MEDICINE

## 2018-12-31 NOTE — PROGRESS NOTES
Assessment/Plan:    1  Type 2 diabetes mellitus  Recent hemoglobin A1c 6 4 which is higher than previous 1 which was 5 5  He just finished his chemotherapy and appetite is improving  Will continue to monitor without any addition of diabetic medicine  Will check hemoglobin A1c before next visit  2  Metastatic CA lung  Being managed by oncologist   3  History of hyperlipidemia  Presently not on any statins  Will check lipid profile before next visit     Diagnoses and all orders for this visit:    Type 2 diabetes mellitus with complication, unspecified whether long term insulin use ([facility] [address] )  -     CBC; Future  -     Lipid Panel with Direct LDL reflex; Future  -     Comprehensive metabolic panel; Future  -     Hemoglobin A1C; Future    Lung neoplasm  -     CBC; Future  -     Comprehensive metabolic panel; Future    Primary malignant neoplasm of right lung metastatic to other site [facility])    Mixed hyperlipidemia          Subjective:          Patient ID: Demetria Ramirez is a 69 y o  male  Diabetes   He presents for his follow-up diabetic visit  He has type 2 diabetes mellitus  His disease course has been fluctuating  Pertinent negatives for hypoglycemia include no confusion, dizziness, headaches, mood changes or nervousness/anxiousness  Associated symptoms include fatigue  Pertinent negatives for diabetes include no chest pain, no polyuria and no weakness  Diabetic complications include PVD  Risk factors for coronary artery disease include diabetes mellitus, dyslipidemia and male sex  Current diabetic treatment includes diet  He is compliant with treatment most of the time  His weight is increasing steadily  Meal planning includes avoidance of concentrated sweets  He has not had a previous visit with a dietitian  He rarely participates in exercise  His home blood glucose trend is increasing steadily  An ACE inhibitor/angiotensin II receptor blocker is not being taken  He does not see a podiatrist Eye exam is current  The following portions of the patient's history were reviewed and updated as appropriate: allergies, current medications, past family history, past medical history, past social history, past surgical history and problem list     Review of Systems   Constitutional: Positive for fatigue  Negative for fever  HENT: Negative for congestion, ear discharge, ear pain, postnasal drip, sinus pressure, sore throat, tinnitus and trouble swallowing  Eyes: Negative for discharge, itching and visual disturbance  Respiratory: Negative for cough and shortness of breath  Cardiovascular: Negative for chest pain and palpitations  Gastrointestinal: Negative for abdominal pain, diarrhea, nausea and vomiting  Endocrine: Negative for cold intolerance and polyuria  Genitourinary: Negative for difficulty urinating, dysuria and urgency  Musculoskeletal: Negative for arthralgias and neck pain  Skin: Negative for rash  Allergic/Immunologic: Negative for environmental allergies  Neurological: Negative for dizziness, weakness and headaches  Psychiatric/Behavioral: Negative for confusion  The patient is not nervous/anxious            Past Medical History:   Diagnosis Date    Atrial fibrillation ([facility] )     Cancer of lung ([facility] )     brain and bones    Diabetes mellitus ([facility] )     Hypercholesteremia     Hypertension     Lung neoplasm     Proteinuria     LAST ASSESSED [id number]    PVD (peripheral vascular disease) ([facility])          Current Outpatient Prescriptions:     JEANNE CONTOUR NEXT TEST test strip, 3 applicators by Does not apply route 3 (three) times a week, Disp: , Rfl:     Osimertinib Mesylate 80 MG TABS, Take 1 tablet (80 mg total) by mouth daily, Disp: 30 tablet, Rfl: 0    predniSONE 10 mg tablet, Take 1 tablet (10 mg total) by mouth daily (Patient not taking: Reported on 12/31/2018 ), Disp: 30 tablet, Rfl: 3    No Known Allergies    Social History   Past Surgical History:   Procedure Laterality Date    HI BRONCHOSCOPY,DIAGNOSTIC N/A 4/24/2018    Procedure: Dwain Becerra;  Surgeon: Kurt Donovan MD;  [facility]: [facility]; Service: Pulmonary    THORACENTESIS N/A 4/24/2018    Procedure: Giovanni Carrera;  Surgeon: Kurt Donovan MD;  [facility]; Service: Pulmonary     Family History   Problem Relation Age of Onset    Diabetes Mother     Diabetes Father     Diabetes Family        Objective:  /60 (BP Location: Left arm, Patient Position: Sitting, Cuff Size: Standard)   Pulse 90   Temp 98 2 °F (36 8 °C) (Tympanic)   Ht 5' 11 5" (1 816 m)   Wt 93 1 kg (205 lb 3 2 oz)   SpO2 98%   BMI 28 22 kg/m²   Body mass index is 28 22 kg/m²  Physical Exam   Constitutional: He appears well-developed  HENT:   Head: Normocephalic  Right Ear: External ear normal    Left Ear: External ear normal    Mouth/Throat: Oropharynx is clear and moist    Eyes: Pupils are equal, round, and reactive to light  No scleral icterus  Neck: Normal range of motion  Neck supple  No tracheal deviation present  No thyromegaly present  Cardiovascular: Normal rate, regular rhythm and normal heart sounds  No murmur heard  Pulmonary/Chest: Effort normal and breath sounds normal  No respiratory distress  He exhibits no tenderness  Abdominal: Soft  Bowel sounds are normal  He exhibits no mass  There is no tenderness  Musculoskeletal: Normal range of motion  He exhibits no edema  Lymphadenopathy:     He has no cervical adenopathy  Neurological: He is alert  No cranial nerve deficit  Skin: Skin is warm  Psychiatric: He has a normal mood and affect   His behavior is normal

## 2019-01-03 ENCOUNTER — HOSPITAL ENCOUNTER (OUTPATIENT)
Dept: RADIOLOGY | Facility: HOSPITAL | Age: 79
Discharge: HOME | End: 2019-01-03
Attending: RADIOLOGY
Payer: COMMERCIAL

## 2019-01-03 DIAGNOSIS — C79.31 SECONDARY MALIGNANT NEOPLASM OF BRAIN AND SPINAL CORD (HCC): ICD-10-CM

## 2019-01-03 DIAGNOSIS — C34.91 PRIMARY MALIGNANT NEOPLASM OF RIGHT LUNG METASTATIC TO OTHER SITE (HCC): ICD-10-CM

## 2019-01-03 DIAGNOSIS — C79.49 SECONDARY MALIGNANT NEOPLASM OF BRAIN AND SPINAL CORD (HCC): ICD-10-CM

## 2019-01-03 PROCEDURE — 70470 CT HEAD/BRAIN W/O & W/DYE: CPT

## 2019-01-03 RX ADMIN — IOHEXOL 100 ML: 350 INJECTION, SOLUTION INTRAVENOUS at 13:11

## 2019-01-07 ENCOUNTER — RADIATION ONCOLOGY FOLLOW-UP (OUTPATIENT)
Dept: RADIATION ONCOLOGY | Facility: HOSPITAL | Age: 79
End: 2019-01-07
Attending: STUDENT IN AN ORGANIZED HEALTH CARE EDUCATION/TRAINING PROGRAM
Payer: COMMERCIAL

## 2019-01-07 ENCOUNTER — CLINICAL SUPPORT (OUTPATIENT)
Dept: RADIATION ONCOLOGY | Facility: HOSPITAL | Age: 79
End: 2019-01-07

## 2019-01-07 VITALS
WEIGHT: 204.6 LBS | BODY MASS INDEX: 28.14 KG/M2 | OXYGEN SATURATION: 99 % | TEMPERATURE: 97.7 F | RESPIRATION RATE: 18 BRPM | DIASTOLIC BLOOD PRESSURE: 82 MMHG | HEART RATE: 94 BPM | SYSTOLIC BLOOD PRESSURE: 124 MMHG

## 2019-01-07 DIAGNOSIS — C34.91 PRIMARY MALIGNANT NEOPLASM OF RIGHT LUNG METASTATIC TO OTHER SITE (HCC): Primary | ICD-10-CM

## 2019-01-07 DIAGNOSIS — C79.49 SECONDARY MALIGNANT NEOPLASM OF BRAIN AND SPINAL CORD (HCC): ICD-10-CM

## 2019-01-07 DIAGNOSIS — C79.31 SECONDARY MALIGNANT NEOPLASM OF BRAIN AND SPINAL CORD (HCC): ICD-10-CM

## 2019-01-07 PROCEDURE — 99214 OFFICE O/P EST MOD 30 MIN: CPT | Performed by: RADIOLOGY

## 2019-01-07 PROCEDURE — G0463 HOSPITAL OUTPT CLINIC VISIT: HCPCS | Performed by: RADIOLOGY

## 2019-01-07 NOTE — PROGRESS NOTES
Elisa Waggoner  1940   Mr James Hernadez is a 69 y o  male       Chief Complaint   Patient presents with    Follow-up     Radiation Oncology       Cancer Staging  Metastatic primary lung cancer [facility])  Staging form: Lung, AJCC 8th Edition  - Clinical stage from 4/24/2018: No stage assigned - Signed by Fariha Cramer PA-C on 10/2/2018      Oncology History    Elisa Waggoner is a 69y o  year old male who presents with a stage IV right lung carcinoma with widespread metastasis involving the mediastinum, left lung, multiple bones, and multiple brain metastasis   He completed radiation therapy to the whole brain on May 9, 2018 and returns today for follow-up visit  Last seen 10/1/18     11/6/18 Dr Bakari Lemon f/u  - fatigue secondary to Omisertinib at 80 mg, decrease in dosage offered, patient decided to continue same dosage  - Glory Anni for bone metastasis stopped per patient request      11/28/18 CT chest  IMPRESSION:   Stable minimal right hilar soft tissue on image 3/41 compatible with treated/residual mass      Decreased size of right hilar and mediastinal nodes      Stable moderate-sized right pleural effusion      Stable diffuse osseous metastases with most severe compression deformity at T5       12/4/18 seen by Fariha Cramer PA-C, med onc  - no changes with systemic treatment      1/3/19 CT Head   IMPRESSION:   Subtle focus of enhancement in the left posterior parietal occipital region, corresponding to enhancing lesion on the MRI from 4/22/2018, though without evidence of corresponding enhancement on subsequent CTs  Possible residual or recurrent disease        Future Appointments:  1/22/19 Dr Bakari Lemon  4/1/19 PCP          Metastatic disease ([facility] [address] )      Metastatic primary lung cancer ([facility] [address] )    4/2018 Initial Diagnosis     Metastatic primary lung cancer ([facility] [address] )         5/2/2018 - 5/31/2018 Chemotherapy     Pembrolizumab/ Alimta/carboplatin x 2 cycles   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, treatment changed to Tagrisso 80 mg 1 tab p o daily  6/18/2018 -  Chemotherapy     Tagrisso 80 mg 1 tab p o daily  Xgeva every other month  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[facility] [address] )    4/2018 Initial Diagnosis     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[facility] [address] )         4/26/2018 - 5/9/2018 Radiation     Treatment:  Course: C1    Plan ID Energy Fractions Dose per Fraction (cGy) Total Dose Delivered (cGy) Elapsed Days   WHOLE BRAIN 6X 10 / 10 300 3,000 13      Treatment dates:  C1: 4/26/2018 - 5/9/2018              Clinical Trial: No    Screening  Tobacco  Current tobacco user: no  If yes, brief counseling provided: No    Hypertension  Hypertension screening performed: yes  Normotensive:  yes  If no, referred to PCP: no    Depression Screening  Screened for depression using PHQ-2: yes    Screened for depression using PHQ-9:  no  Screening positive or negative:  negative  If score >4, was any of the following actions taken?    Additional evaluation for depression, suicide risk assesment, referral to PCP or psychiatry, medication started:  no    Advanced Care Planning for Patients >65 years  Advanced Care Planning Discussed:  yes  Patient named surrogate decision maker or care plan in chart: yes    Health Maintenance   Topic Date Due    Medicare Annual Wellness Visit (AWV)  1940    [facility]  1940    DTaP,Tdap,and Td Vaccines (1 - Tdap) 01/24/1962    Pneumococcal PPSV23/PCV13 65+ Years / High and Highest Risk (1 of 2 - PCV13) 01/24/2006    DM Eye Exam  12/14/2016    INFLUENZA VACCINE  02/28/2019 (Originally 7/1/2018)    URINE MICROALBUMIN  01/08/2019    Fall Risk  05/09/2019    Diabetic Foot Exam  05/09/2019    HEMOGLOBIN A1C  06/18/2019    Depression Screening PHQ  10/01/2019       Patient Active Problem List   Diagnosis    Hypertension    [facility]    Mixed hyperlipidemia    Atrial fibrillation ([facility] [address] )    Peripheral vascular disease ([place] [address] )    Persistent proteinuria    Pleural effusion    Lung neoplasm    Metastatic disease (HCC)    Metastatic primary lung cancer ([place] [address] )    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 [facility])     Past Medical History:   Diagnosis Date    Atrial fibrillation ([place] [address] )     Cancer of lung ([place] [address] )     brain and bones    Diabetes mellitus ([place] [address] )     Hypercholesteremia     Hypertension     Lung neoplasm     Proteinuria     LAST ASSESSED [id number]    PVD (peripheral vascular disease) ([place] [address] )      Past Surgical History:   Procedure Laterality Date    NM BRONCHOSCOPY,DIAGNOSTIC N/A 4/24/2018    Procedure: Allison Hendrix;  Surgeon: Rachna Huizar MD;  [facility]; Service: Pulmonary    THORACENTESIS N/A 4/24/2018    Procedure: Lauren Crowder;  Surgeon: Rachna Huizar MD;  [facility];   Service: Pulmonary     Family History   Problem Relation Age of Onset    Diabetes Mother     Diabetes Father     Diabetes Family      Social History     Social History    Marital status: /Civil Union     Spouse name: N/A    Number of children: N/A    Years of education: N/A     Occupational History    RETIRED      Social History Main Topics    Smoking status: Former Smoker     Types: Cigarettes    Smokeless tobacco: Never Used      Comment: quit 39 yrs ago as of 2018    Alcohol use Yes      Comment: rare    Drug use: No    Sexual activity: Not on file     Other Topics Concern    Not on file     Social History Narrative    ADVANCED DIRECTIVE IN CHART     CAFFEINE USE VIA COFFEE 3 SERVINGS PER DAY            Current Outpatient Prescriptions:     JEANNE CONTOUR NEXT TEST test strip, 3 applicators by Does not apply route 3 (three) times a week, Disp: , Rfl:     Osimertinib Mesylate 80 MG TABS, Take 1 tablet (80 mg total) by mouth daily, Disp: 30 tablet, Rfl: 0    predniSONE 10 mg tablet, Take 1 tablet (10 mg total) by mouth daily (Patient not taking: Reported on 12/31/2018 ), Disp: 30 tablet, Rfl: 3  No Known Allergies    Review of Systems:  Review of Systems   Constitutional: Positive for fatigue  HENT: Negative  Eyes: Negative  Respiratory: Negative  Cardiovascular: Negative  Gastrointestinal: Negative  Endocrine: Negative  Genitourinary: Negative  Musculoskeletal: Positive for arthralgias (generalized)  Skin: Negative  Allergic/Immunologic: Negative  Neurological: Negative  Hematological: Negative  Psychiatric/Behavioral: Negative  Vitals:    01/07/19 1300   BP: 124/82   BP Location: Left arm   Pulse: 94   Resp: 18   Temp: 97 7 °F (36 5 °C)   TempSrc: Temporal   SpO2: 99%   Weight: 92 8 kg (204 lb 9 6 oz)            Imaging:Ct Head W Wo Contrast    Result Date: 1/3/2019  Narrative: CT BRAIN - WITH AND WITHOUT CONTRAST INDICATION:   C79 31: Secondary malignant neoplasm of brain C79 49: Secondary malignant neoplasm of other parts of nervous system C34 91: Malignant neoplasm of unspecified part of right bronchus or lung  Lobe, COMPARISON:  9/21/2018, 6/14/2018 and 4/22/2018  TECHNIQUE:  CT examination of the brain was performed both prior to and after the administration of intravenous contrast   In addition to axial images, coronal reformatted images were created and submitted for interpretation  Radiation dose length product (DLP) for this visit:  2271 mGy-cm   This examination, like all CT scans performed in the [facility], was performed utilizing techniques to minimize radiation dose exposure, including the use of iterative reconstruction and automated exposure control  IV Contrast:  100 mL of iohexol (OMNIPAQUE)  IMAGE QUALITY:  Diagnostic  FINDINGS: PARENCHYMA:  Subtle focus of enhancement in the left posterior parietal occipital region, best visualized on coronal series 700, image 88 and corresponding series 5 image 22    No corresponding enhancing lesion on the prior to CTs though finding likely corresponds to an enhancing lesion demonstrated on the MRI from 4/22/2018  Subtle area of hypoattenuation in the left frontal region corresponding to treated lesion  Stable hypoattenuating periventricular and subcortical lesions consistent with microangiopathic disease  No intracranial hemorrhage or mass effect  VENTRICLES AND EXTRA-AXIAL SPACES:  No hydrocephalus or extra-axial collection  VISUALIZED ORBITS AND PARANASAL SINUSES:  No retro-orbital lesion  No paranasal sinus disease  CALVARIUM:  Chronic, tiny bilateral mastoid effusions  No evidence of lytic or blastic lesion or soft tissue mass  Impression: Subtle focus of enhancement in the left posterior parietal occipital region, corresponding to enhancing lesion on the MRI from 4/22/2018, though without evidence of corresponding enhancement on subsequent CTs  Possible residual or recurrent disease   Workstation performed: [id number]

## 2019-01-07 NOTE — PROGRESS NOTES
Follow-up - Radiation Oncology   Lou Dutton 1940 69 y o  male [id number]      History of Present Illness   Cancer Staging  Metastatic primary lung cancer [facility])  Staging form: Lung, AJCC 8th Edition  - Clinical stage from 4/24/2018: No stage assigned - Signed by Vilma Avilez PA-C on 10/2/2018      Lou Dutton is a 69y o  year old male who returns for f/u post radiation to the brain for metastatic lung cancer  His treatment was completed 5/9/18, and he was seen last in June 2018  Pt has been switched from Alimta/Carbo/Pembro to now receiving Tagrisso, orally, since June 2018  Per Dr Ramirez Field he is tolerating well  Also he receives McPherson-barre, every other month  Has some dysphagia to solids, fatigue, and has had falls in the recent past    8/30/18 Ct scan of the chest, abd pelvis revealed Marked improvement in mediastinal and right hilar lymphadenopathy/tumor mass indicative of treatment response      Marked improvement in postobstructive atelectasis and/or tumor burden in the right lung      Right pleural effusion is slightly increased in size when compared to prior examination   An oval structure within the right major fissure almost certainly representing loculated portion of right pleural effusion requires close interval follow-up to the crescentic margin of hyperenhancement, probably atelectasis, but for which associated tumor cannot be entirely excluded      Marked progression of mixed density sclerotic and lytic metastatic disease with new left compression fractures at T3 and L1 as well as worsening pathologic compression fracture at T5   The marked progression of sclerotic osseous tumor could indicate   either progression of sclerotic osseous metastatic disease or perhaps newly visible tumor masses which have increased sclerosis is result of treatment      Unchanged 45 mm ascending thoracic aortic aneurysm      9/21/18 Ct head: Subtle tiny focus of enhancement right centrum semiovale may represent tiny metastasis noted on MR 4/22/2018   This was not evident on prior study possibly, related to slice selection   Otherwise, no indication of intracranial metastatic disease  Historical Information      Metastatic disease ([place] [address] )      Metastatic primary lung cancer ([facility] )    4/2018 Initial Diagnosis     Metastatic primary lung cancer ([facility] )         5/2/2018 - 5/31/2018 Chemotherapy     Pembrolizumab/ Alimta/carboplatin x 2 cycles  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, treatment changed to Tagrisso 80 mg 1 tab p o daily  6/18/2018 -  Chemotherapy     Tagrisso 80 mg 1 tab p o daily  Xgeva every other month  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[facility] )    4/2018 Initial Diagnosis     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[place] [address] )         4/26/2018 - 5/9/2018 Radiation     Treatment:  Course: C1    Plan ID Energy Fractions Dose per Fraction (cGy) Total Dose Delivered (cGy) Elapsed Days   WHOLE BRAIN 6X 10 / 10 300 3,000 13      Treatment dates:  C1: 4/26/2018 - 5/9/2018              Past Medical History:   Diagnosis Date    Atrial fibrillation (HCC)     Cancer of lung (HCC)     brain and bones    Diabetes mellitus ([place] [address] )     Hypercholesteremia     Hypertension     Lung neoplasm     Proteinuria     LAST ASSESSED [id number]    PVD (peripheral vascular disease) ([place] [address] )      Past Surgical History:   Procedure Laterality Date    VA BRONCHOSCOPY,DIAGNOSTIC N/A 4/24/2018    Procedure: Arnaldo Crespo;  Surgeon: Theresa Luciano MD;  Location: [facility]; Service: Pulmonary    THORACENTESIS N/A 4/24/2018    Procedure: Severiano Lard;  Surgeon: Theresa Luciano MD;  Location: [facility];   Service: Pulmonary       Social History   History   Alcohol Use    Yes     Comment: rare     History   Drug Use No     History   Smoking Status    Former Smoker    Types: Cigarettes   Smokeless Tobacco    Never Used     Comment: quit 39 yrs ago as of 2018         Meds/Allergies     Current Outpatient Prescriptions:     JEANNE CONTOUR NEXT TEST test strip, 3 applicators by Does not apply route 3 (three) times a week, Disp: , Rfl:     Osimertinib Mesylate 80 MG TABS, Take 1 tablet (80 mg total) by mouth daily, Disp: 30 tablet, Rfl: 0    predniSONE 10 mg tablet, Take 1 tablet (10 mg total) by mouth daily (Patient not taking: Reported on 12/31/2018 ), Disp: 30 tablet, Rfl: 3  No Known Allergies    Screening  Tobacco  Current tobacco user: no  If yes, brief counseling provided: NA     Hypertension  Hypertension screening performed: yes  Normotensive:  yes  If no, referred to PCP: no     Depression Screening  Screened for depression using PHQ-2: yes     Screened for depression using PHQ-9:  yes  Screening positive or negative:  negative  If score >4, was any of the following actions taken? Additional evaluation for depression, suicide risk assesment, referral to PCP or psychiatry, medication started:  n/a     Advanced Care Planning for Patients >65 years  Advanced Care Planning Discussed:  yes  Patient named surrogate decision maker or care plan in chart: yes  Review of Systems   Constitutional: Positive for activity change (more active), appetite change (much better) and fatigue (rated 2, but varies)  HENT: Negative  Eyes: Positive for photophobia  Respiratory: Positive for cough (rarely) and shortness of breath (with exhertion)  Cardiovascular: Negative  Gastrointestinal: Negative  Endocrine: Positive for heat intolerance (hot temp bothers him )  Genitourinary: Positive for frequency  Nocturia 0-3   Musculoskeletal: Positive for arthralgias (rt hip when he lays on the rt side)  Skin: Positive for wound (skin tears)  Allergic/Immunologic: Negative  Neurological:        Occasionally off balance   Hematological: Bruises/bleeds easily  Psychiatric/Behavioral: Negative          OBJECTIVE:   /82 (BP Location: Left arm)   Pulse 94   Temp 97 7 °F (36 5 °C) (Temporal) Resp 18   Wt 92 8 kg (204 lb 9 6 oz)   SpO2 99%   BMI 28 14 kg/m²   Pain Assessment:  0  ECOG/Zubrod/WHO: 1 - Symptomatic but completely ambulatory    Physical Exam GENERAL:  Appears stated age, in no apparent distress  Alert and oriented  HEENT:  Normocephalic, atraumatic   extraocular muscles intact  Oral mucosa moist   PULMONARY:  Respirations unlabored  CARDIOVASCULAR:  Regular rate  ABDOMEN:  Soft, nondistended  NEUROLOGIC: Moving all extremities, No focal deficits noted  EXTREMITIES: no clubbing, cyanosis, or edema  PSYCHIATRIC: normal mood and affect  Appropriate thought content and judgement  RESULTS    Lab Results:   Recent Results (from the past 672 hour(s))   Hemoglobin A1C    Collection Time: 12/18/18  7:16 AM   Result Value Ref Range    Hemoglobin A1C 6 4 (H) 4 2 - 6 3 %     mg/dl       Imaging Studies:Ct Head W Wo Contrast    Result Date: 1/3/2019  Narrative: CT BRAIN - WITH AND WITHOUT CONTRAST INDICATION:   C79 31: Secondary malignant neoplasm of brain C79 49: Secondary malignant neoplasm of other parts of nervous system C34 91: Malignant neoplasm of unspecified part of right bronchus or lung  Lobe, COMPARISON:  9/21/2018, 6/14/2018 and 4/22/2018  TECHNIQUE:  CT examination of the brain was performed both prior to and after the administration of intravenous contrast   In addition to axial images, coronal reformatted images were created and submitted for interpretation  Radiation dose length product (DLP) for this visit:  2271 mGy-cm   This examination, like all CT scans performed in the [facility], was performed utilizing techniques to minimize radiation dose exposure, including the use of iterative reconstruction and automated exposure control  IV Contrast:  100 mL of iohexol (OMNIPAQUE)  IMAGE QUALITY:  Diagnostic   FINDINGS: PARENCHYMA:  Subtle focus of enhancement in the left posterior parietal occipital region, best visualized on coronal series 700, image 88 and corresponding series 5 image 22  No corresponding enhancing lesion on the prior to CTs though finding likely corresponds to an enhancing lesion demonstrated on the MRI from 4/22/2018  Subtle area of hypoattenuation in the left frontal region corresponding to treated lesion  Stable hypoattenuating periventricular and subcortical lesions consistent with microangiopathic disease  No intracranial hemorrhage or mass effect  VENTRICLES AND EXTRA-AXIAL SPACES:  No hydrocephalus or extra-axial collection  VISUALIZED ORBITS AND PARANASAL SINUSES:  No retro-orbital lesion  No paranasal sinus disease  CALVARIUM:  Chronic, tiny bilateral mastoid effusions  No evidence of lytic or blastic lesion or soft tissue mass  Impression: Subtle focus of enhancement in the left posterior parietal occipital region, corresponding to enhancing lesion on the MRI from 4/22/2018, though without evidence of corresponding enhancement on subsequent CTs  Possible residual or recurrent disease  Workstation performed: [id number]           Assessment/Plan:  Orders Placed This Encounter   Procedures    CT head w contrast    BUN    Creatinine, serum        Chad Monreal is a 69y o  year old male with stage IV right lung carcinoma with widespread metastasis involving the mediastinum, left lung, multiple bones, and multiple brain metastasis   He completed radiation therapy to the whole brain on May 9, 2018 and returns today for follow-up visit  Patient continues on Louisa Ganong, which he is tolerating well under the care of Dr Blanca Sim  Most recent CT head is stable with no new evidence of intracranial progression  Patient's case was discussed with Dr Avinash Santos, who advises recommended continued surveillance CT head with contrast as patient declines MRI due to claustrophobia  He will return in 4 months        Lisa Mcbride MD  1/7/2019,2:52 PM    Portions of the record may have been created with voice recognition software   Occasional wrong word or "sound a like" substitutions may have occurred due to the inherent limitations of voice recognition software   Read the chart carefully and recognize, using context, where substitutions have occurred

## 2019-01-22 ENCOUNTER — OFFICE VISIT (OUTPATIENT)
Dept: HEMATOLOGY ONCOLOGY | Facility: CLINIC | Age: 79
End: 2019-01-22
Payer: COMMERCIAL

## 2019-01-22 VITALS
WEIGHT: 203.2 LBS | HEART RATE: 109 BPM | SYSTOLIC BLOOD PRESSURE: 128 MMHG | HEIGHT: 71 IN | DIASTOLIC BLOOD PRESSURE: 78 MMHG | TEMPERATURE: 97.9 F | RESPIRATION RATE: 20 BRPM | BODY MASS INDEX: 28.45 KG/M2 | OXYGEN SATURATION: 97 %

## 2019-01-22 DIAGNOSIS — C34.90 EGFR-RELATED LUNG CANCER (HCC): Primary | ICD-10-CM

## 2019-01-22 PROCEDURE — 99214 OFFICE O/P EST MOD 30 MIN: CPT | Performed by: INTERNAL MEDICINE

## 2019-02-13 ENCOUNTER — DOCUMENTATION (OUTPATIENT)
Dept: HEMATOLOGY ONCOLOGY | Facility: CLINIC | Age: 79
End: 2019-02-13

## 2019-02-13 NOTE — PROGRESS NOTES
Received E-mail from providers office stating patient inquiring on renewal application for Camilo Kaufman  Call to patient  He stated he spoke with someone at the [facility] where he took in all of his information for the renewal application  He stated he has been trying to follow up with them however no calls have been returned  I asked him what number he has been calling and he stated [phone] then he  is transferred where he has left messages  Called [place] and spoke with Brook Mcardle who stated the renewal application was never submitted  They did a courtesy fill back on 1-9-19 and patient received and sign for the fill 1-14-19  They are unable at this point to give another fill since the application is not on file   I am currently going to submit a renewal with the information received from Miguel and then call Jeana Tirado back at 2pm they will pull the application and expedite for an overnight fill  [place] Application completed, signed and faxed to  with all supporting documents for immediate processing  2-14-19    Marianne Silver 48 Assistance-Renewal Application approved thru [employer]  Valid 2-14-19  Thru 12-31-19  Spoke with Thanh Anderson and [place]  Pharmacy will contact patient to set up shipment  E-mail to group   Patient notified

## 2019-03-19 ENCOUNTER — HOSPITAL ENCOUNTER (OUTPATIENT)
Dept: RADIOLOGY | Facility: HOSPITAL | Age: 79
Discharge: HOME | End: 2019-03-19
Attending: INTERNAL MEDICINE
Payer: COMMERCIAL

## 2019-03-19 ENCOUNTER — TRANSCRIBE ORDERS (OUTPATIENT)
Dept: RADIOLOGY | Facility: HOSPITAL | Age: 79
End: 2019-03-19

## 2019-03-19 ENCOUNTER — APPOINTMENT (OUTPATIENT)
Dept: LAB | Age: 79
End: 2019-03-19
Payer: COMMERCIAL

## 2019-03-19 DIAGNOSIS — E11.8 TYPE 2 DIABETES MELLITUS WITH COMPLICATION, UNSPECIFIED WHETHER LONG TERM INSULIN USE: ICD-10-CM

## 2019-03-19 DIAGNOSIS — D49.1 LUNG NEOPLASM: ICD-10-CM

## 2019-03-19 DIAGNOSIS — C34.90 EGFR-RELATED LUNG CANCER (HCC): ICD-10-CM

## 2019-03-19 LAB
ALBUMIN SERPL BCP-MCNC: 3.8 G/DL (ref 3.5–5)
ALP SERPL-CCNC: 79 U/L (ref 46–116)
ALT SERPL W P-5'-P-CCNC: 13 U/L (ref 12–78)
ANION GAP SERPL CALCULATED.3IONS-SCNC: 5 MMOL/L (ref 4–13)
AST SERPL W P-5'-P-CCNC: 17 U/L (ref 5–45)
BILIRUB SERPL-MCNC: 0.78 MG/DL (ref 0.2–1)
BUN SERPL-MCNC: 29 MG/DL (ref 5–25)
CALCIUM SERPL-MCNC: 9 MG/DL (ref 8.3–10.1)
CHLORIDE SERPL-SCNC: 104 MMOL/L (ref 100–108)
CHOLEST SERPL-MCNC: 158 MG/DL (ref 50–200)
CO2 SERPL-SCNC: 28 MMOL/L (ref 21–32)
CREAT SERPL-MCNC: 1.35 MG/DL (ref 0.6–1.3)
ERYTHROCYTE [DISTWIDTH] IN BLOOD BY AUTOMATED COUNT: 15.2 % (ref 11.6–15.1)
EST. AVERAGE GLUCOSE BLD GHB EST-MCNC: 131 MG/DL
GFR SERPL CREATININE-BSD FRML MDRD: 50 ML/MIN/1.73SQ M
GLUCOSE P FAST SERPL-MCNC: 94 MG/DL (ref 65–99)
HBA1C MFR BLD: 6.2 % (ref 4.2–6.3)
HCT VFR BLD AUTO: 37.3 % (ref 36.5–49.3)
HDLC SERPL-MCNC: 36 MG/DL (ref 40–60)
HGB BLD-MCNC: 12.1 G/DL (ref 12–17)
LDLC SERPL CALC-MCNC: 100 MG/DL (ref 0–100)
MCH RBC QN AUTO: 29.2 PG (ref 26.8–34.3)
MCHC RBC AUTO-ENTMCNC: 32.4 G/DL (ref 31.4–37.4)
MCV RBC AUTO: 90 FL (ref 82–98)
PLATELET # BLD AUTO: 187 THOUSANDS/UL (ref 149–390)
PMV BLD AUTO: 10.3 FL (ref 8.9–12.7)
POTASSIUM SERPL-SCNC: 4 MMOL/L (ref 3.5–5.3)
PROT SERPL-MCNC: 7.1 G/DL (ref 6.4–8.2)
RBC # BLD AUTO: 4.15 MILLION/UL (ref 3.88–5.62)
SODIUM SERPL-SCNC: 137 MMOL/L (ref 136–145)
TRIGL SERPL-MCNC: 108 MG/DL
WBC # BLD AUTO: 5.7 THOUSAND/UL (ref 4.31–10.16)

## 2019-03-19 PROCEDURE — 83036 HEMOGLOBIN GLYCOSYLATED A1C: CPT

## 2019-03-19 PROCEDURE — 80053 COMPREHEN METABOLIC PANEL: CPT

## 2019-03-19 PROCEDURE — 71250 CT THORAX DX C-: CPT

## 2019-03-19 PROCEDURE — 85027 COMPLETE CBC AUTOMATED: CPT

## 2019-03-19 PROCEDURE — 36415 COLL VENOUS BLD VENIPUNCTURE: CPT

## 2019-03-19 PROCEDURE — 80061 LIPID PANEL: CPT

## 2019-03-26 ENCOUNTER — OFFICE VISIT (OUTPATIENT)
Dept: HEMATOLOGY ONCOLOGY | Facility: CLINIC | Age: 79
End: 2019-03-26
Payer: COMMERCIAL

## 2019-03-26 VITALS
SYSTOLIC BLOOD PRESSURE: 120 MMHG | RESPIRATION RATE: 16 BRPM | WEIGHT: 203 LBS | DIASTOLIC BLOOD PRESSURE: 70 MMHG | HEART RATE: 74 BPM | BODY MASS INDEX: 28.42 KG/M2 | TEMPERATURE: 98.7 F | HEIGHT: 71 IN | OXYGEN SATURATION: 98 %

## 2019-03-26 DIAGNOSIS — C79.49 SECONDARY MALIGNANT NEOPLASM OF BRAIN AND SPINAL CORD (HCC): ICD-10-CM

## 2019-03-26 DIAGNOSIS — C34.91 PRIMARY MALIGNANT NEOPLASM OF RIGHT LUNG METASTATIC TO OTHER SITE (HCC): Primary | ICD-10-CM

## 2019-03-26 DIAGNOSIS — C79.31 SECONDARY MALIGNANT NEOPLASM OF BRAIN AND SPINAL CORD (HCC): ICD-10-CM

## 2019-03-26 PROCEDURE — 99214 OFFICE O/P EST MOD 30 MIN: CPT | Performed by: INTERNAL MEDICINE

## 2019-03-26 NOTE — PROGRESS NOTES
Hematology Outpatient Follow - Up Note  Janet Spencer 67 y o  male MRN: @ Encounter: [id number]        Date:  3/26/2019        Assessment/ Plan:  31-year-old male with stage IV adenocarcinoma of the lung primary in the right lower lobe of the lung with malignant pleural effusion, bony metastases diagnosed in April 2018, molecular tests on good biopsy showed EGFR mutation, he was treated initially with 1 dose of Alimta/carboplatin/Pembrolizumab however after the results of the liquid biopsy came back with 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 treatment was changed to Omisertinib 80 mg p  O  Daily since June 2018    Most recent CT scan of the chest showed stable disease with right pleural effusion, possible might increase of T11 vertebral body lesion no new lesions, continue treatment    Brain metastases status post brain radiation therapy followed by radiation oncology    Follow-up in 2 months with CBC, CMP           HPI:  Janet Spencer is a 71-year-old  male who used to smoke 2 pack of cigarettes daily for 20 years, quit 40 years ago  He noticed dyspnea with cough without hemoptysis, 10 lb weight loss with intermittent anterior chest pain with radiation to the right lateral side in the beginning of 2018  CT scan in April 2018 showed dried large hilar mass with bronchial obstruction, small right pleural effusion, multiple lumbar spine metastatic disease status post right thoracentesis with atypical cellular changes cannot exclude neoplasia, the patient had bronchoscopy and right lower lobe bronchial brushing showed conclusive evidence of malignancy with non-small cell lung cancer favor adenocarcinoma that stained positive for TTF 1, napsin and absent for p40  CT scan of the abdomen and pelvis showed small pericardial effusion, osseous metastases at L1, L2, L4, L5, obstructive collapse of the right middle lobe and the lower lobe    MRI of the brain showed widespread brain metastases involving the supra and the infratentorial compartments  The patient underwent radiation therapy to the brain finished in May 2018  He received 2 cycles of Pembrolizumab, Alimta, carboplatin however 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, the therapy was changed to tagresso 80 mg p o  Daily by the end of June 2018  The last thoracentesis on the right side was done in June 14, 2018      Repeat CT scan of the chest in September 2018 showed significant decrease in the size of the mediastinal, subcarinal lymphadenopathy decrease in the size of the right lung mass, stable or slightly increased right pleural effusion          Interval History:        Previous Treatment:         Test Results:    Imaging: Ct Chest Wo Contrast    Result Date: 3/23/2019  Narrative: CT CHEST WITHOUT IV CONTRAST INDICATION:   C34 90: Malignant neoplasm of unspecified part of unspecified bronchus or lung  COMPARISON:  11/28/2018 TECHNIQUE: CT examination of the chest was performed without intravenous contrast   Axial, sagittal, and coronal 2D reformatted images were created from the source data and submitted for interpretation  Radiation dose length product (DLP) for this visit:  471 26 mGy-cm   This examination, like all CT scans performed in the [facility], was performed utilizing techniques to minimize radiation dose exposure, including the use of iterative  reconstruction and automated exposure control  FINDINGS: LUNGS:  Unchanged right lower lobe compressive atelectasis  No endobronchial or endotracheal abnormality  PLEURA:  Stable moderate right pleural effusion  Audi Noam HEART/GREAT VESSELS:  Unchanged small pericardial effusion  MEDIASTINUM AND DWIGHT:  Stable 1 1 cm right hilar lymph node  Stable 1 8 cm left paratracheal lymph node  Stable P vascular node measuring 7 mm  CHEST WALL AND LOWER NECK:   Unremarkable  VISUALIZED STRUCTURES IN THE UPPER ABDOMEN:  Unremarkable  OSSEOUS STRUCTURES:  Stable T5 compression deformity    T8 blastic lesion appears slightly larger measuring 1 7 cm in diameter on sagittal images in comparison to 1 3 cm on the prior exam   T7 blastic lesion also appears slightly larger  Other blastic lesions appears stable  Healed rib fractures unchanged  Impression: A few osseous lesions appear slightly more prominent concerning for progression of disease  Findings in the chest including pleural effusion, parenchymal changes in the right lung, and mediastinal lymph node size is appear unchanged  The study was marked in EPIC for significant notification  Workstation performed: [id number]       Labs:   Lab Results   Component Value Date    WBC 5 70 03/19/2019    HGB 12 1 03/19/2019    HCT 37 3 03/19/2019    MCV 90 03/19/2019     03/19/2019     Lab Results   Component Value Date    K 4 0 03/19/2019     03/19/2019    CO2 28 03/19/2019    BUN 29 (H) 03/19/2019    CREATININE 1 35 (H) 03/19/2019    GLUF 94 03/19/2019    CALCIUM 9 0 03/19/2019    AST 17 03/19/2019    ALT 13 03/19/2019    ALKPHOS 79 03/19/2019    EGFR 50 03/19/2019       No results found for: IRON, TIBC, FERRITIN    No results found for: [id number]      ROS:   Review of Systems   Constitutional: Negative for activity change, appetite change, chills, diaphoresis, fatigue, fever and unexpected weight change  HENT: Negative for congestion, dental problem, facial swelling, hearing loss, mouth sores, nosebleeds, postnasal drip, rhinorrhea, sore throat, trouble swallowing and voice change  Eyes: Negative for photophobia, pain, discharge, redness, itching and visual disturbance  Respiratory: Negative for cough, choking, chest tightness, shortness of breath and wheezing  Cardiovascular: Negative for chest pain, palpitations and leg swelling  Gastrointestinal: Negative for abdominal distention, abdominal pain, anal bleeding, blood in stool, constipation, diarrhea, nausea, rectal pain and vomiting  Endocrine: Negative for cold intolerance and heat intolerance  Genitourinary: Negative for decreased urine volume, difficulty urinating, dysuria, flank pain, frequency, hematuria and urgency  Musculoskeletal: Negative for arthralgias, back pain, gait problem, joint swelling, myalgias, neck pain and neck stiffness  Skin: Negative for color change, pallor, rash and wound  Allergic/Immunologic: Negative for immunocompromised state  Neurological: Negative for dizziness, tremors, seizures, syncope, facial asymmetry, speech difficulty, weakness, light-headedness, numbness and headaches  Hematological: Negative for adenopathy  Does not bruise/bleed easily  Psychiatric/Behavioral: Negative for agitation, confusion, decreased concentration, dysphoric mood and sleep disturbance  The patient is not nervous/anxious  All other systems reviewed and are negative  Current Medications: Reviewed  Allergies: Reviewed  PMH/FH/SH:  Reviewed      Physical Exam:    Body surface area is 2 12 meters squared  Wt Readings from Last 3 Encounters:   03/26/19 92 1 kg (203 lb)   01/22/19 92 2 kg (203 lb 3 2 oz)   01/07/19 92 8 kg (204 lb 9 6 oz)        Temp Readings from Last 3 Encounters:   03/26/19 98 7 °F (37 1 °C) (Tympanic)   01/22/19 97 9 °F (36 6 °C) (Tympanic Core)   01/07/19 97 7 °F (36 5 °C) (Temporal)        BP Readings from Last 3 Encounters:   03/26/19 120/70   01/22/19 128/78   01/07/19 124/82         Pulse Readings from Last 3 Encounters:   03/26/19 74   01/22/19 (!) 109   01/07/19 94        Physical Exam   Constitutional: He is oriented to person, place, and time  He appears well-developed and well-nourished  No distress  HENT:   Head: Normocephalic and atraumatic  Mouth/Throat: Oropharynx is clear and moist  No oropharyngeal exudate  Eyes: Pupils are equal, round, and reactive to light  Conjunctivae and EOM are normal    Neck: Normal range of motion  Neck supple  No tracheal deviation present  No thyromegaly present  Cardiovascular: Normal rate   Exam reveals no gallop and no friction rub  Murmur heard  Pulmonary/Chest: Effort normal and breath sounds normal  No respiratory distress  He has no wheezes  He has no rales  He exhibits no tenderness  Abdominal: Soft  Bowel sounds are normal  He exhibits no distension and no mass  There is no tenderness  There is no rebound and no guarding  Musculoskeletal: Normal range of motion  Lymphadenopathy:     He has no cervical adenopathy  Neurological: He is alert and oriented to person, place, and time  Skin: Skin is warm and dry  No rash noted  He is not diaphoretic  No erythema  No pallor  Psychiatric: He has a normal mood and affect  His behavior is normal  Judgment and thought content normal    Vitals reviewed  Goals and Barriers:  Current Goal: Minimize effects of disease  Barriers: None  Patient's Capacity to Self Care:  Patient is able to self care      Code Status: @[facility]@

## 2019-04-01 ENCOUNTER — OFFICE VISIT (OUTPATIENT)
Dept: INTERNAL MEDICINE CLINIC | Age: 79
End: 2019-04-01
Payer: COMMERCIAL

## 2019-04-01 VITALS
HEART RATE: 92 BPM | WEIGHT: 206.6 LBS | BODY MASS INDEX: 28.81 KG/M2 | SYSTOLIC BLOOD PRESSURE: 110 MMHG | OXYGEN SATURATION: 98 % | DIASTOLIC BLOOD PRESSURE: 62 MMHG | TEMPERATURE: 98.1 F

## 2019-04-01 DIAGNOSIS — C34.91 PRIMARY MALIGNANT NEOPLASM OF RIGHT LUNG METASTATIC TO OTHER SITE (HCC): ICD-10-CM

## 2019-04-01 DIAGNOSIS — Z00.00 MEDICARE ANNUAL WELLNESS VISIT, SUBSEQUENT: ICD-10-CM

## 2019-04-01 DIAGNOSIS — E11.8 TYPE 2 DIABETES MELLITUS WITH COMPLICATION, UNSPECIFIED WHETHER LONG TERM INSULIN USE: Primary | ICD-10-CM

## 2019-04-01 PROBLEM — I10 HYPERTENSION: Status: RESOLVED | Noted: 2018-01-25 | Resolved: 2019-04-01

## 2019-04-01 PROCEDURE — G0439 PPPS, SUBSEQ VISIT: HCPCS | Performed by: INTERNAL MEDICINE

## 2019-04-01 PROCEDURE — 1160F RVW MEDS BY RX/DR IN RCRD: CPT | Performed by: INTERNAL MEDICINE

## 2019-04-01 PROCEDURE — 1125F AMNT PAIN NOTED PAIN PRSNT: CPT | Performed by: INTERNAL MEDICINE

## 2019-04-01 PROCEDURE — 1036F TOBACCO NON-USER: CPT | Performed by: INTERNAL MEDICINE

## 2019-04-01 PROCEDURE — 1170F FXNL STATUS ASSESSED: CPT | Performed by: INTERNAL MEDICINE

## 2019-04-01 PROCEDURE — 99214 OFFICE O/P EST MOD 30 MIN: CPT | Performed by: INTERNAL MEDICINE

## 2019-04-02 ENCOUNTER — DOCUMENTATION (OUTPATIENT)
Dept: HEMATOLOGY ONCOLOGY | Facility: CLINIC | Age: 79
End: 2019-04-02

## 2019-04-30 ENCOUNTER — APPOINTMENT (OUTPATIENT)
Dept: LAB | Age: 79
End: 2019-04-30
Payer: COMMERCIAL

## 2019-04-30 DIAGNOSIS — C79.31 SECONDARY MALIGNANT NEOPLASM OF BRAIN AND SPINAL CORD (HCC): ICD-10-CM

## 2019-04-30 DIAGNOSIS — C79.49 SECONDARY MALIGNANT NEOPLASM OF BRAIN AND SPINAL CORD (HCC): ICD-10-CM

## 2019-04-30 LAB
BUN SERPL-MCNC: 18 MG/DL (ref 5–25)
CREAT SERPL-MCNC: 1.12 MG/DL (ref 0.6–1.3)
GFR SERPL CREATININE-BSD FRML MDRD: 63 ML/MIN/1.73SQ M

## 2019-04-30 PROCEDURE — 82565 ASSAY OF CREATININE: CPT

## 2019-04-30 PROCEDURE — 36415 COLL VENOUS BLD VENIPUNCTURE: CPT

## 2019-04-30 PROCEDURE — 84520 ASSAY OF UREA NITROGEN: CPT

## 2019-05-09 ENCOUNTER — HOSPITAL ENCOUNTER (OUTPATIENT)
Dept: RADIOLOGY | Facility: HOSPITAL | Age: 79
Discharge: HOME | End: 2019-05-09
Attending: STUDENT IN AN ORGANIZED HEALTH CARE EDUCATION/TRAINING PROGRAM
Payer: COMMERCIAL

## 2019-05-09 DIAGNOSIS — C79.31 SECONDARY MALIGNANT NEOPLASM OF BRAIN AND SPINAL CORD (HCC): ICD-10-CM

## 2019-05-09 DIAGNOSIS — C79.49 SECONDARY MALIGNANT NEOPLASM OF BRAIN AND SPINAL CORD (HCC): ICD-10-CM

## 2019-05-09 PROCEDURE — 70470 CT HEAD/BRAIN W/O & W/DYE: CPT

## 2019-05-09 RX ADMIN — IOHEXOL 100 ML: 350 INJECTION, SOLUTION INTRAVENOUS at 07:38

## 2019-05-29 ENCOUNTER — OFFICE VISIT (OUTPATIENT)
Dept: HEMATOLOGY ONCOLOGY | Facility: CLINIC | Age: 79
End: 2019-05-29
Payer: COMMERCIAL

## 2019-05-29 VITALS
RESPIRATION RATE: 16 BRPM | HEART RATE: 94 BPM | DIASTOLIC BLOOD PRESSURE: 84 MMHG | HEIGHT: 72 IN | SYSTOLIC BLOOD PRESSURE: 126 MMHG | TEMPERATURE: 98.9 F | BODY MASS INDEX: 27.22 KG/M2 | WEIGHT: 201 LBS

## 2019-05-29 DIAGNOSIS — C79.31 SECONDARY MALIGNANT NEOPLASM OF BRAIN AND SPINAL CORD (HCC): ICD-10-CM

## 2019-05-29 DIAGNOSIS — C79.49 SECONDARY MALIGNANT NEOPLASM OF BRAIN AND SPINAL CORD (HCC): ICD-10-CM

## 2019-05-29 DIAGNOSIS — D49.1 LUNG NEOPLASM: Primary | ICD-10-CM

## 2019-05-29 PROCEDURE — 99213 OFFICE O/P EST LOW 20 MIN: CPT | Performed by: PHYSICIAN ASSISTANT

## 2019-06-10 ENCOUNTER — RADIATION ONCOLOGY FOLLOW-UP (OUTPATIENT)
Dept: RADIATION ONCOLOGY | Facility: HOSPITAL | Age: 79
End: 2019-06-10
Attending: RADIOLOGY
Payer: COMMERCIAL

## 2019-06-10 VITALS
SYSTOLIC BLOOD PRESSURE: 130 MMHG | WEIGHT: 202 LBS | DIASTOLIC BLOOD PRESSURE: 80 MMHG | RESPIRATION RATE: 18 BRPM | BODY MASS INDEX: 27.78 KG/M2 | HEART RATE: 92 BPM | TEMPERATURE: 97.4 F | OXYGEN SATURATION: 97 %

## 2019-06-10 DIAGNOSIS — C34.91 PRIMARY MALIGNANT NEOPLASM OF RIGHT LUNG METASTATIC TO OTHER SITE (HCC): ICD-10-CM

## 2019-06-10 DIAGNOSIS — C79.31 SECONDARY MALIGNANT NEOPLASM OF BRAIN AND SPINAL CORD (HCC): Primary | ICD-10-CM

## 2019-06-10 DIAGNOSIS — C79.49 SECONDARY MALIGNANT NEOPLASM OF BRAIN AND SPINAL CORD (HCC): Primary | ICD-10-CM

## 2019-06-10 PROCEDURE — G0463 HOSPITAL OUTPT CLINIC VISIT: HCPCS | Performed by: RADIOLOGY

## 2019-06-10 PROCEDURE — 99211 OFF/OP EST MAY X REQ PHY/QHP: CPT | Performed by: RADIOLOGY

## 2019-06-17 ENCOUNTER — TELEPHONE (OUTPATIENT)
Dept: HEMATOLOGY ONCOLOGY | Facility: CLINIC | Age: 79
End: 2019-06-17

## 2019-06-18 DIAGNOSIS — C34.90 MALIGNANT NEOPLASM OF LUNG, UNSPECIFIED LATERALITY, UNSPECIFIED PART OF LUNG (HCC): ICD-10-CM

## 2019-06-18 NOTE — TELEPHONE ENCOUNTER
Refilled and faxed script to [phone] [facility]  Will call in 2 hours to make sure they overnight the script to the patient

## 2019-06-27 ENCOUNTER — APPOINTMENT (OUTPATIENT)
Dept: LAB | Age: 79
End: 2019-06-27
Payer: COMMERCIAL

## 2019-06-27 DIAGNOSIS — E11.8 TYPE 2 DIABETES MELLITUS WITH COMPLICATION, UNSPECIFIED WHETHER LONG TERM INSULIN USE: ICD-10-CM

## 2019-06-27 DIAGNOSIS — C34.90 EGFR-RELATED LUNG CANCER (HCC): ICD-10-CM

## 2019-06-27 LAB
ALBUMIN SERPL BCP-MCNC: 4.1 G/DL (ref 3.5–5)
ALP SERPL-CCNC: 75 U/L (ref 46–116)
ALT SERPL W P-5'-P-CCNC: 13 U/L (ref 12–78)
ANION GAP SERPL CALCULATED.3IONS-SCNC: 5 MMOL/L (ref 4–13)
AST SERPL W P-5'-P-CCNC: 12 U/L (ref 5–45)
BASOPHILS # BLD AUTO: 0.02 THOUSANDS/ΜL (ref 0–0.1)
BASOPHILS NFR BLD AUTO: 0 % (ref 0–1)
BILIRUB SERPL-MCNC: 1 MG/DL (ref 0.2–1)
BUN SERPL-MCNC: 22 MG/DL (ref 5–25)
CALCIUM SERPL-MCNC: 9.7 MG/DL (ref 8.3–10.1)
CHLORIDE SERPL-SCNC: 101 MMOL/L (ref 100–108)
CO2 SERPL-SCNC: 29 MMOL/L (ref 21–32)
CREAT SERPL-MCNC: 1.31 MG/DL (ref 0.6–1.3)
EOSINOPHIL # BLD AUTO: 0.2 THOUSAND/ΜL (ref 0–0.61)
EOSINOPHIL NFR BLD AUTO: 4 % (ref 0–6)
ERYTHROCYTE [DISTWIDTH] IN BLOOD BY AUTOMATED COUNT: 15.3 % (ref 11.6–15.1)
EST. AVERAGE GLUCOSE BLD GHB EST-MCNC: 128 MG/DL
GFR SERPL CREATININE-BSD FRML MDRD: 52 ML/MIN/1.73SQ M
GLUCOSE P FAST SERPL-MCNC: 97 MG/DL (ref 65–99)
HBA1C MFR BLD: 6.1 % (ref 4.2–6.3)
HCT VFR BLD AUTO: 39.6 % (ref 36.5–49.3)
HGB BLD-MCNC: 12.9 G/DL (ref 12–17)
IMM GRANULOCYTES # BLD AUTO: 0.01 THOUSAND/UL (ref 0–0.2)
IMM GRANULOCYTES NFR BLD AUTO: 0 % (ref 0–2)
LYMPHOCYTES # BLD AUTO: 1.4 THOUSANDS/ΜL (ref 0.6–4.47)
LYMPHOCYTES NFR BLD AUTO: 28 % (ref 14–44)
MCH RBC QN AUTO: 28.8 PG (ref 26.8–34.3)
MCHC RBC AUTO-ENTMCNC: 32.6 G/DL (ref 31.4–37.4)
MCV RBC AUTO: 88 FL (ref 82–98)
MONOCYTES # BLD AUTO: 0.49 THOUSAND/ΜL (ref 0.17–1.22)
MONOCYTES NFR BLD AUTO: 10 % (ref 4–12)
NEUTROPHILS # BLD AUTO: 2.98 THOUSANDS/ΜL (ref 1.85–7.62)
NEUTS SEG NFR BLD AUTO: 58 % (ref 43–75)
NRBC BLD AUTO-RTO: 0 /100 WBCS
PLATELET # BLD AUTO: 171 THOUSANDS/UL (ref 149–390)
PMV BLD AUTO: 10.3 FL (ref 8.9–12.7)
POTASSIUM SERPL-SCNC: 4.3 MMOL/L (ref 3.5–5.3)
PROT SERPL-MCNC: 7.8 G/DL (ref 6.4–8.2)
RBC # BLD AUTO: 4.48 MILLION/UL (ref 3.88–5.62)
SODIUM SERPL-SCNC: 135 MMOL/L (ref 136–145)
WBC # BLD AUTO: 5.1 THOUSAND/UL (ref 4.31–10.16)

## 2019-06-27 PROCEDURE — 83036 HEMOGLOBIN GLYCOSYLATED A1C: CPT

## 2019-06-27 PROCEDURE — 85025 COMPLETE CBC W/AUTO DIFF WBC: CPT | Performed by: PHYSICIAN ASSISTANT

## 2019-06-27 PROCEDURE — 80053 COMPREHEN METABOLIC PANEL: CPT | Performed by: PHYSICIAN ASSISTANT

## 2019-06-27 PROCEDURE — 36415 COLL VENOUS BLD VENIPUNCTURE: CPT | Performed by: PHYSICIAN ASSISTANT

## 2019-07-05 ENCOUNTER — OFFICE VISIT (OUTPATIENT)
Dept: INTERNAL MEDICINE CLINIC | Age: 79
End: 2019-07-05
Payer: COMMERCIAL

## 2019-07-05 VITALS
TEMPERATURE: 98.1 F | DIASTOLIC BLOOD PRESSURE: 60 MMHG | OXYGEN SATURATION: 98 % | BODY MASS INDEX: 27.81 KG/M2 | WEIGHT: 202.2 LBS | HEART RATE: 70 BPM | SYSTOLIC BLOOD PRESSURE: 134 MMHG

## 2019-07-05 DIAGNOSIS — E11.8 TYPE 2 DIABETES MELLITUS WITH COMPLICATION, UNSPECIFIED WHETHER LONG TERM INSULIN USE: Primary | ICD-10-CM

## 2019-07-05 DIAGNOSIS — I73.9 PERIPHERAL VASCULAR DISEASE (HCC): ICD-10-CM

## 2019-07-05 DIAGNOSIS — C79.9 METASTATIC DISEASE (HCC): ICD-10-CM

## 2019-07-05 DIAGNOSIS — E78.2 MIXED HYPERLIPIDEMIA: ICD-10-CM

## 2019-07-05 DIAGNOSIS — R80.1 PERSISTENT PROTEINURIA: ICD-10-CM

## 2019-07-05 PROCEDURE — 99214 OFFICE O/P EST MOD 30 MIN: CPT | Performed by: INTERNAL MEDICINE

## 2019-07-05 NOTE — PROGRESS NOTES
Diabetic Foot Exam    Patient's shoes and socks removed  Right Foot/Ankle   Right Foot Inspection  Skin Exam: dry skin                            Sensory       Monofilament testing: intact  Vascular    The right DP pulse is 2+  The right PT pulse is 2+  Left Foot/Ankle  Left Foot Inspection  Skin Exam: dry skin                                         Sensory       Monofilament: intact  Vascular    The left DP pulse is 2+  The left PT pulse is 2+  Assign Risk Category:  No deformity present;  No loss of protective sensation;        Risk: 0

## 2019-07-05 NOTE — PROGRESS NOTES
Assessment/Plan:    1  Type 2 diabetes mellitus  Hemoglobin A1c 6 1  Will continue with the lifestyle modification  Patient is not on any antidiabetic medicine due to significant weight loss  Will continue to monitor hemoglobin A1c  Continue with diabetic diet    2  Protein urea  Will check urine for microalbumin before next visit    3  Metastatic lung cancer   In remission  Being followed by oncologist and Neurosurgery  Diagnoses and all orders for this visit:    Type 2 diabetes mellitus with complication, unspecified whether long term insulin use ([facility] )  -     Basic metabolic panel; Future  -     Hemoglobin A1C; Future  -     Lipid Panel with Direct LDL reflex; Future  -     Microalbumin / creatinine urine ratio    Peripheral vascular disease (HCC)    Mixed hyperlipidemia  -     Lipid Panel with Direct LDL reflex; Future    Persistent proteinuria    Metastatic disease (HCC)          Subjective:          Patient ID: Adebayo Epps is a 67 y o  male  Patient is here for regular follow-up  Does have blood work done and would like to discuss results  Otherwise no new complaints  Appetite is improving  The following portions of the patient's history were reviewed and updated as appropriate: allergies, current medications, past family history, past medical history, past social history, past surgical history and problem list     Review of Systems   Constitutional: Negative for fatigue and fever  HENT: Negative for congestion, ear discharge, ear pain, postnasal drip, sinus pressure, sore throat, tinnitus and trouble swallowing  Eyes: Negative for discharge, itching and visual disturbance  Respiratory: Negative for cough and shortness of breath  Cardiovascular: Negative for chest pain and palpitations  Gastrointestinal: Negative for abdominal pain, diarrhea, nausea and vomiting  Endocrine: Negative for cold intolerance and polyuria     Genitourinary: Negative for difficulty urinating, dysuria and urgency  Musculoskeletal: Negative for arthralgias and neck pain  Skin: Negative for rash  Allergic/Immunologic: Negative for environmental allergies  Neurological: Negative for dizziness, weakness and headaches  Psychiatric/Behavioral: Negative for agitation and behavioral problems  The patient is not nervous/anxious  Past Medical History:   Diagnosis Date    [facility]     Cancer of lung ([facility] )     brain and bones    Diabetes mellitus ([facility] )     Hypercholesteremia     Hypertension     Lung neoplasm     Proteinuria     LAST ASSESSED [id number]    [facility]peripheral vascular disease) [facility]          Current Outpatient Medications:     JEANNE CONTOUR NEXT TEST test strip, 3 applicators by Does not apply route 3 (three) times a week, Disp: , Rfl:     Osimertinib Mesylate 80 MG TABS, Take 1 tablet (80 mg total) by mouth daily, Disp: 30 tablet, Rfl: 6    No Known Allergies    Social History   Past Surgical History:   Procedure Laterality Date    ID BRONCHOSCOPY,DIAGNOSTIC N/A 4/24/2018    Procedure: Bryan Bailon;  Surgeon: Hayde Canales MD;  Location: [facility]; Service: Pulmonary    THORACENTESIS N/A 4/24/2018    Procedure: Vanda Rondon;  Surgeon: Hayde Canales MD;  Location: [facility]; Service: Pulmonary     Family History   Problem Relation Age of Onset    Diabetes Mother     Diabetes Father     Diabetes Family        Objective:  /60 (BP Location: Left arm, Patient Position: Sitting, Cuff Size: Standard)   Pulse 70   Temp 98 1 °F (36 7 °C) (Tympanic)   Wt 91 7 kg (202 lb 3 2 oz) Comment: shoes on  SpO2 98%   BMI 27 81 kg/m²   Body mass index is 27 81 kg/m²  Physical Exam   Constitutional: He appears well-developed  HENT:   Head: Normocephalic  Mouth/Throat: Oropharynx is clear and moist    Eyes: Pupils are equal, round, and reactive to light  No scleral icterus  Neck: Normal range of motion  Neck supple   No tracheal deviation present  No thyromegaly present  Cardiovascular: Normal rate, regular rhythm and normal heart sounds  Pulmonary/Chest: Effort normal and breath sounds normal  No respiratory distress  He exhibits no tenderness  Abdominal: Soft  Bowel sounds are normal  He exhibits no mass  There is no tenderness  Musculoskeletal: Normal range of motion  Lymphadenopathy:     He has no cervical adenopathy  Neurological: He is alert  No cranial nerve deficit  Skin: Skin is warm  Psychiatric: He has a normal mood and affect

## 2019-07-29 ENCOUNTER — HOSPITAL ENCOUNTER (OUTPATIENT)
Dept: RADIOLOGY | Facility: HOSPITAL | Age: 79
Discharge: HOME | End: 2019-07-29
Payer: COMMERCIAL

## 2019-07-29 DIAGNOSIS — C79.49 SECONDARY MALIGNANT NEOPLASM OF BRAIN AND SPINAL CORD (HCC): ICD-10-CM

## 2019-07-29 DIAGNOSIS — C79.31 SECONDARY MALIGNANT NEOPLASM OF BRAIN AND SPINAL CORD (HCC): ICD-10-CM

## 2019-07-29 DIAGNOSIS — D49.1 LUNG NEOPLASM: ICD-10-CM

## 2019-07-29 PROCEDURE — 71250 CT THORAX DX C-: CPT

## 2019-08-02 ENCOUNTER — OFFICE VISIT (OUTPATIENT)
Dept: HEMATOLOGY ONCOLOGY | Facility: CLINIC | Age: 79
End: 2019-08-02
Payer: COMMERCIAL

## 2019-08-02 VITALS
RESPIRATION RATE: 14 BRPM | HEART RATE: 80 BPM | SYSTOLIC BLOOD PRESSURE: 120 MMHG | DIASTOLIC BLOOD PRESSURE: 60 MMHG | TEMPERATURE: 98.7 F | WEIGHT: 199 LBS | HEIGHT: 72 IN | BODY MASS INDEX: 26.95 KG/M2

## 2019-08-02 DIAGNOSIS — C79.31 SECONDARY MALIGNANT NEOPLASM OF BRAIN AND SPINAL CORD (HCC): ICD-10-CM

## 2019-08-02 DIAGNOSIS — C79.49 SECONDARY MALIGNANT NEOPLASM OF BRAIN AND SPINAL CORD (HCC): ICD-10-CM

## 2019-08-02 DIAGNOSIS — C34.91 PRIMARY MALIGNANT NEOPLASM OF RIGHT LUNG METASTATIC TO OTHER SITE (HCC): Primary | ICD-10-CM

## 2019-08-02 PROCEDURE — 99214 OFFICE O/P EST MOD 30 MIN: CPT | Performed by: INTERNAL MEDICINE

## 2019-08-02 NOTE — PROGRESS NOTES
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, he received 1 cycle of Alimta/carboplatin/pembrolizumab have the treatment changed to Omisertinib 80 mg p o  Daily after obtaining the molecular results he is on 80 mg p o  Daily he has some fatigue mild, CT scan showed stable disease with mild right pleural effusion, asymptomatic    Continue treatment and follow-up in 2 months with CBC, CMP    No need for thoracentesis    Fatigue induced by Omisertinib grade 1        HPI: Adebayo Epps is a 71-year-old  male who used to smoke 2 pack of cigarettes daily for 20 years, quit 40 years ago  He noticed dyspnea with cough without hemoptysis, 10 lb weight loss with intermittent anterior chest pain with radiation to the right lateral side in the beginning of 2018  CT scan in April 2018 showed dried large hilar mass with bronchial obstruction, small right pleural effusion, multiple lumbar spine metastatic disease status post right thoracentesis with atypical cellular changes cannot exclude neoplasia, the patient had bronchoscopy and right lower lobe bronchial brushing showed conclusive evidence of malignancy with non-small cell lung cancer favor adenocarcinoma that stained positive for TTF 1, napsin and absent for p40  CT scan of the abdomen and pelvis showed small pericardial effusion, osseous metastases at L1, L2, L4, L5, obstructive collapse of the right middle lobe and the lower lobe  MRI of the brain showed widespread brain metastases involving the supra and the infratentorial compartments    The patient underwent radiation therapy to the brain finished in May 2018      He received 2 cycles of Pembrolizumab, Alimta, carboplatin however 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, the therapy was changed to tagresso 80 mg p o  Daily by the end of June 2018     The last thoracentesis on the right side was done in June 14, 2018  CT scan chest on 03/2019 showed stable disease, small to moderate right pleural effusion    Repeat CT scan of the chest in July 2019 showed stable disease again small to moderate right pleural effusion no new lesions              Test Results:    Imaging: Ct Chest Wo Contrast    Result Date: 8/1/2019  Narrative: CT CHEST WITHOUT IV CONTRAST INDICATION:   D49 1: Neoplasm of unspecified behavior of the system C79 31: Secondary malignant neoplasm of brain C79 49: Secondary malignant neoplasm of other parts of nervous system  Excerpt from Oncology progress note dated 6/10/2019: "67year old male with Stage IV right lung carcinoma with widespread metastasis involving the mediastinum, left lung, multiple bones, and multiple brain metastasis  He completed radiation therapy to the whole brain on May 9, 2018  He has been taking Jeraline Kayla since 6/18/18 " COMPARISON:  CT chest 3/19/2019 and 11/28/2018  TECHNIQUE: CT examination of the chest was performed without intravenous contrast   Axial, sagittal, and coronal 2D reformatted images were created from the source data and submitted for interpretation  Radiation dose length product (DLP) for this visit:  573 57 mGy-cm   This examination, like all CT scans performed in the [facility], was performed utilizing techniques to minimize radiation dose exposure, including the use of iterative  reconstruction and automated exposure control  FINDINGS: LUNGS:  Lungs are clear  There is no tracheal or endobronchial lesion  PLEURA:  Slightly smaller, now small to moderate size right pleural effusion  HEART/GREAT VESSELS:  Dense coronary artery calcifications  Ascending aortic aneurysm measuring 46 mm   MEDIASTINUM AND DWIGHT:  Previously seen right parahilar node measuring 12 mm in short axis now measures 11 mm  The previously seen left paratracheal node now measures 13 mm in short axis and is unchanged  Stable 7 mm prevascular lymph node  CHEST WALL AND LOWER NECK:   Unremarkable  VISUALIZED STRUCTURES IN THE UPPER ABDOMEN:  Unremarkable  OSSEOUS STRUCTURES:  Stable diffuse blastic and lytic osseous metastases  Stable T5 compression deformity  Healed bilateral rib fractures  Impression: Slightly smaller small to moderate right pleural effusion  Unchanged diffuse lytic and blastic osseous metastases  Stable mediastinal/right parahilar lymph nodes  Stable ascending aortic aneurysm measuring 46 mm Workstation performed: [id number]       Labs:   Lab Results   Component Value Date    WBC 5 10 06/27/2019    HGB 12 9 06/27/2019    HCT 39 6 06/27/2019    MCV 88 06/27/2019     06/27/2019     Lab Results   Component Value Date    K 4 3 06/27/2019     06/27/2019    CO2 29 06/27/2019    BUN 22 06/27/2019    CREATININE 1 31 (H) 06/27/2019    GLUF 97 06/27/2019    CALCIUM 9 7 06/27/2019    AST 12 06/27/2019    ALT 13 06/27/2019    ALKPHOS 75 06/27/2019    EGFR 52 06/27/2019       No results found for: IRON, TIBC, FERRITIN    No results found for: [id number]      ROS:   Review of Systems   Constitutional: Negative for activity change, appetite change, chills, diaphoresis, fatigue, fever and unexpected weight change  HENT: Negative for congestion, dental problem, facial swelling, hearing loss, mouth sores, nosebleeds, postnasal drip, rhinorrhea, sore throat, trouble swallowing and voice change  Eyes: Negative for photophobia, pain, discharge, redness, itching and visual disturbance  Respiratory: Positive for shortness of breath (Exertional dyspnea very mild)  Negative for cough, choking, chest tightness and wheezing  Cardiovascular: Negative for chest pain, palpitations and leg swelling     Gastrointestinal: Negative for abdominal distention, abdominal pain, anal bleeding, blood in stool, constipation, diarrhea, nausea, rectal pain and vomiting  Endocrine: Negative for cold intolerance and heat intolerance  Genitourinary: Negative for decreased urine volume, difficulty urinating, dysuria, flank pain, frequency, hematuria and urgency  Musculoskeletal: Negative for arthralgias, back pain, gait problem, joint swelling, myalgias, neck pain and neck stiffness  Skin: Negative for color change, pallor, rash and wound  Allergic/Immunologic: Negative for immunocompromised state  Neurological: Negative for dizziness, tremors, seizures, syncope, facial asymmetry, speech difficulty, weakness, light-headedness, numbness and headaches  Hematological: Negative for adenopathy  Does not bruise/bleed easily  Psychiatric/Behavioral: Negative for agitation, confusion, decreased concentration, dysphoric mood and sleep disturbance  The patient is not nervous/anxious  All other systems reviewed and are negative  Current Medications: Reviewed  Allergies: Reviewed  PMH/FH/SH:  Reviewed      Physical Exam:    Body surface area is 2 12 meters squared  Wt Readings from Last 3 Encounters:   08/02/19 90 3 kg (199 lb)   07/05/19 91 7 kg (202 lb 3 2 oz)   06/10/19 91 6 kg (202 lb)        Temp Readings from Last 3 Encounters:   08/02/19 98 7 °F (37 1 °C)   07/05/19 98 1 °F (36 7 °C) (Tympanic)   06/10/19 (!) 97 4 °F (36 3 °C) (Temporal)        BP Readings from Last 3 Encounters:   08/02/19 120/60   07/05/19 134/60   06/10/19 130/80         Pulse Readings from Last 3 Encounters:   08/02/19 80   07/05/19 70   06/10/19 92        Physical Exam   Constitutional: He is oriented to person, place, and time  He appears well-developed and well-nourished  No distress  HENT:   Head: Normocephalic and atraumatic  Eyes: Conjunctivae are normal    Neck: Normal range of motion  Neck supple  No tracheal deviation present     Cardiovascular: Normal rate and regular rhythm  Exam reveals no gallop and no friction rub  No murmur heard  Pulmonary/Chest: Effort normal and breath sounds normal  No respiratory distress  He has no wheezes  He has no rales  He exhibits no tenderness  Abdominal: Soft  He exhibits no distension  There is no tenderness  Lymphadenopathy:     He has no cervical adenopathy  Neurological: He is alert and oriented to person, place, and time  Skin: Skin is warm and dry  He is not diaphoretic  No erythema  No pallor  Psychiatric: He has a normal mood and affect  His behavior is normal  Judgment and thought content normal    Vitals reviewed  ECO    Goals and Barriers:  Current Goal: Minimize effects of disease  Barriers: None  Patient's Capacity to Self Care:  Patient is able to self care      Code Status: [unfilled]

## 2019-09-23 ENCOUNTER — TELEPHONE (OUTPATIENT)
Dept: HEMATOLOGY ONCOLOGY | Facility: CLINIC | Age: 79
End: 2019-09-23

## 2019-09-23 NOTE — TELEPHONE ENCOUNTER
Pt called and stated he never got his mail order sent to him this is what he spelled twice but im not sure if this is the medication as it is not on his med list he would like his dsimertind 80mg sent to [address] [facility] P:[phone]

## 2019-09-24 DIAGNOSIS — C34.90 MALIGNANT NEOPLASM OF LUNG, UNSPECIFIED LATERALITY, UNSPECIFIED PART OF LUNG (HCC): ICD-10-CM

## 2019-09-26 ENCOUNTER — TELEPHONE (OUTPATIENT)
Dept: HEMATOLOGY ONCOLOGY | Facility: CLINIC | Age: 79
End: 2019-09-26

## 2019-09-26 NOTE — TELEPHONE ENCOUNTER
Pt called and said was at office beginning of the week asking about medication that has not received yet    Please call back

## 2019-09-26 NOTE — TELEPHONE ENCOUNTER
Returned tc spoke with pt  Explained that I have been [address] at  mulitple times daily as waiting on hold for 30 minutes or more and have not been able to talk to anyone  Justa has reached out to a   Waiting to hear back  I have also reached out to our finance team and no seems to have a an phone number  Pt states he has 2 pills left and that he has appointment with Dr Brennan Padgett next week

## 2019-09-30 ENCOUNTER — TELEPHONE (OUTPATIENT)
Dept: HEMATOLOGY ONCOLOGY | Facility: CLINIC | Age: 79
End: 2019-09-30

## 2019-09-30 ENCOUNTER — APPOINTMENT (OUTPATIENT)
Dept: LAB | Age: 79
End: 2019-09-30
Payer: COMMERCIAL

## 2019-09-30 DIAGNOSIS — C34.91 PRIMARY MALIGNANT NEOPLASM OF RIGHT LUNG METASTATIC TO OTHER SITE (HCC): ICD-10-CM

## 2019-09-30 DIAGNOSIS — C79.31 SECONDARY MALIGNANT NEOPLASM OF BRAIN AND SPINAL CORD (HCC): ICD-10-CM

## 2019-09-30 DIAGNOSIS — C79.49 SECONDARY MALIGNANT NEOPLASM OF BRAIN AND SPINAL CORD (HCC): ICD-10-CM

## 2019-09-30 LAB
ALBUMIN SERPL BCP-MCNC: 4 G/DL (ref 3.5–5)
ALP SERPL-CCNC: 79 U/L (ref 46–116)
ALT SERPL W P-5'-P-CCNC: 14 U/L (ref 12–78)
ANION GAP SERPL CALCULATED.3IONS-SCNC: 5 MMOL/L (ref 4–13)
AST SERPL W P-5'-P-CCNC: 16 U/L (ref 5–45)
BASOPHILS # BLD AUTO: 0.02 THOUSANDS/ΜL (ref 0–0.1)
BASOPHILS NFR BLD AUTO: 0 % (ref 0–1)
BILIRUB SERPL-MCNC: 0.93 MG/DL (ref 0.2–1)
BUN SERPL-MCNC: 26 MG/DL (ref 5–25)
CALCIUM SERPL-MCNC: 9.8 MG/DL (ref 8.3–10.1)
CHLORIDE SERPL-SCNC: 103 MMOL/L (ref 100–108)
CO2 SERPL-SCNC: 27 MMOL/L (ref 21–32)
CREAT SERPL-MCNC: 1.25 MG/DL (ref 0.6–1.3)
EOSINOPHIL # BLD AUTO: 0.19 THOUSAND/ΜL (ref 0–0.61)
EOSINOPHIL NFR BLD AUTO: 4 % (ref 0–6)
ERYTHROCYTE [DISTWIDTH] IN BLOOD BY AUTOMATED COUNT: 15.4 % (ref 11.6–15.1)
GFR SERPL CREATININE-BSD FRML MDRD: 55 ML/MIN/1.73SQ M
GLUCOSE P FAST SERPL-MCNC: 97 MG/DL (ref 65–99)
HCT VFR BLD AUTO: 37.2 % (ref 36.5–49.3)
HGB BLD-MCNC: 12.1 G/DL (ref 12–17)
IMM GRANULOCYTES # BLD AUTO: 0.01 THOUSAND/UL (ref 0–0.2)
IMM GRANULOCYTES NFR BLD AUTO: 0 % (ref 0–2)
LYMPHOCYTES # BLD AUTO: 1.38 THOUSANDS/ΜL (ref 0.6–4.47)
LYMPHOCYTES NFR BLD AUTO: 26 % (ref 14–44)
MCH RBC QN AUTO: 29.1 PG (ref 26.8–34.3)
MCHC RBC AUTO-ENTMCNC: 32.5 G/DL (ref 31.4–37.4)
MCV RBC AUTO: 89 FL (ref 82–98)
MONOCYTES # BLD AUTO: 0.52 THOUSAND/ΜL (ref 0.17–1.22)
MONOCYTES NFR BLD AUTO: 10 % (ref 4–12)
NEUTROPHILS # BLD AUTO: 3.23 THOUSANDS/ΜL (ref 1.85–7.62)
NEUTS SEG NFR BLD AUTO: 60 % (ref 43–75)
NRBC BLD AUTO-RTO: 0 /100 WBCS
PLATELET # BLD AUTO: 152 THOUSANDS/UL (ref 149–390)
PMV BLD AUTO: 10.5 FL (ref 8.9–12.7)
POTASSIUM SERPL-SCNC: 4.5 MMOL/L (ref 3.5–5.3)
PROT SERPL-MCNC: 7.3 G/DL (ref 6.4–8.2)
RBC # BLD AUTO: 4.16 MILLION/UL (ref 3.88–5.62)
SODIUM SERPL-SCNC: 135 MMOL/L (ref 136–145)
WBC # BLD AUTO: 5.35 THOUSAND/UL (ref 4.31–10.16)

## 2019-09-30 PROCEDURE — 85025 COMPLETE CBC W/AUTO DIFF WBC: CPT

## 2019-09-30 PROCEDURE — 36415 COLL VENOUS BLD VENIPUNCTURE: CPT

## 2019-09-30 PROCEDURE — 80053 COMPREHEN METABOLIC PANEL: CPT

## 2019-09-30 NOTE — TELEPHONE ENCOUNTER
Tc spoke with pt explained that we are still working on getting the tagrisso rx filled and that a new pharmacy will be calling you to set up delivery date  Pt is aware he has a fu appt on Friday 10/4/19  Tc to [facility] spoke with [facility]  She stated that they have all the info  needed and that Arsh Gibson is reviewing and they will call pt to set up delivery and that they know he is almost out of the medication

## 2019-10-04 ENCOUNTER — OFFICE VISIT (OUTPATIENT)
Dept: HEMATOLOGY ONCOLOGY | Facility: CLINIC | Age: 79
End: 2019-10-04
Payer: COMMERCIAL

## 2019-10-04 ENCOUNTER — TELEPHONE (OUTPATIENT)
Dept: HEMATOLOGY ONCOLOGY | Facility: CLINIC | Age: 79
End: 2019-10-04

## 2019-10-04 VITALS
BODY MASS INDEX: 27.5 KG/M2 | SYSTOLIC BLOOD PRESSURE: 122 MMHG | HEART RATE: 90 BPM | DIASTOLIC BLOOD PRESSURE: 60 MMHG | OXYGEN SATURATION: 98 % | HEIGHT: 72 IN | WEIGHT: 203 LBS | RESPIRATION RATE: 14 BRPM | TEMPERATURE: 97.5 F

## 2019-10-04 DIAGNOSIS — C79.9 METASTATIC DISEASE (HCC): ICD-10-CM

## 2019-10-04 DIAGNOSIS — C34.91 PRIMARY MALIGNANT NEOPLASM OF RIGHT LUNG METASTATIC TO OTHER SITE (HCC): Primary | ICD-10-CM

## 2019-10-04 DIAGNOSIS — J90 PLEURAL EFFUSION: ICD-10-CM

## 2019-10-04 PROCEDURE — 99214 OFFICE O/P EST MOD 30 MIN: CPT | Performed by: INTERNAL MEDICINE

## 2019-10-04 NOTE — TELEPHONE ENCOUNTER
I called Sheri Greenberg to f/u about patients supply of Tegresso  Yohan Castillo had a visit with Dr Jael Cuellar this morning and reported he has not received his medication to date  She explained that the [facility] information had been received and the request was now at the [facility] likely awaiting shipping to patient  She advised if after he receives the 15 day supply, he has not received the regular monthly supply, our office can submit another request for an additional 15 day supply  I gave her my email address and asked that the form needed for completing be sent to me and I will share this with Dr Terese Avilez clinic nurses as well

## 2019-10-04 NOTE — TELEPHONE ENCOUNTER
I spoke with Krunal Garcia to confirm the [employer] support services reached him to confirm delivery of his 15 day supply of [employer]  He confirmed that he spoke with them and will be receiving his medication tomorrow  I gave him my contact number and asked that he keep me posted on receiving his monthly supply, so that if needed the 15 day supply can continue to be delivered in a timely manner as needed  He verbalized understanding and agreed to this plan  Dr Min Kaplan alerted that patient will receive [facility] tomorrow

## 2019-10-04 NOTE — TELEPHONE ENCOUNTER
I called [facility] to inquire about Narinder's Tegresso medication (15 day supply) being shipped to him  I was told the medication is ready to ship but they have to confirm receipt of shipment with Terrie Khanna  I was passed to the support services and spoke with [facility]  She had a note that patient was called and no answer  I verified his contact information and asked that she call him after we hung up  I also got a direct contact number for the support services so that I can give that information to him if they didn't connect  [address] [place] [address] support services contact number is [phone]

## 2019-10-04 NOTE — PROGRESS NOTES
Hematology Outpatient Follow - Up Note  Abran Pavon 67 y o  male MRN: @ Encounter: [id number]        Date:  10/4/2019        Assessment/ Plan:    Stage IV adenocarcinoma of the lung primary in the right lower lobe of the lung with the malignant pleural effusion, bone metastases diagnosed in April 2018, molecular tests showed EGFR mutation, L858R he received 2 cycle of Alimta/carboplatin/Pembrolizumab and the treatment has to change to Omisertinib 80 mg p o  Daily after obtaining the molecular tests  Physical examination showed no evidence of pleural effusion  CBC, CMP is normal    Follow-up in 3 months with CT scan of the chest without IV contrast, CBC, CMP    We had to make phone calls to the farm a representative to obtain free samples patient had been out of the drug for the past 15 days  Brain metastases widespread involving the supra and the infratentorial compartment status post radiation therapy finished in May 2018              HPI: Abran Pavon is a 71-year-old  male who used to smoke 2 pack of cigarettes daily for 20 years, quit 40 years ago  He noticed dyspnea with cough without hemoptysis, 10 lb weight loss with intermittent anterior chest pain with radiation to the right lateral side in the beginning of 2018  CT scan in April 2018 showed dried large hilar mass with bronchial obstruction, small right pleural effusion, multiple lumbar spine metastatic disease status post right thoracentesis with atypical cellular changes cannot exclude neoplasia, the patient had bronchoscopy and right lower lobe bronchial brushing showed conclusive evidence of malignancy with non-small cell lung cancer favor adenocarcinoma that stained positive for TTF 1, napsin and absent for p40  CT scan of the abdomen and pelvis showed small pericardial effusion, osseous metastases at L1, L2, L4, L5, obstructive collapse of the right middle lobe and the lower lobe    MRI of the brain showed widespread brain metastases involving the supra and the infratentorial compartments  The patient underwent radiation therapy to the brain finished in May 2018      He received 2 cycles of Pembrolizumab, Alimta, carboplatin however 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, the therapy was changed to tagresso 80 mg p o  Daily by the end of June 2018     The last thoracentesis on the right side was done in June 14, 2018       CT scan chest on 03/2019 showed stable disease, small to moderate right pleural effusion     Repeat CT scan of the chest in July 2019 showed stable disease again small to moderate right pleural effusion no new lesions     Interval History:  He has been of Omisertinib for the past 15 days because of logistics       Previous Treatment:         Test Results:    Imaging: No results found  Labs:   Lab Results   Component Value Date    WBC 5 35 09/30/2019    HGB 12 1 09/30/2019    HCT 37 2 09/30/2019    MCV 89 09/30/2019     09/30/2019     Lab Results   Component Value Date    K 4 5 09/30/2019     09/30/2019    CO2 27 09/30/2019    BUN 26 (H) 09/30/2019    CREATININE 1 25 09/30/2019    GLUF 97 09/30/2019    CALCIUM 9 8 09/30/2019    AST 16 09/30/2019    ALT 14 09/30/2019    ALKPHOS 79 09/30/2019    EGFR 55 09/30/2019       No results found for: IRON, TIBC, FERRITIN    No results found for: [id number]      ROS:   Review of Systems   Constitutional: Negative for activity change, appetite change, chills, diaphoresis, fatigue, fever and unexpected weight change  HENT: Negative for congestion, dental problem, facial swelling, hearing loss, mouth sores, nosebleeds, postnasal drip, rhinorrhea, sore throat, trouble swallowing and voice change  Eyes: Negative for photophobia, pain, discharge, redness, itching and visual disturbance  Respiratory: Negative for cough, choking, chest tightness, shortness of breath and wheezing  Cardiovascular: Negative for chest pain, palpitations and leg swelling  Gastrointestinal: Negative for abdominal distention, abdominal pain, anal bleeding, blood in stool, constipation, diarrhea, nausea, rectal pain and vomiting  Endocrine: Negative for cold intolerance and heat intolerance  Genitourinary: Negative for decreased urine volume, difficulty urinating, dysuria, flank pain, frequency, hematuria and urgency  Musculoskeletal: Negative for arthralgias, back pain, gait problem, joint swelling, myalgias, neck pain and neck stiffness  Skin: Negative for color change, pallor, rash and wound  Allergic/Immunologic: Negative for immunocompromised state  Neurological: Negative for dizziness, tremors, seizures, syncope, facial asymmetry, speech difficulty, weakness, light-headedness, numbness and headaches  Hematological: Negative for adenopathy  Does not bruise/bleed easily  Psychiatric/Behavioral: Negative for agitation, confusion, decreased concentration, dysphoric mood and sleep disturbance  The patient is nervous/anxious (Frustrated about not having Omisertinib)  All other systems reviewed and are negative  Current Medications: Reviewed  Allergies: Reviewed  PMH/FH/SH:  Reviewed      Physical Exam:    Body surface area is 2 13 meters squared  Wt Readings from Last 3 Encounters:   10/04/19 92 1 kg (203 lb)   08/02/19 90 3 kg (199 lb)   07/05/19 91 7 kg (202 lb 3 2 oz)        Temp Readings from Last 3 Encounters:   10/04/19 97 5 °F (36 4 °C)   08/02/19 98 7 °F (37 1 °C)   07/05/19 98 1 °F (36 7 °C) (Tympanic)        BP Readings from Last 3 Encounters:   10/04/19 122/60   08/02/19 120/60   07/05/19 134/60         Pulse Readings from Last 3 Encounters:   10/04/19 90   08/02/19 80   07/05/19 70        Physical Exam   Constitutional: He is oriented to person, place, and time  He appears well-developed and well-nourished  No distress  Using cane for ambulation   HENT:   Head: Normocephalic and atraumatic     Eyes: Conjunctivae are normal    Neck: Normal range of motion  Neck supple  No tracheal deviation present  Cardiovascular: Normal rate and regular rhythm  Exam reveals no gallop and no friction rub  No murmur heard  Pulmonary/Chest: Effort normal and breath sounds normal  No respiratory distress  He has no wheezes  He has no rales  He exhibits no tenderness  Abdominal: Soft  He exhibits no distension  There is no tenderness  Musculoskeletal: He exhibits no edema  Lymphadenopathy:     He has no cervical adenopathy  Neurological: He is alert and oriented to person, place, and time  Skin: Skin is warm and dry  He is not diaphoretic  No erythema  No pallor  Psychiatric: He has a normal mood and affect  His behavior is normal  Judgment and thought content normal    Vitals reviewed  ECO  Goals and Barriers:  Current Goal: Minimize effects of disease  Barriers: None  Patient's Capacity to Self Care:  Patient is able to self care      Code Status: @[facility]@

## 2019-10-21 ENCOUNTER — APPOINTMENT (OUTPATIENT)
Dept: LAB | Age: 79
End: 2019-10-21
Payer: COMMERCIAL

## 2019-10-21 DIAGNOSIS — C34.91 PRIMARY MALIGNANT NEOPLASM OF RIGHT LUNG METASTATIC TO OTHER SITE (HCC): ICD-10-CM

## 2019-10-21 DIAGNOSIS — E78.2 MIXED HYPERLIPIDEMIA: ICD-10-CM

## 2019-10-21 DIAGNOSIS — C79.9 METASTATIC DISEASE (HCC): ICD-10-CM

## 2019-10-21 DIAGNOSIS — J90 PLEURAL EFFUSION: ICD-10-CM

## 2019-10-21 DIAGNOSIS — E11.8 TYPE 2 DIABETES MELLITUS WITH COMPLICATION (HCC): ICD-10-CM

## 2019-10-21 LAB
ALBUMIN SERPL BCP-MCNC: 4.2 G/DL (ref 3.5–5)
ALP SERPL-CCNC: 85 U/L (ref 46–116)
ALT SERPL W P-5'-P-CCNC: 12 U/L (ref 12–78)
ANION GAP SERPL CALCULATED.3IONS-SCNC: 6 MMOL/L (ref 4–13)
AST SERPL W P-5'-P-CCNC: 14 U/L (ref 5–45)
BILIRUB SERPL-MCNC: 0.97 MG/DL (ref 0.2–1)
BUN SERPL-MCNC: 19 MG/DL (ref 5–25)
CALCIUM SERPL-MCNC: 9.9 MG/DL (ref 8.3–10.1)
CHLORIDE SERPL-SCNC: 105 MMOL/L (ref 100–108)
CHOLEST SERPL-MCNC: 161 MG/DL (ref 50–200)
CO2 SERPL-SCNC: 28 MMOL/L (ref 21–32)
CREAT SERPL-MCNC: 1.25 MG/DL (ref 0.6–1.3)
CREAT UR-MCNC: 57.8 MG/DL
EST. AVERAGE GLUCOSE BLD GHB EST-MCNC: 128 MG/DL
GFR SERPL CREATININE-BSD FRML MDRD: 55 ML/MIN/1.73SQ M
GLUCOSE P FAST SERPL-MCNC: 86 MG/DL (ref 65–99)
HBA1C MFR BLD: 6.1 % (ref 4.2–6.3)
HDLC SERPL-MCNC: 35 MG/DL
LDLC SERPL CALC-MCNC: 106 MG/DL (ref 0–100)
MICROALBUMIN UR-MCNC: 83 MG/L (ref 0–20)
MICROALBUMIN/CREAT 24H UR: 144 MG/G CREATININE (ref 0–30)
POTASSIUM SERPL-SCNC: 4.6 MMOL/L (ref 3.5–5.3)
PROT SERPL-MCNC: 7.2 G/DL (ref 6.4–8.2)
SODIUM SERPL-SCNC: 139 MMOL/L (ref 136–145)
TRIGL SERPL-MCNC: 98 MG/DL

## 2019-10-21 PROCEDURE — 82043 UR ALBUMIN QUANTITATIVE: CPT | Performed by: INTERNAL MEDICINE

## 2019-10-21 PROCEDURE — 80061 LIPID PANEL: CPT

## 2019-10-21 PROCEDURE — 83036 HEMOGLOBIN GLYCOSYLATED A1C: CPT

## 2019-10-21 PROCEDURE — 80053 COMPREHEN METABOLIC PANEL: CPT

## 2019-10-21 PROCEDURE — 82570 ASSAY OF URINE CREATININE: CPT | Performed by: INTERNAL MEDICINE

## 2019-10-28 ENCOUNTER — OFFICE VISIT (OUTPATIENT)
Dept: INTERNAL MEDICINE CLINIC | Age: 79
End: 2019-10-28
Payer: COMMERCIAL

## 2019-10-28 VITALS
TEMPERATURE: 97.2 F | HEIGHT: 71 IN | WEIGHT: 202 LBS | OXYGEN SATURATION: 98 % | BODY MASS INDEX: 28.28 KG/M2 | DIASTOLIC BLOOD PRESSURE: 60 MMHG | SYSTOLIC BLOOD PRESSURE: 122 MMHG | HEART RATE: 96 BPM

## 2019-10-28 DIAGNOSIS — I48.0 PAROXYSMAL ATRIAL FIBRILLATION (HCC): ICD-10-CM

## 2019-10-28 DIAGNOSIS — R80.1 PERSISTENT PROTEINURIA: ICD-10-CM

## 2019-10-28 DIAGNOSIS — E11.8 TYPE 2 DIABETES MELLITUS WITH COMPLICATION (HCC): Primary | ICD-10-CM

## 2019-10-28 DIAGNOSIS — I73.9 PERIPHERAL VASCULAR DISEASE (HCC): ICD-10-CM

## 2019-10-28 DIAGNOSIS — C79.31 SECONDARY MALIGNANT NEOPLASM OF BRAIN AND SPINAL CORD (HCC): ICD-10-CM

## 2019-10-28 DIAGNOSIS — C79.49 SECONDARY MALIGNANT NEOPLASM OF BRAIN AND SPINAL CORD (HCC): ICD-10-CM

## 2019-10-28 PROCEDURE — 99214 OFFICE O/P EST MOD 30 MIN: CPT | Performed by: INTERNAL MEDICINE

## 2019-10-28 RX ORDER — LISINOPRIL 2.5 MG/1
2.5 TABLET ORAL DAILY
Qty: 90 TABLET | Refills: 3 | Status: SHIPPED | OUTPATIENT
Start: 2019-10-28 | End: 2020-11-09 | Stop reason: SDUPTHER

## 2019-10-28 NOTE — PROGRESS NOTES
Assessment/Plan:  1  Type 2 diabetes mellitus  Recent hemoglobin A1c 6 1  Will continue with lifestyle changes  He is off all insulin for about a year now  2  Protein urea  Will start patient on lisinopril 2 5 mg daily  Will continue to monitor    3  Metastatic CA lung with brain metastasis  Patient is in remission and is being followed by oncologist     4  Paroxysmal atrial fibrillation  Patient is not on any anticoagulant due to mets to brain  Rate is well controlled  Diagnoses and all orders for this visit:    Type 2 diabetes mellitus with complication ([facility] [address] )  -     glucose blood (JEANNE CONTOUR NEXT TEST) test strip; 3 each by Other route 3 (three) times a week    Peripheral vascular disease ([facility] [address] )    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)    Persistent proteinuria  -     lisinopril (ZESTRIL) 2 5 mg tablet; Take 1 tablet (2 5 mg total) by mouth daily    Paroxysmal atrial fibrillation (HCC)               Subjective:          Patient ID: Ric Velásquez is a 67 y o  male  Patient is here for regular follow-up  Blood work was done last week will like to discuss results  Otherwise no new complaints  The following portions of the patient's history were reviewed and updated as appropriate: allergies, current medications, past family history, past medical history, past social history, past surgical history and problem list     Review of Systems   Constitutional: Positive for fatigue  Negative for fever  HENT: Negative for congestion, ear discharge, ear pain, postnasal drip, sinus pressure, sore throat, tinnitus and trouble swallowing  Eyes: Negative for discharge, itching and visual disturbance  Respiratory: Negative for cough and shortness of breath  Cardiovascular: Negative for chest pain and palpitations  Gastrointestinal: Negative for abdominal pain, diarrhea, nausea and vomiting  Endocrine: Negative for cold intolerance and polyuria     Genitourinary: Negative for difficulty urinating, dysuria and urgency  Musculoskeletal: Negative for arthralgias and neck pain  Skin: Negative for rash  Allergic/Immunologic: Negative for environmental allergies  Neurological: Negative for dizziness, weakness and headaches  Psychiatric/Behavioral: Negative for agitation  The patient is not nervous/anxious  Past Medical History:   Diagnosis Date    Atrial fibrillation ([facility] [address] )     Cancer of lung ([facility] )     brain and bones    Diabetes mellitus ([facility] )     Hypercholesteremia     Hypertension     Lung neoplasm     Proteinuria     LAST ASSESSED [id number]    [facility]peripheral vascular disease) ([facility])          Current Outpatient Medications:     glucose blood (JEANNE CONTOUR NEXT TEST) test strip, 3 each by Other route 3 (three) times a week, Disp: 50 each, Rfl: 3    Osimertinib Mesylate 80 MG TABS, Take 1 tablet (80 mg total) by mouth daily, Disp: 30 tablet, Rfl: 6    lisinopril (ZESTRIL) 2 5 mg tablet, Take 1 tablet (2 5 mg total) by mouth daily, Disp: 90 tablet, Rfl: 3    No Known Allergies    Social History   Past Surgical History:   Procedure Laterality Date    [place] BRONCHOSCOPY,DIAGNOSTIC N/A 4/24/2018    Procedure: Lashawn Soto;  Surgeon: Jaden Yu MD;  [facility]: [facility]; Service: Pulmonary    THORACENTESIS N/A 4/24/2018    Procedure: Princess Vyas;  Surgeon: Jaden Yu MD;  Location: [facility]; Service: Pulmonary     Family History   Problem Relation Age of Onset    Diabetes Mother     Diabetes Father     Diabetes Family        Objective:  /60 (BP Location: Left arm, Patient Position: Sitting, Cuff Size: Standard)   Pulse 96   Temp (!) 97 2 °F (36 2 °C) (Tympanic)   Ht 5' 11 26" (1 81 m) Comment: shoes on  Wt 91 6 kg (202 lb) Comment: shoes on  SpO2 98%   BMI 27 97 kg/m²   Body mass index is 27 97 kg/m²  Physical Exam   Constitutional: He appears well-developed  HENT:   Head: Normocephalic     Right Ear: External ear normal  Left Ear: External ear normal    Mouth/Throat: Oropharynx is clear and moist    Eyes: Pupils are equal, round, and reactive to light  No scleral icterus  Neck: Normal range of motion  Neck supple  No tracheal deviation present  No thyromegaly present  Cardiovascular: Normal rate, regular rhythm and normal heart sounds  Pulmonary/Chest: Effort normal and breath sounds normal  No respiratory distress  He exhibits no tenderness  Abdominal: Soft  Bowel sounds are normal  He exhibits no mass  There is no tenderness  Musculoskeletal: Normal range of motion  Lymphadenopathy:     He has no cervical adenopathy  Neurological: He is alert  No cranial nerve deficit  Coordination normal    Skin: Skin is warm and dry  No erythema  Psychiatric: He has a normal mood and affect

## 2019-10-30 ENCOUNTER — TELEPHONE (OUTPATIENT)
Dept: HEMATOLOGY ONCOLOGY | Facility: CLINIC | Age: 79
End: 2019-10-30

## 2019-10-30 NOTE — TELEPHONE ENCOUNTER
I spoke with Ab Smallwood this morning to follow up and verify that he is receiving his Tegresso via the mail  He reports that he is receiving his Tegresso through the mail and that he is called the day before receiving the medication in the mail  He reports he has received a 30 day supply  I asked that he call Dr Aquiles Florian office if he experiences a time when he does not receive his medication  He verbalized understanding

## 2019-11-11 ENCOUNTER — DOCUMENTATION (OUTPATIENT)
Dept: HEMATOLOGY ONCOLOGY | Facility: CLINIC | Age: 79
End: 2019-11-11

## 2019-11-11 ENCOUNTER — HOSPITAL ENCOUNTER (OUTPATIENT)
Dept: RADIOLOGY | Facility: HOSPITAL | Age: 79
Discharge: HOME | End: 2019-11-11
Attending: RADIOLOGY
Payer: COMMERCIAL

## 2019-11-11 DIAGNOSIS — C79.31 SECONDARY MALIGNANT NEOPLASM OF BRAIN AND SPINAL CORD (HCC): ICD-10-CM

## 2019-11-11 DIAGNOSIS — C34.91 PRIMARY MALIGNANT NEOPLASM OF RIGHT LUNG METASTATIC TO OTHER SITE (HCC): ICD-10-CM

## 2019-11-11 DIAGNOSIS — C79.49 SECONDARY MALIGNANT NEOPLASM OF BRAIN AND SPINAL CORD (HCC): ICD-10-CM

## 2019-11-11 PROCEDURE — 70470 CT HEAD/BRAIN W/O & W/DYE: CPT

## 2019-11-11 RX ADMIN — IOHEXOL 100 ML: 350 INJECTION, SOLUTION INTRAVENOUS at 14:05

## 2019-11-12 ENCOUNTER — APPOINTMENT (OUTPATIENT)
Dept: LAB | Age: 79
End: 2019-11-12
Payer: COMMERCIAL

## 2019-11-12 DIAGNOSIS — C79.31 SECONDARY MALIGNANT NEOPLASM OF BRAIN AND SPINAL CORD (HCC): ICD-10-CM

## 2019-11-12 DIAGNOSIS — C79.49 SECONDARY MALIGNANT NEOPLASM OF BRAIN AND SPINAL CORD (HCC): ICD-10-CM

## 2019-11-12 DIAGNOSIS — C34.91 PRIMARY MALIGNANT NEOPLASM OF RIGHT LUNG METASTATIC TO OTHER SITE (HCC): ICD-10-CM

## 2019-11-12 LAB
ALBUMIN SERPL BCP-MCNC: 4.5 G/DL (ref 3.5–5)
ALP SERPL-CCNC: 82 U/L (ref 46–116)
ALT SERPL W P-5'-P-CCNC: 11 U/L (ref 12–78)
ANION GAP SERPL CALCULATED.3IONS-SCNC: 9 MMOL/L (ref 4–13)
AST SERPL W P-5'-P-CCNC: 13 U/L (ref 5–45)
BASOPHILS # BLD AUTO: 0.01 THOUSANDS/ΜL (ref 0–0.1)
BASOPHILS NFR BLD AUTO: 0 % (ref 0–1)
BILIRUB SERPL-MCNC: 0.91 MG/DL (ref 0.2–1)
BUN SERPL-MCNC: 19 MG/DL (ref 5–25)
CALCIUM SERPL-MCNC: 9.7 MG/DL (ref 8.3–10.1)
CHLORIDE SERPL-SCNC: 102 MMOL/L (ref 100–108)
CO2 SERPL-SCNC: 28 MMOL/L (ref 21–32)
CREAT SERPL-MCNC: 1.45 MG/DL (ref 0.6–1.3)
EOSINOPHIL # BLD AUTO: 0.29 THOUSAND/ΜL (ref 0–0.61)
EOSINOPHIL NFR BLD AUTO: 6 % (ref 0–6)
ERYTHROCYTE [DISTWIDTH] IN BLOOD BY AUTOMATED COUNT: 15.1 % (ref 11.6–15.1)
GFR SERPL CREATININE-BSD FRML MDRD: 46 ML/MIN/1.73SQ M
GLUCOSE P FAST SERPL-MCNC: 88 MG/DL (ref 65–99)
HCT VFR BLD AUTO: 38.6 % (ref 36.5–49.3)
HGB BLD-MCNC: 12.3 G/DL (ref 12–17)
IMM GRANULOCYTES # BLD AUTO: 0.01 THOUSAND/UL (ref 0–0.2)
IMM GRANULOCYTES NFR BLD AUTO: 0 % (ref 0–2)
LYMPHOCYTES # BLD AUTO: 1.36 THOUSANDS/ΜL (ref 0.6–4.47)
LYMPHOCYTES NFR BLD AUTO: 26 % (ref 14–44)
MCH RBC QN AUTO: 28.8 PG (ref 26.8–34.3)
MCHC RBC AUTO-ENTMCNC: 31.9 G/DL (ref 31.4–37.4)
MCV RBC AUTO: 90 FL (ref 82–98)
MONOCYTES # BLD AUTO: 0.5 THOUSAND/ΜL (ref 0.17–1.22)
MONOCYTES NFR BLD AUTO: 9 % (ref 4–12)
NEUTROPHILS # BLD AUTO: 3.14 THOUSANDS/ΜL (ref 1.85–7.62)
NEUTS SEG NFR BLD AUTO: 59 % (ref 43–75)
NRBC BLD AUTO-RTO: 0 /100 WBCS
PLATELET # BLD AUTO: 153 THOUSANDS/UL (ref 149–390)
PMV BLD AUTO: 10 FL (ref 8.9–12.7)
POTASSIUM SERPL-SCNC: 4.7 MMOL/L (ref 3.5–5.3)
PROT SERPL-MCNC: 7.3 G/DL (ref 6.4–8.2)
RBC # BLD AUTO: 4.27 MILLION/UL (ref 3.88–5.62)
SODIUM SERPL-SCNC: 139 MMOL/L (ref 136–145)
WBC # BLD AUTO: 5.31 THOUSAND/UL (ref 4.31–10.16)

## 2019-11-12 PROCEDURE — 80053 COMPREHEN METABOLIC PANEL: CPT

## 2019-11-12 PROCEDURE — 36415 COLL VENOUS BLD VENIPUNCTURE: CPT

## 2019-11-12 PROCEDURE — 85025 COMPLETE CBC W/AUTO DIFF WBC: CPT

## 2019-11-25 ENCOUNTER — RADIATION ONCOLOGY FOLLOW-UP (OUTPATIENT)
Dept: RADIATION ONCOLOGY | Facility: HOSPITAL | Age: 79
End: 2019-11-25
Attending: RADIOLOGY
Payer: COMMERCIAL

## 2019-11-25 VITALS
RESPIRATION RATE: 16 BRPM | DIASTOLIC BLOOD PRESSURE: 58 MMHG | HEART RATE: 87 BPM | SYSTOLIC BLOOD PRESSURE: 98 MMHG | TEMPERATURE: 97.4 F | WEIGHT: 200.4 LBS | BODY MASS INDEX: 27.75 KG/M2

## 2019-11-25 DIAGNOSIS — C34.91 PRIMARY MALIGNANT NEOPLASM OF RIGHT LUNG METASTATIC TO OTHER SITE (HCC): Primary | ICD-10-CM

## 2019-11-25 DIAGNOSIS — C79.49 SECONDARY MALIGNANT NEOPLASM OF BRAIN AND SPINAL CORD (HCC): Primary | ICD-10-CM

## 2019-11-25 DIAGNOSIS — C34.91 PRIMARY MALIGNANT NEOPLASM OF RIGHT LUNG METASTATIC TO OTHER SITE (HCC): ICD-10-CM

## 2019-11-25 DIAGNOSIS — C79.31 SECONDARY MALIGNANT NEOPLASM OF BRAIN AND SPINAL CORD (HCC): Primary | ICD-10-CM

## 2019-11-25 PROCEDURE — 99211 OFF/OP EST MAY X REQ PHY/QHP: CPT | Performed by: RADIOLOGY

## 2019-11-25 PROCEDURE — G0463 HOSPITAL OUTPT CLINIC VISIT: HCPCS | Performed by: RADIOLOGY

## 2019-11-25 NOTE — PROGRESS NOTES
Emeterio Andersen 1940 is a 67 y o  male       Follow up visit     Oncology History    Patient returns for radiation follow-up s/p completion of whole brain radiation on 5/9/18 for metastatic lung cancer  He was last seen on 6/10/19     67year old male with a stage IV right lung carcinoma with widespread metastasis involving the mediastinum, left lung, multiple bones, and multiple brain metastasis   He completed radiation therapy to the whole brain on May 9, 2018, followed by Jimmy Santamaria immunotherapy, started 6/18/18 7/29/19 CT Chest  IMPRESSION:   Slightly smaller small to moderate right pleural effusion    Unchanged diffuse lytic and blastic osseous metastases    Stable mediastinal/right parahilar lymph nodes    Stable ascending aortic aneurysm measuring 46 mm  10/4/19 Med Onc, Dr Logan Sawyer follow-up  Repeat CT Chest shows stable disease, small to moderate right pleural effusion, no new lesions  Plan to continue Jimmy Santamaria  11/11/19 CT head   IMPRESSION:   3 tiny foci of calcification consistent with Treated metastases  No indication of new disease      1/3/20 CT Chest  1/8/20 Med Onc follow-up        Lung neoplasm    4/21/2018 Initial Diagnosis     Lung neoplasm        Metastatic disease ([address] )      Metastatic primary lung cancer ([address] )    4/2018 Initial Diagnosis     Metastatic primary lung cancer ([address] )      5/2/2018 - 5/31/2018 Chemotherapy     Pembrolizumab/ Alimta/carboplatin x 2 cycles  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, treatment changed to Tagrisso 80 mg 1 tab p o daily  6/18/2018 -  Chemotherapy     Tagrisso 80 mg 1 tab p o daily  Xgeva every other month          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[address] )    4/2018 Initial Diagnosis     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[address] )      4/26/2018 - 5/9/2018 Radiation     Treatment:  Course: C1    Plan ID Energy Fractions Dose per Fraction (cGy) Total Dose Delivered (cGy) Elapsed Days   WHOLE BRAIN 6X 10 / 10 300 3,000 13      Treatment dates:  C1: 4/26/2018 - 5/9/2018           Clinical Trial: No    Health Maintenance   Topic Date Due    SLP PLAN OF CARE  1940    Hepatitis B Vaccine (1 of 3 - Risk 3-dose series) 01/24/1960    Pneumococcal Vaccine: 65+ Years (1 of 2 - PCV13) 01/24/2006    Influenza Vaccine  04/01/2020 (Originally 7/1/2019)    DTaP,Tdap,and Td Vaccines (1 - Tdap) 04/09/2020 (Originally 1/24/1951)    DM Eye Exam  12/18/2019    Fall Risk  04/01/2020    Depression Screening PHQ  04/01/2020    Medicare Annual Wellness Visit (AWV)  04/01/2020    BMI: Followup Plan  04/01/2020    HEMOGLOBIN A1C  04/21/2020    Diabetic Foot Exam  07/05/2020    URINE MICROALBUMIN  10/21/2020    BMI: Adult  10/28/2020    Pneumococcal Vaccine: Pediatrics (0 to 5 Years) and At-Risk Patients (6 to 59 Years)  Aged Out    HIB Vaccine  Aged Out    IPV Vaccine  Aged Out    Hepatitis A Vaccine  Aged Out    Meningococcal ACWY Vaccine  Aged Out    HPV Vaccine  Aged Out       Patient Active Problem List   Diagnosis    Type 2 diabetes mellitus with complication ([address] )    Mixed hyperlipidemia    Atrial fibrillation ([address] )    Peripheral vascular disease ([address] )    Persistent proteinuria    Pleural effusion    Lung neoplasm    Metastatic disease ([address] )    Metastatic primary lung cancer ([address] )    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[address] )     Past Medical History:   Diagnosis Date    Atrial fibrillation ([address] )     Cancer of lung ([address] )     brain and bones    Diabetes mellitus ([address] )     Hypercholesteremia     Hypertension     Lung neoplasm     Proteinuria     LAST ASSESSED [id number]    PVD (peripheral vascular disease) ([address] )      Past Surgical History:   Procedure Laterality Date    [place] BRONCHOSCOPY,DIAGNOSTIC N/A 4/24/2018    Procedure: Real Bartlett;  Surgeon: Alanna Almazan MD;  [facility];   Service: Pulmonary    THORACENTESIS N/A 4/24/2018    Procedure: THORACENTESIS; Surgeon: Berenice Xiong MD;  [facility]: [facility];   Service: Pulmonary     Family History   Problem Relation Age of Onset    Diabetes Mother     Diabetes Father     Diabetes Family      Social History     Socioeconomic History    Marital status: /Civil Union     Spouse name: Not on file    Number of children: Not on file    Years of education: Not on file    Highest education level: Not on file   Occupational History    Occupation: RETIRED   Social Needs    Financial resource strain: Not on file    Food insecurity:     Worry: Not on file     Inability: Not on file    Transportation needs:     Medical: Not on file     Non-medical: Not on file   Tobacco Use    Smoking status: Former Smoker     Types: Cigarettes    Smokeless tobacco: Never Used    Tobacco comment: quit 39 yrs ago as of 2018   Substance and Sexual Activity    Alcohol use: Yes     Comment: rare    Drug use: No    Sexual activity: Not on file   Lifestyle    Physical activity:     Days per week: Not on file     Minutes per session: Not on file    Stress: Not on file   Relationships    Social connections:     Talks on phone: Not on file     Gets together: Not on file     Attends Orthodoxy service: Not on file     Active member of club or organization: Not on file     Attends meetings of clubs or organizations: Not on file     Relationship status: Not on file    Intimate partner violence:     Fear of current or ex partner: Not on file     Emotionally abused: Not on file     Physically abused: Not on file     Forced sexual activity: Not on file   Other Topics Concern    Not on file   Social History Narrative    ADVANCED DIRECTIVE IN CHART     CAFFEINE USE VIA COFFEE 3 SERVINGS PER DAY        Current Outpatient Medications:     glucose blood (JEANNE CONTOUR NEXT TEST) test strip, 3 each by Other route 3 (three) times a week, Disp: 50 each, Rfl: 3    lisinopril (ZESTRIL) 2 5 mg tablet, Take 1 tablet (2 5 mg total) by mouth daily, Disp: 90 tablet, Rfl: 3    Osimertinib Mesylate 80 MG TABS, Take 1 tablet (80 mg total) by mouth daily, Disp: 30 tablet, Rfl: 6  No Known Allergies    Review of Systems:  Review of Systems   Constitutional: Positive for fatigue  HENT: Negative  Eyes: Negative  Respiratory: Negative  Cardiovascular: Negative  Gastrointestinal: Positive for diarrhea (soft stool at times)  Endocrine: Negative  Genitourinary: Positive for frequency (nocturia x2)  Musculoskeletal: Positive for arthralgias and gait problem (ambulates with cane)  Skin: Negative  Allergic/Immunologic: Negative  Neurological:        Denies headaches or dizziness   Hematological: Negative  Psychiatric/Behavioral: Negative  Vitals:    11/25/19 1300   BP: 98/58   BP Location: Right arm   Pulse: 87   Resp: 16   Temp: (!) 97 4 °F (36 3 °C)   TempSrc: Temporal   Weight: 90 9 kg (200 lb 6 4 oz)        Pain assessment:0           Imaging:Ct Head W Wo Contrast    Result Date: 11/12/2019  Narrative: CT BRAIN - WITH AND WITHOUT CONTRAST INDICATION:   C79 31: Secondary malignant neoplasm of brain C79 49: Secondary malignant neoplasm of other parts of nervous system C34 91: Malignant neoplasm of unspecified part of right bronchus or lung  COMPARISON:  CT 5/9/2019, MR 4/22/2018 TECHNIQUE:  CT examination of the brain was performed both prior to and after the administration of intravenous contrast   In addition to axial images, coronal reformatted images were created and submitted for interpretation  Radiation dose length product (DLP) for this visit:  2864 mGy-cm   This examination, like all CT scans performed in the [facility], was performed utilizing techniques to minimize radiation dose exposure, including the use of iterative reconstruction and automated exposure control  IV Contrast:  100 mL of iohexol (OMNIPAQUE)  IMAGE QUALITY:  Diagnostic  FINDINGS: PARENCHYMA:  No new disease   3 Tiny foci of calcification representing the sequela of the multiple, previously treated metastases are reidentified, stable in appearance  The largest, 4 to 5 mm in the left occipital region, series 5 image 21, the smallest in the right anterior centrum semiovale ovale, series 5 image 30  One final small lesion in the posterior right temporal parietal region, series 5 image 18  No new lesions are evident  No vasogenic edema  Cerebellar metastases, detected 4/22/2018 are not evident  Diffuse generalized volume loss consistent with age with moderate degree of chronic small vessel disease  Post contrast imaging of the brain is normal  VENTRICLES AND EXTRA-AXIAL SPACES:  Normal for patient's age  VISUALIZED ORBITS AND PARANASAL SINUSES:  Stable consolidation left mastoid tip  CALVARIUM:  Normal      Impression: 3 tiny foci of calcification consistent with Treated metastases  No indication of new disease   Workstation performed: [id number]

## 2019-11-25 NOTE — PROGRESS NOTES
Follow-up - Radiation Oncology   Rangel Hurtado 1940 67 y o  male [id number]      History of Present Illness   Cancer Staging  Metastatic primary lung cancer [facility])  Staging form: Lung, AJCC 8th Edition  - Clinical stage from 4/24/2018: No stage assigned - Signed by Wang Moser PA-C on 10/2/2018  Laterality: Right  Type of lung cancer: Locally advanced or metastatic non-small cell lung cancer      Rangel Hurtado is a 67y o  year old male who returns for radiation follow-up s/p completion of whole brain radiation on 5/9/18 for metastatic lung cancer  He was last seen on 6/10/19      67year old male with a stage IV right lung carcinoma with widespread metastasis involving the mediastinum, left lung, multiple bones, and multiple brain metastasis   He completed radiation therapy to the whole brain on May 9, 2018, followed by Josie Retana immunotherapy, started 6/18/18  Interval History:   7/29/19 CT Chest  IMPRESSION:   Slightly smaller small to moderate right pleural effusion    Unchanged diffuse lytic and blastic osseous metastases    Stable mediastinal/right parahilar lymph nodes    Stable ascending aortic aneurysm measuring 46 mm      10/4/19 Med Onc, Dr Kayce Greenberg follow-up  Repeat CT Chest shows stable disease, small to moderate right pleural effusion, no new lesions  Plan to continue Migueadlupe Sella       11/11/19 CT head   IMPRESSION:   3 tiny foci of calcification consistent with Treated metastases   No indication of new disease  Patient reports he is feeling well  He is having no neurologic deficits  He has no headaches nor dizziness  He has good regrowth of his hair  He is seen today with his wife  He reports his memory is good  They recently went to the grocery store and left their list at home and he remembered the whole list   He denies any fatigue  He is eating well  He is walking with the use of a cane most of the time and will use a walker at night  He denies any back pain    He has had no falls  He is only taking lisinopril and his oral Tagrisso and Xgeva  He denies any nausea or vomiting  He reports a good appetite  He has been retired for 28 years  1/3/20 CT Chest  1/8/20 Med Onc follow-up    Historical Information      Lung neoplasm    4/21/2018 Initial Diagnosis     Lung neoplasm        Metastatic disease ([facility] )      Metastatic primary lung cancer ([facility] )    4/2018 Initial Diagnosis     Metastatic primary lung cancer ([facility] )      5/2/2018 - 5/31/2018 Chemotherapy     Pembrolizumab/ Alimta/carboplatin x 2 cycles  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, treatment changed to Tagrisso 80 mg 1 tab p o daily  6/18/2018 -  Chemotherapy     Tagrisso 80 mg 1 tab p o daily  Xgeva every other month  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[facility] )    4/2018 Initial Diagnosis     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[place] [address] )      4/26/2018 - 5/9/2018 Radiation     Treatment:  Course: C1    Plan ID Energy Fractions Dose per Fraction (cGy) Total Dose Delivered (cGy) Elapsed Days   WHOLE BRAIN 6X 10 / 10 300 3,000 13      Treatment dates:  C1: 4/26/2018 - 5/9/2018           Past Medical History:   Diagnosis Date    Atrial fibrillation (HCC)     Cancer of lung (HCC)     brain and bones    Diabetes mellitus ([place] [address] )     Hypercholesteremia     Hypertension     Lung neoplasm     Proteinuria     LAST ASSESSED [id number]    PVD (peripheral vascular disease) ([place] [address] )      Past Surgical History:   Procedure Laterality Date    AK BRONCHOSCOPY,DIAGNOSTIC N/A 4/24/2018    Procedure: Claire Veras;  Surgeon: Subhash Bynum MD;  Location: [facility]; Service: Pulmonary    THORACENTESIS N/A 4/24/2018    Procedure: Narcisa Perkins;  Surgeon: Subhash Bynum MD;  Location: [facility];   Service: Pulmonary       Social History   Social History     Substance and Sexual Activity   Alcohol Use Yes    Comment: rare     Social History     Substance and Sexual Activity   Drug Use No Social History     Tobacco Use   Smoking Status Former Smoker    Types: Cigarettes   Smokeless Tobacco Never Used   Tobacco Comment    quit 39 yrs ago as of 2018     Meds/Allergies     Current Outpatient Medications:     glucose blood (JEANNE CONTOUR NEXT TEST) test strip, 3 each by Other route 3 (three) times a week, Disp: 50 each, Rfl: 3    lisinopril (ZESTRIL) 2 5 mg tablet, Take 1 tablet (2 5 mg total) by mouth daily, Disp: 90 tablet, Rfl: 3    Osimertinib Mesylate 80 MG TABS, Take 1 tablet (80 mg total) by mouth daily, Disp: 30 tablet, Rfl: 6  No Known Allergies    Review of Systems  Constitutional: Positive for fatigue  HENT: Negative  Eyes: Negative  Respiratory: Negative  Cardiovascular: Negative  Gastrointestinal: Positive for diarrhea (soft stool at times)  Endocrine: Negative  Genitourinary: Positive for frequency (nocturia x2)  Musculoskeletal: Positive for arthralgias and gait problem (ambulates with cane)  Skin: Negative  Allergic/Immunologic: Negative  Neurological:        Denies headaches or dizziness   Hematological: Negative  Psychiatric/Behavioral: Negative    OBJECTIVE:   BP 98/58 (BP Location: Right arm)   Pulse 87   Temp (!) 97 4 °F (36 3 °C) (Temporal)   Resp 16   Wt 90 9 kg (200 lb 6 4 oz)   BMI 27 75 kg/m²   Pain Assessment:  0  ECOG/Zubrod/WHO: 1 - Symptomatic but completely ambulatory    Physical Exam   Constitutional: He is oriented to person, place, and time  He appears well-developed and well-nourished  No distress  HENT:   Head: Normocephalic and atraumatic  Mouth/Throat: No oropharyngeal exudate  Eyes: Pupils are equal, round, and reactive to light  Conjunctivae and EOM are normal  No scleral icterus  Neck: Normal range of motion  Neck supple  No tracheal deviation present  No thyromegaly present     Cardiovascular: Normal rate, regular rhythm and normal heart sounds     Pulmonary/Chest: Effort normal and breath sounds normal  No respiratory distress  He has no wheezes  He has no rales  He exhibits no tenderness    Stable decreased breath sounds in the right lower lung field    Abdominal: Soft  Bowel sounds are normal  He exhibits no distension and no mass  There is no tenderness  Musculoskeletal: Normal range of motion  He exhibits no edema or tenderness  Lymphadenopathy:     He has no cervical adenopathy         Right: No supraclavicular adenopathy present         Left: No supraclavicular adenopathy present  Neurological: He is alert and oriented to person, place, and time  No cranial nerve deficit  Coordination normal    Skin: Skin is warm and dry  No rash noted  He is not diaphoretic  No erythema  No pallor    Dryness of the scalp with partial alopecia   He has more hair growth laterally and in the posterior scalp that is now darker  Psychiatric: He has a normal mood and affect  His behavior is normal  Judgment and thought content normal    Nursing note and vitals reviewed      RESULTS    Lab Results:   Recent Results (from the past 672 hour(s))   CBC and differential    Collection Time: 11/12/19  7:26 AM   Result Value Ref Range    WBC 5 31 4 31 - 10 16 Thousand/uL    RBC 4 27 3 88 - 5 62 Million/uL    Hemoglobin 12 3 12 0 - 17 0 g/dL    Hematocrit 38 6 36 5 - 49 3 %    MCV 90 82 - 98 fL    MCH 28 8 26 8 - 34 3 pg    MCHC 31 9 31 4 - 37 4 g/dL    RDW 15 1 11 6 - 15 1 %    MPV 10 0 8 9 - 12 7 fL    Platelets 880 012 - 781 Thousands/uL    nRBC 0 /100 WBCs    Neutrophils Relative 59 43 - 75 %    Immat GRANS % 0 0 - 2 %    Lymphocytes Relative 26 14 - 44 %    Monocytes Relative 9 4 - 12 %    Eosinophils Relative 6 0 - 6 %    Basophils Relative 0 0 - 1 %    Neutrophils Absolute 3 14 1 85 - 7 62 Thousands/µL    Immature Grans Absolute 0 01 0 00 - 0 20 Thousand/uL    Lymphocytes Absolute 1 36 0 60 - 4 47 Thousands/µL    Monocytes Absolute 0 50 0 17 - 1 22 Thousand/µL    Eosinophils Absolute 0 29 0 00 - 0 61 Thousand/µL    Basophils Absolute 0 01 0 00 - 0 10 Thousands/µL   Comprehensive metabolic panel    Collection Time: 11/12/19  7:26 AM   Result Value Ref Range    Sodium 139 136 - 145 mmol/L    Potassium 4 7 3 5 - 5 3 mmol/L    Chloride 102 100 - 108 mmol/L    CO2 28 21 - 32 mmol/L    ANION GAP 9 4 - 13 mmol/L    BUN 19 5 - 25 mg/dL    Creatinine 1 45 (H) 0 60 - 1 30 mg/dL    Glucose, Fasting 88 65 - 99 mg/dL    Calcium 9 7 8 3 - 10 1 mg/dL    AST 13 5 - 45 U/L    ALT 11 (L) 12 - 78 U/L    Alkaline Phosphatase 82 46 - 116 U/L    Total Protein 7 3 6 4 - 8 2 g/dL    Albumin 4 5 3 5 - 5 0 g/dL    Total Bilirubin 0 91 0 20 - 1 00 mg/dL    eGFR 46 ml/min/1 73sq m       Imaging Studies:Ct Head W Wo Contrast    Result Date: 11/12/2019  Narrative: CT BRAIN - WITH AND WITHOUT CONTRAST INDICATION:   C79 31: Secondary malignant neoplasm of brain C79 49: Secondary malignant neoplasm of other parts of nervous system C34 91: Malignant neoplasm of unspecified part of right bronchus or lung  COMPARISON:  CT 5/9/2019, MR 4/22/2018 TECHNIQUE:  CT examination of the brain was performed both prior to and after the administration of intravenous contrast   In addition to axial images, coronal reformatted images were created and submitted for interpretation  Radiation dose length product (DLP) for this visit:  6749 mGy-cm   This examination, like all CT scans performed in the [facility], was performed utilizing techniques to minimize radiation dose exposure, including the use of iterative reconstruction and automated exposure control  IV Contrast:  100 mL of iohexol (OMNIPAQUE)  IMAGE QUALITY:  Diagnostic  FINDINGS: PARENCHYMA:  No new disease  3 Tiny foci of calcification representing the sequela of the multiple, previously treated metastases are reidentified, stable in appearance  The largest, 4 to 5 mm in the left occipital region, series 5 image 21, the smallest in the right anterior centrum semiovale ovale, series 5 image 30    One final small lesion in the posterior right temporal parietal region, series 5 image 18  No new lesions are evident  No vasogenic edema  Cerebellar metastases, detected 4/22/2018 are not evident  Diffuse generalized volume loss consistent with age with moderate degree of chronic small vessel disease  Post contrast imaging of the brain is normal  VENTRICLES AND EXTRA-AXIAL SPACES:  Normal for patient's age  VISUALIZED ORBITS AND PARANASAL SINUSES:  Stable consolidation left mastoid tip  CALVARIUM:  Normal      Impression: 3 tiny foci of calcification consistent with Treated metastases  No indication of new disease  Workstation performed: [id number]     Assessment/Plan:  Orders Placed This Encounter   Procedures    CT head w wo contrast        Kurt Yao is a 67y o  year old male with a stage IV right lung carcinoma with widespread metastasis involving the mediastinum, left lung, multiple bones, and multiple brain metastasis   He completed radiation therapy to the whole brain on May 9, 2018 and returns today for follow-up visit      He started receiving Tagrisso immunotherapy with Dr Rita Blackmon on June 18, 2018 which he is tolerating well   His CT scans of the chest in March 2019 showed stable disease or right pleural effusion and a possible subtle increase in the T11 vertebral body lesions but he is having no back pain  There are no new lesions  He continues on his immunotherapy  His CT of the head from May 9, 2019 shows treated supratentorial and infratentorial metastasis without any suggestion of progression of disease  His most recent CT of the head from November 11, 2019 revealed 3 tiny foci of calcification consistent with treated metastasis and no evidence of any new disease  Patient is having no neurologic symptoms   Patient is very claustrophobic and refuses MRI of the brain   We discussed results today with the patient and his wife and are pleased with his continued good results   We recommend he continue with his immunotherapy  Cole Guido will return in 6 months with a repeat CT of the head with and without contrast in May 2020  Susan Vila MD  11/25/2019,2:51 PM    Portions of the record may have been created with voice recognition software   Occasional wrong word or "sound a like" substitutions may have occurred due to the inherent limitations of voice recognition software   Read the chart carefully and recognize, using context, where substitutions have occurred

## 2020-01-03 ENCOUNTER — HOSPITAL ENCOUNTER (OUTPATIENT)
Dept: RADIOLOGY | Facility: HOSPITAL | Age: 80
Discharge: HOME | End: 2020-01-03
Attending: INTERNAL MEDICINE
Payer: COMMERCIAL

## 2020-01-03 DIAGNOSIS — C34.91 PRIMARY MALIGNANT NEOPLASM OF RIGHT LUNG METASTATIC TO OTHER SITE (HCC): ICD-10-CM

## 2020-01-03 DIAGNOSIS — J90 PLEURAL EFFUSION: ICD-10-CM

## 2020-01-03 DIAGNOSIS — C79.9 METASTATIC DISEASE (HCC): ICD-10-CM

## 2020-01-03 PROCEDURE — 71250 CT THORAX DX C-: CPT

## 2020-01-08 ENCOUNTER — OFFICE VISIT (OUTPATIENT)
Dept: HEMATOLOGY ONCOLOGY | Facility: CLINIC | Age: 80
End: 2020-01-08
Payer: COMMERCIAL

## 2020-01-08 VITALS
DIASTOLIC BLOOD PRESSURE: 72 MMHG | HEART RATE: 116 BPM | RESPIRATION RATE: 16 BRPM | TEMPERATURE: 97.7 F | BODY MASS INDEX: 28.56 KG/M2 | HEIGHT: 71 IN | WEIGHT: 204 LBS | SYSTOLIC BLOOD PRESSURE: 118 MMHG | OXYGEN SATURATION: 98 %

## 2020-01-08 DIAGNOSIS — C79.49 SECONDARY MALIGNANT NEOPLASM OF BRAIN AND SPINAL CORD (HCC): ICD-10-CM

## 2020-01-08 DIAGNOSIS — C79.31 SECONDARY MALIGNANT NEOPLASM OF BRAIN AND SPINAL CORD (HCC): ICD-10-CM

## 2020-01-08 DIAGNOSIS — C34.91 PRIMARY MALIGNANT NEOPLASM OF RIGHT LUNG METASTATIC TO OTHER SITE (HCC): Primary | ICD-10-CM

## 2020-01-08 PROCEDURE — 1160F RVW MEDS BY RX/DR IN RCRD: CPT | Performed by: PHYSICIAN ASSISTANT

## 2020-01-08 PROCEDURE — 99213 OFFICE O/P EST LOW 20 MIN: CPT | Performed by: PHYSICIAN ASSISTANT

## 2020-01-08 NOTE — PROGRESS NOTES
Hematology/Oncology Outpatient Follow- up Note  Richard Gomez 67 y o  male MRN: @ Encounter: [id number]        Date:  1/8/2020      Assessment / Plan:    1  Stage IV adenocarcinoma of the lung primary in the right lower lobe of the lung with the malignant pleural effusion, bone metastases diagnosed in April 2018, molecular tests showed EGFR mutation, L858R he received 2 cycles of Alimta/carboplatin/Pembrolizumab  Treatment was changed to Omisertinib 80 mg p o  Daily 6/2018 after obtaining the molecular test results  CT chest 1/3/2020 remains stable compared to 7/2019 study  No change in slightly enlarged paratracheal node 1 3 centimeters, no change in extensive bone metastases or moderate loculated right pleural effusion  Continue with Tagrisso (omisertinib) 80mg po daily      2  Brain metastases widespread involving the supra and the infratentorial compartment status post radiation therapy finished in May 2018  CT head 11/11/2019:  3 tiny foci of calcification consistent with Treated metastases  No indication of new disease  F/U imaging arranged per Dr Bautista Gave         HPI: Richard Gomez is a 71-year-old  male who used to smoke 2 pack of cigarettes daily for 20 years, quit 40 years ago   He noticed dyspnea with cough without hemoptysis, 10 lb weight loss with intermittent anterior chest pain with radiation to the right lateral side in the beginning of 2018  CT scan in April 2018 showed large hilar mass with bronchial obstruction, small right pleural effusion, multiple lumbar spine metastatic disease status post right thoracentesis with atypical cellular changes cannot exclude neoplasia, the patient had bronchoscopy and right lower lobe bronchial brushing showed conclusive evidence of malignancy with non-small cell lung cancer favor adenocarcinoma that stained positive for TTF 1, napsin and absent for p40  CT scan of the abdomen and pelvis showed small pericardial effusion, osseous metastases at L1, L2, L4, L5, obstructive collapse of the right middle lobe and the lower lobe  MRI of the brain showed widespread brain metastases involving the supra and the infratentorial compartments  The patient underwent radiation therapy to the brain finished in May 2018      He received 2 cycles of Pembrolizumab, Alimta, carboplatin however 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, the therapy was changed to tagresso 80 mg p o  Daily by the end of June 2018     The last thoracentesis on the right side was done in June 14, 2018       CT scan chest on 03/2019 showed stable disease, small to moderate right pleural effusion     Repeat CT scan of the chest in July 2019 showed stable disease again small to moderate right pleural effusion no new lesions     Interval History:   Continues to tolerate Tagrisso well  Dean skin/ nail toxicity, stomatitis, anorexia  Fatigue present  Aware to pace himself  Occassional loose stool which he attributes more to what he eats  CBCD 11/12/2019 normal and CMP stable with mildly elevated cr 1 4  Test Results:        Labs:   Lab Results   Component Value Date    HGB 12 3 11/12/2019    HCT 38 6 11/12/2019    MCV 90 11/12/2019     11/12/2019    WBC 5 31 11/12/2019    NRBC 0 11/12/2019     Lab Results   Component Value Date    K 4 7 11/12/2019     11/12/2019    CO2 28 11/12/2019    BUN 19 11/12/2019    CREATININE 1 45 (H) 11/12/2019    GLUF 88 11/12/2019    CALCIUM 9 7 11/12/2019    AST 13 11/12/2019    ALT 11 (L) 11/12/2019    ALKPHOS 82 11/12/2019    EGFR 46 11/12/2019       Imaging: Ct Chest Wo Contrast    Result Date: 1/7/2020  Narrative: CT CHEST WITHOUT IV CONTRAST INDICATION:   C34 91: Malignant neoplasm of unspecified part of right bronchus or lung J90: Pleural effusion, not elsewhere classified C79 9: Secondary malignant neoplasm of unspecified site  Adenocarcinoma of the right lower lobe diagnosed in April 2018 with extensive metastases    Status post fall yesterday  COMPARISON:  Chest CT from 7/29/2019 and 5/19/2019  TECHNIQUE: CT examination of the chest was performed without intravenous contrast   Axial, sagittal, and coronal 2D reformatted images were created from the source data and submitted for interpretation  Radiation dose length product (DLP) for this visit:  473 19 mGy-cm   This examination, like all CT scans performed in the [facility], was performed utilizing techniques to minimize radiation dose exposure, including the use of iterative  reconstruction and automated exposure control  FINDINGS: LUNGS:  Nothing to indicate recurrent tumor  Persistent mild rounded atelectasis in the right lower lobe with punctate calcification in the right lower lobe  No significant filling defects in the trachea and bronchi  PLEURA:  Unremarkable  HEART/GREAT VESSELS:  Mild dilation of the ascending aorta at 4 6 cm, unchanged  Moderate calcification of the aortic valve leaflets which can contribute to aortic stenosis  Moderate coronary artery calcification indicating atherosclerotic heart disease  No change in trace pericardial effusion  MEDIASTINUM AND DWIGHT:  No change in slightly enlarged pretracheal node with a short axis of 1 3 cm (2/24) CHEST WALL AND LOWER NECK:   Unremarkable  VISUALIZED STRUCTURES IN THE UPPER ABDOMEN:  Unremarkable  OSSEOUS STRUCTURES:  No change in extensive bone metastases with severe compression of T5 and compression of the superior endplate of L1  Old rib fractures  Impression: No significant change since July 2019  Nothing to indicate recurrent tumor in the lung with persistent rounded atelectasis in the right lower lobe No change in moderate loculated right effusion  No change in slightly enlarged pretracheal node  No change in extensive bone metastases  No change in mild dilation of the ascending aorta   Workstation performed: [id number]           ROS:  As mentioned in HPI & Interval History otherwise 14 point ROS negative  Allergies: No Known Allergies  Current Medications: Reviewed  PMH/FH/SH:  Reviewed      Physical Exam:    2 13 meters squared    Ht Readings from Last 3 Encounters:   20 5' 11 26" (1 81 m)   10/28/19 5' 11 26" (1 81 m)   10/04/19 5' 11 5" (1 816 m)        Wt Readings from Last 3 Encounters:   20 92 5 kg (204 lb)   19 90 9 kg (200 lb 6 4 oz)   10/28/19 91 6 kg (202 lb)        Temp Readings from Last 3 Encounters:   20 97 7 °F (36 5 °C) (Tympanic)   19 (!) 97 4 °F (36 3 °C) (Temporal)   10/28/19 (!) 97 2 °F (36 2 °C) (Tympanic)        BP Readings from Last 3 Encounters:   20 118/72   19 98/58   10/28/19 122/60           Physical Exam   Constitutional: He is oriented to person, place, and time  He appears well-developed and well-nourished  No distress  HENT:   Head: Normocephalic and atraumatic  Eyes: Conjunctivae are normal    Neck: Normal range of motion  Neck supple  No tracheal deviation present  Cardiovascular: Normal rate and regular rhythm  Exam reveals no gallop and no friction rub  No murmur heard  Pulmonary/Chest: Effort normal and breath sounds normal  No respiratory distress  He has no wheezes  He has no rales  He exhibits no tenderness  Abdominal: Soft  He exhibits no distension  There is no tenderness  Musculoskeletal:   cane   Lymphadenopathy:     He has no cervical adenopathy  Neurological: He is alert and oriented to person, place, and time  Skin: Skin is warm and dry  He is not diaphoretic  No erythema  No pallor  Psychiatric: He has a normal mood and affect  His behavior is normal  Judgment and thought content normal    Vitals reviewed        ECO      Emergency Contacts:    [place],

## 2020-02-25 ENCOUNTER — APPOINTMENT (OUTPATIENT)
Dept: LAB | Age: 80
End: 2020-02-25
Payer: COMMERCIAL

## 2020-02-25 DIAGNOSIS — E11.8 TYPE 2 DIABETES MELLITUS WITH COMPLICATION (HCC): ICD-10-CM

## 2020-02-25 LAB
ANION GAP SERPL CALCULATED.3IONS-SCNC: 3 MMOL/L (ref 4–13)
BUN SERPL-MCNC: 26 MG/DL (ref 5–25)
CALCIUM SERPL-MCNC: 9.1 MG/DL (ref 8.3–10.1)
CHLORIDE SERPL-SCNC: 108 MMOL/L (ref 100–108)
CO2 SERPL-SCNC: 29 MMOL/L (ref 21–32)
CREAT SERPL-MCNC: 1.45 MG/DL (ref 0.6–1.3)
ERYTHROCYTE [DISTWIDTH] IN BLOOD BY AUTOMATED COUNT: 15.1 % (ref 11.6–15.1)
EST. AVERAGE GLUCOSE BLD GHB EST-MCNC: 117 MG/DL
GFR SERPL CREATININE-BSD FRML MDRD: 45 ML/MIN/1.73SQ M
GLUCOSE P FAST SERPL-MCNC: 97 MG/DL (ref 65–99)
HBA1C MFR BLD: 5.7 %
HCT VFR BLD AUTO: 36.6 % (ref 36.5–49.3)
HGB BLD-MCNC: 11.8 G/DL (ref 12–17)
MCH RBC QN AUTO: 29 PG (ref 26.8–34.3)
MCHC RBC AUTO-ENTMCNC: 32.2 G/DL (ref 31.4–37.4)
MCV RBC AUTO: 90 FL (ref 82–98)
PLATELET # BLD AUTO: 144 THOUSANDS/UL (ref 149–390)
PMV BLD AUTO: 10.4 FL (ref 8.9–12.7)
POTASSIUM SERPL-SCNC: 4.3 MMOL/L (ref 3.5–5.3)
RBC # BLD AUTO: 4.07 MILLION/UL (ref 3.88–5.62)
SODIUM SERPL-SCNC: 140 MMOL/L (ref 136–145)
WBC # BLD AUTO: 5.34 THOUSAND/UL (ref 4.31–10.16)

## 2020-02-25 PROCEDURE — 80048 BASIC METABOLIC PNL TOTAL CA: CPT

## 2020-02-25 PROCEDURE — 36415 COLL VENOUS BLD VENIPUNCTURE: CPT

## 2020-02-25 PROCEDURE — 85027 COMPLETE CBC AUTOMATED: CPT

## 2020-02-25 PROCEDURE — 83036 HEMOGLOBIN GLYCOSYLATED A1C: CPT

## 2020-03-03 ENCOUNTER — OFFICE VISIT (OUTPATIENT)
Dept: INTERNAL MEDICINE CLINIC | Age: 80
End: 2020-03-03
Payer: COMMERCIAL

## 2020-03-03 VITALS
OXYGEN SATURATION: 99 % | WEIGHT: 198.4 LBS | HEART RATE: 76 BPM | BODY MASS INDEX: 27.77 KG/M2 | TEMPERATURE: 98.6 F | SYSTOLIC BLOOD PRESSURE: 100 MMHG | DIASTOLIC BLOOD PRESSURE: 70 MMHG | HEIGHT: 71 IN

## 2020-03-03 DIAGNOSIS — R80.1 PERSISTENT PROTEINURIA: ICD-10-CM

## 2020-03-03 DIAGNOSIS — E11.8 TYPE 2 DIABETES MELLITUS WITH COMPLICATION (HCC): Primary | ICD-10-CM

## 2020-03-03 DIAGNOSIS — C34.91 PRIMARY MALIGNANT NEOPLASM OF RIGHT LUNG METASTATIC TO OTHER SITE (HCC): ICD-10-CM

## 2020-03-03 DIAGNOSIS — N18.30 CKD (CHRONIC KIDNEY DISEASE) STAGE 3, GFR 30-59 ML/MIN (HCC): ICD-10-CM

## 2020-03-03 PROCEDURE — 1036F TOBACCO NON-USER: CPT | Performed by: INTERNAL MEDICINE

## 2020-03-03 PROCEDURE — 99214 OFFICE O/P EST MOD 30 MIN: CPT | Performed by: INTERNAL MEDICINE

## 2020-03-03 PROCEDURE — 3078F DIAST BP <80 MM HG: CPT | Performed by: INTERNAL MEDICINE

## 2020-03-03 PROCEDURE — 3008F BODY MASS INDEX DOCD: CPT | Performed by: INTERNAL MEDICINE

## 2020-03-03 PROCEDURE — 3044F HG A1C LEVEL LT 7.0%: CPT | Performed by: INTERNAL MEDICINE

## 2020-03-03 PROCEDURE — 1160F RVW MEDS BY RX/DR IN RCRD: CPT | Performed by: INTERNAL MEDICINE

## 2020-03-03 PROCEDURE — 3066F NEPHROPATHY DOC TX: CPT | Performed by: INTERNAL MEDICINE

## 2020-03-03 PROCEDURE — 3074F SYST BP LT 130 MM HG: CPT | Performed by: INTERNAL MEDICINE

## 2020-03-03 NOTE — PROGRESS NOTES
Assessment/Plan:    1  Type 2 diabetes mellitus  Hemoglobin A1c 5 7  Which is better than previous  Well controlled with the lifestyle changes weight loss  2  Metastatic CA lung  In remission  Being followed by oncologist    3  CKD stage 3  His renal function relatively stable  Will continue to monitor           Diagnoses and all orders for this visit:    Type 2 diabetes mellitus with complication [facility])  -     Ambulatory referral to Ophthalmology; Future  -     CBC; Future  -     Hemoglobin A1C; Future    Persistent proteinuria    Primary malignant neoplasm of right lung metastatic to other site [facility])    CKD (chronic kidney disease) stage 3, GFR 30-59 ml/min ([facility])  -     Basic metabolic panel; Future  -     Magnesium; Future  -     Phosphorus; Future               Subjective:          Patient ID: Richard Gomez is a 79 y o  male  Patient is here for regular follow-up  Does have blood work done last week and would like to discuss results  Otherwise no new complaints  Appetite is improving  Able to ambulate with a cane  The following portions of the patient's history were reviewed and updated as appropriate: allergies, current medications, past family history, past medical history, past social history, past surgical history and problem list     Review of Systems   Constitutional: Negative for fatigue and fever  HENT: Negative for congestion, ear discharge, ear pain, postnasal drip, sinus pressure, sore throat, tinnitus and trouble swallowing  Eyes: Negative for discharge, itching and visual disturbance  Respiratory: Negative for cough and shortness of breath  Cardiovascular: Negative for chest pain and palpitations  Gastrointestinal: Negative for abdominal pain, diarrhea, nausea and vomiting  Endocrine: Negative for cold intolerance and polyuria  Genitourinary: Negative for difficulty urinating, dysuria and urgency  Musculoskeletal: Positive for arthralgias  Negative for neck pain  Skin: Negative for rash  Allergic/Immunologic: Negative for environmental allergies  Neurological: Negative for dizziness, weakness and headaches  Psychiatric/Behavioral: Negative for agitation and behavioral problems  The patient is not nervous/anxious  Past Medical History:   Diagnosis Date    [facility]     Cancer of lung ([facility] )     brain and bones    [facility]     Hypercholesteremia     Hypertension     Lung neoplasm     Proteinuria     LAST ASSESSED [id number]    PVD (peripheral vascular disease) [facility])          Current Outpatient Medications:     glucose blood (JEANNE CONTOUR NEXT TEST) test strip, 3 each by Other route 3 (three) times a week, Disp: 50 each, Rfl: 3    lisinopril (ZESTRIL) 2 5 mg tablet, Take 1 tablet (2 5 mg total) by mouth daily, Disp: 90 tablet, Rfl: 3    Osimertinib Mesylate 80 MG TABS, Take 1 tablet (80 mg total) by mouth daily, Disp: 30 tablet, Rfl: 6    No Known Allergies    Social History   Past Surgical History:   Procedure Laterality Date    [place] BRONCHOSCOPY,DIAGNOSTIC N/A 4/24/2018    Procedure: Herschell Needle;  Surgeon: Genesis Thompson MD;  [facility]; Service: Pulmonary    THORACENTESIS N/A 4/24/2018    Procedure: Darrin Brogenna;  Surgeon: Genesis Thompson MD;  [facility]; Service: Pulmonary     Family History   Problem Relation Age of Onset    Diabetes Mother     Diabetes Father     Diabetes Family        Objective:  /70 (BP Location: Left arm, Patient Position: Sitting, Cuff Size: Adult)   Pulse 76   Temp 98 6 °F (37 °C) (Tympanic)   Ht 5' 11 26" (1 81 m)   Wt 90 kg (198 lb 6 4 oz)   SpO2 99%   BMI 27 47 kg/m²   Body mass index is 27 47 kg/m²  Physical Exam   Constitutional: He appears well-developed  HENT:   Head: Normocephalic     Right Ear: External ear normal    Left Ear: External ear normal    Mouth/Throat: Oropharynx is clear and moist    Eyes: Pupils are equal, round, and reactive to light  No scleral icterus  Neck: Normal range of motion  Neck supple  No tracheal deviation present  No thyromegaly present  Cardiovascular: Normal rate and normal heart sounds  No murmur heard  Pulmonary/Chest: Effort normal and breath sounds normal  No respiratory distress  He exhibits no tenderness  Abdominal: Soft  Bowel sounds are normal  He exhibits no mass  There is no tenderness  Musculoskeletal: Normal range of motion  He exhibits no edema, tenderness or deformity  Lymphadenopathy:     He has no cervical adenopathy  Neurological: He is alert  No cranial nerve deficit  Skin: Skin is warm  Psychiatric: He has a normal mood and affect

## 2020-03-13 LAB
LEFT EYE DIABETIC RETINOPATHY: NORMAL
RIGHT EYE DIABETIC RETINOPATHY: NORMAL

## 2020-04-17 ENCOUNTER — APPOINTMENT (OUTPATIENT)
Dept: LAB | Age: 80
End: 2020-04-17
Payer: COMMERCIAL

## 2020-04-17 DIAGNOSIS — C34.91 PRIMARY MALIGNANT NEOPLASM OF RIGHT LUNG METASTATIC TO OTHER SITE (HCC): ICD-10-CM

## 2020-04-17 LAB
BUN SERPL-MCNC: 29 MG/DL (ref 5–25)
CREAT SERPL-MCNC: 1.48 MG/DL (ref 0.6–1.3)
GFR SERPL CREATININE-BSD FRML MDRD: 44 ML/MIN/1.73SQ M

## 2020-04-17 PROCEDURE — 82565 ASSAY OF CREATININE: CPT

## 2020-04-17 PROCEDURE — 84520 ASSAY OF UREA NITROGEN: CPT

## 2020-04-17 PROCEDURE — 36415 COLL VENOUS BLD VENIPUNCTURE: CPT

## 2020-05-11 ENCOUNTER — HOSPITAL ENCOUNTER (OUTPATIENT)
Dept: RADIOLOGY | Facility: HOSPITAL | Age: 80
Discharge: HOME | End: 2020-05-11
Attending: RADIOLOGY
Payer: COMMERCIAL

## 2020-05-11 DIAGNOSIS — C79.31 SECONDARY MALIGNANT NEOPLASM OF BRAIN AND SPINAL CORD (HCC): ICD-10-CM

## 2020-05-11 DIAGNOSIS — C79.49 SECONDARY MALIGNANT NEOPLASM OF BRAIN AND SPINAL CORD (HCC): ICD-10-CM

## 2020-05-11 DIAGNOSIS — C34.91 PRIMARY MALIGNANT NEOPLASM OF RIGHT LUNG METASTATIC TO OTHER SITE (HCC): ICD-10-CM

## 2020-05-11 PROCEDURE — 70470 CT HEAD/BRAIN W/O & W/DYE: CPT

## 2020-05-11 RX ADMIN — IOHEXOL 85 ML: 350 INJECTION, SOLUTION INTRAVENOUS at 06:45

## 2020-05-19 ENCOUNTER — OFFICE VISIT (OUTPATIENT)
Dept: HEMATOLOGY ONCOLOGY | Facility: CLINIC | Age: 80
End: 2020-05-19
Payer: COMMERCIAL

## 2020-05-19 VITALS
WEIGHT: 195 LBS | HEIGHT: 71 IN | TEMPERATURE: 98.4 F | RESPIRATION RATE: 16 BRPM | SYSTOLIC BLOOD PRESSURE: 128 MMHG | HEART RATE: 96 BPM | DIASTOLIC BLOOD PRESSURE: 80 MMHG | BODY MASS INDEX: 27.3 KG/M2 | OXYGEN SATURATION: 98 %

## 2020-05-19 DIAGNOSIS — C34.91 PRIMARY MALIGNANT NEOPLASM OF RIGHT LUNG METASTATIC TO OTHER SITE (HCC): Primary | ICD-10-CM

## 2020-05-19 PROCEDURE — 2022F DILAT RTA XM EVC RTNOPTHY: CPT | Performed by: PHYSICIAN ASSISTANT

## 2020-05-19 PROCEDURE — 1160F RVW MEDS BY RX/DR IN RCRD: CPT | Performed by: PHYSICIAN ASSISTANT

## 2020-05-19 PROCEDURE — 3066F NEPHROPATHY DOC TX: CPT | Performed by: PHYSICIAN ASSISTANT

## 2020-05-19 PROCEDURE — 3044F HG A1C LEVEL LT 7.0%: CPT | Performed by: PHYSICIAN ASSISTANT

## 2020-05-19 PROCEDURE — 99213 OFFICE O/P EST LOW 20 MIN: CPT | Performed by: PHYSICIAN ASSISTANT

## 2020-05-19 PROCEDURE — 3008F BODY MASS INDEX DOCD: CPT | Performed by: PHYSICIAN ASSISTANT

## 2020-05-19 PROCEDURE — 1036F TOBACCO NON-USER: CPT | Performed by: PHYSICIAN ASSISTANT

## 2020-05-19 PROCEDURE — 3074F SYST BP LT 130 MM HG: CPT | Performed by: PHYSICIAN ASSISTANT

## 2020-05-19 PROCEDURE — 3079F DIAST BP 80-89 MM HG: CPT | Performed by: PHYSICIAN ASSISTANT

## 2020-05-27 ENCOUNTER — CLINICAL SUPPORT (OUTPATIENT)
Dept: RADIATION ONCOLOGY | Facility: CLINIC | Age: 80
End: 2020-05-27
Attending: RADIOLOGY
Payer: COMMERCIAL

## 2020-05-27 VITALS — BODY MASS INDEX: 26.74 KG/M2 | WEIGHT: 191 LBS | HEIGHT: 71 IN

## 2020-05-27 DIAGNOSIS — C34.91 PRIMARY MALIGNANT NEOPLASM OF RIGHT LUNG METASTATIC TO OTHER SITE (HCC): Primary | ICD-10-CM

## 2020-05-27 DIAGNOSIS — C79.31 SECONDARY MALIGNANT NEOPLASM OF BRAIN AND SPINAL CORD (HCC): Primary | ICD-10-CM

## 2020-05-27 DIAGNOSIS — C79.49 SECONDARY MALIGNANT NEOPLASM OF BRAIN AND SPINAL CORD (HCC): ICD-10-CM

## 2020-05-27 DIAGNOSIS — C79.31 SECONDARY MALIGNANT NEOPLASM OF BRAIN AND SPINAL CORD (HCC): ICD-10-CM

## 2020-05-27 DIAGNOSIS — C79.49 SECONDARY MALIGNANT NEOPLASM OF BRAIN AND SPINAL CORD (HCC): Primary | ICD-10-CM

## 2020-05-27 DIAGNOSIS — C34.91 PRIMARY MALIGNANT NEOPLASM OF RIGHT LUNG METASTATIC TO OTHER SITE (HCC): ICD-10-CM

## 2020-05-27 DIAGNOSIS — D49.1 LUNG NEOPLASM: ICD-10-CM

## 2020-05-27 PROCEDURE — G0463 HOSPITAL OUTPT CLINIC VISIT: HCPCS | Performed by: RADIOLOGY

## 2020-05-27 PROCEDURE — 99211 OFF/OP EST MAY X REQ PHY/QHP: CPT | Performed by: RADIOLOGY

## 2020-07-08 ENCOUNTER — APPOINTMENT (OUTPATIENT)
Dept: LAB | Age: 80
End: 2020-07-08
Payer: COMMERCIAL

## 2020-07-08 ENCOUNTER — OFFICE VISIT (OUTPATIENT)
Dept: INTERNAL MEDICINE CLINIC | Age: 80
End: 2020-07-08
Payer: COMMERCIAL

## 2020-07-08 VITALS
OXYGEN SATURATION: 99 % | SYSTOLIC BLOOD PRESSURE: 130 MMHG | WEIGHT: 192.6 LBS | BODY MASS INDEX: 26.96 KG/M2 | HEIGHT: 71 IN | HEART RATE: 70 BPM | TEMPERATURE: 98.3 F | DIASTOLIC BLOOD PRESSURE: 68 MMHG

## 2020-07-08 DIAGNOSIS — C79.49 SECONDARY MALIGNANT NEOPLASM OF BRAIN AND SPINAL CORD (HCC): ICD-10-CM

## 2020-07-08 DIAGNOSIS — N18.30 CKD (CHRONIC KIDNEY DISEASE) STAGE 3, GFR 30-59 ML/MIN (HCC): ICD-10-CM

## 2020-07-08 DIAGNOSIS — I48.0 PAROXYSMAL ATRIAL FIBRILLATION (HCC): ICD-10-CM

## 2020-07-08 DIAGNOSIS — C34.91 PRIMARY MALIGNANT NEOPLASM OF RIGHT LUNG METASTATIC TO OTHER SITE (HCC): ICD-10-CM

## 2020-07-08 DIAGNOSIS — Z00.00 MEDICARE ANNUAL WELLNESS VISIT, SUBSEQUENT: ICD-10-CM

## 2020-07-08 DIAGNOSIS — D49.1 LUNG NEOPLASM: ICD-10-CM

## 2020-07-08 DIAGNOSIS — R80.1 PERSISTENT PROTEINURIA: ICD-10-CM

## 2020-07-08 DIAGNOSIS — C79.31 SECONDARY MALIGNANT NEOPLASM OF BRAIN AND SPINAL CORD (HCC): ICD-10-CM

## 2020-07-08 DIAGNOSIS — E78.2 MIXED HYPERLIPIDEMIA: ICD-10-CM

## 2020-07-08 DIAGNOSIS — E11.8 TYPE 2 DIABETES MELLITUS WITH COMPLICATION (HCC): Primary | ICD-10-CM

## 2020-07-08 DIAGNOSIS — E11.8 TYPE 2 DIABETES MELLITUS WITH COMPLICATION (HCC): ICD-10-CM

## 2020-07-08 LAB
BASOPHILS # BLD AUTO: 0.02 THOUSANDS/ΜL (ref 0–0.1)
BASOPHILS NFR BLD AUTO: 0 % (ref 0–1)
EOSINOPHIL # BLD AUTO: 0.29 THOUSAND/ΜL (ref 0–0.61)
EOSINOPHIL NFR BLD AUTO: 5 % (ref 0–6)
ERYTHROCYTE [DISTWIDTH] IN BLOOD BY AUTOMATED COUNT: 14.7 % (ref 11.6–15.1)
EST. AVERAGE GLUCOSE BLD GHB EST-MCNC: 111 MG/DL
HBA1C MFR BLD: 5.5 %
HCT VFR BLD AUTO: 36.5 % (ref 36.5–49.3)
HGB BLD-MCNC: 11.9 G/DL (ref 12–17)
IMM GRANULOCYTES # BLD AUTO: 0.02 THOUSAND/UL (ref 0–0.2)
IMM GRANULOCYTES NFR BLD AUTO: 0 % (ref 0–2)
LYMPHOCYTES # BLD AUTO: 1.41 THOUSANDS/ΜL (ref 0.6–4.47)
LYMPHOCYTES NFR BLD AUTO: 25 % (ref 14–44)
MAGNESIUM SERPL-MCNC: 2.3 MG/DL (ref 1.6–2.6)
MCH RBC QN AUTO: 29.5 PG (ref 26.8–34.3)
MCHC RBC AUTO-ENTMCNC: 32.6 G/DL (ref 31.4–37.4)
MCV RBC AUTO: 91 FL (ref 82–98)
MONOCYTES # BLD AUTO: 0.53 THOUSAND/ΜL (ref 0.17–1.22)
MONOCYTES NFR BLD AUTO: 9 % (ref 4–12)
NEUTROPHILS # BLD AUTO: 3.35 THOUSANDS/ΜL (ref 1.85–7.62)
NEUTS SEG NFR BLD AUTO: 61 % (ref 43–75)
NRBC BLD AUTO-RTO: 0 /100 WBCS
PHOSPHATE SERPL-MCNC: 3 MG/DL (ref 2.3–4.1)
PLATELET # BLD AUTO: 180 THOUSANDS/UL (ref 149–390)
PMV BLD AUTO: 10 FL (ref 8.9–12.7)
RBC # BLD AUTO: 4.03 MILLION/UL (ref 3.88–5.62)
WBC # BLD AUTO: 5.62 THOUSAND/UL (ref 4.31–10.16)

## 2020-07-08 PROCEDURE — 85025 COMPLETE CBC W/AUTO DIFF WBC: CPT

## 2020-07-08 PROCEDURE — 99214 OFFICE O/P EST MOD 30 MIN: CPT | Performed by: INTERNAL MEDICINE

## 2020-07-08 PROCEDURE — 3075F SYST BP GE 130 - 139MM HG: CPT | Performed by: INTERNAL MEDICINE

## 2020-07-08 PROCEDURE — 3078F DIAST BP <80 MM HG: CPT | Performed by: INTERNAL MEDICINE

## 2020-07-08 PROCEDURE — 3066F NEPHROPATHY DOC TX: CPT | Performed by: INTERNAL MEDICINE

## 2020-07-08 PROCEDURE — 1160F RVW MEDS BY RX/DR IN RCRD: CPT | Performed by: INTERNAL MEDICINE

## 2020-07-08 PROCEDURE — 1036F TOBACCO NON-USER: CPT | Performed by: INTERNAL MEDICINE

## 2020-07-08 PROCEDURE — 2022F DILAT RTA XM EVC RTNOPTHY: CPT | Performed by: INTERNAL MEDICINE

## 2020-07-08 PROCEDURE — 1125F AMNT PAIN NOTED PAIN PRSNT: CPT | Performed by: INTERNAL MEDICINE

## 2020-07-08 PROCEDURE — 84100 ASSAY OF PHOSPHORUS: CPT

## 2020-07-08 PROCEDURE — 3044F HG A1C LEVEL LT 7.0%: CPT | Performed by: INTERNAL MEDICINE

## 2020-07-08 PROCEDURE — 3008F BODY MASS INDEX DOCD: CPT | Performed by: INTERNAL MEDICINE

## 2020-07-08 PROCEDURE — 1170F FXNL STATUS ASSESSED: CPT | Performed by: INTERNAL MEDICINE

## 2020-07-08 PROCEDURE — 83036 HEMOGLOBIN GLYCOSYLATED A1C: CPT

## 2020-07-08 PROCEDURE — G0439 PPPS, SUBSEQ VISIT: HCPCS | Performed by: INTERNAL MEDICINE

## 2020-07-08 PROCEDURE — 83735 ASSAY OF MAGNESIUM: CPT

## 2020-07-08 NOTE — PATIENT INSTRUCTIONS

## 2020-07-08 NOTE — PROGRESS NOTES
Assessment and Plan:     Problem List Items Addressed This Visit     None           Preventive health issues were discussed with patient, and age appropriate screening tests were ordered as noted in patient's After Visit Summary  Personalized health advice and appropriate referrals for health education or preventive services given if needed, as noted in patient's After Visit Summary  History of Present Illness:     Patient presents for Medicare Annual Wellness visit    Patient Care Team:  Goldy Saldivar MD as PCP - General  Dolly Mccall MD (Radiation Oncology)  Peter Chirinos MD (Hematology)  Alexa Hill RN as Registered Nurse (Care Coordination)  [facility])     Problem List:     Patient Active Problem List   Diagnosis    Type 2 diabetes mellitus with complication [facility]    Mixed hyperlipidemia    Atrial fibrillation [facility]    Peripheral vascular disease ([facility]    Persistent proteinuria    Pleural effusion    Lung neoplasm    Metastatic disease ([facility]    Metastatic primary lung cancer ([facility] )    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[facility] )    CKD (chronic kidney disease) stage 3, GFR 30-59 ml/min ([facility] )      Past Medical and Surgical History:     Past Medical History:   Diagnosis Date    Atrial fibrillation ([facility]     Cancer of lung ([facility] )     brain and bones    Diabetes mellitus ([facility]     Hypercholesteremia     Hypertension     Lung neoplasm     Proteinuria     LAST ASSESSED [id number]    [facility]      Past Surgical History:   Procedure Laterality Date    [place]ONCHOSCOPY,DIAGNOSTIC N/A 4/24/2018    [place] [facility]  Surgeon: Josephine Chew MD;  [facility]; Service: Pulmonary    THORACENTESIS N/A 4/24/2018    Procedure: Francis Calle;  Surgeon: Josephine Chew MD;  [facility];   Service: Pulmonary      Family History:     Family History   Problem Relation Age of Onset    Diabetes Mother    Judith Sam Diabetes Father     Diabetes Family       Social History:        Social History     Socioeconomic History    Marital status: /Civil Union     Spouse name: Not on file    Number of children: Not on file    Years of education: Not on file    Highest education level: Not on file   Occupational History    Occupation: RETIRED   Social Needs    Financial resource strain: Not on file    Food insecurity:     Worry: Not on file     Inability: Not on file   [email or web address] needs:     Medical: Not on file     Non-medical: Not on file   Tobacco Use    Smoking status: Former Smoker     Types: Cigarettes    Smokeless tobacco: Never Used    Tobacco comment: quit 39 yrs ago as of 2018   Substance and Sexual Activity    Alcohol use: Yes     Comment: rare    Drug use: No    Sexual activity: Not on file   Lifestyle    Physical activity:     Days per week: Not on file     Minutes per session: Not on file    Stress: Not on file   Relationships    Social connections:     Talks on phone: Not on file     Gets together: Not on file     Attends [employer] service: Not on file     Active member of club or organization: Not on file     Attends meetings of clubs or organizations: Not on file     Relationship status: Not on file    Intimate partner violence:     Fear of current or ex partner: Not on file     Emotionally abused: Not on file     Physically abused: Not on file     Forced sexual activity: Not on file   Other Topics Concern    Not on file   Social History Narrative    ADVANCED DIRECTIVE IN CHART     CAFFEINE USE VIA COFFEE 3 SERVINGS PER DAY       Medications and Allergies:     Current Outpatient Medications   Medication Sig Dispense Refill    glucose blood (JEANNE CONTOUR NEXT TEST) test strip 3 each by Other route 3 (three) times a week 50 each 3    lisinopril (ZESTRIL) 2 5 mg tablet Take 1 tablet (2 5 mg total) by mouth daily 90 tablet 3    Osimertinib Mesylate 80 MG TABS Take 1 tablet (80 mg total) by mouth daily 30 tablet 6     No current facility-administered medications for this visit  No Known Allergies   Immunizations: There is no immunization history for the selected administration types on file for this patient  Health Maintenance: There are no preventive care reminders to display for this patient  Topic Date Due    DTaP,Tdap,and Td Vaccines (1 - Tdap) 01/24/1951    Pneumococcal Vaccine: 65+ Years (1 of 2 - PCV13) 01/24/2006    Influenza Vaccine  07/01/2020      Medicare Health Risk Assessment:     There were no vitals taken for this visit  Piper Munoz is here for his Subsequent Wellness visit  Last Medicare Wellness visit information reviewed, patient interviewed and updates made to the record today  Health Risk Assessment:   Patient rates overall health as good  Patient feels that their physical health rating is same  Eyesight was rated as same  Hearing was rated as same  Patient feels that their emotional and mental health rating is same  Pain experienced in the last 7 days has been none  Patient states that he has experienced no weight loss or gain in last 6 months  Depression Screening:   PHQ-2 Score: 0      Fall Risk Screening: In the past year, patient has experienced: no history of falling in past year      Home Safety:  Patient does not have trouble with stairs inside or outside of their home  Patient has working smoke alarms and has working carbon monoxide detector  Home safety hazards include: none  Nutrition:   Current diet is Regular  Medications:   Patient is not currently taking any over-the-counter supplements  Patient is able to manage medications  Activities of Daily Living (ADLs)/Instrumental Activities of Daily Living (IADLs):   Walk and transfer into and out of bed and chair?: Yes  Dress and groom yourself?: Yes    Bathe or shower yourself?: Yes    Feed yourself?  Yes  Do your laundry/housekeeping?: Yes  Manage your money, pay your bills and track your expenses?: Yes  Make your own meals?: Yes    Do your own shopping?: Yes    Previous Hospitalizations:   Any hospitalizations or ED visits within the last 12 months?: No      Advance Care Planning:   Living will: No    Advanced directive: No    Five wishes given: Yes      Cognitive Screening:   Provider or family/friend/caregiver concerned regarding cognition?: No    PREVENTIVE SCREENINGS      Cardiovascular Screening:    General: Screening Not Indicated and History Lipid Disorder      Diabetes Screening:     General: Screening Not Indicated and History Diabetes      Colorectal Cancer Screening:     General: Screening Not Indicated      Prostate Cancer Screening:    General: Screening Not Indicated      Osteoporosis Screening:    General: Patient Declines      Abdominal Aortic Aneurysm (AAA) Screening:    Risk factors include: tobacco use        General: Screening Not Indicated      Lung Cancer Screening:     General: Screening Not Indicated and History Lung Cancer      Hepatitis C Screening:    General: Screening Not Indicated    Other Counseling Topics:   Alcohol use counseling, car/seat belt/driving safety, skin self-exam, sunscreen and calcium and vitamin D intake and regular weightbearing exercise  Vaccination discussed with patient and his reluctant to get any vaccination      Maryann Joyce MD

## 2020-07-08 NOTE — PROGRESS NOTES
Assessment/Plan:    1  Type 2 diabetes mellitus  Since his weight loss and treatment for lung cancer his office medicine  Hemoglobin infancy prior to this visit is still pending  Will continue with the healthy diet and patient is also monitored his blood sugar at home  2  Protein urea  Continue with the lisinopril 2 5 mg daily  3  Metastatic carcinoma of lung  Patient is in remission and being followed by oncologist          Diagnoses and all orders for this visit:    Type 2 diabetes mellitus with complication ([facility] )    Lung neoplasm    Paroxysmal atrial fibrillation (HCC)    CKD (chronic kidney disease) stage 3, GFR 30-59 ml/min (HCC)    Mixed hyperlipidemia    Persistent proteinuria    Medicare annual wellness visit, subsequent          BMI Counseling: Body mass index is 26 86 kg/m²  The BMI is above normal  Nutrition recommendations include decreasing portion sizes, encouraging healthy choices of fruits and vegetables, decreasing fast food intake, consuming healthier snacks and limiting drinks that contain sugar  No pharmacotherapy was ordered  Subjective:          Patient ID: Telly Moreno is a 79 y o  male  Patient is here for regular follow-up  No blood work was done prior to this visit  He does have request and will do it as soon as possible  Otherwise no new complaints  No constipation  The following portions of the patient's history were reviewed and updated as appropriate: allergies, current medications, past family history, past medical history, past social history, past surgical history and problem list     Review of Systems   Constitutional: Negative for fatigue and fever  HENT: Negative for congestion, ear discharge, ear pain, postnasal drip, sinus pressure, sore throat, tinnitus and trouble swallowing  Eyes: Negative for discharge, itching and visual disturbance  Respiratory: Negative for cough and shortness of breath      Cardiovascular: Negative for chest pain and palpitations  Gastrointestinal: Negative for abdominal pain, diarrhea, nausea and vomiting  Endocrine: Negative for cold intolerance and polyuria  Genitourinary: Negative for difficulty urinating, dysuria and urgency  Musculoskeletal: Negative for arthralgias and neck pain  Skin: Negative for rash  Allergic/Immunologic: Negative for environmental allergies  Neurological: Negative for dizziness, weakness and headaches  Psychiatric/Behavioral: Negative for agitation and behavioral problems  The patient is not nervous/anxious  Past Medical History:   Diagnosis Date    [facility] )     Cancer of lung ([facility] )     brain and bones    Diabetes mellitus ([facility] )     Hypercholesteremia     Hypertension     Lung neoplasm     Proteinuria     LAST ASSESSED [id number]    PVD [facility]peripheral vascular disease) [facility])          Current Outpatient Medications:     glucose blood (JEANNE CONTOUR NEXT TEST) test strip, 3 each by Other route 3 (three) times a week, Disp: 50 each, Rfl: 3    lisinopril (ZESTRIL) 2 5 mg tablet, Take 1 tablet (2 5 mg total) by mouth daily, Disp: 90 tablet, Rfl: 3    Osimertinib Mesylate 80 MG TABS, Take 1 tablet (80 mg total) by mouth daily, Disp: 30 tablet, Rfl: 6    No Known Allergies    Social History   Past Surgical History:   Procedure Laterality Date    [place] BRONCHOSCOPY,DIAGNOSTIC N/A 4/24/2018    Procedure: Gregor Koenig;  Surgeon: Tena Gonzalez MD;  [facility]; Service: Pulmonary    THORACENTESIS N/A 4/24/2018    Procedure: Dianna Guerra;  Surgeon: Tena Gonzalez MD;  [facility];   Service: Pulmonary     Family History   Problem Relation Age of Onset    Diabetes Mother     Diabetes Father     Diabetes Family        Objective:  /68 (BP Location: Left arm, Patient Position: Sitting, Cuff Size: Standard)   Pulse 70   Temp 98 3 °F (36 8 °C) (Temporal)   Ht 5' 11" (1 803 m)   Wt 87 4 kg (192 lb 9 6 oz)   SpO2 99%   BMI 26 86 kg/m²   Body mass index is 26 86 kg/m²  Physical Exam   Constitutional: He appears well-developed  HENT:   Head: Normocephalic  Right Ear: External ear normal    Left Ear: External ear normal    Mouth/Throat: Oropharynx is clear and moist    Eyes: Pupils are equal, round, and reactive to light  No scleral icterus  Neck: Normal range of motion  Neck supple  No tracheal deviation present  No thyromegaly present  Cardiovascular: Normal rate, regular rhythm and normal heart sounds  Pulmonary/Chest: Effort normal and breath sounds normal  No respiratory distress  He exhibits no tenderness  Abdominal: Soft  Bowel sounds are normal  He exhibits no mass  There is no tenderness  Musculoskeletal: Normal range of motion  Lymphadenopathy:     He has no cervical adenopathy  Neurological: He is alert  No cranial nerve deficit  Skin: Skin is warm and dry  Psychiatric: He has a normal mood and affect   His behavior is normal  Judgment and thought content normal

## 2020-11-09 ENCOUNTER — OFFICE VISIT (OUTPATIENT)
Dept: INTERNAL MEDICINE CLINIC | Age: 80
End: 2020-11-09
Payer: COMMERCIAL

## 2020-11-09 VITALS
HEIGHT: 74 IN | BODY MASS INDEX: 22.97 KG/M2 | DIASTOLIC BLOOD PRESSURE: 72 MMHG | TEMPERATURE: 98.1 F | HEART RATE: 62 BPM | SYSTOLIC BLOOD PRESSURE: 126 MMHG | WEIGHT: 179 LBS | OXYGEN SATURATION: 100 %

## 2020-11-09 DIAGNOSIS — E78.2 MIXED HYPERLIPIDEMIA: ICD-10-CM

## 2020-11-09 DIAGNOSIS — I48.0 PAROXYSMAL ATRIAL FIBRILLATION (HCC): ICD-10-CM

## 2020-11-09 DIAGNOSIS — D49.1 LUNG NEOPLASM: Primary | ICD-10-CM

## 2020-11-09 DIAGNOSIS — R80.1 PERSISTENT PROTEINURIA: ICD-10-CM

## 2020-11-09 DIAGNOSIS — C34.91 PRIMARY MALIGNANT NEOPLASM OF RIGHT LUNG METASTATIC TO OTHER SITE (HCC): ICD-10-CM

## 2020-11-09 DIAGNOSIS — E11.8 TYPE 2 DIABETES MELLITUS WITH COMPLICATION (HCC): ICD-10-CM

## 2020-11-09 PROCEDURE — T1015 CLINIC SERVICE: HCPCS | Performed by: INTERNAL MEDICINE

## 2020-11-09 PROCEDURE — 99214 OFFICE O/P EST MOD 30 MIN: CPT | Performed by: INTERNAL MEDICINE

## 2020-11-09 RX ORDER — LISINOPRIL 2.5 MG/1
2.5 TABLET ORAL DAILY
Qty: 90 TABLET | Refills: 3 | Status: SHIPPED | OUTPATIENT
Start: 2020-11-09

## 2020-11-18 ENCOUNTER — APPOINTMENT (OUTPATIENT)
Dept: LAB | Age: 80
End: 2020-11-18
Payer: COMMERCIAL

## 2020-11-18 DIAGNOSIS — E78.2 MIXED HYPERLIPIDEMIA: ICD-10-CM

## 2020-11-18 DIAGNOSIS — E11.8 TYPE 2 DIABETES MELLITUS WITH COMPLICATION (HCC): ICD-10-CM

## 2020-11-18 LAB
EST. AVERAGE GLUCOSE BLD GHB EST-MCNC: 114 MG/DL
HBA1C MFR BLD: 5.6 %

## 2020-11-18 PROCEDURE — 83036 HEMOGLOBIN GLYCOSYLATED A1C: CPT

## 2020-11-19 ENCOUNTER — TRANSCRIBE ORDERS (OUTPATIENT)
Dept: RADIOLOGY | Facility: HOSPITAL | Age: 80
End: 2020-11-19

## 2020-11-19 ENCOUNTER — HOSPITAL ENCOUNTER (OUTPATIENT)
Dept: RADIOLOGY | Facility: HOSPITAL | Age: 80
Discharge: HOME | End: 2020-11-19
Payer: COMMERCIAL

## 2020-11-19 DIAGNOSIS — C79.31 SECONDARY MALIGNANT NEOPLASM OF BRAIN AND SPINAL CORD (HCC): ICD-10-CM

## 2020-11-19 DIAGNOSIS — C79.49 SECONDARY MALIGNANT NEOPLASM OF BRAIN AND SPINAL CORD (HCC): ICD-10-CM

## 2020-11-19 DIAGNOSIS — C34.91 PRIMARY MALIGNANT NEOPLASM OF RIGHT LUNG METASTATIC TO OTHER SITE (HCC): ICD-10-CM

## 2020-11-19 PROCEDURE — 71250 CT THORAX DX C-: CPT

## 2020-11-19 PROCEDURE — 70450 CT HEAD/BRAIN W/O DYE: CPT

## 2020-11-24 ENCOUNTER — OFFICE VISIT (OUTPATIENT)
Dept: HEMATOLOGY ONCOLOGY | Facility: CLINIC | Age: 80
End: 2020-11-24
Payer: COMMERCIAL

## 2020-11-24 VITALS
TEMPERATURE: 97.2 F | BODY MASS INDEX: 22.98 KG/M2 | RESPIRATION RATE: 18 BRPM | DIASTOLIC BLOOD PRESSURE: 72 MMHG | OXYGEN SATURATION: 99 % | SYSTOLIC BLOOD PRESSURE: 120 MMHG | HEART RATE: 79 BPM | HEIGHT: 74 IN

## 2020-11-24 DIAGNOSIS — C34.90 EGFR-RELATED LUNG CANCER (HCC): Primary | ICD-10-CM

## 2020-11-24 PROCEDURE — 1036F TOBACCO NON-USER: CPT | Performed by: INTERNAL MEDICINE

## 2020-11-24 PROCEDURE — 99214 OFFICE O/P EST MOD 30 MIN: CPT | Performed by: INTERNAL MEDICINE

## 2020-11-24 PROCEDURE — 1160F RVW MEDS BY RX/DR IN RCRD: CPT | Performed by: INTERNAL MEDICINE

## 2020-12-03 ENCOUNTER — DOCUMENTATION (OUTPATIENT)
Dept: HEMATOLOGY ONCOLOGY | Facility: CLINIC | Age: 80
End: 2020-12-03

## 2021-01-07 ENCOUNTER — TELEPHONE (OUTPATIENT)
Dept: HEMATOLOGY ONCOLOGY | Facility: CLINIC | Age: 81
End: 2021-01-07

## 2021-01-11 ENCOUNTER — DOCUMENTATION (OUTPATIENT)
Dept: HEMATOLOGY ONCOLOGY | Facility: CLINIC | Age: 81
End: 2021-01-11

## 2021-01-11 NOTE — TELEPHONE ENCOUNTER
Patient is calling to check on the status of his Tagris[employer]  Advised patient that new auth request was sent to Cover my meds today  Patient stated that he only has 6 days of medication left  Task sent to Oncology finance pool and clinical     Patient is requesting a call back with an update on when he will be able to get his prescription filled      Narinder's call back # [phone] (M)

## 2021-01-11 NOTE — PROGRESS NOTES
1/11/2021  Received notification pt called stating his tagrisso needs a new auth     Pt has  47 [address] Lam  ID # [id number]  [facility] [id number]  PCN # [id number]  GRP [id number]    Submitted for auth through cover my meds    1/14/2021  Called [employer] @ 8:45 ([phone]) spoke with douglas Henry Clock has been approved  auth # [id number]  is valid from 1/1/2021 through 1/12/2022  Notified clinical and finance

## 2021-01-14 DIAGNOSIS — C34.90 MALIGNANT NEOPLASM OF LUNG, UNSPECIFIED LATERALITY, UNSPECIFIED PART OF LUNG (HCC): ICD-10-CM

## 2021-01-14 NOTE — TELEPHONE ENCOUNTER
Received approval notification from [employer]  Script pended for signature  Called pt but no answer and voicemail not set up  Will try again later today

## 2021-01-18 ENCOUNTER — TELEPHONE (OUTPATIENT)
Dept: HEMATOLOGY ONCOLOGY | Facility: CLINIC | Age: 81
End: 2021-01-18

## 2021-01-18 DIAGNOSIS — C34.90 MALIGNANT NEOPLASM OF LUNG, UNSPECIFIED LATERALITY, UNSPECIFIED PART OF LUNG (HCC): ICD-10-CM

## 2021-01-18 NOTE — TELEPHONE ENCOUNTER
Patient is calling to follow up on Osimertinid Rx  Per patient he has not heard from the pharmacy about delivery  I see that Rx was sent on 1/14/21 to the local pharmacy  Will send to oral chemo team and RN to confirm Specialty Pharmacy    I do not see one listed in patients chart    Patient requesting a call back 21

## 2021-01-23 ENCOUNTER — APPOINTMENT (EMERGENCY)
Dept: RADIOLOGY | Facility: HOSPITAL | Age: 81
DRG: 478 | End: 2021-01-23
Payer: COMMERCIAL

## 2021-01-23 ENCOUNTER — APPOINTMENT (OUTPATIENT)
Dept: RADIOLOGY | Facility: HOSPITAL | Age: 81
DRG: 478 | End: 2021-01-23
Payer: COMMERCIAL

## 2021-01-23 ENCOUNTER — HOSPITAL ENCOUNTER (INPATIENT)
Facility: HOSPITAL | Age: 81
LOS: 6 days | Discharge: NURSING, REHAB OR LONG-TERM CARE | DRG: 478 | End: 2021-01-30
Attending: EMERGENCY MEDICINE | Admitting: INTERNAL MEDICINE
Payer: COMMERCIAL

## 2021-01-23 DIAGNOSIS — R05.8 COUGH PRODUCTIVE OF CLEAR SPUTUM: ICD-10-CM

## 2021-01-23 DIAGNOSIS — M25.562 LEFT KNEE PAIN: ICD-10-CM

## 2021-01-23 DIAGNOSIS — T14.8XXA FRACTURE: ICD-10-CM

## 2021-01-23 DIAGNOSIS — K59.00 CONSTIPATION, UNSPECIFIED CONSTIPATION TYPE: ICD-10-CM

## 2021-01-23 DIAGNOSIS — C34.91 PRIMARY MALIGNANT NEOPLASM OF RIGHT LUNG METASTATIC TO OTHER SITE (HCC): ICD-10-CM
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
## 2021-01-23 DIAGNOSIS — R26.2 AMBULATORY DYSFUNCTION: ICD-10-CM

## 2021-01-23 DIAGNOSIS — M25.559 HIP PAIN: ICD-10-CM

## 2021-01-23 PROBLEM — D63.8 ANEMIA OF CHRONIC DISEASE: Status: ACTIVE | Noted: 2021-01-23

## 2021-01-23 LAB
ALBUMIN SERPL BCP-MCNC: 3.4 G/DL (ref 3.5–5)
ALP SERPL-CCNC: 124 U/L (ref 46–116)
ALT SERPL W P-5'-P-CCNC: 15 U/L (ref 12–78)
ANION GAP SERPL CALCULATED.3IONS-SCNC: 5 MMOL/L (ref 4–13)
ANION GAP SERPL CALCULATED.3IONS-SCNC: 6 MMOL/L (ref 4–13)
AST SERPL W P-5'-P-CCNC: 20 U/L (ref 5–45)
ATRIAL RATE: 441 BPM
BASOPHILS # BLD AUTO: 0.02 THOUSANDS/ΜL (ref 0–0.1)
BASOPHILS NFR BLD AUTO: 0 % (ref 0–1)
BILIRUB SERPL-MCNC: 0.73 MG/DL (ref 0.2–1)
BUN SERPL-MCNC: 25 MG/DL (ref 5–25)
BUN SERPL-MCNC: 27 MG/DL (ref 5–25)
CALCIUM ALBUM COR SERPL-MCNC: 9.5 MG/DL (ref 8.3–10.1)
CALCIUM SERPL-MCNC: 9 MG/DL (ref 8.3–10.1)
CALCIUM SERPL-MCNC: 9.2 MG/DL (ref 8.3–10.1)
CHLORIDE SERPL-SCNC: 103 MMOL/L (ref 100–108)
CHLORIDE SERPL-SCNC: 104 MMOL/L (ref 100–108)
CO2 SERPL-SCNC: 27 MMOL/L (ref 21–32)
CO2 SERPL-SCNC: 29 MMOL/L (ref 21–32)
CREAT SERPL-MCNC: 1.42 MG/DL (ref 0.6–1.3)
CREAT SERPL-MCNC: 1.52 MG/DL (ref 0.6–1.3)
EOSINOPHIL # BLD AUTO: 0.19 THOUSAND/ΜL (ref 0–0.61)
EOSINOPHIL NFR BLD AUTO: 3 % (ref 0–6)
ERYTHROCYTE [DISTWIDTH] IN BLOOD BY AUTOMATED COUNT: 14.5 % (ref 11.6–15.1)
FERRITIN SERPL-MCNC: 431 NG/ML (ref 8–388)
FOLATE SERPL-MCNC: >20 NG/ML (ref 3.1–17.5)
GFR SERPL CREATININE-BSD FRML MDRD: 43 ML/MIN/1.73SQ M
GFR SERPL CREATININE-BSD FRML MDRD: 47 ML/MIN/1.73SQ M
GLUCOSE SERPL-MCNC: 80 MG/DL (ref 65–140)
GLUCOSE SERPL-MCNC: 87 MG/DL (ref 65–140)
HCT VFR BLD AUTO: 32.2 % (ref 36.5–49.3)
HGB BLD-MCNC: 10.5 G/DL (ref 12–17)
HGB RETIC QN AUTO: 31.5 PG (ref 30–38.3)
IMM GRANULOCYTES # BLD AUTO: 0.02 THOUSAND/UL (ref 0–0.2)
IMM GRANULOCYTES NFR BLD AUTO: 0 % (ref 0–2)
IMM RETICS NFR: 16.9 % (ref 0–14)
IRON SATN MFR SERPL: 16 %
IRON SERPL-MCNC: 31 UG/DL (ref 65–175)
LYMPHOCYTES # BLD AUTO: 1.23 THOUSANDS/ΜL (ref 0.6–4.47)
LYMPHOCYTES NFR BLD AUTO: 19 % (ref 14–44)
MCH RBC QN AUTO: 28.5 PG (ref 26.8–34.3)
MCHC RBC AUTO-ENTMCNC: 32.6 G/DL (ref 31.4–37.4)
MCV RBC AUTO: 88 FL (ref 82–98)
MONOCYTES # BLD AUTO: 0.7 THOUSAND/ΜL (ref 0.17–1.22)
MONOCYTES NFR BLD AUTO: 11 % (ref 4–12)
NEUTROPHILS # BLD AUTO: 4.28 THOUSANDS/ΜL (ref 1.85–7.62)
NEUTS SEG NFR BLD AUTO: 67 % (ref 43–75)
NRBC BLD AUTO-RTO: 0 /100 WBCS
PLATELET # BLD AUTO: 218 THOUSANDS/UL (ref 149–390)
PMV BLD AUTO: 9.2 FL (ref 8.9–12.7)
POTASSIUM SERPL-SCNC: 3.9 MMOL/L (ref 3.5–5.3)
POTASSIUM SERPL-SCNC: 4.1 MMOL/L (ref 3.5–5.3)
PROT SERPL-MCNC: 6.7 G/DL (ref 6.4–8.2)
QRS AXIS: 45 DEGREES
QRSD INTERVAL: 78 MS
QT INTERVAL: 332 MS
QTC INTERVAL: 412 MS
RBC # BLD AUTO: 3.68 MILLION/UL (ref 3.88–5.62)
RETICS # AUTO: ABNORMAL 10*3/UL (ref 14356–105094)
RETICS # CALC: 1.43 % (ref 0.37–1.87)
SODIUM SERPL-SCNC: 137 MMOL/L (ref 136–145)
SODIUM SERPL-SCNC: 137 MMOL/L (ref 136–145)
T WAVE AXIS: 45 DEGREES
TIBC SERPL-MCNC: 190 UG/DL (ref 250–450)
TSH SERPL DL<=0.05 MIU/L-ACNC: 1.95 UIU/ML (ref 0.36–3.74)
VENTRICULAR RATE: 93 BPM
VIT B12 SERPL-MCNC: 318 PG/ML (ref 100–900)
WBC # BLD AUTO: 6.44 THOUSAND/UL (ref 4.31–10.16)

## 2021-01-23 PROCEDURE — 82607 VITAMIN B-12: CPT | Performed by: STUDENT IN AN ORGANIZED HEALTH CARE EDUCATION/TRAINING PROGRAM

## 2021-01-23 PROCEDURE — 82746 ASSAY OF FOLIC ACID SERUM: CPT | Performed by: STUDENT IN AN ORGANIZED HEALTH CARE EDUCATION/TRAINING PROGRAM

## 2021-01-23 PROCEDURE — G1004 CDSM NDSC: HCPCS

## 2021-01-23 PROCEDURE — 85046 RETICYTE/HGB CONCENTRATE: CPT | Performed by: STUDENT IN AN ORGANIZED HEALTH CARE EDUCATION/TRAINING PROGRAM

## 2021-01-23 PROCEDURE — 80048 BASIC METABOLIC PNL TOTAL CA: CPT | Performed by: EMERGENCY MEDICINE

## 2021-01-23 PROCEDURE — 83540 ASSAY OF IRON: CPT | Performed by: STUDENT IN AN ORGANIZED HEALTH CARE EDUCATION/TRAINING PROGRAM

## 2021-01-23 PROCEDURE — 73502 X-RAY EXAM HIP UNI 2-3 VIEWS: CPT

## 2021-01-23 PROCEDURE — NC001 PR NO CHARGE: Performed by: INTERNAL MEDICINE

## 2021-01-23 PROCEDURE — 36415 COLL VENOUS BLD VENIPUNCTURE: CPT | Performed by: EMERGENCY MEDICINE

## 2021-01-23 PROCEDURE — 71250 CT THORAX DX C-: CPT

## 2021-01-23 PROCEDURE — 99285 EMERGENCY DEPT VISIT HI MDM: CPT

## 2021-01-23 PROCEDURE — 83550 IRON BINDING TEST: CPT | Performed by: STUDENT IN AN ORGANIZED HEALTH CARE EDUCATION/TRAINING PROGRAM

## 2021-01-23 PROCEDURE — 93010 ELECTROCARDIOGRAM REPORT: CPT | Performed by: INTERNAL MEDICINE

## 2021-01-23 PROCEDURE — 93005 ELECTROCARDIOGRAM TRACING: CPT

## 2021-01-23 PROCEDURE — 73564 X-RAY EXAM KNEE 4 OR MORE: CPT

## 2021-01-23 PROCEDURE — 84443 ASSAY THYROID STIM HORMONE: CPT | Performed by: STUDENT IN AN ORGANIZED HEALTH CARE EDUCATION/TRAINING PROGRAM

## 2021-01-23 PROCEDURE — 80053 COMPREHEN METABOLIC PANEL: CPT | Performed by: STUDENT IN AN ORGANIZED HEALTH CARE EDUCATION/TRAINING PROGRAM

## 2021-01-23 PROCEDURE — 82728 ASSAY OF FERRITIN: CPT | Performed by: STUDENT IN AN ORGANIZED HEALTH CARE EDUCATION/TRAINING PROGRAM

## 2021-01-23 PROCEDURE — 70450 CT HEAD/BRAIN W/O DYE: CPT

## 2021-01-23 PROCEDURE — 99219 PR INITIAL OBSERVATION CARE/DAY 50 MINUTES: CPT | Performed by: INTERNAL MEDICINE

## 2021-01-23 PROCEDURE — 85025 COMPLETE CBC W/AUTO DIFF WBC: CPT | Performed by: EMERGENCY MEDICINE

## 2021-01-23 PROCEDURE — 99285 EMERGENCY DEPT VISIT HI MDM: CPT | Performed by: EMERGENCY MEDICINE

## 2021-01-23 RX ORDER — SODIUM CHLORIDE, SODIUM GLUCONATE, SODIUM ACETATE, POTASSIUM CHLORIDE, MAGNESIUM CHLORIDE, SODIUM PHOSPHATE, DIBASIC, AND POTASSIUM PHOSPHATE .53; .5; .37; .037; .03; .012; .00082 G/100ML; G/100ML; G/100ML; G/100ML; G/100ML; G/100ML; G/100ML
100 INJECTION, SOLUTION INTRAVENOUS CONTINUOUS
Status: DISPENSED | OUTPATIENT
Start: 2021-01-23 | End: 2021-01-23

## 2021-01-23 RX ORDER — HEPARIN SODIUM 5000 [USP'U]/ML
5000 INJECTION, SOLUTION INTRAVENOUS; SUBCUTANEOUS EVERY 8 HOURS SCHEDULED
Status: DISCONTINUED | OUTPATIENT
Start: 2021-01-23 | End: 2021-01-23

## 2021-01-23 RX ORDER — ACETAMINOPHEN 325 MG/1
650 TABLET ORAL EVERY 6 HOURS PRN
Status: DISCONTINUED | OUTPATIENT
Start: 2021-01-23 | End: 2021-01-27

## 2021-01-23 RX ORDER — SODIUM CHLORIDE, SODIUM GLUCONATE, SODIUM ACETATE, POTASSIUM CHLORIDE, MAGNESIUM CHLORIDE, SODIUM PHOSPHATE, DIBASIC, AND POTASSIUM PHOSPHATE .53; .5; .37; .037; .03; .012; .00082 G/100ML; G/100ML; G/100ML; G/100ML; G/100ML; G/100ML; G/100ML
100 INJECTION, SOLUTION INTRAVENOUS CONTINUOUS
Status: DISCONTINUED | OUTPATIENT
Start: 2021-01-23 | End: 2021-01-23

## 2021-01-23 RX ORDER — LISINOPRIL 2.5 MG/1
2.5 TABLET ORAL DAILY
Status: DISCONTINUED | OUTPATIENT
Start: 2021-01-23 | End: 2021-01-23

## 2021-01-23 RX ORDER — HEPARIN SODIUM 5000 [USP'U]/ML
5000 INJECTION, SOLUTION INTRAVENOUS; SUBCUTANEOUS EVERY 8 HOURS SCHEDULED
Status: DISPENSED | OUTPATIENT
Start: 2021-01-23 | End: 2021-01-26

## 2021-01-23 RX ADMIN — HEPARIN SODIUM 5000 UNITS: 5000 INJECTION INTRAVENOUS; SUBCUTANEOUS at 21:23

## 2021-01-23 RX ADMIN — SODIUM CHLORIDE, SODIUM GLUCONATE, SODIUM ACETATE, POTASSIUM CHLORIDE AND MAGNESIUM CHLORIDE 100 ML/HR: 526; 502; 368; 37; 30 INJECTION, SOLUTION INTRAVENOUS at 05:52

## 2021-01-23 RX ADMIN — HEPARIN SODIUM 5000 UNITS: 5000 INJECTION INTRAVENOUS; SUBCUTANEOUS at 05:53

## 2021-01-23 RX ADMIN — ACETAMINOPHEN 650 MG: 325 TABLET, FILM COATED ORAL at 19:43

## 2021-01-23 NOTE — ED NOTES
Medication brought in by spouse, given to [place] from pharmacy       Ratna Pickett, JOLLY  01/23/21 [id number]

## 2021-01-23 NOTE — PLAN OF CARE
Problem: Potential for Falls  Goal: Patient will remain free of falls  Description: INTERVENTIONS:  - Assess patient frequently for physical needs  -  Identify cognitive and physical deficits and behaviors that affect risk of falls    -  [place] fall precautions as indicated by assessment   - Educate patient/family on patient safety including physical limitations  - Instruct patient to call for assistance with activity based on assessment  - Modify environment to reduce risk of injury  - Consider OT/PT consult to assist with strengthening/mobility  1/23/2021 1833 by Edwin Olivas RN  Outcome: Progressing  1/23/2021 1833 by Edwin Olivas RN  Outcome: Progressing     Problem: PAIN - ADULT  Goal: Verbalizes/displays adequate comfort level or baseline comfort level  Description: Interventions:  - Encourage patient to monitor pain and request assistance  - Assess pain using appropriate pain scale  - Administer analgesics based on type and severity of pain and evaluate response  - Implement non-pharmacological measures as appropriate and evaluate response  - Consider cultural and social influences on pain and pain management  - Notify physician/advanced practitioner if interventions unsuccessful or patient reports new pain  Outcome: Progressing     Problem: INFECTION - ADULT  Goal: Absence or prevention of progression during hospitalization  Description: INTERVENTIONS:  - Assess and monitor for signs and symptoms of infection  - Monitor lab/diagnostic results  - Monitor all insertion sites, i e  indwelling lines, tubes, and drains  - Monitor endotracheal if appropriate and nasal secretions for changes in amount and color  - [place] appropriate cooling/warming therapies per order  - Administer medications as ordered  - Instruct and encourage patient and family to use good hand hygiene technique  - Identify and instruct in appropriate isolation precautions for identified infection/condition  Outcome: Progressing  Goal: Absence of fever/infection during neutropenic period  Description: INTERVENTIONS:  - Monitor WBC    Outcome: Progressing     Problem: SAFETY ADULT  Goal: Patient will remain free of falls  Description: INTERVENTIONS:  - Assess patient frequently for physical needs  -  Identify cognitive and physical deficits and behaviors that affect risk of falls    -  [place] fall precautions as indicated by assessment   - Educate patient/family on patient safety including physical limitations  - Instruct patient to call for assistance with activity based on assessment  - Modify environment to reduce risk of injury  - Consider OT/PT consult to assist with strengthening/mobility  1/23/2021 1833 by Jovita Horvath RN  Outcome: Progressing  1/23/2021 1833 by Jovita Horvath RN  Outcome: Progressing  Goal: Maintain or return to baseline ADL function  Description: INTERVENTIONS:  -  Assess patient's ability to carry out ADLs; assess patient's baseline for ADL function and identify physical deficits which impact ability to perform ADLs (bathing, care of mouth/teeth, toileting, grooming, dressing, etc )  - Assess/evaluate cause of self-care deficits   - Assess range of motion  - Assess patient's mobility; develop plan if impaired  - Assess patient's need for assistive devices and provide as appropriate  - Encourage maximum independence but intervene and supervise when necessary  - Involve family in performance of ADLs  - Assess for home care needs following discharge   - Consider OT consult to assist with ADL evaluation and planning for discharge  - Provide patient education as appropriate  Outcome: Progressing  Goal: Maintain or return mobility status to optimal level  Description: INTERVENTIONS:  - Assess patient's baseline mobility status (ambulation, transfers, stairs, etc )    - Identify cognitive and physical deficits and behaviors that affect mobility  - Identify mobility aids required to assist with transfers and/or ambulation (gait belt, sit-to-stand, lift, walker, cane, etc )  - [place] fall precautions as indicated by assessment  - Record patient progress and toleration of activity level on Mobility SBAR; progress patient to next Phase/Stage  - Instruct patient to call for assistance with activity based on assessment  - Consider rehabilitation consult to assist with strengthening/weightbearing, etc   Outcome: Progressing     Problem: DISCHARGE PLANNING  Goal: Discharge to home or other facility with appropriate resources  Description: INTERVENTIONS:  - Identify barriers to discharge w/patient and caregiver  - Arrange for needed discharge resources and transportation as appropriate  - Identify discharge learning needs (meds, wound care, etc )  - Arrange for interpretive services to assist at discharge as needed  - Refer to Case Management Department for coordinating discharge planning if the patient needs post-hospital services based on physician/advanced practitioner order or complex needs related to functional status, cognitive ability, or social support system  Outcome: Progressing     Problem: Knowledge Deficit  Goal: Patient/family/caregiver demonstrates understanding of disease process, treatment plan, medications, and discharge instructions  Description: Complete learning assessment and assess knowledge base    Interventions:  - Provide teaching at level of understanding  - Provide teaching via preferred learning methods  Outcome: Progressing

## 2021-01-23 NOTE — ED ATTENDING ATTESTATION
1/23/2021  ISarah DO, saw and evaluated the patient  I have discussed the patient with the resident/non-physician practitioner and agree with the resident's/non-physician practitioner's findings, Plan of Care, and MDM as documented in the resident's/non-physician practitioner's note, except where noted  All available labs and Radiology studies were reviewed  I was present for key portions of any procedure(s) performed by the resident/non-physician practitioner and I was immediately available to provide assistance  At this point I agree with the current assessment done in the Emergency Department  I have conducted an independent evaluation of this patient a history and physical is as follows:    72yo male presents s/p fall  Reports 3 falls in past week  Hit his head a few times  C/o left knee pain  On exam - nad, heart reg, no resp distress, erythema on left knee with tenderness, superficial abrasion left lateral maleolus but no bony tenderness in ankle  Plan - CT head, check labs, xray left knee  Plan for admit for ambulatory dysfunction      ED Course         Critical Care Time  Procedures

## 2021-01-23 NOTE — ASSESSMENT & PLAN NOTE
Lab Results   Component Value Date    EGFR 59 01/27/2021    EGFR 54 01/26/2021    EGFR 48 01/25/2021    CREATININE 1 17 01/27/2021    CREATININE 1 25 01/26/2021    CREATININE 1 37 (H) 01/25/2021     Plan:  - creatinine 1 52 on admission (ranging between 1 12-1 69 since 2019)  - no JUANA currently  - avoid NSAIDs, nephrotoxic agents   - monitor BMP

## 2021-01-23 NOTE — PROGRESS NOTES
INTERNAL MEDICINE RESIDENCY SENIOR ADMISSION NOTE     Name: Nirmal Santana   Age & Sex: 79 y o  male   MRN: [id number]  Unit/Bed#: ED 09   Encounter: [id number]  Primary Care Provider: Marito Artis MD    Admit to team: SOD Team C     Patient seen and examined  Reviewed H&P per Dr Taras Meeks   Agree with the assessment and plan with any exception/addition as noted below:    Principal Problem:    Ambulatory dysfunction  Active Problems:    Type 2 diabetes mellitus with complication (HCC)    Mixed hyperlipidemia    Atrial fibrillation (HCC)    Peripheral vascular disease (HCC)    Metastatic disease (HCC)    Metastatic primary lung cancer (HCC)    CKD (chronic kidney disease) stage 3, GFR 30-59 ml/min    67-year-old male presents secondary to fall at home, negative head strike, negative LOC  Complaint of left knee pain and hip pain, x-rays pending  Will obtain CT imaging of head and chest   PT/OT pending, admission for ambulatory dysfunction  Creatinine appears to be at baseline, will give gentle fluids, hold low-dose lisinopril  Patient currently in atrial fibrillation, self rate controlled, no anticoagulation secondary to brain metastasis  Vitals stable, follow-up imaging      Code Status: Level 1 - Full Code  Admission Status: OBSERVATION  Disposition: Patient requires Med/Surg  Expected Length of Stay: Less than 2 Midnights     DO Giselle Mckinnon 74 Internal Medicine PGY-2

## 2021-01-23 NOTE — QUICK NOTE
Did discuss with patient about code status as he is currently level 1 full code at this time  Did go over all interventions that would be done given his current code status and patient wished to not have intubation or chest compressions at this time  Discussed with patient that I do feel that it is appropriate given his comorbidities and functional status  Will make patient level 3 DNR DNI at this time

## 2021-01-23 NOTE — ASSESSMENT & PLAN NOTE
Stable, not on statin patient states that many of his previously prescribed medications were discontinued following cancer treatment

## 2021-01-23 NOTE — ASSESSMENT & PLAN NOTE
Lab Results   Component Value Date    HGBA1C 5 6 11/18/2020       No results for input(s): POCGLU in the last 72 hours      Blood Sugar Average: Last 72 hrs:     Plan:  -diet controlled, most recent A1c 5 6

## 2021-01-23 NOTE — ED NOTES
Spoke with Jamie Hilario in Pharmacy - ordered Osimertinib Mesylate not available  Medication is not stocked in the hospital  Will ask patient if he has medication on hand or if someone from home can bring his prescription to be processed/administered by Pharmacy during admission stay  Will notify primary JOLLY Palumbo RN  01/23/21 [id number]

## 2021-01-23 NOTE — ASSESSMENT & PLAN NOTE
- Xray Knee L  4+ view 01/23/2021: no acute osseus abnormality   - Weight bearing as tolerated   - Pain management, on Tavia 100 daily and PRN analgesics

## 2021-01-23 NOTE — ASSESSMENT & PLAN NOTE
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

## 2021-01-23 NOTE — ASSESSMENT & PLAN NOTE
Frequent falls, 3 X week prior admission, more falls during past months, notes feeling generalized weakness, fatigue difficulty with coordination  Lives with why at home use cane for ambulation  Primary lung Cancer Mets to Lumbar Spine, b/l Acetabulum and Rt  Sup  Pubic Ramus fracture evident on imaging       Plan:  - Ortho consulted and following  - IR bone biopsy to confirm metastatic lesions done on 1/26/2021, awaiting results  - S/p prophylactic L femoral IM nailing on 1/27/21, POD 2  - PT/OT  - Pain control   - DVT ppx  - Monitor for acute blood loss

## 2021-01-23 NOTE — H&P
INTERNAL MEDICINE RESIDENCY ADMISSION H&P     Name: Richard Gomez   Age & Sex: 79 y o  male   MRN: [id number]  Unit/Bed#: ED 09   Encounter: [id number]  Primary Care Provider: Zulma Scherer MD    Code Status: Level 1 - Full Code  Admission Status: OBSERVATION  Disposition: Patient requires Med/Surg    Admit to team: SOD Team C     ASSESSMENT/PLAN     Principal Problem:    Ambulatory dysfunction  Active Problems:    Acute pain of left knee    Metastatic primary lung cancer (HCC)    CKD (chronic kidney disease) stage 3, GFR 30-59 ml/min    Atrial fibrillation (HCC)    Type 2 diabetes mellitus with complication ([facility] )    Mixed hyperlipidemia    Peripheral vascular disease ([place] [address] )      * Ambulatory dysfunction  Assessment & Plan  Increasing frequency of falls with 3 this past week and as many as 15 noted in the past month  Patient denies any loss of consciousness, seizure, head strike, is not on thinners  Lives at home alone with wife, typically uses his cane for ambulation, occasionally a walker    States he feels generally weaker, fatigued, has difficulty with coordination    Plan:  -likely multifactorial, physical deconditioning from cancer vs PVD vs metastatic bone/brain involvement vs s/p WBRT vs chemo-induced fatigue   -f/u TSH, B12/folate  -CT findings as noted in "metastatic primary lung cancer"   -PT/OT  -fall/delirium precautions  -will likely need CM to follow, rehab placement     Acute pain of left knee  Assessment & Plan  S/p mechanical fall, LOC-, thinners-, headstrike-, FROM    Plan:  -XR L knee, no acute bony fracture appreciated, pending official read  -Pending L XR hip, patient c/o radiation of pain from knee to hip  -tylenol PRN, ice to affected area, avoid NSAIDs w/ impaired renal function, escalate analgesia as needed     Metastatic primary lung cancer ([facility] )  Assessment & Plan  Diagnosis of 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, 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 status post WBRT, 2 cycles of  Alimta/carboplatin/Pembrolizumab  Transitioned to Omisertinib following molecular analysis (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)    MRI brain 04/22/2018-widespread brain Mets involving supra and infratentorial compartment   -s/p WBRT    -prior osseous metastatic involvement of L1, L2, L4, L5    Plan:  -Continue Osimertinib 80 mg qd   -CT head demonstrated stable cerebral atrophy with chronic small-vessel ischemic white matter disease, stable calcifications in occipital lobes bilaterally as well as right frontal centrum semiovale  -CT chest-slight increase in size of the right lower lobe lung mass, slight increase in size of right pleural effusion, stable osseous metastatic disease    CKD (chronic kidney disease) stage 3, GFR 30-59 ml/min  Assessment & Plan  Lab Results   Component Value Date    EGFR 43 01/23/2021    EGFR 38 11/18/2020    EGFR 51 07/08/2020    CREATININE 1 52 (H) 01/23/2021    CREATININE 1 69 (H) 11/18/2020    CREATININE 1 32 (H) 07/08/2020     Plan:  -creatinine 1 52 on admission (ranging between 1 12-1 69 since 2019)  -avoid NSAIDs, nephrotoxic agents   -continue general IV fluid hydration, encourage p o  Intake  -follow-up repeat BMP    Atrial fibrillation (HCC)  Assessment & Plan  History of paroxysmal atrial fibrillation, atrial fibrillation on admission, rate controlled without pharmacologic intervention     Plan:  -not on AVNB agents, rate controlled   -CHADs 5 (HTN, PVD, T2DM, age)    -not on AC secondary to lung ca w/ metastatic brain involvement, also in setting of high fall risk     Peripheral vascular disease [facility])  Assessment & Plan  Patient is not complaining of claudication although limited ambulation, likely a component of his recurring falls    Patient states all prior medications including cardiovascular were discontinued following cancer treatment    Mixed hyperlipidemia  Assessment & Plan  Stable, not on statin patient states that many of his previously prescribed medications were discontinued following cancer treatment    Type 2 diabetes mellitus with complication [facility])  Assessment & Plan  Lab Results   Component Value Date    HGBA1C 5 6 11/18/2020       No results for input(s): POCGLU in the last 72 hours  Blood Sugar Average: Last 72 hrs:     Plan:  -diet controlled, most recent A1c 5 6    VTE Pharmacologic Prophylaxis: Heparin  VTE Mechanical Prophylaxis: sequential compression device    CHIEF COMPLAINT     Chief Complaint   Patient presents with    Fall     Patient brought in by EMS  Per EMS, patient fell 3 times this week  Last fall was this AM around 0130  Mechanical fall out of bed  C/O left hip pain  Patient able to ambulate, but states his gait is very "unsteady  " -thinners -LOC      HISTORY OF PRESENT ILLNESS     Radha Nj is a 79year old male with a history of stage IV adenocarcinoma of the lung (with malignant pleural effusion and metastatic involvement to bone/brain, diagnosed 04/2018 status post chemo/WBRT), PVD, hypertension, hypercholesterolemia, diabetes mellitus, and atrial fibrillation (not currently on anticoagulation secondary to metastatic involvement of brain) who presents status post fall  Patient states that he has had increasing frequency of falls with this many as 15 in the last month  Most recent fall preceding admission he was attempting to get out of bed to reach his walker and fell forward landing on his left knee  He was unable to raise himself from the floor and called EMS, also noting that he was "tired of falling and couldn't live like this anymore"  Denies loss of consciousness, head strike, use of blood thinners  States that he generally feels weak and that he has had increasing difficulty ambulating/maintaining control of balance  Denies chest pain, shortness of breath, palpitation, fevers/chills, urinary symptoms, nausea vomiting diarrhea, abdominal pain, headache/changes in vision   Lives at home with wife, uses cane (occasionally walker) at baseline  On arrival, AVSS in atrial fibrillation, rate controlled  BGL 87, Cr 1 52 (ranging 1 12-1 69 since 2019), Hgb 10 5 (baseline ranging 11-12's)  X-ray left knee did not demonstrate any acute fracture although pending official read, CT head demonstrated stable cerebral atrophy with chronic small-vessel ischemic white matter disease, calcifications that may suggest vascular malformations versus treated metastatic disease, no hemorrhage or acute intracranial abnormality noted  Admitted for observation, patient is Level 1-full code confirmed by patient  REVIEW OF SYSTEMS     Review of Systems   Constitutional: Positive for fatigue  Negative for appetite change, chills, diaphoresis, fever and unexpected weight change  HENT: Negative for ear pain, hearing loss, rhinorrhea and tinnitus  Eyes: Negative for photophobia and visual disturbance  Respiratory: Negative for apnea, cough, choking, chest tightness, shortness of breath and wheezing  Cardiovascular: Negative for chest pain, palpitations and leg swelling  Gastrointestinal: Negative for abdominal pain, diarrhea, nausea and vomiting  Endocrine: Negative for polyuria  Genitourinary: Negative for decreased urine volume, frequency and urgency  Musculoskeletal: Positive for arthralgias, gait problem and joint swelling  Negative for back pain, myalgias, neck pain and neck stiffness  Skin: Positive for wound  Negative for pallor  Neurological: Positive for weakness  Negative for syncope, facial asymmetry, light-headedness and headaches  Hematological: Does not bruise/bleed easily       OBJECTIVE     Vitals:    21 0234 21 0245   BP:  136/68   BP Location:  Right arm   Pulse:  88   Resp:  22   Temp: 97 5 °F (36 4 °C)    TempSrc: Oral    SpO2:  98%   Weight: 84 8 kg (187 lb)    Height: 6' 2" (1 88 m)       Temperature:   Temp (24hrs), Av 5 °F (36 4 °C), Min:97 5 °F (36 4 °C), Max:97 5 °F (36 4 °C)    Temperature: 97 5 °F (36 4 °C)  Intake & Output:  I/O     None        Weights:   IBW: 82 2 kg    Body mass index is 24 01 kg/m²  Weight (last 2 days)     Date/Time   Weight    01/23/21 0234   84 8 (187)            Physical Exam  Vitals signs reviewed  Constitutional:       General: He is not in acute distress  Appearance: Normal appearance  He is normal weight  He is not ill-appearing, toxic-appearing or diaphoretic  HENT:      Head: Normocephalic and atraumatic  Right Ear: External ear normal  There is no impacted cerumen  Left Ear: External ear normal  There is no impacted cerumen  Nose: Nose normal  No congestion or rhinorrhea  Mouth/Throat:      Mouth: Mucous membranes are moist       Pharynx: Oropharynx is clear  No oropharyngeal exudate or posterior oropharyngeal erythema  Eyes:      General: No scleral icterus  Extraocular Movements: Extraocular movements intact  Conjunctiva/sclera: Conjunctivae normal       Pupils: Pupils are equal, round, and reactive to light  Neck:      Musculoskeletal: Normal range of motion  No neck rigidity or muscular tenderness  Cardiovascular:      Rate and Rhythm: Normal rate  Rhythm irregular  Heart sounds: Normal heart sounds  No murmur  No friction rub  No gallop  Pulmonary:      Effort: Pulmonary effort is normal  No respiratory distress  Breath sounds: Normal breath sounds  No stridor  No wheezing or rales  Abdominal:      General: Abdomen is flat  There is no distension  Palpations: Abdomen is soft  Tenderness: There is no abdominal tenderness  Musculoskeletal:      Right lower leg: No edema  Left lower leg: No edema  Comments: Erythema and superficial abrasion L knee, mild edema  No crepitus, joint effusion palpated, FROM  TTP    Skin:     General: Skin is warm and dry  Capillary Refill: Capillary refill takes less than 2 seconds     Neurological:      General: No focal deficit present  Mental Status: He is alert and oriented to person, place, and time  GCS: GCS eye subscore is 4  GCS verbal subscore is 5  GCS motor subscore is 6  Cranial Nerves: Cranial nerves are intact  No cranial nerve deficit, dysarthria or facial asymmetry  Motor: No weakness or tremor  Coordination: Finger-Nose-Finger Test normal        PAST MEDICAL HISTORY     Past Medical History:   Diagnosis Date    Atrial fibrillation (HCC)     Cancer of lung ([facility] )     brain and bones    Diabetes mellitus ([facility] )     Hypercholesteremia     Hypertension     Lung neoplasm     Proteinuria     LAST ASSESSED [id number]    PVD (peripheral vascular disease) ([place] [address] )      PAST SURGICAL HISTORY     Past Surgical History:   Procedure Laterality Date    MA BRONCHOSCOPY,DIAGNOSTIC N/A 4/24/2018    Procedure: Geno Blade;  Surgeon: Berta Gilmore MD;  Location: [facility]; Service: Pulmonary    THORACENTESIS N/A 4/24/2018    Procedure: Lovely Walton;  Surgeon: Berta Gilmore MD;  Location: [facility]; Service: Pulmonary     SOCIAL & FAMILY HISTORY     Social History     Substance and Sexual Activity   Alcohol Use Yes    Comment: rare     Social History     Substance and Sexual Activity   Drug Use No     Social History     Tobacco Use   Smoking Status Former Smoker    Types: Cigarettes   Smokeless Tobacco Never Used   Tobacco Comment    quit 39 yrs ago as of 2018     Family History   Problem Relation Age of Onset    Diabetes Mother     Diabetes Father     Diabetes Family      LABORATORY DATA     Labs: I have personally reviewed pertinent reports      Results from last 7 days   Lab Units 01/23/21  0330   WBC Thousand/uL 6 44   HEMOGLOBIN g/dL 10 5*   HEMATOCRIT % 32 2*   PLATELETS Thousands/uL 218   NEUTROS PCT % 67   MONOS PCT % 11      Results from last 7 days   Lab Units 01/23/21  0330   POTASSIUM mmol/L 4 1   CHLORIDE mmol/L 103   CO2 mmol/L 29   BUN mg/dL 27*   CREATININE mg/dL 1 52*   CALCIUM mg/dL 9 2                          Micro:  No results found for: BLOODCX, URINECX, WOUNDCULT, [facility] TESTS     Imaging: I have personally reviewed pertinent reports  No results found  EKG, Pathology, and Other Studies: I have personally reviewed pertinent reports  ALLERGIES   No Known Allergies  MEDICATIONS PRIOR TO ARRIVAL     Prior to Admission medications    Medication Sig Start Date End Date Taking? Authorizing Provider   glucose blood (JEANNE CONTOUR NEXT TEST) test strip 3 each by Other route 3 (three) times a week 10/28/19  Yes Jalen Gillis MD   lisinopril (ZESTRIL) 2 5 mg tablet Take 1 tablet (2 5 mg total) by mouth daily 11/9/20  Yes Jalen Gillis MD   Osimertinib Mesylate 80 MG TABS Take 1 tablet (80 mg total) by mouth daily 1/18/21  Yes Omer Cummins MD     MEDICATIONS ADMINISTERED IN LAST 24 HOURS     CURRENT MEDICATIONS     Current Facility-Administered Medications   Medication Dose Route Frequency Provider Last Rate    multi-electrolyte  100 mL/hr Intravenous Continuous Jose Ferraro DO      Osimertinib Mesylate  80 mg Oral Daily Jose Ferraro DO       multi-electrolyte, 100 mL/hr           Admission Time  I spent 30 minutes admitting the patient  This involved direct patient contact where I performed a full history and physical, reviewing previous records, and reviewing laboratory and other diagnostic studies  Portions of the record may have been created with voice recognition software  Occasional wrong word or "sound a like" substitutions may have occurred due to the inherent limitations of voice recognition software    Read the chart carefully and recognize, using context, where substitutions have occurred     ==  Josse Peters MD, PGY-I  [address]

## 2021-01-23 NOTE — ASSESSMENT & PLAN NOTE
- Per patient - medications including Cardio meds were DC on diagnosis and treatment of cancer   - Not on A/C "d/t lung cancer mets to Brain, afib rate controlled off meds  "  - A/C per Hem/Onc, f/u outpatient

## 2021-01-24 ENCOUNTER — APPOINTMENT (OUTPATIENT)
Dept: RADIOLOGY | Facility: HOSPITAL | Age: 81
DRG: 478 | End: 2021-01-24
Payer: COMMERCIAL

## 2021-01-24 ENCOUNTER — APPOINTMENT (INPATIENT)
Dept: RADIOLOGY | Facility: HOSPITAL | Age: 81
DRG: 478 | End: 2021-01-24
Payer: COMMERCIAL

## 2021-01-24 PROBLEM — M25.559 HIP PAIN: Status: ACTIVE | Noted: 2021-01-24

## 2021-01-24 LAB
FLUAV RNA RESP QL NAA+PROBE: NEGATIVE
FLUBV RNA RESP QL NAA+PROBE: NEGATIVE
RSV RNA RESP QL NAA+PROBE: NEGATIVE
SARS-COV-2 RNA RESP QL NAA+PROBE: NEGATIVE

## 2021-01-24 PROCEDURE — G1004 CDSM NDSC: HCPCS

## 2021-01-24 PROCEDURE — 0241U HB NFCT DS VIR RESP RNA 4 TRGT: CPT | Performed by: STUDENT IN AN ORGANIZED HEALTH CARE EDUCATION/TRAINING PROGRAM

## 2021-01-24 PROCEDURE — 99232 SBSQ HOSP IP/OBS MODERATE 35: CPT | Performed by: INTERNAL MEDICINE

## 2021-01-24 PROCEDURE — 73552 X-RAY EXAM OF FEMUR 2/>: CPT

## 2021-01-24 PROCEDURE — 74176 CT ABD & PELVIS W/O CONTRAST: CPT

## 2021-01-24 RX ADMIN — HEPARIN SODIUM 5000 UNITS: 5000 INJECTION INTRAVENOUS; SUBCUTANEOUS at 06:05

## 2021-01-24 RX ADMIN — HEPARIN SODIUM 5000 UNITS: 5000 INJECTION INTRAVENOUS; SUBCUTANEOUS at 13:01

## 2021-01-24 RX ADMIN — HEPARIN SODIUM 5000 UNITS: 5000 INJECTION INTRAVENOUS; SUBCUTANEOUS at 21:19

## 2021-01-24 NOTE — PROGRESS NOTES
INTERNAL MEDICINE RESIDENCY PROGRESS NOTE     Name: Kurt Yao   Age & Sex: [de-identified] y o  male   MRN: [id number]  Unit/Bed#: [facility] 806-01   Encounter: [id number]  Team: SOD Team C     PATIENT INFORMATION     Name: Kurt Yao   Age & Sex: [de-identified] y o  male   MRN: [id number]  Hospital Stay Days: 0    ASSESSMENT/PLAN     Principal Problem:    Ambulatory dysfunction  Active Problems:    Type 2 diabetes mellitus with complication ([address] )    Mixed hyperlipidemia    Atrial fibrillation (HCC)    Peripheral vascular disease (HCC)    Metastatic primary lung cancer (HCC)    CKD (chronic kidney disease) stage 3, GFR 30-59 ml/min    Acute pain of left knee    Anemia of chronic disease    Hip pain      Hip pain  Assessment & Plan  · Patient complained of hip pain and x-ray of the hip was ordered with results showing; Lucency in the right superior pubic ramus concerning for nondisplaced fracture  · Orthopedic surgery was consulted and they recommended no need for surgery or changing weight-bearing status for his pubic ramus fracture      · Fracture likely secondary to metastasis  · Will order CT scan of the pelvis to rule out metastatic lesions in the acetabulum or proximal femoral   By orthopedic surgery patient will need prophylactic fixation or change in weight bearing if CT scan positive  · CT scan of the pelvis ordered follow-up results    Acute pain of left knee  Assessment & Plan  S/p mechanical fall, LOC-, thinners-, headstrike-, FROM    Plan:  -XR L knee, no acute bony fracture appreciated, pending official read  -Pending L XR hip, patient c/o radiation of pain from knee to hip  -tylenol PRN, ice to affected area, avoid NSAIDs w/ impaired renal function, escalate analgesia as needed     CKD (chronic kidney disease) stage 3, GFR 30-59 ml/min  Assessment & Plan  Lab Results   Component Value Date    EGFR 43 01/23/2021    EGFR 38 11/18/2020    EGFR 51 07/08/2020    CREATININE 1 52 (H) 01/23/2021    CREATININE 1 69 (H) 11/18/2020    CREATININE 1 32 (H) 07/08/2020     Plan:  -creatinine 1 52 on admission (ranging between 1 12-1 69 since 2019)  -avoid NSAIDs, nephrotoxic agents   -continue general IV fluid hydration, encourage p o  Intake  -follow-up repeat BMP    Metastatic primary lung cancer ([facility] )  Assessment & Plan  Diagnosis of 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, 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 status post WBRT, 2 cycles of  Alimta/carboplatin/Pembrolizumab  Transitioned to Omisertinib following molecular analysis (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)    MRI brain 04/22/2018-widespread brain Mets involving supra and infratentorial compartment   -s/p WBRT    -prior osseous metastatic involvement of L1, L2, L4, L5    Plan:  -Continue Osimertinib 80 mg qd   -CT head demonstrated stable cerebral atrophy with chronic small-vessel ischemic white matter disease, stable calcifications in occipital lobes bilaterally as well as right frontal centrum semiovale  -CT chest-slight increase in size of the right lower lobe lung mass, slight increase in size of right pleural effusion, stable osseous metastatic disease    Peripheral vascular disease [facility])  Assessment & Plan  Patient is not complaining of claudication although limited ambulation, likely a component of his recurring falls    Patient states all prior medications including cardiovascular were discontinued following cancer treatment    Atrial fibrillation [facility])  Assessment & Plan  History of paroxysmal atrial fibrillation, atrial fibrillation on admission, rate controlled without pharmacologic intervention     Plan:  -not on AVNB agents, rate controlled   -CHADs 5 (HTN, PVD, T2DM, age)    -not on AC secondary to lung ca w/ metastatic brain involvement, also in setting of high fall risk     Mixed hyperlipidemia  Assessment & Plan  Stable, not on statin patient states that many of his previously prescribed medications were discontinued following cancer treatment    Type 2 diabetes mellitus with complication [facility])  Assessment & Plan  Lab Results   Component Value Date    HGBA1C 5 6 11/18/2020       No results for input(s): POCGLU in the last 72 hours  Blood Sugar Average: Last 72 hrs:     Plan:  -diet controlled, most recent A1c 5 6    * Ambulatory dysfunction  Assessment & Plan  Increasing frequency of falls with 3 this past week and as many as 15 noted in the past month  Patient denies any loss of consciousness, seizure, head strike, is not on thinners  Lives at home alone with wife, typically uses his cane for ambulation, occasionally a walker  States he feels generally weaker, fatigued, has difficulty with coordination  Normal TSH, B12/folate    Plan:  -likely multifactorial, physical deconditioning from cancer vs PVD vs metastatic bone/brain involvement vs s/p WBRT vs chemo-induced fatigue   -CT findings as noted in "metastatic primary lung cancer"   -PT/OT  -fall/delirium precautions  -will likely need CM to follow, rehab placement       Disposition:  Continue inpatient management  Patient will need PT/OT evaluation and possible placement pending recommendations  SUBJECTIVE     Patient seen and examined by bedside he complained of hip pain  His nurse reports diarrhea which she described as 2 episodes of looses, brown, nonbloody stool, and dry cough  Patient is also tachycardic with heart rate in the 120s  Patient denies headaches, lightheadedness, chest pain, shortness of breath, palpitation, abdominal pain, N/V/C  COVID-19 test was ordered this morning and was negative    Patient looks stable not in any obvious distress  OBJECTIVE     Vitals:    01/23/21 1659 01/23/21 2203 01/24/21 0738 01/24/21 1000   BP: 144/86 125/82 111/70    BP Location:       Pulse: (!) 108 105 (!) 126 97   Resp: (!) 26 20 18    Temp: 98 1 °F (36 7 °C) 98 °F (36 7 °C) 98 °F (36 7 °C)    TempSrc:       SpO2: 97% 96% (!) 85% 94%   Weight:       Height: Temperature:   Temp (24hrs), Av °F (36 7 °C), Min:98 °F (36 7 °C), Max:98 1 °F (36 7 °C)    Temperature: 98 °F (36 7 °C)  Intake & Output:  I/O        07 -  0700  0700  0700    P  O    360    I V  (mL/kg)  1000 (11 8)     Total Intake(mL/kg)  1000 (11 8) 360 (4 2)    Urine (mL/kg/hr) 300 875 (0 4) 100 (0 2)    Stool  0 0    Total Output 300 875 100    Net -300 +125 +260           Unmeasured Urine Occurrence   0 x    Unmeasured Stool Occurrence  1 x 1 x        Weights:   IBW: 82 2 kg    Body mass index is 24 01 kg/m²  Weight (last 2 days)     Date/Time   Weight    21 0234   84 8 (187)            Physical Exam  Vitals signs and nursing note reviewed  Constitutional:       Appearance: He is well-developed  HENT:      Head: Normocephalic and atraumatic  Eyes:      Conjunctiva/sclera: Conjunctivae normal    Neck:      Musculoskeletal: Neck supple  Cardiovascular:      Rate and Rhythm: Normal rate and regular rhythm  Heart sounds: No murmur  Pulmonary:      Effort: Pulmonary effort is normal  No respiratory distress  Breath sounds: Normal breath sounds  Abdominal:      Palpations: Abdomen is soft  Tenderness: There is no abdominal tenderness  Musculoskeletal:      Comments: Erythema and superficial abrasion L knee,  No crepitus, joint effusion palpated  Skin:     General: Skin is warm and dry  Neurological:      Mental Status: He is alert  LABORATORY DATA     Labs: I have personally reviewed pertinent reports    Results from last 7 days   Lab Units 21  0330   WBC Thousand/uL 6 44   HEMOGLOBIN g/dL 10 5*   HEMATOCRIT % 32 2*   PLATELETS Thousands/uL 218   NEUTROS PCT % 67   MONOS PCT % 11      Results from last 7 days   Lab Units 21  0552 21  0330   POTASSIUM mmol/L 3 9 4 1   CHLORIDE mmol/L 104 103   CO2 mmol/L 27 29   BUN mg/dL 25 27*   CREATININE mg/dL 1 42* 1 52*   CALCIUM mg/dL 9 0 9 2   ALK PHOS U/L 124* --    ALT U/L 15  --    AST U/L 20  --                             IMAGING & DIAGNOSTIC TESTING     Radiology Results: I have personally reviewed pertinent reports  Xr Hip/pelv 2-3 Vws Left If Performed    Result Date: 1/24/2021  Impression: Osseous metastatic disease again seen  Lucency in the right superior pubic ramus concerning for nondisplaced fracture  CT or MRI could be obtained for further evaluation if warranted  The study was marked in [facility] for immediate notification  Workstation performed: [id number]     Xr Knee 4+ Vw Left Injury    Result Date: 1/24/2021  Impression: No acute osseous abnormality  Workstation performed: [id number]     Ct Head Without Contrast    Result Date: 1/23/2021  Impression: 1  Stable cerebral atrophy with chronic small vessel ischemic white matter disease  No acute intracranial abnormality  2   Stable calcifications in the occipital lobes bilaterally and right frontal centrum semiovale  Differential includes calcification within vascular malformations versus treated metastatic disease  Workstation performed: [id number]     Ct Chest Without Contrast    Result Date: 1/23/2021  Impression: 1  Slight increase in size in right lower lobe lung mass, most compatible with patient's known carcinoma  2   Slight increase in the size of the right pleural effusion  3   Stable osseous metastatic disease  The study was marked in [facility] for immediate notification  Workstation performed: [id number]     Other Diagnostic Testing: I have personally reviewed pertinent reports      ACTIVE MEDICATIONS     Current Facility-Administered Medications   Medication Dose Route Frequency    acetaminophen (TYLENOL) tablet 650 mg  650 mg Oral Q6H PRN    heparin (porcine) subcutaneous injection 5,000 Units  5,000 Units Subcutaneous Q8H [address]    Osimertinib Mesylate TABS 80 mg  80 mg Oral Daily       VTE Pharmacologic Prophylaxis: Heparin  VTE Mechanical Prophylaxis: sequential compression device    Portions of the record may have been created with voice recognition software  Occasional wrong word or "sound a like" substitutions may have occurred due to the inherent limitations of voice recognition software    Read the chart carefully and recognize, using context, where substitutions have occurred   ==  Blank Rangel, [address] [facility]  Internal Medicine Residency PGY-2

## 2021-01-24 NOTE — PHYSICIAN ADVISOR
Current patient class: Observation  The patient is currently on Hospital Day: 2 at [address] [place] [address]      This patient was originally admitted to the hospital under observation status  After admission the patient was reevaluated and determined to require further hospitalization  The patient is now documented to require at least a 2nd midnight in the hospital  As such the patient is now expected to satisfy the 2 midnight benchmark and is therefore eligible for inpatient admission  After review of the relevant documentation, labs, vital signs and test results, the patient is appropriate for INPATIENT ADMISSION  Admission to the hospital as an inpatient is a complex decision making process which requires the practitioner to consider the patients presenting complaint, history and physical examination and all relevant testing  With this in mind, in this case, the patient was deemed appropriate for INPATIENT ADMISSION  After review of the documentation and testing available at the time of the admission I concur with this clinical determination of medical necessity  Rationale is as follows: The patient is a 79 yrs Male who presented to the ED at 1/23/2021  2:30 AM with a chief complaint of Fall (Patient brought in by EMS  Per EMS, patient fell 3 times this week  Last fall was this AM around 0130  Mechanical fall out of bed  C/O left hip pain  Patient able to ambulate, but states his gait is very "unsteady  " -thinners -LOC)   The patient does have a history which is complicated by metastatic lung cancer  Patient was noted on x-ray of the hip to have a lucency which is concerning for a nondisplaced fracture  Patient was seen by Orthopedics who has concern for possible metastatic fracture if it is present    Patient is ordered a CT scan of the pelvis and if it is a fracture patient will likely require prophylactic surgical procedure for stabilization given likely pathological fracture  Given the above the patient remains appropriately hospitalized for further workup with ongoing hip pain  The patients vitals on arrival were   ED Triage Vitals   Temperature Pulse Respirations Blood Pressure SpO2   01/23/21 0234 01/23/21 0245 01/23/21 0245 01/23/21 0245 01/23/21 0245   97 5 °F (36 4 °C) 88 22 136/68 98 %      Temp Source Heart Rate Source Patient Position - Orthostatic VS BP Location FiO2 (%)   01/23/21 0234 01/23/21 0245 01/23/21 0245 01/23/21 0245 --   Oral Monitor Lying Right arm       Pain Score       01/23/21 0234       8           Past Medical History:   Diagnosis Date    Atrial fibrillation (HCC)     Cancer of lung (HCC)     brain and bones    Diabetes mellitus ([facility] )     Hypercholesteremia     Hypertension     Lung neoplasm     Proteinuria     LAST ASSESSED [id number]    PVD (peripheral vascular disease) ([facility] )      Past Surgical History:   Procedure Laterality Date    ID BRONCHOSCOPY,DIAGNOSTIC N/A 4/24/2018    Procedure: Rafael Molina;  Surgeon: Rangel Falk MD;  Location: [facility]; Service: Pulmonary    THORACENTESIS N/A 4/24/2018    Procedure: Susan Epps;  Surgeon: Rangel Falk MD;  Location: [facility]; Service: Pulmonary       The patient was admitted to the hospital at N/A on N/A for the following diagnosis:  Fall [W19  XXXA]  Left knee pain [M25 562]  Ambulatory dysfunction [R26 2]  Unspecified multiple injuries, initial encounter [T07  XXXA]    Consults have been placed to:   IP CONSULT TO ORTHOPEDIC SURGERY    Vitals:    01/23/21 1659 01/23/21 2203 01/24/21 0738 01/24/21 1000   BP: 144/86 125/82 111/70    BP Location:       Pulse: (!) 108 105 (!) 126 97   Resp: (!) 26 20 18    Temp: 98 1 °F (36 7 °C) 98 °F (36 7 °C) 98 °F (36 7 °C)    TempSrc:       SpO2: 97% 96% (!) 85% 94%   Weight:       Height:           Most recent labs:    Recent Labs     01/23/21  0330 01/23/21  0552   WBC 6 44  --    HGB 10 5*  --    HCT 32 2*  --      --    K 4 1 3  9   CALCIUM 9 2 9 0   BUN 27* 25   CREATININE 1 52* 1 42*   AST  --  20   ALT  --  15   ALKPHOS  --  124*       Scheduled Meds:  Current Facility-Administered Medications   Medication Dose Route Frequency Provider Last Rate    acetaminophen  650 mg Oral Q6H PRN Ayaz Nicole MD      heparin (porcine)  5,000 Units Subcutaneous [facility] Ayaz Nicole MD      Osimertinib Mesylate  80 mg Oral Daily Jose Ferraro DO       Continuous Infusions:   PRN Meds:   acetaminophen    Surgical procedures (if appropriate):

## 2021-01-24 NOTE — PLAN OF CARE
Problem: Potential for Falls  Goal: Patient will remain free of falls  Description: INTERVENTIONS:  - Assess patient frequently for physical needs  -  Identify cognitive and physical deficits and behaviors that affect risk of falls    -  [place] fall precautions as indicated by assessment   - Educate patient/family on patient safety including physical limitations  - Instruct patient to call for assistance with activity based on assessment  - Modify environment to reduce risk of injury  - Consider OT/PT consult to assist with strengthening/mobility  Outcome: Progressing     Problem: PAIN - ADULT  Goal: Verbalizes/displays adequate comfort level or baseline comfort level  Description: Interventions:  - Encourage patient to monitor pain and request assistance  - Assess pain using appropriate pain scale  - Administer analgesics based on type and severity of pain and evaluate response  - Implement non-pharmacological measures as appropriate and evaluate response  - Consider cultural and social influences on pain and pain management  - Notify physician/advanced practitioner if interventions unsuccessful or patient reports new pain  Outcome: Progressing     Problem: INFECTION - ADULT  Goal: Absence or prevention of progression during hospitalization  Description: INTERVENTIONS:  - Assess and monitor for signs and symptoms of infection  - Monitor lab/diagnostic results  - Monitor all insertion sites, i e  indwelling lines, tubes, and drains  - Monitor endotracheal if appropriate and nasal secretions for changes in amount and color  - [place] appropriate cooling/warming therapies per order  - Administer medications as ordered  - Instruct and encourage patient and family to use good hand hygiene technique  - Identify and instruct in appropriate isolation precautions for identified infection/condition  Outcome: Progressing  Goal: Absence of fever/infection during neutropenic period  Description: INTERVENTIONS:  - Monitor WBC    Outcome: Progressing     Problem: SAFETY ADULT  Goal: Patient will remain free of falls  Description: INTERVENTIONS:  - Assess patient frequently for physical needs  -  Identify cognitive and physical deficits and behaviors that affect risk of falls    -  [place] fall precautions as indicated by assessment   - Educate patient/family on patient safety including physical limitations  - Instruct patient to call for assistance with activity based on assessment  - Modify environment to reduce risk of injury  - Consider OT/PT consult to assist with strengthening/mobility  Outcome: Progressing  Goal: Maintain or return to baseline ADL function  Description: INTERVENTIONS:  -  Assess patient's ability to carry out ADLs; assess patient's baseline for ADL function and identify physical deficits which impact ability to perform ADLs (bathing, care of mouth/teeth, toileting, grooming, dressing, etc )  - Assess/evaluate cause of self-care deficits   - Assess range of motion  - Assess patient's mobility; develop plan if impaired  - Assess patient's need for assistive devices and provide as appropriate  - Encourage maximum independence but intervene and supervise when necessary  - Involve family in performance of ADLs  - Assess for home care needs following discharge   - Consider OT consult to assist with ADL evaluation and planning for discharge  - Provide patient education as appropriate  Outcome: Progressing  Goal: Maintain or return mobility status to optimal level  Description: INTERVENTIONS:  - Assess patient's baseline mobility status (ambulation, transfers, stairs, etc )    - Identify cognitive and physical deficits and behaviors that affect mobility  - Identify mobility aids required to assist with transfers and/or ambulation (gait belt, sit-to-stand, lift, walker, cane, etc )  - [place] fall precautions as indicated by assessment  - Record patient progress and toleration of activity level on Mobility SBAR; progress patient to next Phase/Stage  - Instruct patient to call for assistance with activity based on assessment  - Consider rehabilitation consult to assist with strengthening/weightbearing, etc   Outcome: Progressing     Problem: DISCHARGE PLANNING  Goal: Discharge to home or other facility with appropriate resources  Description: INTERVENTIONS:  - Identify barriers to discharge w/patient and caregiver  - Arrange for needed discharge resources and transportation as appropriate  - Identify discharge learning needs (meds, wound care, etc )  - Arrange for interpretive services to assist at discharge as needed  - Refer to Case Management Department for coordinating discharge planning if the patient needs post-hospital services based on physician/advanced practitioner order or complex needs related to functional status, cognitive ability, or social support system  Outcome: Progressing     Problem: Knowledge Deficit  Goal: Patient/family/caregiver demonstrates understanding of disease process, treatment plan, medications, and discharge instructions  Description: Complete learning assessment and assess knowledge base    Interventions:  - Provide teaching at level of understanding  - Provide teaching via preferred learning methods  Outcome: Progressing     Problem: Prexisting or High Potential for Compromised Skin Integrity  Goal: Skin integrity is maintained or improved  Description: INTERVENTIONS:  - Identify patients at risk for skin breakdown  - Assess and monitor skin integrity  - Assess and monitor nutrition and hydration status  - Monitor labs   - Assess for incontinence   - Turn and reposition patient  - Assist with mobility/ambulation  - Relieve pressure over bony prominences  - Avoid friction and shearing  - Provide appropriate hygiene as needed including keeping skin clean and dry  - Evaluate need for skin moisturizer/barrier cream  - Collaborate with interdisciplinary team   - Patient/family teaching  - Consider wound care consult   Outcome: Progressing     Problem: RESPIRATORY - ADULT  Goal: Achieves optimal ventilation and oxygenation  Description: INTERVENTIONS:  - Assess for changes in respiratory status  - Assess for changes in mentation and behavior  - Position to facilitate oxygenation and minimize respiratory effort  - Oxygen administered by appropriate delivery if ordered  - Initiate smoking cessation education as indicated  - Encourage broncho-pulmonary hygiene including cough, deep breathe, Incentive Spirometry  - Assess the need for suctioning and aspirate as needed  - Assess and instruct to report SOB or any respiratory difficulty  - Respiratory Therapy support as indicated  Outcome: Progressing     Problem: SKIN/TISSUE INTEGRITY - ADULT  Goal: Skin integrity remains intact  Description: INTERVENTIONS  - Identify patients at risk for skin breakdown  - Assess and monitor skin integrity  - Assess and monitor nutrition and hydration status  - Monitor labs (i e  albumin)  - Assess for incontinence   - Turn and reposition patient  - Assist with mobility/ambulation  - Relieve pressure over bony prominences  - Avoid friction and shearing  - Provide appropriate hygiene as needed including keeping skin clean and dry  - Evaluate need for skin moisturizer/barrier cream  - Collaborate with interdisciplinary team (i e  Nutrition, Rehabilitation, etc )   - Patient/family teaching  Outcome: Progressing  Goal: Incision(s), wounds(s) or drain site(s) healing without S/S of infection  Description: INTERVENTIONS  - Assess and document risk factors for skin impairment   - Assess and document dressing, incision, wound bed, drain sites and surrounding tissue  - Consider nutrition services referral as needed  - Oral mucous membranes remain intact  - Provide patient/ family education  Outcome: Progressing  Goal: Oral mucous membranes remain intact  Description: INTERVENTIONS  - Assess oral mucosa and hygiene practices  - Implement preventative oral hygiene regimen  - Implement oral medicated treatments as ordered  - Initiate Nutrition services referral as needed  Outcome: Progressing     Problem: MUSCULOSKELETAL - ADULT  Goal: Maintain or return mobility to safest level of function  Description: INTERVENTIONS:  - Assess patient's ability to carry out ADLs; assess patient's baseline for ADL function and identify physical deficits which impact ability to perform ADLs (bathing, care of mouth/teeth, toileting, grooming, dressing, etc )  - Assess/evaluate cause of self-care deficits   - Assess range of motion  - Assess patient's mobility  - Assess patient's need for assistive devices and provide as appropriate  - Encourage maximum independence but intervene and supervise when necessary  - Involve family in performance of ADLs  - Assess for home care needs following discharge   - Consider OT consult to assist with ADL evaluation and planning for discharge  - Provide patient education as appropriate  Outcome: Progressing  Goal: Maintain proper alignment of affected body part  Description: INTERVENTIONS:  - Support, maintain and protect limb and body alignment  - Provide patient/ family with appropriate education  Outcome: Progressing

## 2021-01-24 NOTE — PLAN OF CARE
Problem: Potential for Falls  Goal: Patient will remain free of falls  Description: INTERVENTIONS:  - Assess patient frequently for physical needs  -  Identify cognitive and physical deficits and behaviors that affect risk of falls    -  [place] fall precautions as indicated by assessment   - Educate patient/family on patient safety including physical limitations  - Instruct patient to call for assistance with activity based on assessment  - Modify environment to reduce risk of injury  - Consider OT/PT consult to assist with strengthening/mobility  Outcome: Progressing     Problem: PAIN - ADULT  Goal: Verbalizes/displays adequate comfort level or baseline comfort level  Description: Interventions:  - Encourage patient to monitor pain and request assistance  - Assess pain using appropriate pain scale  - Administer analgesics based on type and severity of pain and evaluate response  - Implement non-pharmacological measures as appropriate and evaluate response  - Consider cultural and social influences on pain and pain management  - Notify physician/advanced practitioner if interventions unsuccessful or patient reports new pain  Outcome: Progressing     Problem: INFECTION - ADULT  Goal: Absence or prevention of progression during hospitalization  Description: INTERVENTIONS:  - Assess and monitor for signs and symptoms of infection  - Monitor lab/diagnostic results  - Monitor all insertion sites, i e  indwelling lines, tubes, and drains  - Monitor endotracheal if appropriate and nasal secretions for changes in amount and color  - [place] appropriate cooling/warming therapies per order  - Administer medications as ordered  - Instruct and encourage patient and family to use good hand hygiene technique  - Identify and instruct in appropriate isolation precautions for identified infection/condition  Outcome: Progressing  Goal: Absence of fever/infection during neutropenic period  Description: INTERVENTIONS:  - Monitor WBC    Outcome: Progressing     Problem: SAFETY ADULT  Goal: Patient will remain free of falls  Description: INTERVENTIONS:  - Assess patient frequently for physical needs  -  Identify cognitive and physical deficits and behaviors that affect risk of falls    -  [place] fall precautions as indicated by assessment   - Educate patient/family on patient safety including physical limitations  - Instruct patient to call for assistance with activity based on assessment  - Modify environment to reduce risk of injury  - Consider OT/PT consult to assist with strengthening/mobility  Outcome: Progressing  Goal: Maintain or return to baseline ADL function  Description: INTERVENTIONS:  -  Assess patient's ability to carry out ADLs; assess patient's baseline for ADL function and identify physical deficits which impact ability to perform ADLs (bathing, care of mouth/teeth, toileting, grooming, dressing, etc )  - Assess/evaluate cause of self-care deficits   - Assess range of motion  - Assess patient's mobility; develop plan if impaired  - Assess patient's need for assistive devices and provide as appropriate  - Encourage maximum independence but intervene and supervise when necessary  - Involve family in performance of ADLs  - Assess for home care needs following discharge   - Consider OT consult to assist with ADL evaluation and planning for discharge  - Provide patient education as appropriate  Outcome: Progressing  Goal: Maintain or return mobility status to optimal level  Description: INTERVENTIONS:  - Assess patient's baseline mobility status (ambulation, transfers, stairs, etc )    - Identify cognitive and physical deficits and behaviors that affect mobility  - Identify mobility aids required to assist with transfers and/or ambulation (gait belt, sit-to-stand, lift, walker, cane, etc )  - [place] fall precautions as indicated by assessment  - Record patient progress and toleration of activity level on Mobility SBAR; progress patient to next Phase/Stage  - Instruct patient to call for assistance with activity based on assessment  - Consider rehabilitation consult to assist with strengthening/weightbearing, etc   Outcome: Progressing     Problem: DISCHARGE PLANNING  Goal: Discharge to home or other facility with appropriate resources  Description: INTERVENTIONS:  - Identify barriers to discharge w/patient and caregiver  - Arrange for needed discharge resources and transportation as appropriate  - Identify discharge learning needs (meds, wound care, etc )  - Arrange for interpretive services to assist at discharge as needed  - Refer to Case Management Department for coordinating discharge planning if the patient needs post-hospital services based on physician/advanced practitioner order or complex needs related to functional status, cognitive ability, or social support system  Outcome: Progressing     Problem: Knowledge Deficit  Goal: Patient/family/caregiver demonstrates understanding of disease process, treatment plan, medications, and discharge instructions  Description: Complete learning assessment and assess knowledge base    Interventions:  - Provide teaching at level of understanding  - Provide teaching via preferred learning methods  Outcome: Progressing     Problem: Prexisting or High Potential for Compromised Skin Integrity  Goal: Skin integrity is maintained or improved  Description: INTERVENTIONS:  - Identify patients at risk for skin breakdown  - Assess and monitor skin integrity  - Assess and monitor nutrition and hydration status  - Monitor labs   - Assess for incontinence   - Turn and reposition patient  - Assist with mobility/ambulation  - Relieve pressure over bony prominences  - Avoid friction and shearing  - Provide appropriate hygiene as needed including keeping skin clean and dry  - Evaluate need for skin moisturizer/barrier cream  - Collaborate with interdisciplinary team   - Patient/family teaching  - Consider wound care consult   Outcome: Progressing

## 2021-01-24 NOTE — PROGRESS NOTES
Geovanny Cervantes made aware of immediate findings on hip/pelvis xray- Per Blessings - will consult ortho

## 2021-01-24 NOTE — CONSULTS
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  He refers he has pain in the groin and thigh with weightbearing  Has had multiple treatments for his cancer including radiation and chemotherapy  Review Of Systems:   · Skin: Normal  · Neuro: See HPI  · Musculoskeletal: See HPI  · 14 point review of systems negative except as stated above     Past Medical History:   Past Medical History:   Diagnosis Date    Atrial fibrillation ([facility] )     Cancer of lung ([facility] )     brain and bones    Diabetes mellitus ([facility] )     Hypercholesteremia     Hypertension     Lung neoplasm     Proteinuria     LAST ASSESSED [id number]    PVD (peripheral vascular disease) ([place] [address] )        Past Surgical History:   Past Surgical History:   Procedure Laterality Date    KS BRONCHOSCOPY,DIAGNOSTIC N/A 4/24/2018    Procedure: Meet Horowitz;  Surgeon: Ehsan Sue MD;  Location: [facility]; Service: Pulmonary    THORACENTESIS N/A 4/24/2018    Procedure: Glenn Favre;  Surgeon: Ehsan Sue MD;  Location: [facility];   Service: Pulmonary       Family History:  Family history reviewed and non-contributory  Family History   Problem Relation Age of Onset    Diabetes Mother     Diabetes Father     Diabetes Family        Social History:  Social History     Socioeconomic History    Marital status: /Civil Union     Spouse name: None    Number of children: None    Years of education: None    Highest education level: None   Occupational History    Occupation: RETIRED   Social Needs    Financial resource strain: None    Food insecurity     Worry: None     Inability: None    Transportation needs     Medical: None     Non-medical: None   Tobacco Use  Smoking status: Former Smoker     Types: Cigarettes    Smokeless tobacco: Never Used    Tobacco comment: quit 39 yrs ago as of 2018   Substance and Sexual Activity    Alcohol use: Yes     Comment: rare    Drug use: No    Sexual activity: None   Lifestyle    Physical activity     Days per week: None     Minutes per session: None    Stress: None   Relationships    Social connections     Talks on phone: None     Gets together: None     Attends Protestant service: None     Active member of club or organization: None     Attends meetings of clubs or organizations: None     Relationship status: None    Intimate partner violence     Fear of current or ex partner: None     Emotionally abused: None     Physically abused: None     Forced sexual activity: None   Other Topics Concern    None   Social History Narrative    ADVANCED DIRECTIVE IN CHART     CAFFEINE USE VIA COFFEE 3 SERVINGS PER DAY        Allergies:   No Known Allergies        Labs:  0   Lab Value Date/Time    HCT 32 2 (L) 01/23/2021 0330    HCT 34 3 (L) 11/18/2020 0916    HCT 36 5 07/08/2020 1151    HGB 10 5 (L) 01/23/2021 0330    HGB 11 0 (L) 11/18/2020 0916    HGB 11 9 (L) 07/08/2020 1151    INR 1 32 (H) 04/24/2018 0611    WBC 6 44 01/23/2021 0330    WBC 6 38 11/18/2020 0916    WBC 5 62 07/08/2020 1151       Meds:    Current Facility-Administered Medications:     acetaminophen (TYLENOL) tablet 650 mg, 650 mg, Oral, Q6H PRN, Austin Sebastian MD, 650 mg at 01/23/21 1943    heparin (porcine) subcutaneous injection 5,000 Units, 5,000 Units, Subcutaneous, Q8H [address], 5,000 Units at 01/24/21 0605 **AND** [CANCELED] Platelet count, , , Once, Austin Sebastian MD    Osimertinib Mesylate TABS 80 mg, 80 mg, Oral, Daily, Jose Ferraro DO, 80 mg at 01/24/21 0948    Blood Culture:   No results found for: BLOODCX    Wound Culture:   No results found for: WOUNDCULT    Ins and Outs:  I/O last 24 hours:   In: 1000 [I V :1000]  Out: 975 [Urine:975]          Physical Exam: /70   Pulse 97   Temp 98 °F (36 7 °C)   Resp 18   Ht 6' 2" (1 88 m)   Wt 84 8 kg (187 lb)   SpO2 94%   BMI 24 01 kg/m²   Gen: Alert and oriented to person, place  HEENT: EOMI, eyes clear, moist mucus membranes, hearing intact  Respiratory: Bilateral chest rise  No audible wheezing found  Cardiovascular: Regular Rate and Rhythm  Abdomen: soft nontender/nondistended  Musculoskeletal: left lower extremity  · Skin intact, abrasion over knee  · Tender to palpation over anterior pelvis and thigh  · Leg lengths equal  · Painful hip motion  · Sensation grossly intact L3-S1  · Positive ankle dorsi/plantar flexion, EHL/FHL  · [facility]    Radiology:   I personally reviewed the films  X-rays left hip and CT pelvis show lytic lesion in proximal femur shaft and lytic lesion in left acetabulum    _*_*_*_*_*_*_*_*_*_*_*_*_*_*_*_*_*_*_*_*_*_*_*_*_*_*_*_*_*_*_*_*_*_*_*_*_*_*_*_*_*    Assessment:  [de-identified] y o  male with lung metastatic cancer to bone including left acetabulum and left proximal femur  Patient has functional pain, lytic lesions on imaging in the proximal femur his calcukated Mirel's score is at least 10 for which prophylactic nailing of the femur is indicated  Discussed prognosis with primary team, but unclear right now, they will contact family and oncologist to gather more information       Plan:   · NWB LLE  · Analgesics for pain  · Further discussion with primary team and family to determine benefits/risks of prophylactic nailing L femur for impending fracture  · Dispo: Ortho will follow    Jacqui Townsend MD

## 2021-01-24 NOTE — UTILIZATION REVIEW
Initial Clinical Review    WAS OBSERVATION 01/23/2021 @ 0497, CONVERTED TO INPATIENT ADMISSION 01/24/2021 @ 1505, DUE TO CONTINUED STAY REQUIRED TO CARE FOR PATIENT WITH  Sylvester Montgomery The patient is a 79 yrs Male who presented to the ED at 1/23/2021  2:30 AM with a chief complaint of Fall (Patient brought in by EMS  Per EMS, patient fell 3 times this week  Last fall was this AM around 0130  Mechanical fall out of bed  C/O left hip pain  Patient able to ambulate, but states his gait is very "unsteady  " -thinners -LOC)   The patient does have a history which is complicated by metastatic lung cancer  Patient was noted on x-ray of the hip to have a lucency which is concerning for a nondisplaced fracture  Patient was seen by Orthopedics who has concern for possible metastatic fracture if it is present  Patient is ordered a CT scan of the pelvis and if it is a fracture patient will likely require prophylactic surgical procedure for stabilization given likely pathological fracture  Given the above the patient remains appropriately hospitalized for further workup with ongoing hip pain  Admission: Date/Time/Statement:   01/24/21 1505  Inpatient Admission Once     Question Answer Comment   Level of Care Med Surg    Estimated length of stay More than 2 Midnights    Certification I certify that inpatient services are medically necessary for this patient for a duration of greater than two midnights  See H&P and MD Progress Notes for additional information about the patient's course of treatment  ED Arrival Information     Expected Arrival Acuity Means of Arrival Escorted By Service Admission Type    - 1/23/2021 02:30 Urgent Ambulance [facility] [place] [facility]gent    Arrival Complaint    Fall        Chief Complaint   Patient presents with    Fall     Patient brought in by EMS  Per EMS, patient fell 3 times this week  Last fall was this AM around 0130  Mechanical fall out of bed  C/O left hip pain  Patient able to ambulate, but states his gait is very "unsteady  " -thinners -LOC     Assessment/Plan: [de-identified]year old male, presented to the ED @ [facility] from home, via EMS  Admitted as Obsevation due to Ambulatory dysfunction  Increasing frequency of falls with 3 this past week and as many as 15 noted in the past month  Lives at home alone with wife, typically uses his cane for ambulation, occasionally a walker  States he feels generally weaker, fatigued, has difficulty with coordination  likely multifactorial, physical deconditioning from cancer vs PVD vs metastatic bone/brain involvement vs s/p WBRT vs chemo-induced fatigue  f/u TSH, B12/folate  CT findings as noted in "metastatic primary lung cancer"   PT/OT  fall/delirium precautions  Tylenol PRN  Ice to affected areas  Avoid NSAIDs w/ impaired renal function  01/24/2021  Progress Note:  right superior pubic ramus concerning for nondisplaced fracture  Orthopedic surgery was consulted and they recommended no need for surgery or changing weight-bearing status for his pubic ramus fracture  Continue Tylenol PRN and ice to area  Consult PT/OT        ED Triage Vitals   Temperature Pulse Respirations Blood Pressure SpO2   01/23/21 0234 01/23/21 0245 01/23/21 0245 01/23/21 0245 01/23/21 0245   97 5 °F (36 4 °C) 88 22 136/68 98 %      Temp Source Heart Rate Source Patient Position - Orthostatic VS BP Location FiO2 (%)   01/23/21 0234 01/23/21 0245 01/23/21 0245 01/23/21 0245 --   Oral Monitor Lying Right arm       Pain Score       01/23/21 0234       8          Wt Readings from Last 1 Encounters:   01/23/21 84 8 kg (187 lb)     Additional Vital Signs:   Date/Time  Temp  Pulse  Resp  BP  MAP (mmHg)  SpO2  O2 Device  Patient Position - Orthostatic VS   01/24/21 07:38:05  98 °F (36 7 °C)  126Abnormal   18  111/70  84  85 %Abnormal        01/23/21 22:03:56  98 °F (36 7 °C)  105  20  125/82  96  96 %       01/23/21 1943              None (Room air)     21 1700              None (Room air)     21 16:59:04  98 1 °F (36 7 °C)  108Abnormal   26Abnormal   144/86  105  97 %       21 1530    98  18  135/75           21 1113    86  17  130/75    97 %  None (Room air)  Lying   21 0800    88  17  120/69  89  96 %  None (Room air)  Lying   21 0700    88  18  126/71  92  99 %  None (Room air)  Lying   21 0615    84  15      98 %       21 0600    92  25Abnormal   118/63  84  100 %  None (Room air)  Lying   21 0500    88  17  132/70  93  96 %  None (Room air)  Lying     Date and Time Eye Opening Best Verbal Response Best Motor Response [place] Coma Scale Score   21 1943 4 5 6 15   21 0626 4 5 6 15   21 0243 4 5 6 15     2021 @ 1237  Left hip/pel X:  Pending official reading    2021 @ 0503  CT head:  1   Stable cerebral atrophy with chronic small vessel ischemic white matter disease   No acute intracranial abnormality      2   Stable calcifications in the occipital lobes bilaterally and right frontal centrum semiovale   Differential includes calcification within vascular malformations versus treated metastatic disease  2021 @ [id number]  CT chest:  1   Slight increase in size in right lower lobe lung mass, most compatible with patient's known carcinoma  2   Slight increase in the size of the right pleural effusion  3   Stable osseous metastatic disease  2021 @ 0351  Left knee X:  Pending official reading    2021 @ 0455  EC, A  Fib    Pertinent Labs/Diagnostic Test Results:     Results from last 7 days   Lab Units 21  0330   WBC Thousand/uL 6 44   HEMOGLOBIN g/dL 10 5*   HEMATOCRIT % 32 2*   PLATELETS Thousands/uL 218   NEUTROS ABS Thousands/µL 4 28     Results from last 7 days   Lab Units 21  0330   RETIC CT ABS  51,800   RETIC CT PCT % 1 43     Results from last 7 days   Lab Units 21  0552 21  0330   SODIUM mmol/L 137 137   POTASSIUM mmol/L 3 9 4 1   CHLORIDE mmol/L 104 103   CO2 mmol/L 27 29   ANION GAP mmol/L 6 5   BUN mg/dL 25 27*   CREATININE mg/dL 1 42* 1 52*   EGFR ml/min/1 73sq m 47 43   CALCIUM mg/dL 9 0 9 2     Results from last 7 days   Lab Units 01/23/21  0552   AST U/L 20   ALT U/L 15   ALK PHOS U/L 124*   TOTAL PROTEIN g/dL 6 7   ALBUMIN g/dL 3 4*   TOTAL BILIRUBIN mg/dL 0 73     Results from last 7 days   Lab Units 01/23/21  0552 01/23/21  0330   GLUCOSE RANDOM mg/dL 80 87     Results from last 7 days   Lab Units 01/23/21  0552   TSH 3RD GENERATON uIU/mL 1 950     Results from last 7 days   Lab Units 01/23/21  0330   FERRITIN ng/mL 431*     ED Treatment:   Medication Administration from 01/23/2021 0230 to 01/23/2021 1631       Date/Time Order Dose Route Action     01/23/2021 0552 multi-electrolyte (PLASMALYTE-A/ISOLYTE-S PH 7 4) IV solution 100 mL/hr Intravenous New Bag     01/23/2021 0553 heparin (porcine) subcutaneous injection 5,000 Units 5,000 Units Subcutaneous Given        Past Medical History:   Diagnosis Date    [facility]rial fibrillation ([facility] )     Cancer of lung ([facility] )     brain and bones    Diabetes mellitus ([facility] )     Hypercholesteremia     Hypertension     Lung neoplasm     Proteinuria     LAST ASSESSED [id number]    PVD (peripheral vascular disease) ([facility] )      Present on Admission:   Type 2 diabetes mellitus with complication (HCC)   Mixed hyperlipidemia   Atrial fibrillation (HCC)   [facility]   Metastatic primary lung cancer (HCC)   CKD (chronic kidney disease) stage 3, GFR 30-59 ml/min   Acute pain of left knee      Admitting Diagnosis: Fall [W19  XXXA]  Left knee pain [M25 562]  Ambulatory dysfunction [R26 2]  Unspecified multiple injuries, initial encounter [T07  XXXA]  Age/Sex: [de-identified] y o  male  Admission Orders:  Scheduled Medications:  heparin (porcine), 5,000 Units, Subcutaneous, Q8H [address]  Osimertinib Mesylate, 80 mg, Oral, Daily      Continuous IV Infusions: PRN Meds:  acetaminophen, 650 mg, Oral, Q6H PRN      Consult PT/OT  Julian SCDs  Ice to left knee      Network Utilization Review Department  ATTENTION: Please call with any questions or concerns to [phone] and carefully listen to the prompts so that you are directed to the right person  All voicemails are confidential   Farzaneh Rafael all requests for admission clinical reviews, approved or denied determinations and any other requests to dedicated fax number below belonging to the campus where the patient is receiving treatment   List of dedicated fax numbers for the Facilities:  [address] DENIALS (Administrative/Medical Necessity) [phone]   [address] (Maternity/NICU/Pediatrics) [phone]   [address] [facility] [address] Anupama Silva 3911 (  Dariana Gardiner "Jessica" 103) [address] [place] [address] Katherine Alesha Amador [address] [place] [address] [phone]

## 2021-01-25 ENCOUNTER — TELEPHONE (OUTPATIENT)
Dept: HEMATOLOGY ONCOLOGY | Facility: CLINIC | Age: 81
End: 2021-01-25

## 2021-01-25 PROBLEM — R73.03 PREDIABETES: Status: ACTIVE | Noted: 2021-01-25

## 2021-01-25 PROBLEM — E11.8 TYPE 2 DIABETES MELLITUS WITH COMPLICATION (HCC): Status: RESOLVED | Noted: 2018-01-25 | Resolved: 2021-01-25

## 2021-01-25 LAB
ANION GAP SERPL CALCULATED.3IONS-SCNC: 5 MMOL/L (ref 4–13)
BASOPHILS # BLD AUTO: 0.01 THOUSANDS/ΜL (ref 0–0.1)
BASOPHILS NFR BLD AUTO: 0 % (ref 0–1)
BUN SERPL-MCNC: 30 MG/DL (ref 5–25)
CALCIUM SERPL-MCNC: 9.4 MG/DL (ref 8.3–10.1)
CHLORIDE SERPL-SCNC: 102 MMOL/L (ref 100–108)
CO2 SERPL-SCNC: 29 MMOL/L (ref 21–32)
CREAT SERPL-MCNC: 1.37 MG/DL (ref 0.6–1.3)
EOSINOPHIL # BLD AUTO: 0.27 THOUSAND/ΜL (ref 0–0.61)
EOSINOPHIL NFR BLD AUTO: 2 % (ref 0–6)
ERYTHROCYTE [DISTWIDTH] IN BLOOD BY AUTOMATED COUNT: 14.4 % (ref 11.6–15.1)
GFR SERPL CREATININE-BSD FRML MDRD: 48 ML/MIN/1.73SQ M
GLUCOSE SERPL-MCNC: 86 MG/DL (ref 65–140)
HCT VFR BLD AUTO: 29.4 % (ref 36.5–49.3)
HGB BLD-MCNC: 9.4 G/DL (ref 12–17)
IMM GRANULOCYTES # BLD AUTO: 0.04 THOUSAND/UL (ref 0–0.2)
IMM GRANULOCYTES NFR BLD AUTO: 0 % (ref 0–2)
LYMPHOCYTES # BLD AUTO: 1.33 THOUSANDS/ΜL (ref 0.6–4.47)
LYMPHOCYTES NFR BLD AUTO: 12 % (ref 14–44)
MCH RBC QN AUTO: 28.3 PG (ref 26.8–34.3)
MCHC RBC AUTO-ENTMCNC: 32 G/DL (ref 31.4–37.4)
MCV RBC AUTO: 89 FL (ref 82–98)
MONOCYTES # BLD AUTO: 0.74 THOUSAND/ΜL (ref 0.17–1.22)
MONOCYTES NFR BLD AUTO: 6 % (ref 4–12)
NEUTROPHILS # BLD AUTO: 9.14 THOUSANDS/ΜL (ref 1.85–7.62)
NEUTS SEG NFR BLD AUTO: 80 % (ref 43–75)
NRBC BLD AUTO-RTO: 0 /100 WBCS
PLATELET # BLD AUTO: 197 THOUSANDS/UL (ref 149–390)
PMV BLD AUTO: 9.8 FL (ref 8.9–12.7)
POTASSIUM SERPL-SCNC: 3.9 MMOL/L (ref 3.5–5.3)
RBC # BLD AUTO: 3.32 MILLION/UL (ref 3.88–5.62)
SODIUM SERPL-SCNC: 136 MMOL/L (ref 136–145)
WBC # BLD AUTO: 11.53 THOUSAND/UL (ref 4.31–10.16)

## 2021-01-25 PROCEDURE — 99223 1ST HOSP IP/OBS HIGH 75: CPT | Performed by: ORTHOPAEDIC SURGERY

## 2021-01-25 PROCEDURE — 85025 COMPLETE CBC W/AUTO DIFF WBC: CPT | Performed by: STUDENT IN AN ORGANIZED HEALTH CARE EDUCATION/TRAINING PROGRAM

## 2021-01-25 PROCEDURE — 99232 SBSQ HOSP IP/OBS MODERATE 35: CPT | Performed by: INTERNAL MEDICINE

## 2021-01-25 PROCEDURE — NC001 PR NO CHARGE: Performed by: ORTHOPAEDIC SURGERY

## 2021-01-25 PROCEDURE — 80048 BASIC METABOLIC PNL TOTAL CA: CPT | Performed by: STUDENT IN AN ORGANIZED HEALTH CARE EDUCATION/TRAINING PROGRAM

## 2021-01-25 RX ORDER — NYSTATIN 100000 U/G
CREAM TOPICAL 2 TIMES DAILY
Status: DISCONTINUED | OUTPATIENT
Start: 2021-01-25 | End: 2021-01-30 | Stop reason: HOSPADM

## 2021-01-25 RX ORDER — GUAIFENESIN 600 MG
600 TABLET, EXTENDED RELEASE 12 HR ORAL EVERY 12 HOURS SCHEDULED
Status: DISCONTINUED | OUTPATIENT
Start: 2021-01-25 | End: 2021-01-30 | Stop reason: HOSPADM

## 2021-01-25 RX ORDER — POTASSIUM CHLORIDE 20 MEQ/1
20 TABLET, EXTENDED RELEASE ORAL ONCE
Status: COMPLETED | OUTPATIENT
Start: 2021-01-25 | End: 2021-01-25

## 2021-01-25 RX ADMIN — ACETAMINOPHEN 650 MG: 325 TABLET, FILM COATED ORAL at 08:42

## 2021-01-25 RX ADMIN — GUAIFENESIN 600 MG: 600 TABLET, EXTENDED RELEASE ORAL at 21:04

## 2021-01-25 RX ADMIN — HEPARIN SODIUM 5000 UNITS: 5000 INJECTION INTRAVENOUS; SUBCUTANEOUS at 21:04

## 2021-01-25 RX ADMIN — GUAIFENESIN 600 MG: 600 TABLET, EXTENDED RELEASE ORAL at 12:33

## 2021-01-25 RX ADMIN — HEPARIN SODIUM 5000 UNITS: 5000 INJECTION INTRAVENOUS; SUBCUTANEOUS at 16:23

## 2021-01-25 RX ADMIN — POTASSIUM CHLORIDE 20 MEQ: 1500 TABLET, EXTENDED RELEASE ORAL at 12:33

## 2021-01-25 RX ADMIN — NYSTATIN: 100000 CREAM TOPICAL at 18:47

## 2021-01-25 RX ADMIN — HEPARIN SODIUM 5000 UNITS: 5000 INJECTION INTRAVENOUS; SUBCUTANEOUS at 05:31

## 2021-01-25 NOTE — PROGRESS NOTES
Progress Note - Orthopedics   Elda Barefoot [de-identified] y o  male MRN: [id number]  [facility]      Subjective:    [de-identified] y o male with left hip and pelvis lytic lesions  No acute events, no complaints  Pt doing well  Pain controlled  Denies fevers chills, CP, SOB    Labs:  0   Lab Value Date/Time    HCT 29 4 (L) 01/25/2021 0502    HCT 32 2 (L) 01/23/2021 0330    HCT 34 3 (L) 11/18/2020 0916    HGB 9 4 (L) 01/25/2021 0502    HGB 10 5 (L) 01/23/2021 0330    HGB 11 0 (L) 11/18/2020 0916    INR 1 32 (H) 04/24/2018 0611    WBC 11 53 (H) 01/25/2021 0502    WBC 6 44 01/23/2021 0330    WBC 6 38 11/18/2020 0916       Meds:    Current Facility-Administered Medications:     acetaminophen (TYLENOL) tablet 650 mg, 650 mg, Oral, Q6H PRN, Trevor Giang MD, 650 mg at 01/23/21 1943    heparin (porcine) subcutaneous injection 5,000 Units, 5,000 Units, Subcutaneous, Q8H [facility], 5,000 Units at 01/25/21 0531 **AND** [CANCELED] Platelet count, , , Once, Trevor Giang MD    Osimertinib Mesylate TABS 80 mg, 80 mg, Oral, Daily, Jose Ferraro DO, 80 mg at 01/24/21 0948    Blood Culture:   No results found for: BLOODCX    Wound Culture:   No results found for: WOUNDCULT    Ins and Outs:  I/O last 24 hours: In: 360 [P O :360]  Out: 1375 [Urine:1375]          Physical:  Vitals:    01/25/21 0630   BP: 137/66   Pulse: 86   Resp: 18   Temp: 97 5 °F (36 4 °C)   SpO2: 92%     Musculoskeletal: left Lower Extremity  · Skin intact  · TTP hip  · SILT s/s/sp/dp/t  +fhl/ehl, +ankle dorsi/plantar flexion  2+ DP pulse    Assessment:    81 y o male with lytic lesions who will likely require prophylactic fixation pending further discussion with the orthopedic and primary teams      Plan:  · NWB  LLE  · PT/OT  · Pain control  · DVT ppx  · Dispo: Ortho will follow    Erma Kelley MD

## 2021-01-25 NOTE — CASE MANAGEMENT
LOS: 1  Bundle: No  Readmission: No    Explained role of CM to pt's daughterRanjeet [phone]  CM obtained the following information from pt's daughter  Home: Pt lives in house; MIN 3; 1 floor  Lives with: Pt lives with wife  DME: Pt uses a walker and cane  Pt also has commode  Ambulation: Pt ambulates with walker or cane independently  Pt completes ADLs independently  Drives: Pt, pt's wife, and pt's daughter drive  Pharmacy: [facility]; [employer] to mail cancer medication  PCP: Dr Ty Host History: No hx reported  Substance Use/Abuse History: No hx reported  HHC History: Pt has previously used [facility] 5 years ago  Work/Retired: Retired  Rehab History: No inpt or outpt rehab  POA/LW: No POA or LW  Transport at [employer] to provide transport at [facility]  CM reviewed d/c planning process including the following: identifying help at home, patient preference for d/c planning needs, Discharge Lounge, [facility] Meds to Bed program, availability of treatment team to discuss questions or concerns patient and/or family may have regarding understanding medications and recognizing signs and symptoms once discharged  CM also encouraged patient to follow up with all recommended appointments after discharge  Patient advised of importance for patient and family to participate in managing patient's medical well being

## 2021-01-25 NOTE — PROGRESS NOTES
INTERNAL MEDICINE RESIDENCY PROGRESS NOTE     Name: Adolfo Archuleta   Age & Sex: [de-identified] y o  male   MRN: [id number]  Unit/Bed#: [facility] 806-01   Encounter: [id number]  Team: SOD Team C     PATIENT INFORMATION     Name: Adolfo Archuleta   Age & Sex: [de-identified] y o  male   MRN: [id number]  Hospital Stay Days: 1    ASSESSMENT/PLAN     Principal Problem:    Ambulatory dysfunction    Hip pain   - CT Abdomen Pelvis wo contrast 01/24 significant for extensive osseus mets to spine and pelvis w large destructive soft tissue lesions Rt Superior pubic ramus and ant acetabulum & post L acetabulum, compression deformities L1-5   - PT/OT eval and treat , consulted and following    - Orthopedic Surgery, possible plan for prophylactic IM Nail L Femur    - Tylenol and ICE for pain, avoid NSAID given CKD     Active Problems:    Prediabetes    - Most recent HA1C 5 6 on 11/18/2020    - Continue monitor BG, FBG today 86 @5:01am        Mixed hyperlipidemia    Atrial fibrillation ([address] )    Peripheral vascular disease ([facility] [address] )   - Per patients medications including Cardio meds were DC on diagnosis and treatment of cancer    - Not on A/C d/t lung cancer mets to Brain, afib rate controlled off meds  Metastatic primary lung cancer (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 80 mg QD    - May consider Thoracentesis if symptoms worsen / Pleur   Effusion increases       CKD (chronic kidney disease) stage 3, GFR 30-59 ml/min   - Avoid nephrotoxic including NSAID and contrast    - Monitor Cr, I/O       Acute pain of left knee   - Xray Knee L  4+ view 01/23/2021: no acute osseus abnormality    - Ice / Voltaren GEL PRN       Anemia of chronic disease   - Iron Panel 01/23 consistent w Anemia of Chronic Dx    - Hgb 11s> 9 4 01/25, no evidence of bleeding    - trend H&H, goal > 7        No new Assessment & Plan notes have been filed under this hospital service since the last note was generated  Service: Critical Care/ICU      Disposition: Continue level of care, Orthopedic Surgery planned prophylactic IM Nail L Femur on      SUBJECTIVE     Patient seen and examined  No acute events overnight  Patient notes continuous pain L knee and L Hip 6-7 on scale 1-10, intermittent cough w thick mucus, otherwise denies any other symptoms including SOB, chest/ abdominal pain, palpitation, fever, chills, difficulty swallowing, nausea, dizziness or lightheadness  OBJECTIVE     Vitals:    21 1527 21 2218 21 0630 21 1515   BP: 112/62 109/75 137/66 111/72   Pulse: 100 93 86 82   Resp: 18 20 18 20   Temp: 97 9 °F (36 6 °C) 98 4 °F (36 9 °C) 97 5 °F (36 4 °C) 98 5 °F (36 9 °C)   TempSrc:       SpO2: 92% 91% 92% 95%   Weight:       Height:          Temperature:   Temp (24hrs), Av 1 °F (36 7 °C), Min:97 5 °F (36 4 °C), Max:98 5 °F (36 9 °C)    Temperature: 98 5 °F (36 9 °C)  Intake & Output:  I/O        07 -  0700  07 -  0700  0700    P  O   360     I V  (mL/kg) 1000 (11 8)      Total Intake(mL/kg) 1000 (11 8) 360 (4 2)     Urine (mL/kg/hr) 875 (0 4) 800 (0 4) 200 (0 2)    Stool 0 0     Total Output 875 800 200    Net +125 -440 -200           Unmeasured Urine Occurrence  0 x     Unmeasured Stool Occurrence 1 x 1 x         Weights:   IBW: 82 2 kg    Body mass index is 24 01 kg/m²  Weight (last 2 days)     Date/Time   Weight    21 0234   84 8 (187)            Physical Exam  Constitutional:       General: He is not in acute distress  HENT:      Nose: Nose normal  No congestion or rhinorrhea  Mouth/Throat:      Mouth: Mucous membranes are moist       Pharynx: Oropharynx is clear  Eyes:      Extraocular Movements: Extraocular movements intact        Conjunctiva/sclera: Conjunctivae normal       Pupils: Pupils are equal, round, and reactive to light  Cardiovascular:      Rate and Rhythm: Normal rate and regular rhythm  Pulses: Normal pulses  Heart sounds: No murmur  No gallop  Pulmonary:      Effort: Pulmonary effort is normal  No respiratory distress  Breath sounds: Normal breath sounds  No wheezing or rales  Abdominal:      General: Bowel sounds are normal  There is no distension  Palpations: There is no mass  Tenderness: There is no abdominal tenderness  Musculoskeletal:      Right lower leg: No edema  Left lower leg: No edema  Comments: L knee abrasion and mild tenderness to palpation   Skin:     Capillary Refill: Capillary refill takes less than 2 seconds  Coloration: Skin is not jaundiced or pale  Findings: No rash  Neurological:      General: No focal deficit present  Mental Status: He is alert and oriented to person, place, and time  Motor: No weakness  LABORATORY DATA     Labs: I have personally reviewed pertinent reports  Results from last 7 days   Lab Units 01/25/21  0502 01/23/21  0330   WBC Thousand/uL 11 53* 6 44   HEMOGLOBIN g/dL 9 4* 10 5*   HEMATOCRIT % 29 4* 32 2*   PLATELETS Thousands/uL 197 218   NEUTROS PCT % 80* 67   MONOS PCT % 6 11      Results from last 7 days   Lab Units 01/25/21  0501 01/23/21  0552 01/23/21  0330   POTASSIUM mmol/L 3 9 3 9 4 1   CHLORIDE mmol/L 102 104 103   CO2 mmol/L 29 27 29   BUN mg/dL 30* 25 27*   CREATININE mg/dL 1 37* 1 42* 1 52*   CALCIUM mg/dL 9 4 9 0 9 2   ALK PHOS U/L  --  124*  --    ALT U/L  --  15  --    AST U/L  --  20  --                             IMAGING & DIAGNOSTIC TESTING     Radiology Results: I have personally reviewed pertinent reports  Ct Abdomen Pelvis Wo Contrast    Result Date: 1/24/2021  Impression: 1    Extensive osseous metastasis involving the spine and pelvis with large destructive soft tissue lesions involving the right superior pubic ramus and anterior acetabulum and posterior left acetabulum  Linear lucency in the right superior pubic ramus described on radiograph is likely related to cortical destruction from the anterior acetabular mass  2   Mottled lucency and sclerosis in the bilateral hips without osseous destruction  This may represent treated metastasis  3   Compression deformities of L1, L2, L4, and L5, likely pathologic given mottled appearance of the vertebral bodies  4   Right pleural effusion  Workstation performed: [id number]     Xr Hip/pelv 2-3 Vws Left If Performed    Result Date: 1/24/2021  Impression: Osseous metastatic disease again seen  Lucency in the right superior pubic ramus concerning for nondisplaced fracture  CT or MRI could be obtained for further evaluation if warranted  The study was marked in [facility] for immediate notification  Workstation performed: [id number]     Xr Femur 2 Vw Left    Result Date: 1/25/2021  Impression: Ill-defined lucency within the proximal femoral shaft with endosteal scalloping suspicious for lytic metastasis  No pathologic fracture identified  Workstation performed: [id number]     Xr Knee 4+ Vw Left Injury    Result Date: 1/24/2021  Impression: No acute osseous abnormality  Workstation performed: [id number]     Ct Head Without Contrast    Result Date: 1/23/2021  Impression: 1  Stable cerebral atrophy with chronic small vessel ischemic white matter disease  No acute intracranial abnormality  2   Stable calcifications in the occipital lobes bilaterally and right frontal centrum semiovale  Differential includes calcification within vascular malformations versus treated metastatic disease  Workstation performed: [id number]     Ct Chest Without Contrast    Result Date: 1/23/2021  Impression: 1  Slight increase in size in right lower lobe lung mass, most compatible with patient's known carcinoma  2   Slight increase in the size of the right pleural effusion   3   Stable osseous metastatic disease  The study was marked in [facility] for immediate notification  Workstation performed: [id number]     Other Diagnostic Testing: I have personally reviewed pertinent reports  ACTIVE MEDICATIONS     Current Facility-Administered Medications   Medication Dose Route Frequency    acetaminophen (TYLENOL) tablet 650 mg  650 mg Oral Q6H PRN    guaiFENesin (MUCINEX) 12 hr tablet 600 mg  600 mg Oral Q12H JOSÉ MIGUEL    heparin (porcine) subcutaneous injection 5,000 Units  5,000 Units Subcutaneous Q8H [facility]    Osimertinib Mesylate TABS 80 mg  80 mg Oral Daily       VTE Pharmacologic Prophylaxis: Heparin  VTE Mechanical Prophylaxis: sequential compression device    Portions of the record may have been created with voice recognition software  Occasional wrong word or "sound a like" substitutions may have occurred due to the inherent limitations of voice recognition software    Read the chart carefully and recognize, using context, where substitutions have occurred   ==  Juan C Olson, [facility]  Internal Medicine Residency PGY-1

## 2021-01-25 NOTE — TELEPHONE ENCOUNTER
From medical heme point of view patient to proceed with ORIF for the lytic lesion, with like to have biopsy     Prognosis is good especially with EGFR mutation lung cancer

## 2021-01-25 NOTE — PLAN OF CARE
Problem: Potential for Falls  Goal: Patient will remain free of falls  Description: INTERVENTIONS:  - Assess patient frequently for physical needs  -  Identify cognitive and physical deficits and behaviors that affect risk of falls    -  [place] fall precautions as indicated by assessment   - Educate patient/family on patient safety including physical limitations  - Instruct patient to call for assistance with activity based on assessment  - Modify environment to reduce risk of injury  - Consider OT/PT consult to assist with strengthening/mobility  Outcome: Progressing     Problem: PAIN - ADULT  Goal: Verbalizes/displays adequate comfort level or baseline comfort level  Description: Interventions:  - Encourage patient to monitor pain and request assistance  - Assess pain using appropriate pain scale  - Administer analgesics based on type and severity of pain and evaluate response  - Implement non-pharmacological measures as appropriate and evaluate response  - Consider cultural and social influences on pain and pain management  - Notify physician/advanced practitioner if interventions unsuccessful or patient reports new pain  Outcome: Progressing     Problem: INFECTION - ADULT  Goal: Absence or prevention of progression during hospitalization  Description: INTERVENTIONS:  - Assess and monitor for signs and symptoms of infection  - Monitor lab/diagnostic results  - Monitor all insertion sites, i e  indwelling lines, tubes, and drains  - Monitor endotracheal if appropriate and nasal secretions for changes in amount and color  - [place] appropriate cooling/warming therapies per order  - Administer medications as ordered  - Instruct and encourage patient and family to use good hand hygiene technique  - Identify and instruct in appropriate isolation precautions for identified infection/condition  Outcome: Progressing  Goal: Absence of fever/infection during neutropenic period  Description: INTERVENTIONS:  - Monitor WBC    Outcome: Progressing     Problem: SAFETY ADULT  Goal: Patient will remain free of falls  Description: INTERVENTIONS:  - Assess patient frequently for physical needs  -  Identify cognitive and physical deficits and behaviors that affect risk of falls    -  [place] fall precautions as indicated by assessment   - Educate patient/family on patient safety including physical limitations  - Instruct patient to call for assistance with activity based on assessment  - Modify environment to reduce risk of injury  - Consider OT/PT consult to assist with strengthening/mobility  Outcome: Progressing  Goal: Maintain or return to baseline ADL function  Description: INTERVENTIONS:  -  Assess patient's ability to carry out ADLs; assess patient's baseline for ADL function and identify physical deficits which impact ability to perform ADLs (bathing, care of mouth/teeth, toileting, grooming, dressing, etc )  - Assess/evaluate cause of self-care deficits   - Assess range of motion  - Assess patient's mobility; develop plan if impaired  - Assess patient's need for assistive devices and provide as appropriate  - Encourage maximum independence but intervene and supervise when necessary  - Involve family in performance of ADLs  - Assess for home care needs following discharge   - Consider OT consult to assist with ADL evaluation and planning for discharge  - Provide patient education as appropriate  Outcome: Progressing  Goal: Maintain or return mobility status to optimal level  Description: INTERVENTIONS:  - Assess patient's baseline mobility status (ambulation, transfers, stairs, etc )    - Identify cognitive and physical deficits and behaviors that affect mobility  - Identify mobility aids required to assist with transfers and/or ambulation (gait belt, sit-to-stand, lift, walker, cane, etc )  - [place] fall precautions as indicated by assessment  - Record patient progress and toleration of activity level on Mobility SBAR; progress patient to next Phase/Stage  - Instruct patient to call for assistance with activity based on assessment  - Consider rehabilitation consult to assist with strengthening/weightbearing, etc   Outcome: Progressing     Problem: DISCHARGE PLANNING  Goal: Discharge to home or other facility with appropriate resources  Description: INTERVENTIONS:  - Identify barriers to discharge w/patient and caregiver  - Arrange for needed discharge resources and transportation as appropriate  - Identify discharge learning needs (meds, wound care, etc )  - Arrange for interpretive services to assist at discharge as needed  - Refer to Case Management Department for coordinating discharge planning if the patient needs post-hospital services based on physician/advanced practitioner order or complex needs related to functional status, cognitive ability, or social support system  Outcome: Progressing     Problem: Knowledge Deficit  Goal: Patient/family/caregiver demonstrates understanding of disease process, treatment plan, medications, and discharge instructions  Description: Complete learning assessment and assess knowledge base    Interventions:  - Provide teaching at level of understanding  - Provide teaching via preferred learning methods  Outcome: Progressing     Problem: Prexisting or High Potential for Compromised Skin Integrity  Goal: Skin integrity is maintained or improved  Description: INTERVENTIONS:  - Identify patients at risk for skin breakdown  - Assess and monitor skin integrity  - Assess and monitor nutrition and hydration status  - Monitor labs   - Assess for incontinence   - Turn and reposition patient  - Assist with mobility/ambulation  - Relieve pressure over bony prominences  - Avoid friction and shearing  - Provide appropriate hygiene as needed including keeping skin clean and dry  - Evaluate need for skin moisturizer/barrier cream  - Collaborate with interdisciplinary team   - Patient/family teaching  - Consider wound care consult   Outcome: Progressing     Problem: RESPIRATORY - ADULT  Goal: Achieves optimal ventilation and oxygenation  Description: INTERVENTIONS:  - Assess for changes in respiratory status  - Assess for changes in mentation and behavior  - Position to facilitate oxygenation and minimize respiratory effort  - Oxygen administered by appropriate delivery if ordered  - Initiate smoking cessation education as indicated  - Encourage broncho-pulmonary hygiene including cough, deep breathe, Incentive Spirometry  - Assess the need for suctioning and aspirate as needed  - Assess and instruct to report SOB or any respiratory difficulty  - Respiratory Therapy support as indicated  Outcome: Progressing     Problem: SKIN/TISSUE INTEGRITY - ADULT  Goal: Skin integrity remains intact  Description: INTERVENTIONS  - Identify patients at risk for skin breakdown  - Assess and monitor skin integrity  - Assess and monitor nutrition and hydration status  - Monitor labs (i e  albumin)  - Assess for incontinence   - Turn and reposition patient  - Assist with mobility/ambulation  - Relieve pressure over bony prominences  - Avoid friction and shearing  - Provide appropriate hygiene as needed including keeping skin clean and dry  - Evaluate need for skin moisturizer/barrier cream  - Collaborate with interdisciplinary team (i e  Nutrition, Rehabilitation, etc )   - Patient/family teaching  Outcome: Progressing  Goal: Incision(s), wounds(s) or drain site(s) healing without S/S of infection  Description: INTERVENTIONS  - Assess and document risk factors for skin impairment   - Assess and document dressing, incision, wound bed, drain sites and surrounding tissue  - Consider nutrition services referral as needed  - Oral mucous membranes remain intact  - Provide patient/ family education  Outcome: Progressing  Goal: Oral mucous membranes remain intact  Description: INTERVENTIONS  - Assess oral mucosa and hygiene practices  - Implement preventative oral hygiene regimen  - Implement oral medicated treatments as ordered  - Initiate Nutrition services referral as needed  Outcome: Progressing     Problem: MUSCULOSKELETAL - ADULT  Goal: Maintain or return mobility to safest level of function  Description: INTERVENTIONS:  - Assess patient's ability to carry out ADLs; assess patient's baseline for ADL function and identify physical deficits which impact ability to perform ADLs (bathing, care of mouth/teeth, toileting, grooming, dressing, etc )  - Assess/evaluate cause of self-care deficits   - Assess range of motion  - Assess patient's mobility  - Assess patient's need for assistive devices and provide as appropriate  - Encourage maximum independence but intervene and supervise when necessary  - Involve family in performance of ADLs  - Assess for home care needs following discharge   - Consider OT consult to assist with ADL evaluation and planning for discharge  - Provide patient education as appropriate  Outcome: Progressing  Goal: Maintain proper alignment of affected body part  Description: INTERVENTIONS:  - Support, maintain and protect limb and body alignment  - Provide patient/ family with appropriate education  Outcome: Progressing

## 2021-01-25 NOTE — PHYSICAL THERAPY NOTE
Physical Therapy Cancellation Note    Patient's Name: Justin Fountain    Orders received, chart reviewed  Pt admit with lytic lesions, ambulatory dysfunction  Per ortho, will likely require prophylactic fixation pending further discussion with the orthopedic and primary teams  GOC pending  Will follow for PT evaluation as medically appropriate  Thank you       Sagrario Carter, PT, DPT

## 2021-01-25 NOTE — OCCUPATIONAL THERAPY NOTE
Occupational Therapy Cancellation Note        Patient Name: Mendoza Saleh  QWROT'X Date: 1/25/2021 01/25/21 1624   OT Last Visit   OT Visit Date 01/25/21   Note Type   Note type Evaluation   Cancel Reasons Medical status admitted with lytic lesions and likely will require prophylactic fixation Will continue to follow and evaluate as medically appropriate   Julia RODRIGUEZ, OTR/L

## 2021-01-26 ENCOUNTER — APPOINTMENT (INPATIENT)
Dept: RADIOLOGY | Facility: HOSPITAL | Age: 81
DRG: 478 | End: 2021-01-26
Payer: COMMERCIAL

## 2021-01-26 PROBLEM — Z01.818 PREOPERATIVE CLEARANCE: Status: ACTIVE | Noted: 2021-01-26

## 2021-01-26 LAB
ABO GROUP BLD: NORMAL
ABO GROUP BLD: NORMAL
ANION GAP SERPL CALCULATED.3IONS-SCNC: 5 MMOL/L (ref 4–13)
APTT PPP: 39 SECONDS (ref 23–37)
BASOPHILS # BLD AUTO: 0.02 THOUSANDS/ΜL (ref 0–0.1)
BASOPHILS NFR BLD AUTO: 0 % (ref 0–1)
BLD GP AB SCN SERPL QL: NEGATIVE
BUN SERPL-MCNC: 29 MG/DL (ref 5–25)
CALCIUM SERPL-MCNC: 9.1 MG/DL (ref 8.3–10.1)
CHLORIDE SERPL-SCNC: 103 MMOL/L (ref 100–108)
CO2 SERPL-SCNC: 27 MMOL/L (ref 21–32)
CREAT SERPL-MCNC: 1.25 MG/DL (ref 0.6–1.3)
EOSINOPHIL # BLD AUTO: 0.35 THOUSAND/ΜL (ref 0–0.61)
EOSINOPHIL NFR BLD AUTO: 4 % (ref 0–6)
ERYTHROCYTE [DISTWIDTH] IN BLOOD BY AUTOMATED COUNT: 14.4 % (ref 11.6–15.1)
GFR SERPL CREATININE-BSD FRML MDRD: 54 ML/MIN/1.73SQ M
GLUCOSE SERPL-MCNC: 93 MG/DL (ref 65–140)
HCT VFR BLD AUTO: 30.1 % (ref 36.5–49.3)
HGB BLD-MCNC: 9.9 G/DL (ref 12–17)
IMM GRANULOCYTES # BLD AUTO: 0.03 THOUSAND/UL (ref 0–0.2)
IMM GRANULOCYTES NFR BLD AUTO: 0 % (ref 0–2)
INR PPP: 1.1 (ref 0.84–1.19)
LYMPHOCYTES # BLD AUTO: 1.11 THOUSANDS/ΜL (ref 0.6–4.47)
LYMPHOCYTES NFR BLD AUTO: 13 % (ref 14–44)
MCH RBC QN AUTO: 28.8 PG (ref 26.8–34.3)
MCHC RBC AUTO-ENTMCNC: 32.9 G/DL (ref 31.4–37.4)
MCV RBC AUTO: 88 FL (ref 82–98)
MONOCYTES # BLD AUTO: 0.78 THOUSAND/ΜL (ref 0.17–1.22)
MONOCYTES NFR BLD AUTO: 9 % (ref 4–12)
NEUTROPHILS # BLD AUTO: 6.56 THOUSANDS/ΜL (ref 1.85–7.62)
NEUTS SEG NFR BLD AUTO: 74 % (ref 43–75)
NRBC BLD AUTO-RTO: 0 /100 WBCS
PLATELET # BLD AUTO: 228 THOUSANDS/UL (ref 149–390)
PMV BLD AUTO: 10 FL (ref 8.9–12.7)
POTASSIUM SERPL-SCNC: 4 MMOL/L (ref 3.5–5.3)
PROCALCITONIN SERPL-MCNC: 1.26 NG/ML
PROTHROMBIN TIME: 14.2 SECONDS (ref 11.6–14.5)
RBC # BLD AUTO: 3.44 MILLION/UL (ref 3.88–5.62)
RH BLD: POSITIVE
RH BLD: POSITIVE
SODIUM SERPL-SCNC: 135 MMOL/L (ref 136–145)
SPECIMEN EXPIRATION DATE: NORMAL
WBC # BLD AUTO: 8.85 THOUSAND/UL (ref 4.31–10.16)

## 2021-01-26 PROCEDURE — 86900 BLOOD TYPING SEROLOGIC ABO: CPT | Performed by: ORTHOPAEDIC SURGERY

## 2021-01-26 PROCEDURE — 85730 THROMBOPLASTIN TIME PARTIAL: CPT | Performed by: ORTHOPAEDIC SURGERY

## 2021-01-26 PROCEDURE — 88305 TISSUE EXAM BY PATHOLOGIST: CPT | Performed by: PATHOLOGY

## 2021-01-26 PROCEDURE — 20225 BONE BIOPSY TROCAR/NDL DEEP: CPT

## 2021-01-26 PROCEDURE — 86901 BLOOD TYPING SEROLOGIC RH(D): CPT | Performed by: ORTHOPAEDIC SURGERY

## 2021-01-26 PROCEDURE — 84145 PROCALCITONIN (PCT): CPT | Performed by: STUDENT IN AN ORGANIZED HEALTH CARE EDUCATION/TRAINING PROGRAM

## 2021-01-26 PROCEDURE — 85025 COMPLETE CBC W/AUTO DIFF WBC: CPT | Performed by: STUDENT IN AN ORGANIZED HEALTH CARE EDUCATION/TRAINING PROGRAM

## 2021-01-26 PROCEDURE — NC001 PR NO CHARGE: Performed by: PHYSICIAN ASSISTANT

## 2021-01-26 PROCEDURE — 77012 CT SCAN FOR NEEDLE BIOPSY: CPT | Performed by: RADIOLOGY

## 2021-01-26 PROCEDURE — 99152 MOD SED SAME PHYS/QHP 5/>YRS: CPT

## 2021-01-26 PROCEDURE — 85610 PROTHROMBIN TIME: CPT | Performed by: PHYSICIAN ASSISTANT

## 2021-01-26 PROCEDURE — 88333 PATH CONSLTJ SURG CYTO XM 1: CPT | Performed by: PATHOLOGY

## 2021-01-26 PROCEDURE — 80048 BASIC METABOLIC PNL TOTAL CA: CPT | Performed by: STUDENT IN AN ORGANIZED HEALTH CARE EDUCATION/TRAINING PROGRAM

## 2021-01-26 PROCEDURE — 88342 IMHCHEM/IMCYTCHM 1ST ANTB: CPT | Performed by: PATHOLOGY

## 2021-01-26 PROCEDURE — 88341 IMHCHEM/IMCYTCHM EA ADD ANTB: CPT | Performed by: PATHOLOGY

## 2021-01-26 PROCEDURE — 77012 CT SCAN FOR NEEDLE BIOPSY: CPT

## 2021-01-26 PROCEDURE — 20225 BONE BIOPSY TROCAR/NDL DEEP: CPT | Performed by: RADIOLOGY

## 2021-01-26 PROCEDURE — 86850 RBC ANTIBODY SCREEN: CPT | Performed by: ORTHOPAEDIC SURGERY

## 2021-01-26 PROCEDURE — ND001 PR NO DOCUMENTATION: Performed by: INTERNAL MEDICINE

## 2021-01-26 PROCEDURE — NC001 PR NO CHARGE: Performed by: ORTHOPAEDIC SURGERY

## 2021-01-26 PROCEDURE — 0QB53ZX EXCISION OF LEFT ACETABULUM, PERCUTANEOUS APPROACH, DIAGNOSTIC: ICD-10-PCS | Performed by: RADIOLOGY

## 2021-01-26 PROCEDURE — 99153 MOD SED SAME PHYS/QHP EA: CPT

## 2021-01-26 RX ORDER — DIPHENHYDRAMINE HYDROCHLORIDE 50 MG/ML
INJECTION INTRAMUSCULAR; INTRAVENOUS CODE/TRAUMA/SEDATION MEDICATION
Status: COMPLETED | OUTPATIENT
Start: 2021-01-26 | End: 2021-01-26

## 2021-01-26 RX ORDER — FENTANYL CITRATE 50 UG/ML
INJECTION, SOLUTION INTRAMUSCULAR; INTRAVENOUS CODE/TRAUMA/SEDATION MEDICATION
Status: COMPLETED | OUTPATIENT
Start: 2021-01-26 | End: 2021-01-26

## 2021-01-26 RX ORDER — SODIUM CHLORIDE, SODIUM LACTATE, POTASSIUM CHLORIDE, CALCIUM CHLORIDE 600; 310; 30; 20 MG/100ML; MG/100ML; MG/100ML; MG/100ML
75 INJECTION, SOLUTION INTRAVENOUS CONTINUOUS
Status: DISCONTINUED | OUTPATIENT
Start: 2021-01-27 | End: 2021-01-28

## 2021-01-26 RX ADMIN — DIPHENHYDRAMINE HYDROCHLORIDE 25 MG: 50 INJECTION, SOLUTION INTRAMUSCULAR; INTRAVENOUS at 15:10

## 2021-01-26 RX ADMIN — NYSTATIN: 100000 CREAM TOPICAL at 17:50

## 2021-01-26 RX ADMIN — FENTANYL CITRATE 25 MCG: 50 INJECTION INTRAMUSCULAR; INTRAVENOUS at 15:13

## 2021-01-26 RX ADMIN — NYSTATIN: 100000 CREAM TOPICAL at 11:21

## 2021-01-26 RX ADMIN — GUAIFENESIN 600 MG: 600 TABLET, EXTENDED RELEASE ORAL at 21:02

## 2021-01-26 RX ADMIN — FENTANYL CITRATE 25 MCG: 50 INJECTION INTRAMUSCULAR; INTRAVENOUS at 15:10

## 2021-01-26 RX ADMIN — HEPARIN SODIUM 5000 UNITS: 5000 INJECTION INTRAVENOUS; SUBCUTANEOUS at 05:28

## 2021-01-26 RX ADMIN — HEPARIN SODIUM 5000 UNITS: 5000 INJECTION INTRAVENOUS; SUBCUTANEOUS at 21:02

## 2021-01-26 NOTE — BRIEF OP NOTE (RAD/CATH)
INTERVENTIONAL RADIOLOGY PROCEDURE NOTE    Date: 1/26/2021    Procedure: Left acetabulum lytic lesion biopsy    Preoperative diagnosis:   1  Fall    2  Ambulatory dysfunction    3  Left knee pain    4  Fracture    5  Primary malignant neoplasm of right lung metastatic to other site ([facility] [address] )    6  Hip pain         Postoperative diagnosis: Same  Surgeon: Loretta Hurley MD     Assistant: None  No qualified resident was available  Blood loss: 1 mL    Specimens: 4 core needle samples placed into formalin  Findings: Successful left posterior acetabulum lytic lesion biopsy  Complications: None immediate      Anesthesia: local and IV Fentanyl

## 2021-01-26 NOTE — QUICK NOTE
Orthopedics PreOp Note  Kurt Yao [de-identified] y o  male MRN: [id number]  Unit/Bed#: [facility] 806-01      Dx: Osteolytic lesion of the left femur   Procedure: Prophylactic left femur IM nail     Hgb: 9 9  Plt: 228  Wbc: 8 85    Na: 135  K: 4 0  Cl: 103  Co2: 27  BUN: 29  Cr: 1 25  Gluc: 93    PTT:39  INR:1 10  PT:14 2    Abx: Ancef 2g on hold for OR  Type and Screen/Cross: B Rh+  NPO:at midnight, 1/27, sips with meds   Consent:  In chart   POA Name/ Phone: Gonzales Beeo (wife) [phone]  Clearance: SLIM  Anticoagulation:SQ heparin, please hold morning dose in preparation for OR

## 2021-01-26 NOTE — TELEMEDICINE
INTERPROFESSIONAL (PHONE) CONSULTATION - Interventional Radiology  Ranjith Caballero [de-identified] y o  male MRN: [id number]  Unit/Bed#: [facility] 806-01 Encounter: [id number]    IR has been consulted to evaluate the patient, determine the appropriate procedure, and whether or not a procedure can and should be performed regarding the care of Ranjith Caballero  We were consulted by orthopedic surgery concerning left acetabulum bone lesion, and to possibly perform a bone biopsy if medically appropriate for the patient  IP Consult to IR  Consult performed by: Quincy Christy PA-C  Consult ordered by: Ciera Velasquez MD        01/26/21      Assessment/Recommendation:     [de-identified]year old male with history of stage IV adenocarcinoma of lung (malignant pleural effusion), possible mets to bone/brain, presenting with increasing falls, multiple bone lesions, large right acetabulum mass    - patient for prophylactic nailing of left femur per orthopedic  Biopsy reporting needed prior to nailing  - discussed with resident and informed him bone biopsy pathology would not be reported for 3-5 days  - need INR prior to biopsy  - will plan for biopsy today   - please keep npo    Total time spent in review of data, discussion with requesting provider and rendering advice was 25 minutes       Patient or appropriate family member was verbally informed by orthopedic surgery of this consultative service on their behalf to provide more timely access to specialty care in lieu of an in person consultation  They were informed it is a billable service unless the it was determined an in person follow up was medically necessary by me within the next 14 days at which time only the in person consult would be billed  Verbal consent was obtained  Thank you for allowing Interventional Radiology to participate in the care of Ranjith Caballero  Please don't hesitate to call or TigerText us with any questions       Quincy Christy PA-C

## 2021-01-26 NOTE — PROGRESS NOTES
INTERNAL MEDICINE RESIDENCY PROGRESS NOTE     Name: Elda Whitaker   Age & Sex: [de-identified] y o  male   MRN: [id number]  Unit/Bed#: 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     - CT Abdomen Pelvis wo contrast 01/24 significant for extensive osseus mets to spine and pelvis w large destructive soft tissue lesions Rt Superior pubic ramus and ant acetabulum & post L acetabulum, compression deformities L1-5              - PT/OT eval and treat , consulted and following    - IR bone biopsy to confirm metastatic lesions scheduled for today (1/26/2020), awaiting results, INR ordered prior - WNL    - Per Ortho, patient will need L Femur IM nail / prophylactic fixation scheduled for tomorrow (1/27/2020)  - NPO Midnight 01/27    - SC Heparin will hold morning dose  - Tylenol and ICE for pain, avoid NSAID given CKD      PRE-OP CLEARANCE   Elda Whitaker is seen for pre-operative evaluation  Patient reports no sx of chest pain reported at rest or with exertion, dyspnea at rest or with exertion, PND, LE edema, claudication, or palpitations  Hx of PVD and Afib but No history of ischemic heart disease or CHF  No recent anticoagulant or antithrombotic use (Afib not on A/C d/t lung cancer mets to brain)  Patient reports no history of stress test, cardiac cath, or coronary revascularization)  The patient is able to achieve 2-3 METs of activity at baseline but currently limited d/t recent fall, left hip and knee pain  Pt's RCRI is 0, correlating with Class I risk, which carries a 3 9 percent 30 day risk of death, MI, or cardiac arrest  Patient's other comorbid conditions include Lung Cancer stage IV, Afib not on AC, Prediabetes, HTN, HLD, PVD, Proteinuria  ECG 12 Lead 01/23 remarkable for Afib and low voltage QRS  There is no need for further diagnostic testing prior to patient's scheduled procedure  Patient may proceed with surgery with understanding of perioperative risk in light of the benefits of possible surgery  Active Problems:    Prediabetes               - Most recent HA1C 5 6 on 11/18/2020               - Continue monitor BG, FBG today morning 93                   Mixed hyperlipidemia    Atrial fibrillation (HCC)    Peripheral vascular disease ([facility] [address] )              - Per patient - medications including Cardio meds were DC on diagnosis and treatment of cancer               - Not on A/C "d/t lung cancer mets to Brain, afib rate controlled off meds   "   - Will confirm plan of A/C with Hem/Onc on/prior discharge                   Metastatic primary lung cancer ([facility] [address] )              - following with Dr Shiloh Rangel - input appreciated               - 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                 - Mets to bone and brain s/p WBRT, 2 cycles of Alimta/Carboplatin/Pembroizumab               - EGFR Mutation, 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 80 mg QD               - Right Pleural Effusion is Chronic and relatively stable, patient not tachypnic or SOB , no leukocytosis or fever, will continue to monitor and no need for thoracentesis at the moment          CKD (chronic kidney disease) stage 3, GFR 30-59 ml/min              - Avoid nephrotoxic including NSAID and contrast               - Monitor Cr, I/O        Acute pain of left knee              - Xray Knee L  4+ view 01/23/2021: no acute osseus abnormality               - Ice / Voltaren GEL PRN        Anemia of chronic disease              - Iron Panel 01/23 consistent w Anemia of Chronic Dx               - Hgb 11s> 9 4> 9 9 today, no evidence of bleeding; INR 1 1 today               - trend H&H, goal > 7                    Service: Internal Medicine - SOD C    Disposition: Continue level of care, planned bone biopsy today (IR), and Internal Fixation L Femur tomorrow (Ortho) following  SUBJECTIVE     Patient seen and examined  No acute events overnight  He reports pain left lateral knee pain is slightly better than yesterday 5 on scale 1-10 at rest  Mild burning sensation, radiating to hip, and exacerbated by movement  Patient also notes improving productive cough with clear sputum after started on the cough medicine "Mucinex"  He notes loose BM yesterday, non-bloody  He is bedridden and not ambulating due to pain  He is NPO for IR procedure later today  He denies fevers, chills, shortness of breath, abdominal pain, urinary symptoms, headaches, or photophobia  OBJECTIVE     Vitals:    21 1515 21 2228 21 0736 21 0933   BP: 111/72 114/69 126/72    Pulse: 82 84 87 76   Resp: 20 18 16    Temp: 98 5 °F (36 9 °C) 97 6 °F (36 4 °C) (!) 97 4 °F (36 3 °C)    TempSrc:       SpO2: 95% 92% 93% 95%   Weight:       Height:          Temperature:   Temp (24hrs), Av 8 °F (36 6 °C), Min:97 4 °F (36 3 °C), Max:98 5 °F (36 9 °C)    Temperature: (!) 97 4 °F (36 3 °C)  Intake & Output:  I/O       701 -  0700  0700  0700    P  O  360 240     I V  (mL/kg)       Total Intake(mL/kg) 360 (4 2) 240 (2 8)     Urine (mL/kg/hr) 800 (0 4) 1086 (0 5) 125 (0 2)    Stool 0 0     Total Output 800 1086 125    Net -440 -846 -125           Unmeasured Urine Occurrence 0 x 1 x     Unmeasured Stool Occurrence 1 x 1 x         Weights:   IBW: 82 2 kg    Body mass index is 24 01 kg/m²  Weight (last 2 days)     None        Physical Exam   Constitutional:       General: He is not in acute distress  HENT:      Head: Normocephalic        Right Ear: Ear canal and external ear normal       Left Ear: Ear canal and external ear normal       Mouth/Throat:      Mouth: Mucous membranes are moist       Pharynx: No oropharyngeal exudate or posterior oropharyngeal erythema  Eyes:      Extraocular Movements: Extraocular movements intact  Neck:      Musculoskeletal: Normal range of motion  Cardiovascular:      Rate and Rhythm: Normal rate and regular rhythm  Pulses: Normal pulses  Heart sounds: Normal heart sounds  Pulmonary:      Effort: Pulmonary effort is normal  No respiratory distress  Breath sounds: Normal breath sounds  No wheezing, rhonchi or rales  Abdominal:      General: Abdomen is flat  Bowel sounds are normal  There is no distension  Palpations: Abdomen is soft  Tenderness: There is no abdominal tenderness  Musculoskeletal:         General: Tenderness and abrasion on L Knee present  Right lower leg: No edema  Left lower leg: No edema  Comments: 5/5 strength dorsiflexion/plantarflexion; 2/5 strength on L leg raises (limited d/t pain)  Skin:     General: Skin is warm  Findings: Bruising and skin abrasion L knee present  Neurological:      General: No focal deficit present  Mental Status: He is alert  Psychiatric:         Mood and Affect: Mood normal          Behavior: Behavior normal          Thought Content: Thought content normal          Judgment: Judgment normal      LABORATORY DATA     Labs: I have personally reviewed pertinent reports    Results from last 7 days   Lab Units 01/26/21  0525 01/25/21  0502 01/23/21  0330   WBC Thousand/uL 8 85 11 53* 6 44   HEMOGLOBIN g/dL 9 9* 9 4* 10 5*   HEMATOCRIT % 30 1* 29 4* 32 2*   PLATELETS Thousands/uL 228 197 218   NEUTROS PCT % 74 80* 67   MONOS PCT % 9 6 11      Results from last 7 days   Lab Units 01/26/21  0525 01/25/21  0501 01/23/21  0552   POTASSIUM mmol/L 4 0 3 9 3 9   CHLORIDE mmol/L 103 102 104   CO2 mmol/L 27 29 27   BUN mg/dL 29* 30* 25   CREATININE mg/dL 1 25 1 37* 1 42*   CALCIUM mg/dL 9 1 9 4 9 0   ALK PHOS U/L  --   --  124*   ALT U/L  --   --  15   AST U/L  --   --  20              Results from last 7 days   Lab Units 01/26/21  1019   INR  1 10   PTT seconds 39*               IMAGING & DIAGNOSTIC TESTING     Radiology Results: I have personally reviewed pertinent reports  Ct Abdomen Pelvis Wo Contrast    Result Date: 1/24/2021  Impression: 1  Extensive osseous metastasis involving the spine and pelvis with large destructive soft tissue lesions involving the right superior pubic ramus and anterior acetabulum and posterior left acetabulum  Linear lucency in the right superior pubic ramus described on radiograph is likely related to cortical destruction from the anterior acetabular mass  2   Mottled lucency and sclerosis in the bilateral hips without osseous destruction  This may represent treated metastasis  3   Compression deformities of L1, L2, L4, and L5, likely pathologic given mottled appearance of the vertebral bodies  4   Right pleural effusion  Workstation performed: [id number]     Xr Hip/pelv 2-3 Vws Left If Performed    Result Date: 1/24/2021  Impression: Osseous metastatic disease again seen  Lucency in the right superior pubic ramus concerning for nondisplaced fracture  CT or MRI could be obtained for further evaluation if warranted  The study was marked in [facility] for immediate notification  Workstation performed: [id number]     Xr Femur 2 Vw Left    Result Date: 1/25/2021  Impression: Ill-defined lucency within the proximal femoral shaft with endosteal scalloping suspicious for lytic metastasis  No pathologic fracture identified  Workstation performed: [id number]     Xr Knee 4+ Vw Left Injury    Result Date: 1/24/2021  Impression: No acute osseous abnormality  Workstation performed: [id number]     Ct Head Without Contrast    Result Date: 1/23/2021  Impression: 1  Stable cerebral atrophy with chronic small vessel ischemic white matter disease  No acute intracranial abnormality  2   Stable calcifications in the occipital lobes bilaterally and right frontal centrum semiovale    Differential includes calcification within vascular malformations versus treated metastatic disease  Workstation performed: [id number]     Ct Chest Without Contrast    Result Date: 1/23/2021  Impression: 1  Slight increase in size in right lower lobe lung mass, most compatible with patient's known carcinoma  2   Slight increase in the size of the right pleural effusion  3   Stable osseous metastatic disease  The study was marked in [facility] for immediate notification  Workstation performed: [id number]     Other Diagnostic Testing: I have personally reviewed pertinent reports  ACTIVE MEDICATIONS     Current Facility-Administered Medications   Medication Dose Route Frequency    acetaminophen (TYLENOL) tablet 650 mg  650 mg Oral Q6H PRN    Diclofenac Sodium (VOLTAREN) 1 % topical gel 2 g  2 g Topical TID PRN    guaiFENesin (MUCINEX) 12 hr tablet 600 mg  600 mg Oral Q12H JOSÉ MIGUEL    heparin (porcine) subcutaneous injection 5,000 Units  5,000 Units Subcutaneous Q8H [facility]    [START ON 1/27/2021] lactated ringers infusion  75 mL/hr Intravenous Continuous    nystatin (MYCOSTATIN) cream   Topical BID    Osimertinib Mesylate TABS 80 mg  80 mg Oral Daily       VTE Pharmacologic Prophylaxis: Heparin  VTE Mechanical Prophylaxis: sequential compression device    Portions of the record may have been created with voice recognition software  Occasional wrong word or "sound a like" substitutions may have occurred due to the inherent limitations of voice recognition software    Read the chart carefully and recognize, using context, where substitutions have occurred   ==  Radha Simeon, [facility]  Internal Medicine Residency PGY-1

## 2021-01-26 NOTE — QUICK NOTE
Error to previous telemedicine note  Patient with large LEFT acetabulum mass  Will plan for left sided biopsy today  Plans for left prophylactic femur nailing this week

## 2021-01-26 NOTE — SEDATION DOCUMENTATION
CT guided left acetabular biopsy completed in IR by Dr Danisha Fitch without complication  Tolerated well  Bedrest start time [id number]

## 2021-01-26 NOTE — PHYSICAL THERAPY NOTE
Physical Therapy Cancellation Note    Patient's Name: Luisito Passer    Orders received, chart reviewed  Per ortho: plan for prophylactic IMN of the left femur tomorrow with IR bone biopsy today with IR  Will follow for PT evaluation as medically appropriate  Thank you       Lito Avelar, PT, DPT

## 2021-01-26 NOTE — OCCUPATIONAL THERAPY NOTE
Occupational Therapy Cancellation Note        Patient Name: Luis KEARNEY Date: 1/26/2021 01/26/21 0746   OT Last Visit   OT Visit Date 01/26/21   Note Type   Note type Evaluation   Cancel Reasons Patient to operating room  Pt admitted with left hip and pelvis lytic lesions  Plan for prophylactic IMN of the left femur tomorrow with IR bone biopsy today with IR   Will continue to follow         Kj RODRIGUEZ, OTR/L

## 2021-01-26 NOTE — PROGRESS NOTES
Progress Note - Orthopedics   Sharri Pay [de-identified] y o  male MRN: [id number]  [facility]      Subjective:    [de-identified] y o male with left hip and pelvis lytic lesions  No acute events, no complaints  Pt doing well  Pain controlled  Denies fevers chills, CP, SOB    Labs:  0   Lab Value Date/Time    HCT 29 4 (L) 01/25/2021 0502    HCT 32 2 (L) 01/23/2021 0330    HCT 34 3 (L) 11/18/2020 0916    HGB 9 4 (L) 01/25/2021 0502    HGB 10 5 (L) 01/23/2021 0330    HGB 11 0 (L) 11/18/2020 0916    INR 1 32 (H) 04/24/2018 0611    WBC 11 53 (H) 01/25/2021 0502    WBC 6 44 01/23/2021 0330    WBC 6 38 11/18/2020 0916       Meds:    Current Facility-Administered Medications:     acetaminophen (TYLENOL) tablet 650 mg, 650 mg, Oral, Q6H PRN, Carly Holman MD, 650 mg at 01/25/21 8881    Diclofenac Sodium (VOLTAREN) 1 % topical gel 2 g, 2 g, Topical, TID PRN, Patrick Edwards DO    guaiFENesin (MUCINEX) 12 hr tablet 600 mg, 600 mg, Oral, Q12H [address], Patrick Aiad, DO, 600 mg at 01/25/21 2104    heparin (porcine) subcutaneous injection 5,000 Units, 5,000 Units, Subcutaneous, Q8H [address], 5,000 Units at 01/26/21 0528 **AND** [CANCELED] Platelet count, , , Once, Carly Holman MD    nystatin (MYCOSTATIN) cream, , Topical, BID, Davin Oconnell MD, Given at 01/25/21 1847    Osimertinib Mesylate TABS 80 mg, 80 mg, Oral, Daily, Jose Ferraro DO, 80 mg at 01/25/21 9930    Blood Culture:   No results found for: BLOODCX    Wound Culture:   No results found for: WOUNDCULT    Ins and Outs:  I/O last 24 hours: In: 240 [P O :240]  Out: 5821 [Urine:1386]          Physical:  Vitals:    01/25/21 2228   BP: 114/69   Pulse: 84   Resp: 18   Temp: 97 6 °F (36 4 °C)   SpO2: 92%     Musculoskeletal: left Lower Extremity  · Skin intact   · TTP left hip  · SILT s/s/sp/dp/t  +fhl/ehl, +ankle dorsi/plantar flexion  2+ DP pulse    Assessment:    81 y o male with left hip and pelvis lytic lesions   Plan for prophylactic IMN of the left femur tomorrow with IR bone biopsy today with IR      Plan:  · NWB LLE  · Continue to monitor for acute blood loss anemia, will monitor and administer IVF/prbc as indicated   · PT/OT  · Pain control  · DVT ppx  · Dispo: Ortho will follow    Roselie Simmonds, MD

## 2021-01-26 NOTE — PROGRESS NOTES
Pt returned to room from IR  Frequent vital signs in progress  Band aid noted on left buttock cheek clean dry and intact  Pt to lie in bed x1 hour

## 2021-01-27 ENCOUNTER — ANESTHESIA EVENT (INPATIENT)
Dept: PERIOP | Facility: HOSPITAL | Age: 81
DRG: 478 | End: 2021-01-27
Payer: COMMERCIAL

## 2021-01-27 ENCOUNTER — APPOINTMENT (INPATIENT)
Dept: RADIOLOGY | Facility: HOSPITAL | Age: 81
DRG: 478 | End: 2021-01-27
Payer: COMMERCIAL

## 2021-01-27 ENCOUNTER — ANESTHESIA (INPATIENT)
Dept: PERIOP | Facility: HOSPITAL | Age: 81
DRG: 478 | End: 2021-01-27
Payer: COMMERCIAL

## 2021-01-27 VITALS — HEART RATE: 87 BPM

## 2021-01-27 LAB
ANION GAP SERPL CALCULATED.3IONS-SCNC: 5 MMOL/L (ref 4–13)
BASOPHILS # BLD AUTO: 0.02 THOUSANDS/ΜL (ref 0–0.1)
BASOPHILS NFR BLD AUTO: 0 % (ref 0–1)
BUN SERPL-MCNC: 27 MG/DL (ref 5–25)
CALCIUM SERPL-MCNC: 9.6 MG/DL (ref 8.3–10.1)
CHLORIDE SERPL-SCNC: 102 MMOL/L (ref 100–108)
CO2 SERPL-SCNC: 29 MMOL/L (ref 21–32)
CREAT SERPL-MCNC: 1.17 MG/DL (ref 0.6–1.3)
EOSINOPHIL # BLD AUTO: 0.19 THOUSAND/ΜL (ref 0–0.61)
EOSINOPHIL NFR BLD AUTO: 2 % (ref 0–6)
ERYTHROCYTE [DISTWIDTH] IN BLOOD BY AUTOMATED COUNT: 14.4 % (ref 11.6–15.1)
GFR SERPL CREATININE-BSD FRML MDRD: 59 ML/MIN/1.73SQ M
GLUCOSE SERPL-MCNC: 79 MG/DL (ref 65–140)
GLUCOSE SERPL-MCNC: 97 MG/DL (ref 65–140)
HCT VFR BLD AUTO: 32.4 % (ref 36.5–49.3)
HGB BLD-MCNC: 10.4 G/DL (ref 12–17)
IMM GRANULOCYTES # BLD AUTO: 0.03 THOUSAND/UL (ref 0–0.2)
IMM GRANULOCYTES NFR BLD AUTO: 0 % (ref 0–2)
LYMPHOCYTES # BLD AUTO: 1.03 THOUSANDS/ΜL (ref 0.6–4.47)
LYMPHOCYTES NFR BLD AUTO: 12 % (ref 14–44)
MCH RBC QN AUTO: 28.4 PG (ref 26.8–34.3)
MCHC RBC AUTO-ENTMCNC: 32.1 G/DL (ref 31.4–37.4)
MCV RBC AUTO: 89 FL (ref 82–98)
MONOCYTES # BLD AUTO: 0.73 THOUSAND/ΜL (ref 0.17–1.22)
MONOCYTES NFR BLD AUTO: 8 % (ref 4–12)
NEUTROPHILS # BLD AUTO: 6.75 THOUSANDS/ΜL (ref 1.85–7.62)
NEUTS SEG NFR BLD AUTO: 78 % (ref 43–75)
NRBC BLD AUTO-RTO: 0 /100 WBCS
PLATELET # BLD AUTO: 250 THOUSANDS/UL (ref 149–390)
PMV BLD AUTO: 10.6 FL (ref 8.9–12.7)
POTASSIUM SERPL-SCNC: 4.2 MMOL/L (ref 3.5–5.3)
PROCALCITONIN SERPL-MCNC: 0.94 NG/ML
RBC # BLD AUTO: 3.66 MILLION/UL (ref 3.88–5.62)
SODIUM SERPL-SCNC: 136 MMOL/L (ref 136–145)
WBC # BLD AUTO: 8.75 THOUSAND/UL (ref 4.31–10.16)

## 2021-01-27 PROCEDURE — C1713 ANCHOR/SCREW BN/BN,TIS/BN: HCPCS | Performed by: ORTHOPAEDIC SURGERY

## 2021-01-27 PROCEDURE — 82948 REAGENT STRIP/BLOOD GLUCOSE: CPT

## 2021-01-27 PROCEDURE — 85025 COMPLETE CBC W/AUTO DIFF WBC: CPT | Performed by: ORTHOPAEDIC SURGERY

## 2021-01-27 PROCEDURE — 84145 PROCALCITONIN (PCT): CPT | Performed by: STUDENT IN AN ORGANIZED HEALTH CARE EDUCATION/TRAINING PROGRAM

## 2021-01-27 PROCEDURE — 0QH706Z INSERTION OF INTRAMEDULLARY INTERNAL FIXATION DEVICE INTO LEFT UPPER FEMUR, OPEN APPROACH: ICD-10-PCS | Performed by: ORTHOPAEDIC SURGERY

## 2021-01-27 PROCEDURE — 99232 SBSQ HOSP IP/OBS MODERATE 35: CPT | Performed by: INTERNAL MEDICINE

## 2021-01-27 PROCEDURE — 73552 X-RAY EXAM OF FEMUR 2/>: CPT

## 2021-01-27 PROCEDURE — NC001 PR NO CHARGE: Performed by: ORTHOPAEDIC SURGERY

## 2021-01-27 PROCEDURE — C1769 GUIDE WIRE: HCPCS | Performed by: ORTHOPAEDIC SURGERY

## 2021-01-27 PROCEDURE — 27187 REINFORCE HIP BONES: CPT | Performed by: ORTHOPAEDIC SURGERY

## 2021-01-27 PROCEDURE — 80048 BASIC METABOLIC PNL TOTAL CA: CPT | Performed by: ORTHOPAEDIC SURGERY

## 2021-01-27 DEVICE — 5.0MM TI LOCKING SCREW W/T25 STARDRIVE 52MM F/IM NAIL-STER: Type: IMPLANTABLE DEVICE | Site: FEMUR | Status: FUNCTIONAL

## 2021-01-27 DEVICE — TFNA FENESTRATED HELICAL BLADE 100MM - STERILE
Type: IMPLANTABLE DEVICE | Site: FEMUR | Status: FUNCTIONAL
Brand: TFN-ADVANCE

## 2021-01-27 DEVICE — 10MM/130 DEG TI CANN TFNA 400MM/LEFT - STERILE
Type: IMPLANTABLE DEVICE | Site: FEMUR | Status: FUNCTIONAL
Brand: TFN-ADVANCE

## 2021-01-27 RX ORDER — SENNOSIDES 8.6 MG
1 TABLET ORAL DAILY
Status: DISCONTINUED | OUTPATIENT
Start: 2021-01-28 | End: 2021-01-30 | Stop reason: HOSPADM

## 2021-01-27 RX ORDER — DOCUSATE SODIUM 100 MG/1
100 CAPSULE, LIQUID FILLED ORAL 2 TIMES DAILY
Status: DISCONTINUED | OUTPATIENT
Start: 2021-01-27 | End: 2021-01-30 | Stop reason: HOSPADM

## 2021-01-27 RX ORDER — SODIUM CHLORIDE, SODIUM LACTATE, POTASSIUM CHLORIDE, CALCIUM CHLORIDE 600; 310; 30; 20 MG/100ML; MG/100ML; MG/100ML; MG/100ML
INJECTION, SOLUTION INTRAVENOUS CONTINUOUS PRN
Status: DISCONTINUED | OUTPATIENT
Start: 2021-01-27 | End: 2021-01-27

## 2021-01-27 RX ORDER — PROPOFOL 10 MG/ML
INJECTION, EMULSION INTRAVENOUS AS NEEDED
Status: DISCONTINUED | OUTPATIENT
Start: 2021-01-27 | End: 2021-01-27

## 2021-01-27 RX ORDER — HYDROMORPHONE HCL/PF 1 MG/ML
0.2 SYRINGE (ML) INJECTION
Status: DISCONTINUED | OUTPATIENT
Start: 2021-01-27 | End: 2021-01-27 | Stop reason: HOSPADM

## 2021-01-27 RX ORDER — FENTANYL CITRATE/PF 50 MCG/ML
50 SYRINGE (ML) INJECTION
Status: DISCONTINUED | OUTPATIENT
Start: 2021-01-27 | End: 2021-01-27 | Stop reason: HOSPADM

## 2021-01-27 RX ORDER — DIPHENHYDRAMINE HYDROCHLORIDE 50 MG/ML
12.5 INJECTION INTRAMUSCULAR; INTRAVENOUS ONCE AS NEEDED
Status: DISCONTINUED | OUTPATIENT
Start: 2021-01-27 | End: 2021-01-27 | Stop reason: HOSPADM

## 2021-01-27 RX ORDER — HYDROMORPHONE HCL/PF 1 MG/ML
0.5 SYRINGE (ML) INJECTION
Status: DISCONTINUED | OUTPATIENT
Start: 2021-01-27 | End: 2021-01-27 | Stop reason: HOSPADM

## 2021-01-27 RX ORDER — ACETAMINOPHEN 325 MG/1
650 TABLET ORAL EVERY 6 HOURS PRN
Status: DISCONTINUED | OUTPATIENT
Start: 2021-01-27 | End: 2021-01-30 | Stop reason: HOSPADM

## 2021-01-27 RX ORDER — GABAPENTIN 100 MG/1
100 CAPSULE ORAL
Status: DISCONTINUED | OUTPATIENT
Start: 2021-01-27 | End: 2021-01-30 | Stop reason: HOSPADM

## 2021-01-27 RX ORDER — EPHEDRINE SULFATE 50 MG/ML
INJECTION INTRAVENOUS AS NEEDED
Status: DISCONTINUED | OUTPATIENT
Start: 2021-01-27 | End: 2021-01-27

## 2021-01-27 RX ORDER — ONDANSETRON 2 MG/ML
4 INJECTION INTRAMUSCULAR; INTRAVENOUS ONCE AS NEEDED
Status: DISCONTINUED | OUTPATIENT
Start: 2021-01-27 | End: 2021-01-27 | Stop reason: HOSPADM

## 2021-01-27 RX ORDER — HYDROMORPHONE HCL/PF 1 MG/ML
0.2 SYRINGE (ML) INJECTION EVERY 4 HOURS PRN
Status: DISCONTINUED | OUTPATIENT
Start: 2021-01-27 | End: 2021-01-30 | Stop reason: HOSPADM

## 2021-01-27 RX ORDER — CEFAZOLIN SODIUM 2 G/50ML
SOLUTION INTRAVENOUS AS NEEDED
Status: DISCONTINUED | OUTPATIENT
Start: 2021-01-27 | End: 2021-01-27

## 2021-01-27 RX ORDER — ONDANSETRON 2 MG/ML
4 INJECTION INTRAMUSCULAR; INTRAVENOUS EVERY 6 HOURS PRN
Status: DISCONTINUED | OUTPATIENT
Start: 2021-01-27 | End: 2021-01-30 | Stop reason: HOSPADM

## 2021-01-27 RX ORDER — KETAMINE HCL IN NACL, ISO-OSM 100MG/10ML
SYRINGE (ML) INJECTION AS NEEDED
Status: DISCONTINUED | OUTPATIENT
Start: 2021-01-27 | End: 2021-01-27

## 2021-01-27 RX ORDER — LIDOCAINE HYDROCHLORIDE 10 MG/ML
0.5 INJECTION, SOLUTION EPIDURAL; INFILTRATION; INTRACAUDAL; PERINEURAL ONCE AS NEEDED
Status: DISCONTINUED | OUTPATIENT
Start: 2021-01-27 | End: 2021-01-30 | Stop reason: HOSPADM

## 2021-01-27 RX ORDER — PROPOFOL 10 MG/ML
INJECTION, EMULSION INTRAVENOUS CONTINUOUS PRN
Status: DISCONTINUED | OUTPATIENT
Start: 2021-01-27 | End: 2021-01-27

## 2021-01-27 RX ORDER — SODIUM CHLORIDE 9 MG/ML
125 INJECTION, SOLUTION INTRAVENOUS CONTINUOUS
Status: DISCONTINUED | OUTPATIENT
Start: 2021-01-27 | End: 2021-01-28

## 2021-01-27 RX ORDER — FENTANYL CITRATE 50 UG/ML
INJECTION, SOLUTION INTRAMUSCULAR; INTRAVENOUS AS NEEDED
Status: DISCONTINUED | OUTPATIENT
Start: 2021-01-27 | End: 2021-01-27

## 2021-01-27 RX ORDER — CEFAZOLIN SODIUM 2 G/50ML
2000 SOLUTION INTRAVENOUS EVERY 8 HOURS
Status: COMPLETED | OUTPATIENT
Start: 2021-01-28 | End: 2021-01-28

## 2021-01-27 RX ORDER — OXYCODONE HYDROCHLORIDE 5 MG/1
5 TABLET ORAL EVERY 4 HOURS PRN
Status: DISCONTINUED | OUTPATIENT
Start: 2021-01-27 | End: 2021-01-30 | Stop reason: HOSPADM

## 2021-01-27 RX ORDER — OXYCODONE HYDROCHLORIDE 5 MG/1
2.5 TABLET ORAL EVERY 4 HOURS PRN
Status: DISCONTINUED | OUTPATIENT
Start: 2021-01-27 | End: 2021-01-30 | Stop reason: HOSPADM

## 2021-01-27 RX ORDER — METOCLOPRAMIDE HYDROCHLORIDE 5 MG/ML
10 INJECTION INTRAMUSCULAR; INTRAVENOUS ONCE AS NEEDED
Status: DISCONTINUED | OUTPATIENT
Start: 2021-01-27 | End: 2021-01-27 | Stop reason: HOSPADM

## 2021-01-27 RX ORDER — ONDANSETRON 2 MG/ML
INJECTION INTRAMUSCULAR; INTRAVENOUS AS NEEDED
Status: DISCONTINUED | OUTPATIENT
Start: 2021-01-27 | End: 2021-01-27

## 2021-01-27 RX ORDER — LIDOCAINE 50 MG/G
1 PATCH TOPICAL DAILY
Status: COMPLETED | OUTPATIENT
Start: 2021-01-28 | End: 2021-01-28

## 2021-01-27 RX ORDER — LIDOCAINE HYDROCHLORIDE 10 MG/ML
INJECTION, SOLUTION EPIDURAL; INFILTRATION; INTRACAUDAL; PERINEURAL AS NEEDED
Status: DISCONTINUED | OUTPATIENT
Start: 2021-01-27 | End: 2021-01-27

## 2021-01-27 RX ORDER — ROCURONIUM BROMIDE 10 MG/ML
INJECTION, SOLUTION INTRAVENOUS AS NEEDED
Status: DISCONTINUED | OUTPATIENT
Start: 2021-01-27 | End: 2021-01-27

## 2021-01-27 RX ORDER — MAGNESIUM HYDROXIDE 1200 MG/15ML
LIQUID ORAL AS NEEDED
Status: DISCONTINUED | OUTPATIENT
Start: 2021-01-27 | End: 2021-01-27 | Stop reason: HOSPADM

## 2021-01-27 RX ORDER — CEFAZOLIN SODIUM 2 G/50ML
2000 SOLUTION INTRAVENOUS
Status: COMPLETED | OUTPATIENT
Start: 2021-01-28 | End: 2021-01-28

## 2021-01-27 RX ADMIN — SODIUM CHLORIDE, SODIUM LACTATE, POTASSIUM CHLORIDE, AND CALCIUM CHLORIDE 75 ML/HR: .6; .31; .03; .02 INJECTION, SOLUTION INTRAVENOUS at 00:30

## 2021-01-27 RX ADMIN — ONDANSETRON 4 MG: 2 INJECTION INTRAMUSCULAR; INTRAVENOUS at 19:51

## 2021-01-27 RX ADMIN — SODIUM CHLORIDE 125 ML/HR: 0.9 INJECTION, SOLUTION INTRAVENOUS at 21:56

## 2021-01-27 RX ADMIN — Medication 10 MG: at 19:02

## 2021-01-27 RX ADMIN — GUAIFENESIN 600 MG: 600 TABLET, EXTENDED RELEASE ORAL at 09:04

## 2021-01-27 RX ADMIN — FENTANYL CITRATE 25 MCG: 50 INJECTION INTRAMUSCULAR; INTRAVENOUS at 19:26

## 2021-01-27 RX ADMIN — ROCURONIUM BROMIDE 50 MG: 50 INJECTION, SOLUTION INTRAVENOUS at 18:26

## 2021-01-27 RX ADMIN — ACETAMINOPHEN 650 MG: 325 TABLET, FILM COATED ORAL at 09:04

## 2021-01-27 RX ADMIN — PHENYLEPHRINE HYDROCHLORIDE 20 MCG/MIN: 10 INJECTION INTRAVENOUS at 18:28

## 2021-01-27 RX ADMIN — PROPOFOL 120 MG: 10 INJECTION, EMULSION INTRAVENOUS at 18:26

## 2021-01-27 RX ADMIN — DOCUSATE SODIUM 100 MG: 100 CAPSULE, LIQUID FILLED ORAL at 21:54

## 2021-01-27 RX ADMIN — CEFAZOLIN SODIUM 2000 MG: 2 SOLUTION INTRAVENOUS at 18:46

## 2021-01-27 RX ADMIN — PHENYLEPHRINE HYDROCHLORIDE 100 MCG: 10 INJECTION INTRAVENOUS at 19:14

## 2021-01-27 RX ADMIN — SODIUM CHLORIDE, SODIUM LACTATE, POTASSIUM CHLORIDE, AND CALCIUM CHLORIDE 75 ML/HR: .6; .31; .03; .02 INJECTION, SOLUTION INTRAVENOUS at 21:01

## 2021-01-27 RX ADMIN — PROPOFOL 50 MCG/KG/MIN: 10 INJECTION, EMULSION INTRAVENOUS at 18:28

## 2021-01-27 RX ADMIN — PHENYLEPHRINE HYDROCHLORIDE 100 MCG: 10 INJECTION INTRAVENOUS at 18:35

## 2021-01-27 RX ADMIN — SODIUM CHLORIDE, SODIUM LACTATE, POTASSIUM CHLORIDE, AND CALCIUM CHLORIDE 75 ML/HR: .6; .31; .03; .02 INJECTION, SOLUTION INTRAVENOUS at 15:08

## 2021-01-27 RX ADMIN — SODIUM CHLORIDE, SODIUM LACTATE, POTASSIUM CHLORIDE, AND CALCIUM CHLORIDE: .6; .31; .03; .02 INJECTION, SOLUTION INTRAVENOUS at 18:21

## 2021-01-27 RX ADMIN — Medication 50 MCG: at 20:52

## 2021-01-27 RX ADMIN — NYSTATIN: 100000 CREAM TOPICAL at 09:06

## 2021-01-27 RX ADMIN — SUGAMMADEX 200 MG: 100 INJECTION, SOLUTION INTRAVENOUS at 20:01

## 2021-01-27 RX ADMIN — Medication 10 MG: at 19:29

## 2021-01-27 RX ADMIN — OXYCODONE HYDROCHLORIDE 5 MG: 5 TABLET ORAL at 21:54

## 2021-01-27 RX ADMIN — FENTANYL CITRATE 25 MCG: 50 INJECTION INTRAMUSCULAR; INTRAVENOUS at 18:49

## 2021-01-27 RX ADMIN — LIDOCAINE HYDROCHLORIDE 40 MG: 10 INJECTION, SOLUTION EPIDURAL; INFILTRATION; INTRACAUDAL; PERINEURAL at 18:26

## 2021-01-27 RX ADMIN — PHENYLEPHRINE HYDROCHLORIDE 100 MCG: 10 INJECTION INTRAVENOUS at 18:32

## 2021-01-27 RX ADMIN — GABAPENTIN 100 MG: 100 CAPSULE ORAL at 21:54

## 2021-01-27 RX ADMIN — EPHEDRINE SULFATE 10 MG: 50 INJECTION, SOLUTION INTRAVENOUS at 18:54

## 2021-01-27 NOTE — ANESTHESIA PREPROCEDURE EVALUATION
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)      PULMONARY   (+) Pleural effusion      Other   (+) Ambulatory dysfunction   (+) Lung neoplasm   (+) Metastatic disease (HCC)   (+) Metastatic primary lung cancer (HCC)        Physical Exam    Airway    Mallampati score: II  TM Distance: >3 FB  Neck ROM: full     Dental   No notable dental hx     Cardiovascular  Rhythm: regular, Rate: normal, Cardiovascular exam normal    Pulmonary  Pulmonary exam normal Breath sounds clear to auscultation,     Other Findings        Anesthesia Plan  ASA Score- 4     Anesthesia Type- general with ASA Monitors  Additional Monitors:   Airway Plan: ETT  Plan Factors-    Chart reviewed  EKG reviewed  Existing labs reviewed  Patient summary reviewed  Patient is not a current smoker  Induction- intravenous  Postoperative Plan- Plan for postoperative opioid use  Informed Consent- Anesthetic plan and risks discussed with patient  I personally reviewed this patient with the CRNA  Discussed and agreed on the Anesthesia Plan with the CRNA  Ignacio Soto           Recent labs personally reviewed:  Lab Results   Component Value Date    WBC 8 75 01/27/2021    HGB 10 4 (L) 01/27/2021     01/27/2021     Lab Results   Component Value Date    K 4 2 01/27/2021    BUN 27 (H) 01/27/2021    CREATININE 1 17 01/27/2021     Lab Results   Component Value Date    PTT 39 (H) 01/26/2021      Lab Results Component Value Date    INR 1 10 01/26/2021       Blood type B    Lab Results   Component Value Date    HGBA1C 5 6 11/18/2020       I, Fina Hinkle MD, have personally seen and evaluated the patient prior to anesthetic care  I have reviewed the pre-anesthetic record, and other medical records if appropriate to the anesthetic care  If a CRNA is involved in the case, I have reviewed the CRNA assessment, if present, and agree  Risks/benefits and alternatives discussed with patient including possible PONV, sore throat, and possibility of rare anesthetic and surgical emergencies

## 2021-01-27 NOTE — PROGRESS NOTES
Progress Note - Orthopedics   Christen Del Cid [de-identified] y o  male MRN: [id number]  [facility]      Subjective:    [de-identified] y o male with lytic lesions of the left femur and pelvis  Bone marrow biopsy completed yesterday  No acute events, no complaints  Pt doing well  Pain controlled  Denies fevers chills, CP, SOB    Labs:  0   Lab Value Date/Time    HCT 32 4 (L) 01/27/2021 0501    HCT 30 1 (L) 01/26/2021 0525    HCT 29 4 (L) 01/25/2021 0502    HGB 10 4 (L) 01/27/2021 0501    HGB 9 9 (L) 01/26/2021 0525    HGB 9 4 (L) 01/25/2021 0502    INR 1 10 01/26/2021 1019    WBC 8 75 01/27/2021 0501    WBC 8 85 01/26/2021 0525    WBC 11 53 (H) 01/25/2021 0502       Meds:    Current Facility-Administered Medications:     acetaminophen (TYLENOL) tablet 650 mg, 650 mg, Oral, Q6H PRN, Orlando Morataya MD, 650 mg at 01/25/21 9426    Diclofenac Sodium (VOLTAREN) 1 % topical gel 2 g, 2 g, Topical, TID PRN, Patrick Edwards DO    guaiFENesin (MUCINEX) 12 hr tablet 600 mg, 600 mg, Oral, Q12H [address], Patrickanjel Edwards DO, 600 mg at 01/26/21 2102    lactated ringers infusion, 75 mL/hr, Intravenous, Continuous, Kathy Hansen MD, Last Rate: 75 mL/hr at 01/27/21 0030, 75 mL/hr at 01/27/21 0030    nystatin (MYCOSTATIN) cream, , Topical, BID, Adrian Neves MD, Given at 01/26/21 1750    Osimertinib Mesylate TABS 80 mg, 80 mg, Oral, Daily, Jose Ferraro DO, 80 mg at 01/26/21 0856    Blood Culture:   No results found for: BLOODCX    Wound Culture:   No results found for: WOUNDCULT    Ins and Outs:  I/O last 24 hours: In: 480 [P O :480]  Out: 2111 [Urine:2111]          Physical:  Vitals:    01/26/21 2241   BP: 118/72   Pulse: (!) 111   Resp: 16   Temp: 97 8 °F (36 6 °C)   SpO2: 92%     Musculoskeletal: left Lower Extremity  · Skin intact  · TTP hip  · SILT s/s/sp/dp/t  +fhl/ehl, +ankle dorsi/plantar flexion  2+ DP pulse    Assessment:    81 y o male with lytic lesions of the pelvis and femur  Biopsy results pending   Plan for prophylactic fixation today     Plan:  · NWB LLE  · PT/OT  · Pain control  · DVT ppx on hold for the OR  · Dispo: Ortho will follow    Cristopher Thibodeaux MD

## 2021-01-27 NOTE — PLAN OF CARE
Problem: Potential for Falls  Goal: Patient will remain free of falls  Description: INTERVENTIONS:  - Assess patient frequently for physical needs  -  Identify cognitive and physical deficits and behaviors that affect risk of falls    -  [place] fall precautions as indicated by assessment   - Educate patient/family on patient safety including physical limitations  - Instruct patient to call for assistance with activity based on assessment  - Modify environment to reduce risk of injury  - Consider OT/PT consult to assist with strengthening/mobility  Outcome: Progressing     Problem: PAIN - ADULT  Goal: Verbalizes/displays adequate comfort level or baseline comfort level  Description: Interventions:  - Encourage patient to monitor pain and request assistance  - Assess pain using appropriate pain scale  - Administer analgesics based on type and severity of pain and evaluate response  - Implement non-pharmacological measures as appropriate and evaluate response  - Consider cultural and social influences on pain and pain management  - Notify physician/advanced practitioner if interventions unsuccessful or patient reports new pain  Outcome: Progressing     Problem: INFECTION - ADULT  Goal: Absence or prevention of progression during hospitalization  Description: INTERVENTIONS:  - Assess and monitor for signs and symptoms of infection  - Monitor lab/diagnostic results  - Monitor all insertion sites, i e  indwelling lines, tubes, and drains  - Monitor endotracheal if appropriate and nasal secretions for changes in amount and color  - [place] appropriate cooling/warming therapies per order  - Administer medications as ordered  - Instruct and encourage patient and family to use good hand hygiene technique  - Identify and instruct in appropriate isolation precautions for identified infection/condition  Outcome: Progressing  Goal: Absence of fever/infection during neutropenic period  Description: INTERVENTIONS:  - Monitor WBC    Outcome: Progressing     Problem: SAFETY ADULT  Goal: Patient will remain free of falls  Description: INTERVENTIONS:  - Assess patient frequently for physical needs  -  Identify cognitive and physical deficits and behaviors that affect risk of falls    -  [place] fall precautions as indicated by assessment   - Educate patient/family on patient safety including physical limitations  - Instruct patient to call for assistance with activity based on assessment  - Modify environment to reduce risk of injury  - Consider OT/PT consult to assist with strengthening/mobility  Outcome: Progressing  Goal: Maintain or return to baseline ADL function  Description: INTERVENTIONS:  -  Assess patient's ability to carry out ADLs; assess patient's baseline for ADL function and identify physical deficits which impact ability to perform ADLs (bathing, care of mouth/teeth, toileting, grooming, dressing, etc )  - Assess/evaluate cause of self-care deficits   - Assess range of motion  - Assess patient's mobility; develop plan if impaired  - Assess patient's need for assistive devices and provide as appropriate  - Encourage maximum independence but intervene and supervise when necessary  - Involve family in performance of ADLs  - Assess for home care needs following discharge   - Consider OT consult to assist with ADL evaluation and planning for discharge  - Provide patient education as appropriate  Outcome: Progressing  Goal: Maintain or return mobility status to optimal level  Description: INTERVENTIONS:  - Assess patient's baseline mobility status (ambulation, transfers, stairs, etc )    - Identify cognitive and physical deficits and behaviors that affect mobility  - Identify mobility aids required to assist with transfers and/or ambulation (gait belt, sit-to-stand, lift, walker, cane, etc )  - [place] fall precautions as indicated by assessment  - Record patient progress and toleration of activity level on Mobility SBAR; progress patient to next Phase/Stage  - Instruct patient to call for assistance with activity based on assessment  - Consider rehabilitation consult to assist with strengthening/weightbearing, etc   Outcome: Progressing     Problem: DISCHARGE PLANNING  Goal: Discharge to home or other facility with appropriate resources  Description: INTERVENTIONS:  - Identify barriers to discharge w/patient and caregiver  - Arrange for needed discharge resources and transportation as appropriate  - Identify discharge learning needs (meds, wound care, etc )  - Arrange for interpretive services to assist at discharge as needed  - Refer to Case Management Department for coordinating discharge planning if the patient needs post-hospital services based on physician/advanced practitioner order or complex needs related to functional status, cognitive ability, or social support system  Outcome: Progressing     Problem: Knowledge Deficit  Goal: Patient/family/caregiver demonstrates understanding of disease process, treatment plan, medications, and discharge instructions  Description: Complete learning assessment and assess knowledge base    Interventions:  - Provide teaching at level of understanding  - Provide teaching via preferred learning methods  Outcome: Progressing     Problem: Prexisting or High Potential for Compromised Skin Integrity  Goal: Skin integrity is maintained or improved  Description: INTERVENTIONS:  - Identify patients at risk for skin breakdown  - Assess and monitor skin integrity  - Assess and monitor nutrition and hydration status  - Monitor labs   - Assess for incontinence   - Turn and reposition patient  - Assist with mobility/ambulation  - Relieve pressure over bony prominences  - Avoid friction and shearing  - Provide appropriate hygiene as needed including keeping skin clean and dry  - Evaluate need for skin moisturizer/barrier cream  - Collaborate with interdisciplinary team   - Patient/family teaching  - Consider wound care consult   Outcome: Progressing     Problem: RESPIRATORY - ADULT  Goal: Achieves optimal ventilation and oxygenation  Description: INTERVENTIONS:  - Assess for changes in respiratory status  - Assess for changes in mentation and behavior  - Position to facilitate oxygenation and minimize respiratory effort  - Oxygen administered by appropriate delivery if ordered  - Initiate smoking cessation education as indicated  - Encourage broncho-pulmonary hygiene including cough, deep breathe, Incentive Spirometry  - Assess the need for suctioning and aspirate as needed  - Assess and instruct to report SOB or any respiratory difficulty  - Respiratory Therapy support as indicated  Outcome: Progressing     Problem: SKIN/TISSUE INTEGRITY - ADULT  Goal: Skin integrity remains intact  Description: INTERVENTIONS  - Identify patients at risk for skin breakdown  - Assess and monitor skin integrity  - Assess and monitor nutrition and hydration status  - Monitor labs (i e  albumin)  - Assess for incontinence   - Turn and reposition patient  - Assist with mobility/ambulation  - Relieve pressure over bony prominences  - Avoid friction and shearing  - Provide appropriate hygiene as needed including keeping skin clean and dry  - Evaluate need for skin moisturizer/barrier cream  - Collaborate with interdisciplinary team (i e  Nutrition, Rehabilitation, etc )   - Patient/family teaching  Outcome: Progressing  Goal: Incision(s), wounds(s) or drain site(s) healing without S/S of infection  Description: INTERVENTIONS  - Assess and document risk factors for skin impairment   - Assess and document dressing, incision, wound bed, drain sites and surrounding tissue  - Consider nutrition services referral as needed  - Oral mucous membranes remain intact  - Provide patient/ family education  Outcome: Progressing  Goal: Oral mucous membranes remain intact  Description: INTERVENTIONS  - Assess oral mucosa and hygiene practices  - Implement preventative oral hygiene regimen  - Implement oral medicated treatments as ordered  - Initiate Nutrition services referral as needed  Outcome: Progressing     Problem: MUSCULOSKELETAL - ADULT  Goal: Maintain or return mobility to safest level of function  Description: INTERVENTIONS:  - Assess patient's ability to carry out ADLs; assess patient's baseline for ADL function and identify physical deficits which impact ability to perform ADLs (bathing, care of mouth/teeth, toileting, grooming, dressing, etc )  - Assess/evaluate cause of self-care deficits   - Assess range of motion  - Assess patient's mobility  - Assess patient's need for assistive devices and provide as appropriate  - Encourage maximum independence but intervene and supervise when necessary  - Involve family in performance of ADLs  - Assess for home care needs following discharge   - Consider OT consult to assist with ADL evaluation and planning for discharge  - Provide patient education as appropriate  Outcome: Progressing  Goal: Maintain proper alignment of affected body part  Description: INTERVENTIONS:  - Support, maintain and protect limb and body alignment  - Provide patient/ family with appropriate education  Outcome: Progressing

## 2021-01-27 NOTE — PROGRESS NOTES
INTERNAL MEDICINE RESIDENCY PROGRESS NOTE     Name: Princess Galloway   Age & Sex: [de-identified] y o  male   MRN: [id number]  Unit/Bed#: [facility] 806-01   Encounter: [id number]  Team: SOD Team C     PATIENT INFORMATION     Name: Princess Galloway   Age & Sex: [de-identified] y o  male   MRN: [id number]  Hospital Stay Days: 3    ASSESSMENT/PLAN     Principal Problem:    Ambulatory dysfunction  Active Problems:    Hip pain    Acute pain of left knee    Metastatic primary lung cancer (HCC)    Atrial fibrillation (HCC)    Mixed hyperlipidemia    Peripheral vascular disease (HCC)    CKD (chronic kidney disease) stage 3, GFR 30-59 ml/min    Anemia of chronic disease    Prediabetes    Preoperative clearance      * Ambulatory dysfunction  Assessment & Plan  Increasing frequency of falls with 3 this past week and as many as 15 noted in the past month  Patient denies any loss of consciousness, seizure, head strike, is not on thinners  Lives at home alone with wife, typically uses his cane for ambulation, occasionally a walker  States he feels generally weaker, fatigued, has difficulty with coordination  Normal TSH, B12/folate    Plan:  -likely multifactorial, physical deconditioning from cancer vs PVD vs metastatic bone/brain involvement vs s/p WBRT vs chemo-induced fatigue   -CT findings as noted in "metastatic primary lung cancer"   -PT/OT  -fall/delirium precautions  -will likely need CM to follow, rehab placement     Hip pain  Assessment & Plan  Patient complained of hip pain and x-ray of the hip showed lucency in the right superior pubic ramus concerning for nondisplaced fracture  CT pelvis remarkable for extensive osseous metastasis involving the spine and pelvis with large destructive soft tissue lesions involving the right superior pubic ramus and anterior acetabulum and posterior left acetabulum   Linear lucency in the right superior pubic ramus described on radiograph is likely related to cortical destruction from the anterior acetabular mass   Fracture likely secondary to metastasis  Plan:  - IR bone biopsy to confirm metastatic lesions done on 1/26/2020, awaiting results  - Per orthopedic surgery patient will need prophylactic fixation which is scheduled for today      Acute pain of left knee  Assessment & Plan  S/p mechanical fall, LOC-, thinners-, headstrike-, FROM    Plan:  -XR L knee, no acute bony fracture appreciated or acute osseous abnormality   -tylenol PRN, ice to affected area, avoid NSAIDs w/ impaired renal function, escalate analgesia as needed     Metastatic primary lung cancer (HCC)  Assessment & Plan  Diagnosis of 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, 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 status post WBRT, 2 cycles of  Alimta/carboplatin/Pembrolizumab  Transitioned to Omisertinib following molecular analysis (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)    MRI brain 04/22/2018-widespread brain Mets involving supra and infratentorial compartment   -s/p WBRT    CT pelvis remarkable for extensive osseous metastasis involving the spine and pelvis with large destructive soft tissue lesions involving the right superior pubic ramus and anterior acetabulum and posterior left acetabulum  Linear lucency in the right superior pubic ramus described on radiograph is likely related to cortical destruction from the anterior acetabular mass      Plan:  -Continue Osimertinib 80 mg qd   -IR bone biopsy to confirm metastatic lesions done on 1/26/2020, awaiting results      Atrial fibrillation [facility])  Assessment & Plan  History of paroxysmal atrial fibrillation, atrial fibrillation on admission, rate controlled without pharmacologic intervention     Plan:  -not on AVNB agents, rate controlled   -CHADs 5 (HTN, PVD, T2DM, age)    -not on AC secondary to lung ca w/ metastatic brain involvement, also in setting of high fall risk     CKD (chronic kidney disease) stage 3, GFR 30-59 ml/min  Assessment & Plan  Lab Results Component Value Date    EGFR 59 2021    EGFR 54 2021    EGFR 48 2021    CREATININE 1 17 2021    CREATININE 1 25 2021    CREATININE 1 37 (H) 2021     Plan:  - creatinine 1 52 on admission (ranging between 1 12-1 69 since )  - no JUANA currently  - avoid NSAIDs, nephrotoxic agents   - monitor BMP    Peripheral vascular disease [facility])  Assessment & Plan  Patient is not complaining of claudication although limited ambulation, likely a component of his recurring falls  Patient states all prior medications including cardiovascular were discontinued following cancer treatment    Mixed hyperlipidemia  Assessment & Plan  Stable, not on statin patient states that many of his previously prescribed medications were discontinued following cancer treatment    Type 2 diabetes mellitus with complication (HCC)resolved as of 2021  Assessment & Plan  Lab Results   Component Value Date    HGBA1C 5 6 2020       No results for input(s): POCGLU in the last 72 hours  Blood Sugar Average: Last 72 hrs:     Plan:  -diet controlled, most recent A1c 5 6      Disposition: continue inpatient care     SUBJECTIVE     Patient seen and examined  No acute events overnight  Pt had bone bx done yesterday by IR  Pt with no complaints this morning  Pt states he is doing fine  Pain well controlled  Denies fever, chills, CP, SOB  OBJECTIVE     Vitals:    21 2101 21 2157 21 2241 21 0727   BP: 137/76 114/76 118/72 119/71   Pulse: 93 97 (!) 111 89   Resp: 16 17 16 16   Temp: 97 9 °F (36 6 °C) 97 8 °F (36 6 °C) 97 8 °F (36 6 °C) 97 8 °F (36 6 °C)   TempSrc:       SpO2: 93% 92% 92% 94%   Weight:       Height:          Temperature:   Temp (24hrs), Av 9 °F (36 6 °C), Min:97 4 °F (36 3 °C), Max:98 4 °F (36 9 °C)    Temperature: 97 8 °F (36 6 °C)  Intake & Output:  I/O        0700  0700  07    P  O  240 480     Total Intake(mL/kg) 240 (2 8) 480 (5 7)     Urine (mL/kg/hr) 1086 (0 5) 1425 (0 7) 100 (0 7)    Stool 0      Total Output 1086 1425 100    Net -846 -945 -100           Unmeasured Urine Occurrence 1 x      Unmeasured Stool Occurrence 1 x          Weights:   IBW: 82 2 kg    Body mass index is 24 01 kg/m²  Weight (last 2 days)     None        Physical Exam  HENT:      Head: Normocephalic and atraumatic  Mouth/Throat:      Mouth: Mucous membranes are moist    Eyes:      Pupils: Pupils are equal, round, and reactive to light  Cardiovascular:      Rate and Rhythm: Normal rate and regular rhythm  Heart sounds: Normal heart sounds  Pulmonary:      Effort: Pulmonary effort is normal  No respiratory distress  Breath sounds: Normal breath sounds  Abdominal:      Palpations: Abdomen is soft  Tenderness: There is no abdominal tenderness  Musculoskeletal:      Right lower leg: No edema  Left lower leg: No edema  Comments: L knee abrasion    Skin:     General: Skin is warm  Neurological:      General: No focal deficit present  Mental Status: He is alert and oriented to person, place, and time  Mental status is at baseline  LABORATORY DATA     Labs: I have personally reviewed pertinent reports    Results from last 7 days   Lab Units 01/27/21  0501 01/26/21  0525 01/25/21  0502   WBC Thousand/uL 8 75 8 85 11 53*   HEMOGLOBIN g/dL 10 4* 9 9* 9 4*   HEMATOCRIT % 32 4* 30 1* 29 4*   PLATELETS Thousands/uL 250 228 197   NEUTROS PCT % 78* 74 80*   MONOS PCT % 8 9 6      Results from last 7 days   Lab Units 01/27/21  0501 01/26/21  0525 01/25/21  0501 01/23/21  0552   POTASSIUM mmol/L 4 2 4 0 3 9 3 9   CHLORIDE mmol/L 102 103 102 104   CO2 mmol/L 29 27 29 27   BUN mg/dL 27* 29* 30* 25   CREATININE mg/dL 1 17 1 25 1 37* 1 42*   CALCIUM mg/dL 9 6 9 1 9 4 9 0   ALK PHOS U/L  --   --   --  124*   ALT U/L  --   --   --  15   AST U/L  --   --   --  20              Results from last 7 days   Lab Units 01/26/21  1019   INR  1 10   PTT seconds 39*               IMAGING & DIAGNOSTIC TESTING     Radiology Results: I have personally reviewed pertinent reports  Ct Abdomen Pelvis Wo Contrast    Result Date: 1/24/2021  Impression: 1  Extensive osseous metastasis involving the spine and pelvis with large destructive soft tissue lesions involving the right superior pubic ramus and anterior acetabulum and posterior left acetabulum  Linear lucency in the right superior pubic ramus described on radiograph is likely related to cortical destruction from the anterior acetabular mass  2   Mottled lucency and sclerosis in the bilateral hips without osseous destruction  This may represent treated metastasis  3   Compression deformities of L1, L2, L4, and L5, likely pathologic given mottled appearance of the vertebral bodies  4   Right pleural effusion  Workstation performed: [id number]     Xr Hip/pelv 2-3 Vws Left If Performed    Result Date: 1/24/2021  Impression: Osseous metastatic disease again seen  Lucency in the right superior pubic ramus concerning for nondisplaced fracture  CT or MRI could be obtained for further evaluation if warranted  The study was marked in [facility] for immediate notification  Workstation performed: [id number]     Xr Femur 2 Vw Left    Result Date: 1/25/2021  Impression: Ill-defined lucency within the proximal femoral shaft with endosteal scalloping suspicious for lytic metastasis  No pathologic fracture identified  Workstation performed: [id number]     Xr Knee 4+ Vw Left Injury    Result Date: 1/24/2021  Impression: No acute osseous abnormality  Workstation performed: [id number]     Ct Head Without Contrast    Result Date: 1/23/2021  Impression: 1  Stable cerebral atrophy with chronic small vessel ischemic white matter disease  No acute intracranial abnormality  2   Stable calcifications in the occipital lobes bilaterally and right frontal centrum semiovale    Differential includes calcification within vascular malformations versus treated metastatic disease  Workstation performed: [id number]     Ct Chest Without Contrast    Result Date: 1/23/2021  Impression: 1  Slight increase in size in right lower lobe lung mass, most compatible with patient's known carcinoma  2   Slight increase in the size of the right pleural effusion  3   Stable osseous metastatic disease  The study was marked in [facility] for immediate notification  Workstation performed: [id number]     Ct Needle Biopsy Bone    Result Date: 1/26/2021  Impression: CT-guided biopsy of left posterior acetabulum lytic lesion  Plan: Specimen(s) sent for evaluation  _______________________________________________________________ PROCEDURE SUMMARY: - Percutaneous CT-guided left acetabulum biopsy PROCEDURE DETAILS: Pre-procedure Reference imaging for biopsy target: CT abdomen pelvis 1/24/2021 Consent: Informed consent for the procedure including risks, benefits and alternatives was obtained and time-out was performed prior to the procedure  Preparation: The site was prepared and draped using maximal sterile barrier technique including cutaneous antisepsis  Anesthesia/sedation Level of anesthesia/sedation: Moderate sedation (conscious sedation) Anesthesia/sedation administered by: IR nurse under attending supervision with continuous monitoring of the patient's level of consciousness and physiologic status Total intra-service sedation time (minutes): 25 Imaging prior to biopsy The patient was positioned prone  Initial imaging was performed using noncontrast CT  Biopsy target: - Maximal diameter (cm): 4 8 - Location: Left posterior acetabulum Biopsy Local anesthesia was administered  Under CT guidance, the biopsy needle was advanced to the target and biopsy was performed   Coaxial needle: 17 gauge Core needle biopsy device: Scale Computingince Core needle size: 18 gauge Number of core specimens: 4 On-site biopsy touch preparation: Yes  Additional sampling recommendations: None Preliminary assessment of sample adequacy: Adequate Needle removal The biopsy needle was removed and a sterile dressing was applied  Tract embolization: D-Stat flowable hemostat Imaging following biopsy Immediate post-biopsy imaging was performed using noncontrast CT  Post-biopsy imaging findings: No hematoma Radiation Dose CT dose length product (mGy-cm): 1030 74 Additional Details Specimens removed: Biopsy samples as detailed above Estimated blood loss (mL): 1 Standardized report: [email or web address] Attestation Signer name: [facility] I attest that I was present for the entire procedure  I reviewed the stored images and agree with the report as written  Workstation performed: [id number]     Other Diagnostic Testing: I have personally reviewed pertinent reports  ACTIVE MEDICATIONS     Current Facility-Administered Medications   Medication Dose Route Frequency    acetaminophen (TYLENOL) tablet 650 mg  650 mg Oral Q6H PRN    Diclofenac Sodium (VOLTAREN) 1 % topical gel 2 g  2 g Topical TID PRN    guaiFENesin (MUCINEX) 12 hr tablet 600 mg  600 mg Oral Q12H JOSÉ MIGUEL    lactated ringers infusion  75 mL/hr Intravenous Continuous    nystatin (MYCOSTATIN) cream   Topical BID    Osimertinib Mesylate TABS 80 mg  80 mg Oral Daily       VTE Pharmacologic Prophylaxis: Reason for no pharmacologic prophylaxis pending OR today  VTE Mechanical Prophylaxis: sequential compression device    Portions of the record may have been created with voice recognition software  Occasional wrong word or "sound a like" substitutions may have occurred due to the inherent limitations of voice recognition software    Read the chart carefully and recognize, using context, where substitutions have occurred   ==  Wilmer Melgoza MD  [address]  Internal Medicine Residency PGY-1

## 2021-01-27 NOTE — PHYSICAL THERAPY NOTE
Physical Therapy Cancellation Note    Patient's Name: Luis Gennaro    Orders received, chart reviewed  Plans for OR today with ortho  Will follow for PT evaluation post-op as medically appropriate  Thank you       Lawyer Jarvis, PT, DPT

## 2021-01-27 NOTE — OCCUPATIONAL THERAPY NOTE
Occupational Therapy Cancellation Note      Patient Name: Nav Alba  FTPBY'H Date: 1/27/2021 01/27/21 0747   OT Last Visit   OT Visit Date 01/27/21   Note Type   Note type Evaluation   Cancel Reasons Patient to operating room-pt with lytic lesions of the pelvis and femur  Biopsy results pending  Plan for prophylactic fixation today   Will continue to follow   Samuel RODRIGUEZ, OTR/L

## 2021-01-28 LAB
ALBUMIN SERPL BCP-MCNC: 2.7 G/DL (ref 3.5–5)
ALP SERPL-CCNC: 98 U/L (ref 46–116)
ALT SERPL W P-5'-P-CCNC: 21 U/L (ref 12–78)
ANION GAP SERPL CALCULATED.3IONS-SCNC: 5 MMOL/L (ref 4–13)
AST SERPL W P-5'-P-CCNC: 30 U/L (ref 5–45)
BILIRUB SERPL-MCNC: 0.54 MG/DL (ref 0.2–1)
BUN SERPL-MCNC: 26 MG/DL (ref 5–25)
CALCIUM ALBUM COR SERPL-MCNC: 9.8 MG/DL (ref 8.3–10.1)
CALCIUM SERPL-MCNC: 8.8 MG/DL (ref 8.3–10.1)
CHLORIDE SERPL-SCNC: 102 MMOL/L (ref 100–108)
CO2 SERPL-SCNC: 28 MMOL/L (ref 21–32)
CREAT SERPL-MCNC: 1.11 MG/DL (ref 0.6–1.3)
ERYTHROCYTE [DISTWIDTH] IN BLOOD BY AUTOMATED COUNT: 14.6 % (ref 11.6–15.1)
GFR SERPL CREATININE-BSD FRML MDRD: 62 ML/MIN/1.73SQ M
GLUCOSE SERPL-MCNC: 67 MG/DL (ref 65–140)
GLUCOSE SERPL-MCNC: 96 MG/DL (ref 65–140)
HCT VFR BLD AUTO: 28.9 % (ref 36.5–49.3)
HGB BLD-MCNC: 9.3 G/DL (ref 12–17)
MCH RBC QN AUTO: 28.5 PG (ref 26.8–34.3)
MCHC RBC AUTO-ENTMCNC: 32.2 G/DL (ref 31.4–37.4)
MCV RBC AUTO: 89 FL (ref 82–98)
PLATELET # BLD AUTO: 215 THOUSANDS/UL (ref 149–390)
PMV BLD AUTO: 9.6 FL (ref 8.9–12.7)
POTASSIUM SERPL-SCNC: 4.2 MMOL/L (ref 3.5–5.3)
PROT SERPL-MCNC: 6.1 G/DL (ref 6.4–8.2)
RBC # BLD AUTO: 3.26 MILLION/UL (ref 3.88–5.62)
SODIUM SERPL-SCNC: 135 MMOL/L (ref 136–145)
WBC # BLD AUTO: 7.94 THOUSAND/UL (ref 4.31–10.16)

## 2021-01-28 PROCEDURE — 99232 SBSQ HOSP IP/OBS MODERATE 35: CPT | Performed by: INTERNAL MEDICINE

## 2021-01-28 PROCEDURE — 97163 PT EVAL HIGH COMPLEX 45 MIN: CPT

## 2021-01-28 PROCEDURE — 85027 COMPLETE CBC AUTOMATED: CPT | Performed by: STUDENT IN AN ORGANIZED HEALTH CARE EDUCATION/TRAINING PROGRAM

## 2021-01-28 PROCEDURE — NC001 PR NO CHARGE: Performed by: ORTHOPAEDIC SURGERY

## 2021-01-28 PROCEDURE — 80053 COMPREHEN METABOLIC PANEL: CPT | Performed by: ORTHOPAEDIC SURGERY

## 2021-01-28 PROCEDURE — 97167 OT EVAL HIGH COMPLEX 60 MIN: CPT

## 2021-01-28 RX ORDER — SODIUM CHLORIDE, SODIUM LACTATE, POTASSIUM CHLORIDE, CALCIUM CHLORIDE 600; 310; 30; 20 MG/100ML; MG/100ML; MG/100ML; MG/100ML
75 INJECTION, SOLUTION INTRAVENOUS CONTINUOUS
Status: DISCONTINUED | OUTPATIENT
Start: 2021-01-28 | End: 2021-01-28

## 2021-01-28 RX ORDER — POLYETHYLENE GLYCOL 3350 17 G/17G
17 POWDER, FOR SOLUTION ORAL ONCE AS NEEDED
Status: DISCONTINUED | OUTPATIENT
Start: 2021-01-28 | End: 2021-01-30 | Stop reason: HOSPADM

## 2021-01-28 RX ADMIN — CEFAZOLIN SODIUM 2000 MG: 2 SOLUTION INTRAVENOUS at 03:09

## 2021-01-28 RX ADMIN — SENNOSIDES 8.6 MG: 8.6 TABLET, FILM COATED ORAL at 08:03

## 2021-01-28 RX ADMIN — ACETAMINOPHEN 650 MG: 325 TABLET, FILM COATED ORAL at 16:55

## 2021-01-28 RX ADMIN — OXYCODONE HYDROCHLORIDE 2.5 MG: 5 TABLET ORAL at 13:04

## 2021-01-28 RX ADMIN — OXYCODONE HYDROCHLORIDE 2.5 MG: 5 TABLET ORAL at 07:59

## 2021-01-28 RX ADMIN — GUAIFENESIN 600 MG: 600 TABLET, EXTENDED RELEASE ORAL at 21:11

## 2021-01-28 RX ADMIN — OXYCODONE HYDROCHLORIDE 5 MG: 5 TABLET ORAL at 03:08

## 2021-01-28 RX ADMIN — ENOXAPARIN SODIUM 30 MG: 30 INJECTION SUBCUTANEOUS at 08:04

## 2021-01-28 RX ADMIN — CEFAZOLIN SODIUM 2000 MG: 2 SOLUTION INTRAVENOUS at 09:32

## 2021-01-28 RX ADMIN — DOCUSATE SODIUM 100 MG: 100 CAPSULE, LIQUID FILLED ORAL at 08:00

## 2021-01-28 RX ADMIN — NYSTATIN: 100000 CREAM TOPICAL at 17:00

## 2021-01-28 RX ADMIN — GUAIFENESIN 600 MG: 600 TABLET, EXTENDED RELEASE ORAL at 08:00

## 2021-01-28 RX ADMIN — NYSTATIN: 100000 CREAM TOPICAL at 08:00

## 2021-01-28 RX ADMIN — DOCUSATE SODIUM 100 MG: 100 CAPSULE, LIQUID FILLED ORAL at 17:00

## 2021-01-28 RX ADMIN — LIDOCAINE 1 PATCH: 50 PATCH TOPICAL at 08:04

## 2021-01-28 RX ADMIN — GABAPENTIN 100 MG: 100 CAPSULE ORAL at 21:11

## 2021-01-28 RX ADMIN — CEFAZOLIN SODIUM 2000 MG: 2 SOLUTION INTRAVENOUS at 06:30

## 2021-01-28 RX ADMIN — ENOXAPARIN SODIUM 30 MG: 30 INJECTION SUBCUTANEOUS at 21:11

## 2021-01-28 RX ADMIN — SODIUM CHLORIDE, SODIUM LACTATE, POTASSIUM CHLORIDE, AND CALCIUM CHLORIDE 75 ML/HR: .6; .31; .03; .02 INJECTION, SOLUTION INTRAVENOUS at 06:23

## 2021-01-28 NOTE — PLAN OF CARE
Problem: Potential for Falls  Goal: Patient will remain free of falls  Description: INTERVENTIONS:  - Assess patient frequently for physical needs  -  Identify cognitive and physical deficits and behaviors that affect risk of falls    -  [place] fall precautions as indicated by assessment   - Educate patient/family on patient safety including physical limitations  - Instruct patient to call for assistance with activity based on assessment  - Modify environment to reduce risk of injury  - Consider OT/PT consult to assist with strengthening/mobility  Outcome: Progressing     Problem: PAIN - ADULT  Goal: Verbalizes/displays adequate comfort level or baseline comfort level  Description: Interventions:  - Encourage patient to monitor pain and request assistance  - Assess pain using appropriate pain scale  - Administer analgesics based on type and severity of pain and evaluate response  - Implement non-pharmacological measures as appropriate and evaluate response  - Consider cultural and social influences on pain and pain management  - Notify physician/advanced practitioner if interventions unsuccessful or patient reports new pain  Outcome: Progressing     Problem: INFECTION - ADULT  Goal: Absence or prevention of progression during hospitalization  Description: INTERVENTIONS:  - Assess and monitor for signs and symptoms of infection  - Monitor lab/diagnostic results  - Monitor all insertion sites, i e  indwelling lines, tubes, and drains  - Monitor endotracheal if appropriate and nasal secretions for changes in amount and color  - [place] appropriate cooling/warming therapies per order  - Administer medications as ordered  - Instruct and encourage patient and family to use good hand hygiene technique  - Identify and instruct in appropriate isolation precautions for identified infection/condition  Outcome: Progressing  Goal: Absence of fever/infection during neutropenic period  Description: INTERVENTIONS:  - Monitor WBC    Outcome: Progressing     Problem: SAFETY ADULT  Goal: Patient will remain free of falls  Description: INTERVENTIONS:  - Assess patient frequently for physical needs  -  Identify cognitive and physical deficits and behaviors that affect risk of falls    -  [place] fall precautions as indicated by assessment   - Educate patient/family on patient safety including physical limitations  - Instruct patient to call for assistance with activity based on assessment  - Modify environment to reduce risk of injury  - Consider OT/PT consult to assist with strengthening/mobility  Outcome: Progressing  Goal: Maintain or return to baseline ADL function  Description: INTERVENTIONS:  -  Assess patient's ability to carry out ADLs; assess patient's baseline for ADL function and identify physical deficits which impact ability to perform ADLs (bathing, care of mouth/teeth, toileting, grooming, dressing, etc )  - Assess/evaluate cause of self-care deficits   - Assess range of motion  - Assess patient's mobility; develop plan if impaired  - Assess patient's need for assistive devices and provide as appropriate  - Encourage maximum independence but intervene and supervise when necessary  - Involve family in performance of ADLs  - Assess for home care needs following discharge   - Consider OT consult to assist with ADL evaluation and planning for discharge  - Provide patient education as appropriate  Outcome: Progressing  Goal: Maintain or return mobility status to optimal level  Description: INTERVENTIONS:  - Assess patient's baseline mobility status (ambulation, transfers, stairs, etc )    - Identify cognitive and physical deficits and behaviors that affect mobility  - Identify mobility aids required to assist with transfers and/or ambulation (gait belt, sit-to-stand, lift, walker, cane, etc )  - [place] fall precautions as indicated by assessment  - Record patient progress and toleration of activity level on Mobility SBAR; progress patient to next Phase/Stage  - Instruct patient to call for assistance with activity based on assessment  - Consider rehabilitation consult to assist with strengthening/weightbearing, etc   Outcome: Progressing     Problem: DISCHARGE PLANNING  Goal: Discharge to home or other facility with appropriate resources  Description: INTERVENTIONS:  - Identify barriers to discharge w/patient and caregiver  - Arrange for needed discharge resources and transportation as appropriate  - Identify discharge learning needs (meds, wound care, etc )  - Arrange for interpretive services to assist at discharge as needed  - Refer to Case Management Department for coordinating discharge planning if the patient needs post-hospital services based on physician/advanced practitioner order or complex needs related to functional status, cognitive ability, or social support system  Outcome: Progressing     Problem: Knowledge Deficit  Goal: Patient/family/caregiver demonstrates understanding of disease process, treatment plan, medications, and discharge instructions  Description: Complete learning assessment and assess knowledge base    Interventions:  - Provide teaching at level of understanding  - Provide teaching via preferred learning methods  Outcome: Progressing     Problem: Prexisting or High Potential for Compromised Skin Integrity  Goal: Skin integrity is maintained or improved  Description: INTERVENTIONS:  - Identify patients at risk for skin breakdown  - Assess and monitor skin integrity  - Assess and monitor nutrition and hydration status  - Monitor labs   - Assess for incontinence   - Turn and reposition patient  - Assist with mobility/ambulation  - Relieve pressure over bony prominences  - Avoid friction and shearing  - Provide appropriate hygiene as needed including keeping skin clean and dry  - Evaluate need for skin moisturizer/barrier cream  - Collaborate with interdisciplinary team   - Patient/family teaching  - Consider wound care consult   Outcome: Progressing     Problem: RESPIRATORY - ADULT  Goal: Achieves optimal ventilation and oxygenation  Description: INTERVENTIONS:  - Assess for changes in respiratory status  - Assess for changes in mentation and behavior  - Position to facilitate oxygenation and minimize respiratory effort  - Oxygen administered by appropriate delivery if ordered  - Initiate smoking cessation education as indicated  - Encourage broncho-pulmonary hygiene including cough, deep breathe, Incentive Spirometry  - Assess the need for suctioning and aspirate as needed  - Assess and instruct to report SOB or any respiratory difficulty  - Respiratory Therapy support as indicated  Outcome: Progressing     Problem: SKIN/TISSUE INTEGRITY - ADULT  Goal: Skin integrity remains intact  Description: INTERVENTIONS  - Identify patients at risk for skin breakdown  - Assess and monitor skin integrity  - Assess and monitor nutrition and hydration status  - Monitor labs (i e  albumin)  - Assess for incontinence   - Turn and reposition patient  - Assist with mobility/ambulation  - Relieve pressure over bony prominences  - Avoid friction and shearing  - Provide appropriate hygiene as needed including keeping skin clean and dry  - Evaluate need for skin moisturizer/barrier cream  - Collaborate with interdisciplinary team (i e  Nutrition, Rehabilitation, etc )   - Patient/family teaching  Outcome: Progressing  Goal: Incision(s), wounds(s) or drain site(s) healing without S/S of infection  Description: INTERVENTIONS  - Assess and document risk factors for skin impairment   - Assess and document dressing, incision, wound bed, drain sites and surrounding tissue  - Consider nutrition services referral as needed  - Oral mucous membranes remain intact  - Provide patient/ family education  Outcome: Progressing  Goal: Oral mucous membranes remain intact  Description: INTERVENTIONS  - Assess oral mucosa and hygiene practices  - Implement preventative oral hygiene regimen  - Implement oral medicated treatments as ordered  - Initiate Nutrition services referral as needed  Outcome: Progressing     Problem: MUSCULOSKELETAL - ADULT  Goal: Maintain or return mobility to safest level of function  Description: INTERVENTIONS:  - Assess patient's ability to carry out ADLs; assess patient's baseline for ADL function and identify physical deficits which impact ability to perform ADLs (bathing, care of mouth/teeth, toileting, grooming, dressing, etc )  - Assess/evaluate cause of self-care deficits   - Assess range of motion  - Assess patient's mobility  - Assess patient's need for assistive devices and provide as appropriate  - Encourage maximum independence but intervene and supervise when necessary  - Involve family in performance of ADLs  - Assess for home care needs following discharge   - Consider OT consult to assist with ADL evaluation and planning for discharge  - Provide patient education as appropriate  Outcome: Progressing  Goal: Maintain proper alignment of affected body part  Description: INTERVENTIONS:  - Support, maintain and protect limb and body alignment  - Provide patient/ family with appropriate education  Outcome: Progressing

## 2021-01-28 NOTE — PLAN OF CARE
Problem: PHYSICAL THERAPY ADULT  Goal: Performs mobility at highest level of function for planned discharge setting  See evaluation for individualized goals  Description: Treatment/Interventions: Functional transfer training, LE strengthening/ROM, Therapeutic exercise, Endurance training, Patient/family training, Equipment eval/education, Bed mobility, Gait training          See flowsheet documentation for full assessment, interventions and recommendations  Note: Prognosis: Fair  Problem List: Decreased endurance, Impaired balance, Decreased mobility, Decreased cognition, Decreased safety awareness, Pain, Orthopedic restrictions, Decreased strength, Decreased range of motion  Assessment: Pt is a [de-identified] y o  male seen for PT evaluation s/p admit to [facility] on 1/23/2021  Pt was admitted with a primary dx of: Ambulatory dysfunction, L femoral metastatic lesion s/p prophylactic IM nail L femur  PT now consulted for assessment of mobility and d/c needs  Pt with Up as tolerated orders  Pts current comorbidities effecting treatment include: 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, DM, Afib, Frequent falls  Pts current clinical presentation is Unstable/ Unpredictable (high complexity) due to Ongoing medical management for primary dx, Increased reliance on more restrictive AD compared to baseline, Decreased activity tolerance compared to baseline, Fall risk, Current WBS, Trending lab values, s/p surgical intervention  Prior to admission, pt was independent with straight cane/RW, required assistance from family for IADL's, frequent falls  Upon evaluation, pt currently is requiring modA x2 for bed mobility; modA x2 for transfers   Pt presents at PT eval functioning below baseline and currently w/ overall mobility deficits 2* to: BLE weakness, impaired balance, decreased endurance, pain, decreased activity tolerance compared to baseline, decreased functional mobility tolerance compared to baseline, decreased safety awareness, fall risk, orthopedic restrictions  Pt currently at a fall risk 2* to impairments listed above  Pt will continue to benefit from skilled acute PT interventions to address stated impairments; to maximize functional mobility; for ongoing pt/ family training; and DME needs  At conclusion of PT session pt returned back in chair and chair alarm engaged with phone and call bell within reach  Pt denies any further questions at this time  The patient's AM-PAC Basic Mobility Inpatient Short Form Low Function Raw Score 14 , Standardized Score is 22 01  A standardized score less 42 9 suggests the patient may benefit from discharge to post-acute rehab services  Recommend IP rehab upon hospital D/C  PT Discharge Recommendation: Post-Acute Rehabilitation Services     PT - OK to Discharge: Yes(to IP rehab)    See flowsheet documentation for full assessment

## 2021-01-28 NOTE — PLAN OF CARE
Problem: OCCUPATIONAL THERAPY ADULT  Goal: Performs self-care activities at highest level of function for planned discharge setting  See evaluation for individualized goals  Description: Treatment Interventions: ADL retraining, Visual perceptual retraining, Functional transfer training, Endurance training, UE strengthening/ROM, Patient/family training, Equipment evaluation/education, Compensatory technique education, Continued evaluation, Energy conservation, Activityengagement          See flowsheet documentation for full assessment, interventions and recommendations  Outcome: Progressing  Note: Limitation: Decreased ADL status, Decreased Safe judgement during ADL, Decreased endurance, Decreased self-care trans, Decreased high-level ADLs  Prognosis: Good  Assessment: Pt is a [de-identified] y o  male admitted to [facility] on 1/23/2021 w/ Ambulatory dysfunction and fall   has a past medical history of Atrial fibrillation ([facility] [address] ), [facility] Diabetes mellitus ([facility] ), Hypercholesteremia, Hypertension, Lung neoplasm, Proteinuria, and PVD (peripheral vascular disease) ([facility] )  Pt with active OT orders and activity as tolerated, LLE WBAT orders  As per pt report, pta, resides in a 1STH, 3STE with his wife  Pt was I w/  ADLS and shares IADLS with his wife  Pt reports using cane during day and RW at night PTA and has not been driving  Upon evaluation, pt currently requires MOD A X 2 with transfers and minimal fnxl mobility  Pt currently  requires MIN A UB ADLs, MAX LB ADLs, and MAX toileting  Pt is limited at this time 2*: pain, endurance, activity tolerance, functional mobility, balance, functional standing tolerance, decreased I w/ ADLS/IADLS and strength  The following Occupational Performance Areas to address include: grooming, bathing/shower, toilet hygiene, dressing, health maintenance, functional mobility, community mobility, clothing management and cleaning   Pt to benefit from continued skilled OT services while in the hospital to address deficits as defined above and maximize level of functional independence w ADL's and functional mobility  From OT standpoint, recommendation at time of d/c would be STR  Pt states wife would not be able to provide physical assist if needed  The patient's raw score on the AM-PAC Daily Activity inpatient short form is 16, standardized score is 35 96, less than 39 4  Patients at this level are likely to benefit from DC to post-acute rehabilitation services  Please refer to the recommendation of the Occupational Therapist for safe DC planning  Pt was left in chair with alarm on after session with all current needs met        OT Discharge Recommendation: Post-Acute Rehabilitation Services  OT - OK to Discharge: Yes(to rehab when medically stable )

## 2021-01-28 NOTE — PROGRESS NOTES
Progress Note - Orthopedics   [facility] [age] [address] y o  male MRN: [id number]  Unit/Bed#: [facility] 806-01      Subjective:    [age] Kev[address] Head[address] y o male POD 1 left femoral IMN  No acute events, no complaints  Pt doing well  Pain controlled   Denies fevers chills, CP, SOB    Labs:  0   Lab Value Date/Time    HCT 28 9 (L) 01/28/2021 0512    HCT 32 4 (L) 01/27/2021 0501    HCT 30 1 (L) 01/26/2021 0525    HGB 9 3 (L) 01/28/2021 0512    HGB 10 4 (L) 01/27/2021 0501    HGB 9 9 (L) 01/26/2021 0525    INR 1 10 01/26/2021 1019    WBC 7 94 01/28/2021 0512    WBC 8 75 01/27/2021 0501    WBC 8 85 01/26/2021 0525       Meds:    Current Facility-Administered Medications:     acetaminophen (TYLENOL) tablet 650 mg, 650 mg, Oral, Q6H PRN, Dhaval Phillips MD    ceFAZolin (ANCEF) IVPB (premix in dextrose) 2,000 mg 50 mL, 2,000 mg, Intravenous, On Call To OR, Evonne Gregory MD, Last Rate: 100 mL/hr at 01/28/21 0630, 2,000 mg at 01/28/21 0630    ceFAZolin (ANCEF) IVPB (premix in dextrose) 2,000 mg 50 mL, 2,000 mg, Intravenous, Q8H, Evonne Gregory MD, Last Rate: 100 mL/hr at 01/28/21 0309, 2,000 mg at 01/28/21 0309    [MAR Hold] Diclofenac Sodium (VOLTAREN) 1 % topical gel 2 g, 2 g, Topical, TID PRN, Patrick Edwards DO    docusate sodium (COLACE) capsule 100 mg, 100 mg, Oral, BID, Evonne Gregory MD, 100 mg at 01/27/21 2154    enoxaparin (LOVENOX) subcutaneous injection 30 mg, 30 mg, Subcutaneous, BID, Evonne Gregory MD    gabapentin (NEURONTIN) capsule 100 mg, 100 mg, Oral, HS, Evonne Gregory MD, 100 mg at 01/27/21 2154    [MAR Hold] guaiFENesin (MUCINEX) 12 hr tablet 600 mg, 600 mg, Oral, Q12H [address], Patrick Aiad, DO, 600 mg at 01/27/21 0904    HYDROmorphone (DILAUDID) injection 0 2 mg, 0 2 mg, Intravenous, Q4H PRN, Evonne Gregory MD    lactated ringers bolus 500 mL, 500 mL, Intravenous, Once PRN **AND** lactated ringers bolus 500 mL, 500 mL, Intravenous, Once PRN, Evonne Gregory MD    lactated ringers infusion, 75 mL/hr, Intravenous, Continuous, Ekta Pea, PA-C, Last Rate: 75 mL/hr at 01/28/21 0623, 75 mL/hr at 01/28/21 0623    lidocaine (LIDODERM) 5 % patch 1 patch, 1 patch, Topical, Daily, Law Banks MD    lidocaine (PF) (XYLOCAINE-MPF) 1 % injection 0 5 mL, 0 5 mL, Infiltration, Once PRN, Ajith Manzano MD    [facility] Hold] nystatin (MYCOSTATIN) cream, , Topical, BID, Maureen Parada MD, Given at 01/27/21 0906    ondansetron (ZOFRAN) injection 4 mg, 4 mg, Intravenous, Q6H PRN, Law Banks MD  [facility] Hold] Osimertinib Mesylate TABS 80 mg, 80 mg, Oral, Daily, Jose Ferraro DO, 80 mg at 01/27/21 0905    oxyCODONE (ROXICODONE) IR tablet 2 5 mg, 2 5 mg, Oral, Q4H PRN, Law Banks MD    oxyCODONE (ROXICODONE) IR tablet 5 mg, 5 mg, Oral, Q4H PRN, Law Banks MD, 5 mg at 01/28/21 0308    senna (SENOKOT) tablet 8 6 mg, 1 tablet, Oral, Daily, Law Banks MD    sodium chloride 0 9 % bolus 500 mL, 500 mL, Intravenous, Once PRN **AND** sodium chloride 0 9 % bolus 500 mL, 500 mL, Intravenous, Once PRN, Law Banks MD    Blood Culture:   No results found for: BLOODCX    Wound Culture:   No results found for: WOUNDCULT    Ins and Outs:  I/O last 24 hours: In: 3310 6 [I V :3310 6]  Out: 1325 [Urine:1325]          Physical:  Vitals:    01/28/21 0338   BP: 102/61   Pulse: 92   Resp: 15   Temp: 98 1 °F (36 7 °C)   SpO2: 93%     Musculoskeletal: left Lower Extremity  · Dressing c/d/i  · TTP harvinder incisions  · SILT s/s/sp/dp/t  +fhl/ehl, +ankle dorsi/plantar flexion  2+ DP pulse    Assessment:    81 y o male POD 1 left prophylactic fem IMN  Patient doing well       Plan:  · WB LLE  · Continue to monitor for acute blood loss anemia, will monitor and administer IVF/prbc as indicated   · PT/OT  · Pain control  · DVT ppx  · Dispo: Ortho will follow    Cathrine Blizzard, MD

## 2021-01-28 NOTE — OCCUPATIONAL THERAPY NOTE
Occupational Therapy Evaluation     Patient Name: Nav Alba  ESDXM'H Date: 1/28/2021  Problem List  Principal Problem:    Ambulatory dysfunction  Active Problems:    Mixed hyperlipidemia    Atrial fibrillation (HCC)    Peripheral vascular disease (HCC)    Metastatic primary lung cancer (HCC)    CKD (chronic kidney disease) stage 3, GFR 30-59 ml/min    Acute pain of left knee    Anemia of chronic disease    Hip pain    Prediabetes    Preoperative clearance    Past Medical History  Past Medical History:   Diagnosis Date    Atrial fibrillation ([facility] )     Cancer of lung ([facility] )     brain and bones    Diabetes mellitus ([facility] )     Hypercholesteremia     Hypertension     Lung neoplasm     Proteinuria     LAST ASSESSED [id number]    [facility]per[facility]      Past Surgical History  Past Surgical History:   Procedure Laterality Date    CT NEEDLE BIOPSY BONE  1/26/2021    [place] BRONCHOSCOPY,DIAGNOSTIC N/A 4/24/2018    Procedure: Real [place];  Surgeon: Berenice Xiong MD;  [facility]; Service: Pulmonary    NJ OPEN RX FEMUR FX+INTRAMED TERESA Left 1/27/2021    Procedure: PROPHYLACTIC INSERTION NAIL IM FEMUR ANTEGRADE (TROCHANTERIC); Surgeon: Porter Alvarado MD;  [facility];  Service: Orthopedics    THORACENTESIS N/A 4/24/2018    Procedure: Jake Spenser;  Surgeon: Berenice Xiong MD;  [facility]; Service: Pulmonary        01/28/21 1056   OT Last Visit   OT Visit Date 01/28/21   Note Type   Note type Evaluation   Restrictions/Precautions   Weight Bearing Precautions Per Order Yes   LLE Weight Bearing Per Order WBAT   Other Precautions Chair Alarm; Fall Risk;Pain;WBS   Pain Assessment   Pain Assessment Tool 0-10   Pain Score 8   Pain Location/Orientation Orientation: Left; Location: Knee   Hospital Pain Intervention(s) Repositioned; Ambulation/increased activity   Home Living   Type of [address] [place] [address] One level  (3STE)   Bathroom Shower/Tub Tub/shower unit Bathroom Toilet Standard  (has BSC over toilet )   [address];Cane   Additional Comments Pt reports using cane during day and RW at night    Prior Function   Level of [place] Independent with ADLs and functional mobility   Lives With Dottie Help From Family   ADL Assistance Independent   IADLs Needs assistance  (Pt cooks and cleans, wife does laundry )   Falls in the last 6 months >10   Vocational Retired   Lifestyle   Autonomy Pt reports living in [place], [place] with wife  Pt was I with ADLs, shares IADLs with wife  Pt has stopped driving    Reciprocal Relationships Wife, family    Service to Others Retired   Intrinsic Gratification Playing SitScape and card with his dtr    Psychosocial   Psychosocial (WDL) WDL   Subjective   Subjective "I don't think my wife could help me"   ADL   Eating Assistance 5  Supervision/Setup   Grooming Assistance 5  Supervision/Setup   UB Bathing Assistance 4  Minimal Assistance   LB Bathing Assistance 2  Maximal Assistance   UB Dressing Assistance 4  Minimal Assistance   LB Dressing Assistance 2  Maximal [address]  2  Maximal Assistance   Bed Mobility   Supine to Sit 3  Moderate assistance   Additional items Assist x 2   Transfers   Sit to Stand 3  Moderate assistance   Additional items Assist x 2; Increased time required   Stand to Sit 3  Moderate assistance   Additional items Assist x 2; Increased time required   Stand pivot 3  Moderate assistance   Additional items Assist x 2   Additional Comments Initial sit <> stand trials with RW, second trial with SPT, B/L HHA and no DME    Functional Mobility   Functional Mobility 3  Moderate assistance   Additional Comments AX2   Balance   Static Sitting Fair +   Dynamic Sitting Fair   Static Standing Poor   Dynamic Standing Poor   Ambulatory Poor -   Activity Tolerance   Activity Tolerance Patient limited by pain   Medical Staff Made Aware PT Tino Brennan    Nurse Made Aware yes    RUE Assessment   RUE Assessment WFL   LUE Assessment   LUE Assessment WFL   Hand Function   Gross Motor Coordination Functional   Sensation   Light Touch No apparent deficits   Vision-Basic Assessment   Current Vision Does not wear glasses   Vision - Complex Assessment   Tracking Decreased smoothness of eye movement to R inferior field   Saccades Additional eye shifts occurred during testing   Cognition   Overall Cognitive Status [facility]   Arousal/Participation Alert; Responsive; Cooperative   Attention Within functional limits   Orientation Level Oriented X4   Memory Within functional limits   Following Commands Follows one step commands with increased time or repetition   Comments Pt pleasant and cooperative t/o session, though fearful of mvmt at this time    Assessment   Limitation Decreased ADL status; Decreased Safe judgement during ADL;Decreased endurance;Decreased self-care trans;Decreased high-level ADLs   Prognosis Good   Assessment Pt is a [de-identified] y o  male admitted [facility]20[facility] w/ Ambulatory dysfunction and fall   has a past medical history of Atrial fibrillation ([facility] ), [facility] Diabetes mellitus ([facility] ), Hypercholesteremia, Hypertension, Lung neoplasm, Proteinuria, and PVD (peripheral vascular disease) ([facility] )  Pt with active OT orders and activity as tolerated, LLE WBAT orders  As per pt report, pta, resides in a 1STH, 3STE with his wife  Pt was I w/  ADLS and shares IADLS with his wife  Pt reports using cane during day and RW at night PTA and has not been driving  Upon evaluation, pt currently requires MOD A X 2 with transfers and minimal fnxl mobility  Pt currently  requires MIN A UB ADLs, MAX LB ADLs, and MAX toileting  Pt is limited at this time 2*: pain, endurance, activity tolerance, functional mobility, balance, functional standing tolerance, decreased I w/ ADLS/IADLS and strength  The following Occupational Performance Areas to address include: grooming, bathing/shower, toilet hygiene, dressing, health maintenance, functional mobility, community mobility, clothing management and cleaning  Pt to benefit from continued skilled OT services while in the hospital to address deficits as defined above and maximize level of functional independence w ADL's and functional mobility  From OT standpoint, recommendation at time of d/c would be STR  Pt states wife would not be able to provide physical assist if needed  The patient's raw score on the AM-PAC Daily Activity inpatient short form is 16, standardized score is 35 96, less than 39 4  Patients at this level are likely to benefit from DC to post-acute rehabilitation services  Please refer to the recommendation of the Occupational Therapist for safe DC planning  Pt was left in chair with alarm on after session with all current needs met  Goals   LT Time Frame 10-14   Plan   Treatment Interventions ADL retraining;Visual perceptual retraining;Functional transfer training; Endurance training;UE strengthening/ROM; Patient/family training;Equipment evaluation/education; Compensatory technique education;Continued evaluation; Energy conservation; Activityengagement   Goal Expiration Date 02/11/21   OT Frequency 3-5x/wk   Recommendation   OT Discharge Recommendation Post-Acute Rehabilitation Services   OT - OK to Discharge Yes  (to rehab when medically stable )   AM-PAC Daily Activity Inpatient   Lower Body Dressing 2   Bathing 2   Toileting 2   Upper Body Dressing 3   Grooming 3   Eating 4   Daily Activity Raw Score 16   Daily Activity Standardized Score (Calc for Raw Score >=11) 35 96   AM-PAC Applied Cognition Inpatient   Following a Speech/Presentation 3   Understanding Ordinary Conversation 4   Taking Medications 3   Remembering Where Things Are Placed or Put Away 3   Remembering List of 4-5 Errands 3   Taking Care of Complicated Tasks 4   Applied Cognition Raw Score 20   Applied Cognition Standardized Score 41 76   Modified Allen Scale   Modified [place] Scale 4        GOALS    1) Pt will increase activity tolerance to G for 30 min txment sessions    2) Pt will complete UB/LB dressing/self care w/ mod I using adaptive device and DME as needed    3) Pt will complete bathing w/ Mod I w/ use of AE and DME as needed    4) Pt will complete toileting w/ mod I w/ G hygiene/thoroughness using DME as needed    5) Pt will improve functional transfers to Mod I on/off all surfaces using DME as needed w/ G balance/safety     6) Pt will improve functional mobility during ADL/IADL/leisure tasks to Mod I using DME as needed w/ G balance/safety     7) Pt will participate in simulated IADL management task with DME as needed to increase independence to  w/ G safety and endurance    8) Pt will demonstrate G carryover of pt/caregiver education and training as appropriate      9) Pt will demonstrate 100% carryover of energy conservation techniques t/o functional I/ADL/leisure tasks w/o cues s/p skilled education    10) Pt will engage in ongoing cognitive assessment w/ G participation to assist w/ safe d/c planning/recommendations    Alonzo Torres MS, OTR/L

## 2021-01-28 NOTE — PROGRESS NOTES
INTERNAL MEDICINE RESIDENCY PROGRESS NOTE     Name: Eliezer Uribe   Age & Sex: [de-identified] y o  male   MRN: [id number]  Unit/Bed#: [facility] 806-01   Encounter: [id number]  Team: SOD Team C     PATIENT INFORMATION     Name: Eliezer Uribe   Age & Sex: [de-identified] y o  male   MRN: [id number]  Hospital Stay Days: 4    ASSESSMENT/PLAN     Principal Problem:    Ambulatory dysfunction  Active Problems:    Hip pain    Acute pain of left knee    Metastatic primary lung cancer (HCC)    Atrial fibrillation (HCC)    Mixed hyperlipidemia    Peripheral vascular disease (HCC)    CKD (chronic kidney disease) stage 3, GFR 30-59 ml/min    Anemia of chronic disease    Prediabetes    Preoperative clearance      * Ambulatory dysfunction  Assessment & Plan  Increasing frequency of falls with 3 this past week and as many as 15 noted in the past month  Patient denies any loss of consciousness, seizure, head strike, is not on thinners  Lives at home alone with wife, typically uses his cane for ambulation, occasionally a walker  States he feels generally weaker, fatigued, has difficulty with coordination  Normal TSH, B12/folate    Plan:  -likely multifactorial, physical deconditioning from cancer vs PVD vs metastatic bone/brain involvement vs s/p WBRT vs chemo-induced fatigue   -CT findings as noted in "metastatic primary lung cancer"   -PT/OT  -fall/delirium precautions  -will likely need CM to follow for rehab placement     Hip pain  Assessment & Plan  Patient complained of hip pain and x-ray of the hip showed lucency in the right superior pubic ramus concerning for nondisplaced fracture  CT pelvis remarkable for extensive osseous metastasis involving the spine and pelvis with large destructive soft tissue lesions involving the right superior pubic ramus and anterior acetabulum and posterior left acetabulum   Linear lucency in the right superior pubic ramus described on radiograph is likely related to cortical destruction from the anterior acetabular mass   Xray of L femur showed ill-defined lucency within the proximal femoral shaft with endosteal scalloping suspicious for lytic metastasis  No pathologic fracture identified    Plan:  - Ortho consulted and following  - IR bone biopsy to confirm metastatic lesions done on 1/26/2021, awaiting results  - S/p prophylactic L femoral IM nailing on 1/28/21, POD 1  - PT/OT  - Pain control   - DVT ppx  and diet restarted  - Monitor for acute blood loss      Acute pain of left knee  Assessment & Plan  S/p mechanical fall, LOC-, thinners-, headstrike-, FROM    Plan:  -XR L knee, no acute bony fracture appreciated or acute osseous abnormality   -tylenol PRN, ice to affected area, avoid NSAIDs w/ impaired renal function, escalate analgesia as needed     Metastatic primary lung cancer ([facility] [address] )  Assessment & Plan  Diagnosis of 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, 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 status post WBRT, 2 cycles of  Alimta/carboplatin/Pembrolizumab  Transitioned to Omisertinib following molecular analysis (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)    MRI brain 04/22/2018-widespread brain Mets involving supra and infratentorial compartment   -s/p WBRT    CT pelvis remarkable for extensive osseous metastasis involving the spine and pelvis with large destructive soft tissue lesions involving the right superior pubic ramus and anterior acetabulum and posterior left acetabulum  Linear lucency in the right superior pubic ramus described on radiograph is likely related to cortical destruction from the anterior acetabular mass      Plan:  -Continue Osimertinib 80 mg qd   -IR bone biopsy to confirm metastatic lesions done on 1/26/2021, awaiting results      Atrial fibrillation [facility])  Assessment & Plan  History of paroxysmal atrial fibrillation, atrial fibrillation on admission, rate controlled without pharmacologic intervention     Plan:  -not on AVNB agents, rate controlled   -CHADs 5 (HTN, PVD, T2DM, age)    -not on AC secondary to lung ca w/ metastatic brain involvement, also in setting of high fall risk     CKD (chronic kidney disease) stage 3, GFR 30-59 ml/min  Assessment & Plan  Lab Results   Component Value Date    EGFR 59 01/27/2021    EGFR 54 01/26/2021    EGFR 48 01/25/2021    CREATININE 1 17 01/27/2021    CREATININE 1 25 01/26/2021    CREATININE 1 37 (H) 01/25/2021     Plan:  - creatinine 1 52 on admission (ranging between 1 12-1 69 since 2019)  - no JUANA currently  - avoid NSAIDs, nephrotoxic agents   - monitor BMP    Peripheral vascular disease [facility])  Assessment & Plan  Patient is not complaining of claudication although limited ambulation, likely a component of his recurring falls  Patient states all prior medications including cardiovascular were discontinued following cancer treatment    Mixed hyperlipidemia  Assessment & Plan  Stable, not on statin patient states that many of his previously prescribed medications were discontinued following cancer treatment    Type 2 diabetes mellitus with complication (HCC)resolved as of 1/25/2021  Assessment & Plan  Lab Results   Component Value Date    HGBA1C 5 6 11/18/2020       No results for input(s): POCGLU in the last 72 hours  Blood Sugar Average: Last 72 hrs:     Plan:  -diet controlled, most recent A1c 5 6      Disposition: continue inpatient care     SUBJECTIVE     Patient seen and examined  POD 1 following L femur IM nailing  Pt reports his pain is tolerable, rates it a 4/10 in severity  No CP, SOB, palpitation, fever, chills, N/V, abd pain, constipation or diarrhea   States he is hungry and would like to eat breakfast      OBJECTIVE     Vitals:    01/27/21 2300 01/28/21 0013 01/28/21 0338 01/28/21 0717   BP: 121/68 110/77 102/61 120/64   Pulse: 94 88 92 101   Resp:  20 15    Temp:  98 9 °F (37 2 °C) 98 1 °F (36 7 °C) 97 7 °F (36 5 °C)   TempSrc:       SpO2: (!) 85% 96% 93% 95%   Weight:       Height:          Temperature:   Temp (24hrs), Av 7 °F (36 5 °C), Min:96 8 °F (36 °C), Max:98 9 °F (37 2 °C)    Temperature: 97 7 °F (36 5 °C)  Intake & Output:  I/O        07 -  0700 701 -  0700    P  O  480 0    I V  (mL/kg)  3310 6 (39)    IV Piggyback  0    Total Intake(mL/kg) 480 (5 7) 3310 6 (39)    Urine (mL/kg/hr) 1425 (0 7) 675 (0 3)    Total Output 1425 675    Net -945 +2635 6              Weights:   IBW: 82 2 kg    Body mass index is 24 01 kg/m²  Weight (last 2 days)     None        Physical Exam  Constitutional:       General: He is not in acute distress  HENT:      Head: Normocephalic and atraumatic  Mouth/Throat:      Mouth: Mucous membranes are moist    Eyes:      Pupils: Pupils are equal, round, and reactive to light  Cardiovascular:      Rate and Rhythm: Normal rate and regular rhythm  Heart sounds: Normal heart sounds  Pulmonary:      Effort: Pulmonary effort is normal  No respiratory distress  Breath sounds: Normal breath sounds  Abdominal:      General: There is no distension  Palpations: Abdomen is soft  Tenderness: There is no abdominal tenderness  Musculoskeletal:      Right lower leg: No edema  Left lower leg: No edema  Comments: LLE- L knee and LUE with dressing in place, C/D/I, no significant TTP harvinder incision, ROM limited secondary to pain, sensation intact, 2+ DP pulses   Skin:     General: Skin is warm  Neurological:      General: No focal deficit present  Mental Status: He is alert and oriented to person, place, and time  Psychiatric:         Mood and Affect: Mood normal        LABORATORY DATA     Labs: I have personally reviewed pertinent reports    Results from last 7 days   Lab Units 21  0512 21  0501 21  0525 21  0502   WBC Thousand/uL 7 94 8 75 8 85 11 53*   HEMOGLOBIN g/dL 9 3* 10 4* 9 9* 9 4*   HEMATOCRIT % 28 9* 32 4* 30 1* 29 4*   PLATELETS Thousands/uL 215 250 228 197   NEUTROS PCT %  --  78* 74 80*   MONOS PCT %  --  8 9 6      Results from last 7 days   Lab Units 01/28/21  0512 01/27/21  0501 01/26/21  0525  01/23/21  0552   POTASSIUM mmol/L 4 2 4 2 4 0   < > 3 9   CHLORIDE mmol/L 102 102 103   < > 104   CO2 mmol/L 28 29 27   < > 27   BUN mg/dL 26* 27* 29*   < > 25   CREATININE mg/dL 1 11 1 17 1 25   < > 1 42*   CALCIUM mg/dL 8 8 9 6 9 1   < > 9 0   ALK PHOS U/L 98  --   --   --  124*   ALT U/L 21  --   --   --  15   AST U/L 30  --   --   --  20    < > = values in this interval not displayed  Results from last 7 days   Lab Units 01/26/21  1019   INR  1 10   PTT seconds 39*               IMAGING & DIAGNOSTIC TESTING     Radiology Results: I have personally reviewed pertinent reports  Ct Abdomen Pelvis Wo Contrast    Result Date: 1/24/2021  Impression: 1  Extensive osseous metastasis involving the spine and pelvis with large destructive soft tissue lesions involving the right superior pubic ramus and anterior acetabulum and posterior left acetabulum  Linear lucency in the right superior pubic ramus described on radiograph is likely related to cortical destruction from the anterior acetabular mass  2   Mottled lucency and sclerosis in the bilateral hips without osseous destruction  This may represent treated metastasis  3   Compression deformities of L1, L2, L4, and L5, likely pathologic given mottled appearance of the vertebral bodies  4   Right pleural effusion  Workstation performed: [id number]     Xr Hip/pelv 2-3 Vws Left If Performed    Result Date: 1/24/2021  Impression: Osseous metastatic disease again seen  Lucency in the right superior pubic ramus concerning for nondisplaced fracture  CT or MRI could be obtained for further evaluation if warranted  The study was marked in [facility] for immediate notification  Workstation performed: [id number]     Xr Femur 2 Vw Left    Result Date: 1/28/2021  Impression: Procedure guidance  Please refer to the separate procedure notes for additional details    Workstation performed: [id number]     Xr Femur 2 Vw Left    Result Date: 1/25/2021  Impression: Ill-defined lucency within the proximal femoral shaft with endosteal scalloping suspicious for lytic metastasis  No pathologic fracture identified  Workstation performed: [id number]     Xr Knee 4+ Vw Left Injury    Result Date: 1/24/2021  Impression: No acute osseous abnormality  Workstation performed: [id number]     Ct Head Without Contrast    Result Date: 1/23/2021  Impression: 1  Stable cerebral atrophy with chronic small vessel ischemic white matter disease  No acute intracranial abnormality  2   Stable calcifications in the occipital lobes bilaterally and right frontal centrum semiovale  Differential includes calcification within vascular malformations versus treated metastatic disease  Workstation performed: [id number]     Ct Chest Without Contrast    Result Date: 1/23/2021  Impression: 1  Slight increase in size in right lower lobe lung mass, most compatible with patient's known carcinoma  2   Slight increase in the size of the right pleural effusion  3   Stable osseous metastatic disease  The study was marked in [facility] for immediate notification  Workstation performed: [id number]     Ct Needle Biopsy Bone    Result Date: 1/26/2021  Impression: CT-guided biopsy of left posterior acetabulum lytic lesion  Plan: Specimen(s) sent for evaluation  _______________________________________________________________ PROCEDURE SUMMARY: - Percutaneous CT-guided left acetabulum biopsy PROCEDURE DETAILS: Pre-procedure Reference imaging for biopsy target: CT abdomen pelvis 1/24/2021 Consent: Informed consent for the procedure including risks, benefits and alternatives was obtained and time-out was performed prior to the procedure  Preparation: The site was prepared and draped using maximal sterile barrier technique including cutaneous antisepsis   Anesthesia/sedation Level of anesthesia/sedation: Moderate sedation (conscious sedation) Anesthesia/sedation administered by: IR nurse under attending supervision with continuous monitoring of the patient's level of consciousness and physiologic status Total intra-service sedation time (minutes): 25 Imaging prior to biopsy The patient was positioned prone  Initial imaging was performed using noncontrast CT  Biopsy target: - Maximal diameter (cm): 4 8 - Location: Left posterior acetabulum Biopsy Local anesthesia was administered  Under CT guidance, the biopsy needle was advanced to the target and biopsy was performed  Coaxial needle: 17 gauge Core needle biopsy device: VTMe Core needle size: 18 gauge Number of core specimens: 4 On-site biopsy touch preparation: Yes  Additional sampling recommendations: None Preliminary assessment of sample adequacy: Adequate Needle removal The biopsy needle was removed and a sterile dressing was applied  Tract embolization: D-Stat flowable hemostat Imaging following biopsy Immediate post-biopsy imaging was performed using noncontrast CT  Post-biopsy imaging findings: No hematoma Radiation Dose CT dose length product (mGy-cm): 1030 74 Additional Details Specimens removed: Biopsy samples as detailed above Estimated blood loss (mL): 1 Standardized report: [email or web address] Attestation Signer name: [facility] I attest that I was present for the entire procedure  I reviewed the stored images and agree with the report as written  Workstation performed: [id number]     Other Diagnostic Testing: I have personally reviewed pertinent reports      ACTIVE MEDICATIONS     Current Facility-Administered Medications   Medication Dose Route Frequency    acetaminophen (TYLENOL) tablet 650 mg  650 mg Oral Q6H PRN    ceFAZolin (ANCEF) IVPB (premix in dextrose) 2,000 mg 50 mL  2,000 mg Intravenous Q8H    Diclofenac Sodium (VOLTAREN) 1 % topical gel 2 g  2 g Topical TID PRN    docusate sodium (COLACE) capsule 100 mg  100 mg Oral BID    enoxaparin (LOVENOX) subcutaneous injection 30 mg  30 mg Subcutaneous BID    gabapentin (NEURONTIN) capsule 100 mg  100 mg Oral HS    guaiFENesin (MUCINEX) 12 hr tablet 600 mg  600 mg Oral Q12H [address]    HYDROmorphone (DILAUDID) injection 0 2 mg  0 2 mg Intravenous Q4H PRN    lactated ringers bolus 500 mL  500 mL Intravenous Once PRN    And    lactated ringers bolus 500 mL  500 mL Intravenous Once PRN    lactated ringers infusion  75 mL/hr Intravenous Continuous    lidocaine (LIDODERM) 5 % patch 1 patch  1 patch Topical Daily    lidocaine (PF) (XYLOCAINE-MPF) 1 % injection 0 5 mL  0 5 mL Infiltration Once PRN    nystatin (MYCOSTATIN) cream   Topical BID    ondansetron (ZOFRAN) injection 4 mg  4 mg Intravenous Q6H PRN    Osimertinib Mesylate TABS 80 mg  80 mg Oral Daily    oxyCODONE (ROXICODONE) IR tablet 2 5 mg  2 5 mg Oral Q4H PRN    oxyCODONE (ROXICODONE) IR tablet 5 mg  5 mg Oral Q4H PRN    senna (SENOKOT) tablet 8 6 mg  1 tablet Oral Daily    sodium chloride 0 9 % bolus 500 mL  500 mL Intravenous Once PRN    And    sodium chloride 0 9 % bolus 500 mL  500 mL Intravenous Once PRN       VTE Pharmacologic Prophylaxis: Enoxaparin (Lovenox)  VTE Mechanical Prophylaxis: sequential compression device    Portions of the record may have been created with voice recognition software  Occasional wrong word or "sound a like" substitutions may have occurred due to the inherent limitations of voice recognition software    Read the chart carefully and recognize, using context, where substitutions have occurred   ==  Cherelle Carrasco MD  [facility]  Internal Medicine Residency PGY-1

## 2021-01-28 NOTE — DISCHARGE SUMMARY
INTERNAL MEDICINE RESIDENCY DISCHARGE SUMMARY     Abran Pavon   [de-identified] y o  male  MRN: [id number]  Room/Bed: [facility] 806/[facility] [address] [place] [address]   Encounter: [id number]    Principal Problem:    Ambulatory dysfunction  Active Problems:    Hip pain    Acute pain of left knee    Metastatic primary lung cancer (HCC)    Atrial fibrillation (HCC)    Mixed hyperlipidemia    Peripheral vascular disease (HCC)    CKD (chronic kidney disease) stage 3, GFR 30-59 ml/min    Anemia of chronic disease    Prediabetes    Preoperative clearance      * Ambulatory dysfunction  Assessment & Plan  Increasing frequency of falls with 3 this past week and as many as 15 noted in the past month  Patient denies any loss of consciousness, seizure, head strike, is not on thinners  Lives at home alone with wife, typically uses his cane for ambulation, occasionally a walker  States he feels generally weaker, fatigued, has difficulty with coordination  Normal TSH, B12/folate    Plan:  -likely multifactorial, physical deconditioning from cancer vs PVD vs metastatic bone/brain involvement vs s/p WBRT vs chemo-induced fatigue   -CT findings as noted in "metastatic primary lung cancer"   -PT/OT  -fall/delirium precautions  -will likely need CM to follow for rehab placement     Hip pain  Assessment & Plan  Patient complained of hip pain and x-ray of the hip showed lucency in the right superior pubic ramus concerning for nondisplaced fracture  CT pelvis remarkable for extensive osseous metastasis involving the spine and pelvis with large destructive soft tissue lesions involving the right superior pubic ramus and anterior acetabulum and posterior left acetabulum  Linear lucency in the right superior pubic ramus described on radiograph is likely related to cortical destruction from the anterior acetabular mass    Xray of L femur showed ill-defined lucency within the proximal femoral shaft with endosteal scalloping suspicious for lytic metastasis  No pathologic fracture identified    Plan:  - Ortho consulted and following  - IR bone biopsy to confirm metastatic lesions done on 1/26/2021, awaiting results  - S/p prophylactic L femoral IM nailing on 1/28/21, POD 1  - PT/OT  - Pain control   - DVT ppx  and diet restarted  - Will need DVT ppx with lovenox 30 BID x 6 weeks per ortho  - Monitor for acute blood loss      Acute pain of left knee  Assessment & Plan  S/p mechanical fall, LOC-, thinners-, headstrike-, FROM    Plan:  -XR L knee, no acute bony fracture appreciated or acute osseous abnormality   -tylenol PRN, ice to affected area, avoid NSAIDs w/ impaired renal function, escalate analgesia as needed     Metastatic primary lung cancer (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)    MRI brain 04/22/2018-widespread brain Mets involving supra and infratentorial compartment   -s/p WBRT    CT pelvis remarkable for extensive osseous metastasis involving the spine and pelvis with large destructive soft tissue lesions involving the right superior pubic ramus and anterior acetabulum and posterior left acetabulum  Linear lucency in the right superior pubic ramus described on radiograph is likely related to cortical destruction from the anterior acetabular mass      Plan:  -Continue Osimertinib 80 mg qd   -IR bone biopsy to confirm metastatic lesions done on 1/26/2021, awaiting results      Atrial fibrillation [facility])  Assessment & Plan  History of paroxysmal atrial fibrillation, atrial fibrillation on admission, rate controlled without pharmacologic intervention     Plan:  -not on AVNB agents, rate controlled   -CHADs 5 (HTN, PVD, T2DM, age)    -not on [facility] secondary to lung ca w/ metastatic brain involvement, also in setting of high fall risk     CKD (chronic kidney disease) stage 3, GFR 30-59 ml/min  Assessment & Plan  Lab Results   Component Value Date    EGFR 59 01/27/2021    EGFR 54 01/26/2021    EGFR 48 01/25/2021    CREATININE 1 17 01/27/2021    CREATININE 1 25 01/26/2021    CREATININE 1 37 (H) 01/25/2021     Plan:  - creatinine 1 52 on admission (ranging between 1 12-1 69 since 2019)  - no JUANA currently  - avoid NSAIDs, nephrotoxic agents   - monitor BMP    Peripheral vascular disease [facility])  Assessment & Plan  Patient is not complaining of claudication although limited ambulation, likely a component of his recurring falls  Patient states all prior medications including cardiovascular were discontinued following cancer treatment    Mixed hyperlipidemia  Assessment & Plan  Stable, not on statin patient states that many of his previously prescribed medications were discontinued following cancer treatment    Type 2 diabetes mellitus with complication (HCC)resolved as of 1/25/2021  Assessment & Plan  Lab Results   Component Value Date    HGBA1C 5 6 11/18/2020       No results for input(s): POCGLU in the last 72 hours  Blood Sugar Average: Last 72 hrs:     Plan:  -diet controlled, most recent A1c 5 6        Maico Merino is a 79year old male with a history of stage IV adenocarcinoma of the lung (with malignant pleural effusion and metastatic involvement to bone/brain, diagnosed 04/2018 status post chemo/WBRT), PVD, hypertension, hypercholesterolemia, diabetes mellitus, and atrial fibrillation (not currently on anticoagulation secondary to metastatic involvement of brain) who presented to [facility] on 1/23/21 status post fall  Patient states that he has had increasing frequency of falls with this many as 15 in the last month    Most recent fall preceding admission he was attempting to get out of bed to reach his walker and fell forward landing on his left knee  He was unable to raise himself from the floor and called EMS, also noting that he was "tired of falling and couldn't live like this anymore"  Denied loss of consciousness, head strike, use of blood thinners  Stated that he generally feels weak and that he has had increasing difficulty ambulating/maintaining control of balance  Lives at home with wife, uses cane (occasionally walker) at baseline       On arrival, AVSS in atrial fibrillation, rate controlled  X-ray left knee did not demonstrate any acute fracture  CT head demonstrated stable cerebral atrophy with chronic small-vessel ischemic white matter disease, calcifications that may suggest vascular malformations versus treated metastatic disease, no hemorrhage or acute intracranial abnormality noted  Patient complained of hip pain and x-ray of the hip showed lucency in the right superior pubic ramus concerning for nondisplaced fracture  CT pelvis was remarkable for extensive osseous metastasis involving the spine and pelvis with large destructive soft tissue lesions involving the right superior pubic ramus and anterior acetabulum and posterior left acetabulum  Linear lucency in the right superior pubic ramus described on radiograph is likely related to cortical destruction from the anterior acetabular mass  Xray of L femur showed ill-defined lucency within the proximal femoral shaft with endosteal scalloping suspicious for lytic metastasis  No pathologic fracture identified  Ortho was consulted and it was recommended pt receive a prophylactic L femur IM nailing  Prior to this, ortho requested bone biopsy which was done by IR on 1/26/21 which confirmed metastatic squamous cell carcinoma  Pt was taken to OR on 1/29 for L femur IM nailing  Pt tolerated the surgery well  Ortho cleared him for discharge recommending weightbearing as tolerated to the left lower extremity   Pt will need DVT prophylaxis with lovenox 30 BID for 6 weeks today  Pt was evaluated by PT and OT who recommended acute rehab  DISCHARGE INFORMATION     PCP at Discharge: { House Staff:44532}    Admitting Provider: Parvez Parker DO  Admission Date: 1/23/2021    Discharge Provider: Lian Apodaca MD  Discharge Date: ***    Discharge Disposition: Home/Self Care  Discharge Condition: {condition:64514}  Discharge with Lines: {Discharged with active lines:965344329}    Discharge Diet: {diet:94305}  Activity Restrictions: {plan; activity restriction:34886}  Test Results Pending at Discharge: ***    Discharge Diagnoses:  Principal Problem:    Ambulatory dysfunction  Active Problems:    Hip pain    Acute pain of left knee    Metastatic primary lung cancer (HCC)    Atrial fibrillation (HCC)    Mixed hyperlipidemia    Peripheral vascular disease (HCC)    CKD (chronic kidney disease) stage 3, GFR 30-59 ml/min    Anemia of chronic disease    Prediabetes    Preoperative clearance  Resolved Problems:    Type 2 diabetes mellitus with complication [facility])      Consulting Providers:      Diagnostic & Therapeutic Procedures Performed:  Ct Abdomen Pelvis Wo Contrast    Result Date: 1/24/2021  Impression: 1  Extensive osseous metastasis involving the spine and pelvis with large destructive soft tissue lesions involving the right superior pubic ramus and anterior acetabulum and posterior left acetabulum  Linear lucency in the right superior pubic ramus described on radiograph is likely related to cortical destruction from the anterior acetabular mass  2   Mottled lucency and sclerosis in the bilateral hips without osseous destruction  This may represent treated metastasis  3   Compression deformities of L1, L2, L4, and L5, likely pathologic given mottled appearance of the vertebral bodies  4   Right pleural effusion  Workstation performed: [id number]     Xr Hip/pelv 2-3 Vws Left If Performed    Result Date: 1/24/2021  Impression: Osseous metastatic disease again seen   Lucency in the right superior pubic ramus concerning for nondisplaced fracture  CT or MRI could be obtained for further evaluation if warranted  The study was marked in [facility] for immediate notification  Workstation performed: [id number]     Xr Femur 2 Vw Left    Result Date: 1/28/2021  Impression: Procedure guidance  Please refer to the separate procedure notes for additional details  Workstation performed: [id number]     Xr Femur 2 Vw Left    Result Date: 1/25/2021  Impression: Ill-defined lucency within the proximal femoral shaft with endosteal scalloping suspicious for lytic metastasis  No pathologic fracture identified  Workstation performed: [id number]     Xr Knee 4+ Vw Left Injury    Result Date: 1/24/2021  Impression: No acute osseous abnormality  Workstation performed: [id number]     Ct Head Without Contrast    Result Date: 1/23/2021  Impression: 1  Stable cerebral atrophy with chronic small vessel ischemic white matter disease  No acute intracranial abnormality  2   Stable calcifications in the occipital lobes bilaterally and right frontal centrum semiovale  Differential includes calcification within vascular malformations versus treated metastatic disease  Workstation performed: [id number]     Ct Chest Without Contrast    Result Date: 1/23/2021  Impression: 1  Slight increase in size in right lower lobe lung mass, most compatible with patient's known carcinoma  2   Slight increase in the size of the right pleural effusion  3   Stable osseous metastatic disease  The study was marked in [facility] for immediate notification  Workstation performed: [id number]     Ct Needle Biopsy Bone    Result Date: 1/26/2021  Impression: CT-guided biopsy of left posterior acetabulum lytic lesion  Plan: Specimen(s) sent for evaluation   _______________________________________________________________ PROCEDURE SUMMARY: - Percutaneous CT-guided left acetabulum biopsy PROCEDURE DETAILS: Pre-procedure Reference imaging for biopsy target: CT abdomen pelvis 1/24/2021 Consent: Informed consent for the procedure including risks, benefits and alternatives was obtained and time-out was performed prior to the procedure  Preparation: The site was prepared and draped using maximal sterile barrier technique including cutaneous antisepsis  Anesthesia/sedation Level of anesthesia/sedation: Moderate sedation (conscious sedation) Anesthesia/sedation administered by: IR nurse under attending supervision with continuous monitoring of the patient's level of consciousness and physiologic status Total intra-service sedation time (minutes): 25 Imaging prior to biopsy The patient was positioned prone  Initial imaging was performed using noncontrast CT  Biopsy target: - Maximal diameter (cm): 4 8 - Location: Left posterior acetabulum Biopsy Local anesthesia was administered  Under CT guidance, the biopsy needle was advanced to the target and biopsy was performed  Coaxial needle: 17 gauge Core needle biopsy device: [employer] Core needle size: 18 gauge Number of core specimens: 4 On-site biopsy touch preparation: Yes  Additional sampling recommendations: None Preliminary assessment of sample adequacy: Adequate Needle removal The biopsy needle was removed and a sterile dressing was applied  Tract embolization: D-Stat flowable hemostat Imaging following biopsy Immediate post-biopsy imaging was performed using noncontrast CT  Post-biopsy imaging findings: No hematoma Radiation Dose CT dose length product (mGy-cm): 1030 74 Additional Details Specimens removed: Biopsy samples as detailed above Estimated blood loss (mL): 1 Standardized report: [email or web address] Attestation Signer name: [facility] I attest that I was present for the entire procedure  I reviewed the stored images and agree with the report as written   Workstation performed: [id number]       Code Status: Level 3 - DNAR and DNI  Advance Directive & Living Will: Received  Power of :    POLST: Medications:  Current Discharge Medication List        Current Discharge Medication List        Current Discharge Medication List      CONTINUE these medications which have NOT CHANGED    Details   glucose blood (JEANNE CONTOUR NEXT TEST) test strip 3 each by Other route 3 (three) times a week  Qty: 50 each, Refills: 3    Associated Diagnoses: Type 2 diabetes mellitus with complication (HCC)      lisinopril (ZESTRIL) 2 5 mg tablet Take 1 tablet (2 5 mg total) by mouth daily  Qty: 90 tablet, Refills: 3    Associated Diagnoses: Persistent proteinuria      Osimertinib Mesylate 80 MG TABS Take 1 tablet (80 mg total) by mouth daily  Qty: 90 tablet, Refills: 1    Associated Diagnoses: Malignant neoplasm of lung, unspecified laterality, unspecified part of lung (HCC)             Allergies:  No Known Allergies    FOLLOW-UP     PCP Outpatient Follow-up:  {LENI CONNELL OUTPATIENT FOLLOW CC:[id number]}    Consulting Providers Follow-up:  {LENI CONNELL CONSULTING PHYSICIAN FOLLOW UP HWQ/OD:[id number]}     Active Issues Requiring Follow-up:   {LENI CONNELL ACTIVE ISSUES FOLLOW UP RWG/VE:[id number]}    Discharge Statement:   I spent {Time; 15 min - 1 hour:11402} minutes discharging the patient  This time was spent on the day of discharge  I had direct contact with the patient on the day of discharge  Additional documentation is required if more than 30 minutes were spent on discharge  Portions of the record may have been created with voice recognition software  Occasional wrong word or "sound a like" substitutions may have occurred due to the inherent limitations of voice recognition software    Read the chart carefully and recognize, using context, where substitutions have occurred     ==  Cherelle Carrasco MD  [address]  Internal Medicine Resident PGY-1

## 2021-01-28 NOTE — CASE MANAGEMENT
A post acute care recommendation was made by your care team for STR  Discussed Freedom of Choice with patient and pt's wife  List of facilities given to patient via in person  both patient and caregiver aware the list is custom filtered for them by zip code location and that [facility] post acute providers are designated  Pt's choices by preference:    1  [facility]  2  [facility]  3  [facility]  4  [facility] sent referrals via [place]

## 2021-01-28 NOTE — PHYSICAL THERAPY NOTE
Physical Therapy Evaluation    Patient's Name: Maretta Frankel    Admitting Diagnosis  Injury, unspecified, initial encounter Hernan Carpio  Left knee pain [M25 562]  Ambulatory dysfunction [R26 2]  Unspecified multiple injuries, initial encounter [T07  XXXA]    Problem List  Patient Active Problem List   Diagnosis    Mixed hyperlipidemia    Atrial fibrillation (HCC)    Peripheral vascular disease (HCC)    Persistent proteinuria    Pleural effusion    Lung neoplasm    Metastatic disease (HCC)    Metastatic primary lung cancer ([facility] )    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[facility] )    CKD (chronic kidney disease) stage 3, GFR 30-59 ml/min    Ambulatory dysfunction    Acute pain of left knee    Anemia of chronic disease    Hip pain    Prediabetes    Preoperative clearance       Past Medical History  Past Medical History:   Diagnosis Date    Atrial fibrillation ([place] [address] )     Cancer of lung ([place] [address] )     brain and bones    Diabetes mellitus ([place] [address] )     Hypercholesteremia     Hypertension     Lung neoplasm     Proteinuria     LAST ASSESSED [id number]    PVD (peripheral vascular disease) ([place] [address] )        Past Surgical History  Past Surgical History:   Procedure Laterality Date    CT NEEDLE BIOPSY BONE  1/26/2021    HI BRONCHOSCOPY,DIAGNOSTIC N/A 4/24/2018    Procedure: Jocelyn Eis;  Surgeon: Tripp Laboy MD;  [facility]; Service: Pulmonary    HI OPEN RX FEMUR FX+INTRAMED TERESA Left 1/27/2021    Procedure: PROPHYLACTIC INSERTION NAIL IM FEMUR ANTEGRADE (TROCHANTERIC); Surgeon: Andrew Stephens MD;  [facility];  Service: Orthopedics    THORACENTESIS N/A 4/24/2018    Procedure: Taniya Apo;  Surgeon: Tripp Laboy MD;  [facility]; Service: Pulmonary        01/28/21 1057   PT Last Visit   PT Visit Date 01/28/21   Note Type   Note type Evaluation   Pain Assessment   Pain Assessment Tool 0-10   Pain Score 8   Pain Location/Orientation Orientation: Left; [facility]: [facility] Intervention(s) Repositioned; Ambulation/increased activity   Home Living   Type of [address] One level  (3 MIN)   Home Equipment Walker;Cane   Additional Comments Pt reports using straight cane during the day and RW at night due to instability   Prior Function   Level of [place] Independent with ADLs and functional mobility   Lives With Spouse   Receives Help From [facility] in the last 6 months >10   Restrictions/Precautions   Weight Bearing Precautions Per Order Yes   LLE Weight Bearing Per Order WBAT   Other Precautions Chair Alarm; Fall Risk;Pain;WBS;Cognitive   General   Family/Caregiver Present No   Cognition   Overall Cognitive Status WFL   Arousal/Participation Alert   Attention Within functional limits   Orientation Level Oriented X4   Following Commands Follows one step commands with increased time or repetition   Comments Pt pleasant and cooperative throughout   RLE Assessment   RLE Assessment WFL  (Grossly 4/5)   LLE Assessment   LLE Assessment X   LLE Overall AROM   L Knee Flexion Grossly 50 degrees, limited by pain AAROM   L Knee Extension (-15) degrees   Strength LLE   L Knee Extension 1/5   L Hip Flexion 2-/5   Light Touch   RLE Light Touch Grossly intact   LLE Light Touch Grossly intact   Bed Mobility   Supine to Sit 3  Moderate assistance   Additional items Assist x 2; Increased time required;Verbal cues   Additional Comments VC's for sequencing of bed mobility, difficulty advancing L LE    Transfers   Sit to Stand 3  Moderate assistance   Additional items Assist x 2; Increased time required;Verbal cues  (bed height elevated)   Stand to Sit 3  Moderate assistance   Additional items Assist x 2; Increased time required;Verbal cues   Stand pivot 3  Moderate assistance   Additional items Assist x 2; Increased time required;Verbal cues   Additional Comments Initially performed with RW, increased pain in standing    Performed stand pivot transfer to bedside chair with B/L HHA for safety, cues for positioning  Unable to advance LE's for gait assessment   Balance   Static Sitting Fair +   Dynamic Sitting Fair -   Static Standing Poor   Dynamic Standing Poor   Ambulatory Zero   Activity Tolerance   Activity Tolerance Patient limited by fatigue;Patient limited by pain   Medical Staff Made Aware Jocelyn NUGENT   Nurse Made Aware RN updated  Chair alarm engaged   Assessment   Prognosis Fair   Problem List Decreased endurance; Impaired balance;Decreased mobility; Decreased cognition;Decreased safety awareness;Pain;Orthopedic restrictions;Decreased strength;Decreased range of motion   Assessment Pt is a [de-identified] y o  male seen for PT evaluation s/p admit to [facility] on 1/23/2021  Pt was admitted with a primary dx of: Ambulatory dysfunction, L femoral metastatic lesion s/p prophylactic IM nail L femur  PT now consulted for assessment of mobility and d/c needs  Pt with Up as tolerated orders  Pts current comorbidities effecting treatment include: 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, DM, Afib, Frequent falls  Pts current clinical presentation is Unstable/ Unpredictable (high complexity) due to Ongoing medical management for primary dx, Increased reliance on more restrictive AD compared to baseline, Decreased activity tolerance compared to baseline, Fall risk, Current WBS, Trending lab values, s/p surgical intervention  Prior to admission, pt was independent with straight cane/RW, required assistance from family for IADL's, frequent falls  Upon evaluation, pt currently is requiring modA x2 for bed mobility; modA x2 for transfers   Pt presents at PT eval functioning below baseline and currently w/ overall mobility deficits 2* to: BLE weakness, impaired balance, decreased endurance, pain, decreased activity tolerance compared to baseline, decreased functional mobility tolerance compared to baseline, decreased safety awareness, fall risk, orthopedic restrictions  Pt currently at a fall risk 2* to impairments listed above  Pt will continue to benefit from skilled acute PT interventions to address stated impairments; to maximize functional mobility; for ongoing pt/ family training; and DME needs  At conclusion of PT session pt returned back in chair and chair alarm engaged with phone and call bell within reach  Pt denies any further questions at this time  The patient's AM-PAC Basic Mobility Inpatient Short Form Low Function Raw Score 14 , Standardized Score is 22 01  A standardized score less 42 9 suggests the patient may benefit from discharge to post-acute rehab services  Recommend IP rehab upon hospital D/C  Goals   Patient Goals to go home   STG Expiration Date 02/11/21   Short Term Goal #1 In 14 days pt will be able to: 1  Demonstrate ability to perform all aspects of bed mobility with supervision to increase functional independence  2  Perform functional transfers with RW and Randi to facilitate safe return to previous living environment  3   Ambulate 100 ft with RW and Randi with stable vitals to improve safety with household distances and reduce fall risk  4  Improve LE strength grades by 1 to increase ease of functional mobility with transfers and gait  5  Pt will demonstrate improved balance by one grade in order to decrease risk of falls  PT Treatment Day 0   Plan   Treatment/Interventions Functional transfer training;LE strengthening/ROM; Therapeutic exercise; Endurance training;Patient/family training;Equipment eval/education; Bed mobility;Gait training   PT Frequency Other (Comment)  (3-6x/week)   Recommendation   PT Discharge Recommendation Post-Acute Rehabilitation Services   PT - OK to Discharge Yes  (to IP rehab)   Richard 8 in Bed Without Bedrails 2   Lying on Back to Sitting on Edge of Flat Bed 1   Moving Bed to Chair 1   Standing Up From Chair 1   Walk in Room 1   Climb 3-5 Stairs 1   Basic Mobility Inpatient Raw Score 7   Turning Head Towards Sound 4   Follow Simple Instructions 3   Low Function Basic Mobility Raw Score 14   Low Function Basic Mobility Standardized Score 22 01       Mehreen Chauhan, PT, DPT

## 2021-01-28 NOTE — ANESTHESIA POSTPROCEDURE EVALUATION
Post-Op Assessment Note    CV Status:  Stable    Pain management: adequate     Mental Status:  Alert and awake   Hydration Status:  Euvolemic   PONV Controlled:  Controlled   Airway Patency:  Patent      Post Op Vitals Reviewed: Yes      Staff: Anesthesiologist, CRNA         No complications documented      BP   103/64   Temp      Pulse  77   Resp   16   SpO2   100

## 2021-01-28 NOTE — OP NOTE
OPERATIVE REPORT  PATIENT NAME: Adrian Ren    :  1940  MRN: [id number]  Pt Location: [facility] 18    SURGERY DATE: 2021    Surgeon(s) and Role:     * Stephanie Polo MD - Primary     * Evonne Gregory MD - Assisting    Preop Diagnosis:  Left femoral metastatic lesion with functional pain    Post-Op Diagnosis Codes:  Same    Procedure(s) (LRB):  PROPHYLACTIC INSERTION NAIL IM FEMUR ANTEGRADE (TROCHANTERIC) (Left)    Procedure:  Prophylactic IM nail fixation left proximal femoral metastatic cancer lesion (CPT 39935)    Specimen(s):  * No specimens in log *    Estimated Blood Loss:   Minimal    Drains:  * No LDAs found *    Anesthesia Type:   General    Operative Indications:  Functional pain with left proximal femoral metastatic lesion    Operative Findings:  Unremarkable long IM nail fixation    Complications:   None    Implants: Synthes 10x400 long TFN, 292WE helical blade, locking bolt x1 distal    Procedure and Technique:  The patient's operative site, laterality, procedure, consent were verified in the preoperative area and the patient was transitioned to the operating room  General anesthesia was provided by the anesthesia team   The patient was moved to the table in the usual fashion  Patient's left hip and lower extremity was prepped and draped in the usual sterile fashion  No reduction was required  A laterally based incision was made at the mid femoral shaft and a drill bit was used to drill a vent hole to prevent thermal overheating/high pressure system  The proximal hip incision was made and using sharp dissection, the fascia was opened  The guide pin was inserted at the greater trochanter in the usual manner and was advanced and confirmed on the AP and lateral views  The opening Reamer was advanced to the level of the lesser trochanter   The ball-tip guidewire was advanced to the level of the physeal scar at the knee and verified on both the AP and lateral planes, no anterior cortical perforation was appreciated  Length was measured and sequential reaming took place up to the 11 5 mm Reamer  The 10 mm x 400 mm long Synthes trochanteric fixation nail was then introduced into the femoral canal and advanced to the appropriate positioning under direct fluoroscopic imaging  A lateral based incision was made at the thigh and the guide pin for the blade was advanced in the appropriate position  The lateral cortex was opened with the opening Reamer and drilling and tapping took place for a 161 mm helical blade, which was then inserted  The set screw was then advanced and locked and turned 1/4 turn off  One distal static interlock screw was then placed using the perfect Jena technique and one small lateral based incision  Final images were obtained all wounds were copiously irrigated with saline, the proximal deep layer was closed with 0 Vicryl sutures and remaining wounds were all closed with 2-O Vicryl sutures for the subcutaneous layer and staples for skin closure  Sterile dressings consisting of Mepilex dressings  All final counts, including but not limited to instrument, sponge, needle counts were correct  I was present for the entire procedure  The patient was transferred off the table to bed and transported extubated to the PACU in stable condition  There were no immediate complications  The patient will be weight bearing as tolerated left lower extremity  He will require lovenox for dvt prophylaxis for 4 weeks   I will see him in the clinic in 2 weeks for staple removal     Patient Disposition:  PACU     SIGNATURE: Aris Vidal MD  DATE: January 27, 2021  TIME: 8:04 PM

## 2021-01-28 NOTE — UTILIZATION REVIEW
Continued Stay Review    Date: 1/28/2021                         Current Patient Class: INPT Current Level of Care: MED-SURG    HPI:81 y o  male initially admitted INPT on 1/24 D/T AMBULATORY DYSFUNCTION S/P FALL AND left hip pain  Assessment/Plan: Increasing frequency of falls with 3 this past week and as many as 15 noted in the past month  feels generally weaker, fatigued, has difficulty with coordination  physical deconditioning from cancer vs PVD vs metastatic bone/brain involvement vs s/p WBRT vs chemo-induced fatigue  IR bone biopsy to confirm metastatic lesions done on 1/26  CT showed metastatic primary lung cancer  POD 1  OR- prophylactic IM nail fixaption left proximal femoral metastatic cancer lesion  Pain 4/10  PT/OT  fall/delirium precautions  Needs rehab placement      SURGERY DATE: 1/27/2021     Preop Diagnosis:  Left femoral metastatic lesion with functional pain     Post-Op Diagnosis Codes:  Same     Procedure(s) (LRB):  PROPHYLACTIC INSERTION NAIL IM FEMUR ANTEGRADE (TROCHANTERIC) (Left)     Procedure:  Prophylactic IM nail fixation left proximal femoral metastatic cancer lesion (CPT 33554)     Estimated Blood Loss:   Minimal     Drains:  * No LDAs found *     Anesthesia Type:   General     Operative Indications:  Functional pain with left proximal femoral metastatic lesion     Operative Findings:  Unremarkable long IM nail fixation     Complications:   None    Pertinent Labs/Diagnostic Results:   Results from last 7 days   Lab Units 01/24/21  1302   SARS-COV-2  Negative     Results from last 7 days   Lab Units 01/28/21  0512 01/27/21  0501 01/26/21  0525 01/25/21  0502 01/23/21  0330   WBC Thousand/uL 7 94 8 75 8 85 11 53* 6 44   HEMOGLOBIN g/dL 9 3* 10 4* 9 9* 9 4* 10 5*   HEMATOCRIT % 28 9* 32 4* 30 1* 29 4* 32 2*   PLATELETS Thousands/uL 215 250 228 197 218   NEUTROS ABS Thousands/µL  --  6 75 6 56 9 14* 4 28     Results from last 7 days   Lab Units 01/23/21  0330   RETIC CT ABS  51,800   RETIC CT PCT % 1 43     Results from last 7 days   Lab Units 01/28/21  0512 01/27/21  0501 01/26/21  0525 01/25/21  0501 01/23/21  0552   SODIUM mmol/L 135* 136 135* 136 137   POTASSIUM mmol/L 4 2 4 2 4 0 3 9 3 9   CHLORIDE mmol/L 102 102 103 102 104   CO2 mmol/L 28 29 27 29 27   ANION GAP mmol/L 5 5 5 5 6   BUN mg/dL 26* 27* 29* 30* 25   CREATININE mg/dL 1 11 1 17 1 25 1 37* 1 42*   EGFR ml/min/1 73sq m 62 59 54 48 47   CALCIUM mg/dL 8 8 9 6 9 1 9 4 9 0     Results from last 7 days   Lab Units 01/28/21  0512 01/23/21  0552   AST U/L 30 20   ALT U/L 21 15   ALK PHOS U/L 98 124*   TOTAL PROTEIN g/dL 6 1* 6 7   ALBUMIN g/dL 2 7* 3 4*   TOTAL BILIRUBIN mg/dL 0 54 0 73     Results from last 7 days   Lab Units 01/27/21  2023 01/27/21  1909   POC GLUCOSE mg/dl 79 67     Results from last 7 days   Lab Units 01/28/21  0512 01/27/21  0501 01/26/21  0525 01/25/21  0501 01/23/21  0552 01/23/21  0330   GLUCOSE RANDOM mg/dL 96 97 93 86 80 87       Results from last 7 days   Lab Units 01/26/21  1019   PROTIME seconds 14 2   INR  1 10   PTT seconds 39*     Results from last 7 days   Lab Units 01/23/21  0552   TSH 3RD GENERATON uIU/mL 1 950     Results from last 7 days   Lab Units 01/27/21  0501 01/26/21  0525   PROCALCITONIN ng/ml 0 94* 1 26*       Results from last 7 days   Lab Units 01/23/21  0330   FERRITIN ng/mL 431*       Results from last 7 days   Lab Units 01/24/21  1302   INFLUENZA A PCR  Negative   INFLUENZA B PCR  Negative   RSV PCR  Negative     1/27 XRAY L FEMUR:Procedure guidance  Please refer to the separate procedure notes for additional details    1/26 CT NEEDLE BIOPSY:CT-guided biopsy of left posterior acetabulum lytic lesion        Vital Signs:   01/28/21 07:17:03  97 7 °F (36 5 °C)  101    120/64  83  95 %  None (Room air)     01/28/21 03:38:40  98 1 °F (36 7 °C)  92  15  102/61  75  93 %       01/28/21 00:13:04  98 9 °F (37 2 °C)  88  20  110/77  88  96 %       01/27/21 2300    94    121/68  86  85 %Abnormal       01/27/21 21:41:30        109/69  82         01/27/21 2100    82  24Abnormal   112/64  76  98 %       01/27/21 2045  97 2 °F (36 2 °C)Abnormal   86  24Abnormal   112/71    98 %  None (Room air)  X   01/27/21 2015  96 8 °F (36 °C)Abnormal   95  15  103/64    95 %  None (Room air)  X   01/27/21 15:21:53    85  19  128/74  92  95 %       01/27/21 07:27:48  97 8 °F (36 6 °C)  89  16  119/71  87  94 %             Medications:   Scheduled Medications:  docusate sodium, 100 mg, Oral, BID  enoxaparin, 30 mg, Subcutaneous, BID  gabapentin, 100 mg, Oral, HS  guaiFENesin, 600 mg, Oral, Q12H JOSÉ MIGUEL  lidocaine, 1 patch, Topical, Daily  nystatin, , Topical, BID  Osimertinib Mesylate, 80 mg, Oral, Daily  senna, 1 tablet, Oral, Daily      Continuous IV Infusions:  lactated ringers, 75 mL/hr, Intravenous, Continuous      PRN Meds:  acetaminophen, 650 mg, Oral, Q6H PRN 1/27 X 1  Diclofenac Sodium, 2 g, Topical, TID PRN  HYDROmorphone, 0 2 mg, Intravenous, Q4H PRN  lactated ringers, 500 mL, Intravenous, Once PRN    And  lactated ringers, 500 mL, Intravenous, Once PRN  lidocaine (PF), 0 5 mL, Infiltration, Once PRN  ondansetron, 4 mg, Intravenous, Q6H PRN  oxyCODONE, 2 5 mg, Oral, Q4H PRN 1/28 X 1  oxyCODONE, 5 mg, Oral, Q4H PRN 1/27 X 1, 1/28 X 1  polyethylene glycol, 17 g, Oral, Once PRN  sodium chloride, 500 mL, Intravenous, Once PRN    And  sodium chloride, 500 mL, Intravenous, Once PRN    REG DIET  SCD  OOB  C&DB  NEUROVASCULAR CHECKS Q4H  PT/OT      Discharge Plan: TBD    Network Utilization Review Department  ATTENTION: Please call with any questions or concerns to [phone] and carefully listen to the prompts so that you are directed to the right person  All voicemails are confidential   Hipolito Bryan all requests for admission clinical reviews, approved or denied determinations and any other requests to dedicated fax number below belonging to the campus where the patient is receiving treatment   List of dedicated fax numbers for the Facilities:  [address] DENIALS (Administrative/Medical Necessity) [phone]   [address]  (Maternity/NICU/Pediatrics) 261 [facility] [address] [facility] [address] Anupama Silva 4242 (  Dariana Gardiner "Jessica" [phone]) [phone] [address] Alesha Dooley 1481 [address] [place] [address] [phone]

## 2021-01-29 LAB
ANION GAP SERPL CALCULATED.3IONS-SCNC: 3 MMOL/L (ref 4–13)
BUN SERPL-MCNC: 30 MG/DL (ref 5–25)
CALCIUM SERPL-MCNC: 9.2 MG/DL (ref 8.3–10.1)
CHLORIDE SERPL-SCNC: 105 MMOL/L (ref 100–108)
CO2 SERPL-SCNC: 28 MMOL/L (ref 21–32)
CREAT SERPL-MCNC: 1.27 MG/DL (ref 0.6–1.3)
ERYTHROCYTE [DISTWIDTH] IN BLOOD BY AUTOMATED COUNT: 14.6 % (ref 11.6–15.1)
FLUAV RNA RESP QL NAA+PROBE: NEGATIVE
FLUBV RNA RESP QL NAA+PROBE: NEGATIVE
GFR SERPL CREATININE-BSD FRML MDRD: 53 ML/MIN/1.73SQ M
GLUCOSE SERPL-MCNC: 109 MG/DL (ref 65–140)
HCT VFR BLD AUTO: 25.7 % (ref 36.5–49.3)
HGB BLD-MCNC: 8.3 G/DL (ref 12–17)
MCH RBC QN AUTO: 28.4 PG (ref 26.8–34.3)
MCHC RBC AUTO-ENTMCNC: 32.3 G/DL (ref 31.4–37.4)
MCV RBC AUTO: 88 FL (ref 82–98)
PLATELET # BLD AUTO: 208 THOUSANDS/UL (ref 149–390)
PMV BLD AUTO: 9.8 FL (ref 8.9–12.7)
POTASSIUM SERPL-SCNC: 4 MMOL/L (ref 3.5–5.3)
RBC # BLD AUTO: 2.92 MILLION/UL (ref 3.88–5.62)
RSV RNA RESP QL NAA+PROBE: NEGATIVE
SARS-COV-2 RNA RESP QL NAA+PROBE: NEGATIVE
SODIUM SERPL-SCNC: 136 MMOL/L (ref 136–145)
WBC # BLD AUTO: 7.47 THOUSAND/UL (ref 4.31–10.16)

## 2021-01-29 PROCEDURE — NC001 PR NO CHARGE: Performed by: ORTHOPAEDIC SURGERY

## 2021-01-29 PROCEDURE — 85027 COMPLETE CBC AUTOMATED: CPT | Performed by: STUDENT IN AN ORGANIZED HEALTH CARE EDUCATION/TRAINING PROGRAM

## 2021-01-29 PROCEDURE — 80048 BASIC METABOLIC PNL TOTAL CA: CPT | Performed by: STUDENT IN AN ORGANIZED HEALTH CARE EDUCATION/TRAINING PROGRAM

## 2021-01-29 PROCEDURE — 0241U HB NFCT DS VIR RESP RNA 4 TRGT: CPT | Performed by: STUDENT IN AN ORGANIZED HEALTH CARE EDUCATION/TRAINING PROGRAM

## 2021-01-29 RX ADMIN — NYSTATIN 1 APPLICATION: 100000 CREAM TOPICAL at 09:45

## 2021-01-29 RX ADMIN — GUAIFENESIN 600 MG: 600 TABLET, EXTENDED RELEASE ORAL at 20:54

## 2021-01-29 RX ADMIN — DICLOFENAC SODIUM 2 G: 10 GEL TOPICAL at 09:43

## 2021-01-29 RX ADMIN — NYSTATIN 1 APPLICATION: 100000 CREAM TOPICAL at 17:45

## 2021-01-29 RX ADMIN — ENOXAPARIN SODIUM 30 MG: 30 INJECTION SUBCUTANEOUS at 09:40

## 2021-01-29 RX ADMIN — DOCUSATE SODIUM 100 MG: 100 CAPSULE, LIQUID FILLED ORAL at 17:44

## 2021-01-29 RX ADMIN — GABAPENTIN 100 MG: 100 CAPSULE ORAL at 21:00

## 2021-01-29 RX ADMIN — SENNOSIDES 8.6 MG: 8.6 TABLET, FILM COATED ORAL at 09:40

## 2021-01-29 RX ADMIN — GUAIFENESIN 600 MG: 600 TABLET, EXTENDED RELEASE ORAL at 09:40

## 2021-01-29 RX ADMIN — DOCUSATE SODIUM 100 MG: 100 CAPSULE, LIQUID FILLED ORAL at 09:40

## 2021-01-29 RX ADMIN — ENOXAPARIN SODIUM 30 MG: 30 INJECTION SUBCUTANEOUS at 20:54

## 2021-01-29 NOTE — PROGRESS NOTES
Subjective:  Resting comfortably in bed  Denies numbness tingling          Lab Results   Component Value Date/Time    WBC 7 47 01/29/2021 04:57 AM    HGB 8 3 (L) 01/29/2021 04:57 AM       Vitals:    01/28/21 2219   BP: 105/66   Pulse: 102   Resp: 16   Temp: 97 8 °F (36 6 °C)   SpO2: 94%     Musculoskeletal:  Left lower extremity  Dressing C/D/I  Motor and sensation grossly intact distally  Superficial abrasion over left knee  Distal extremity warm and pink    Assessment: [de-identified] y o  male postoperative day 2 status post left prophylactic femoral intramedullary nail for biopsy-proven metastatic cancer    Plan:  Weightbearing as tolerated left lower extremity  Pain control  DVT ppx  PT/OT  Patient noted to have acute blood loss anemia due to a drop in Hbg of > 2 0g from preop levels, will monitor vital signs and resuscitate with IV fluids as needed  Dispo: Ortho will follow

## 2021-01-29 NOTE — CASE MANAGEMENT
CM called Roseanna from [facility] to f/u on [employer]  [facility] does not have a bed available at this time  At this time there are no accepting beds at [facility], [facility], and [facility]  LIZBETH LM for pt's daughter, Jefry Velásquez [phone] to discuss rehab choices  CM spoke with pt for more STR choices  Per request of pt he wanted referrals to be sent close to his home  Per pt request CM sent referrals to [facility] [employer], [address] [place] [address], and [facility]  CM to follow

## 2021-01-29 NOTE — PROGRESS NOTES
INTERNAL MEDICINE RESIDENCY PROGRESS NOTE     Name: Nasreen Baer   Age & Sex: [de-identified] y o  male   MRN: [id number]  Unit/Bed#: [facility] 806-01   Encounter: [id number]  Team: SOD Team C     PATIENT INFORMATION     Name: Nasreen Baer   Age & Sex: [de-identified] y o  male   MRN: [id number]  Hospital Stay Days: 5    ASSESSMENT/PLAN     Principal Problem:    Ambulatory dysfunction  Active Problems:    Metastatic primary lung cancer ([address] )    Acute pain of left knee    Hip pain    Mixed hyperlipidemia    Atrial fibrillation (HCC)    Peripheral vascular disease (HCC)    CKD (chronic kidney disease) stage 3, GFR 30-59 ml/min    Anemia of chronic disease    Prediabetes    Preoperative clearance      * Ambulatory dysfunction  Assessment & Plan  Frequent falls, 3 X week prior admission, more falls during past months, notes feeling generalized weakness, fatigue difficulty with coordination  Lives with why at home use cane for ambulation  Primary lung Cancer Mets to Lumbar Spine, b/l Acetabulum and Rt  Sup  Pubic Ramus fracture evident on imaging  Plan:  - Ortho consulted and following  - IR bone biopsy to confirm metastatic lesions done on 1/26/2021, awaiting results  - S/p prophylactic L femoral IM nailing on 1/27/21, POD 2  - PT/OT  - Pain control   - DVT ppx  - Monitor for acute blood loss    Hip pain  Assessment & Plan  See Ambulatory Dysfunction A&P       Acute pain of left knee  Assessment & Plan  - Xray Knee L  4+ view 01/23/2021: no acute osseus abnormality   - Weight bearing as tolerated   - Pain management, on Tavia 100 daily and PRN analgesics         Metastatic primary lung cancer [facility])  Assessment & Plan    - following with Dr Alberta Vogel - input appreciated     - 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       - Mets to bone and brain s/p WBRT, 2 cycles of Alimta/Carboplatin/Pembroizumab     - EGFR Mutation, 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 80 mg QD   Plan:  - Continue Osimertinib 80 mg qd   - IR bone biopsy to confirm metastatic lesions done on 1/26/2021, awaiting results  - Continue f/u outpatient Hem/Onc        CKD (chronic kidney disease) stage 3, GFR 30-59 ml/min  Assessment & Plan  Lab Results   Component Value Date    EGFR 59 01/27/2021    EGFR 54 01/26/2021    EGFR 48 01/25/2021    CREATININE 1 17 01/27/2021    CREATININE 1 25 01/26/2021    CREATININE 1 37 (H) 01/25/2021     Plan:  - creatinine 1 52 on admission (ranging between 1 12-1 69 since 2019)  - no JUANA currently  - avoid NSAIDs, nephrotoxic agents   - monitor BMP    Peripheral vascular disease ([facility] )  Assessment & Plan  See A-fib A&P     Atrial fibrillation ([facility] )  Assessment & Plan  - Per patient - medications including Cardio meds were DC on diagnosis and treatment of cancer   - Not on A/C "d/t lung cancer mets to Brain, afib rate controlled off meds  "  - A/C per Hem/Onc, f/u outpatient  Mixed hyperlipidemia  Assessment & Plan  Stable, not on statin patient states that many of his previously prescribed medications were discontinued following cancer treatment    Type 2 diabetes mellitus with complication (HCC)resolved as of 1/25/2021  Assessment & Plan  Lab Results   Component Value Date    HGBA1C 5 6 11/18/2020       No results for input(s): POCGLU in the last 72 hours  Blood Sugar Average: Last 72 hrs:     Plan:  -diet controlled, most recent A1c 5 6        Disposition:  Status post prophylactic left femur IM nail placement, pending placement  following, possible DC tomorrow  SUBJECTIVE     Patient seen and examined  No acute events overnight  Patient notes left knee and hip pain continue but better than before and rates it as 5/10, noted not worsening and improving over time and he was reminded about the p r n  Pain medications if needed  Patient denies any fever, chest pain, Palpitation, or dizziness    OBJECTIVE     Vitals:    01/28/21 1519 01/28/21 2219 01/29/21 0740 01/29/21 1521   BP: 104/73 105/66 126/66 117/81   Pulse:  102 103 94   Resp: 18 16  16   Temp: (!) 97 2 °F (36 2 °C) 97 8 °F (36 6 °C)  97 5 °F (36 4 °C)   TempSrc:       SpO2:  94% 94% 96%   Weight:       Height:          Temperature:   Temp (24hrs), Av 7 °F (36 5 °C), Min:97 5 °F (36 4 °C), Max:97 8 °F (36 6 °C)    Temperature: 97 5 °F (36 4 °C)  Intake & Output:  I/O        0700  07 0700    P  O  0 640 240    I V  (mL/kg) 3310 6 (39) 657 5 (7 8)     IV Piggyback 0      Total Intake(mL/kg) 3310 6 (39) 1297 5 (15 3) 240 (2 8)    Urine (mL/kg/hr) 675 (0 3) 1100 (0 5) 125 (0 1)    Total Output 675 1100 125    Net +2635 6 +197 5 +115               Weights:   IBW: 82 2 kg    Body mass index is 24 01 kg/m²  Weight (last 2 days)     None        Physical Exam  Constitutional:       General: He is not in acute distress  HENT:      Head: Normocephalic and atraumatic  Mouth/Throat:      Mouth: Mucous membranes are moist    Eyes:      Extraocular Movements: Extraocular movements intact  Pupils: Pupils are equal, round, and reactive to light  Cardiovascular:      Rate and Rhythm: Normal rate and regular rhythm  Pulses: Normal pulses  Heart sounds: No murmur  No gallop  Pulmonary:      Effort: Pulmonary effort is normal  No respiratory distress  Breath sounds: Normal breath sounds  No wheezing  Musculoskeletal:      Right lower leg: No edema  Left lower leg: No edema  Comments: Left thigh dressing clean and intact no drainage, left knee abrasion, left lower extremity motion limited due to pain, pulses intact and equal bilaterally, vascular refill less than 2 seconds both feet    Skin:     Capillary Refill: Capillary refill takes less than 2 seconds  Neurological:      General: No focal deficit present  Mental Status: He is alert and oriented to person, place, and time     Psychiatric:         Mood and Affect: Mood normal          Behavior: Behavior normal  Thought Content: Thought content normal        LABORATORY DATA     Labs: I have personally reviewed pertinent reports  Results from last 7 days   Lab Units 01/29/21  0457 01/28/21  0512 01/27/21  0501 01/26/21  0525 01/25/21  0502   WBC Thousand/uL 7 47 7 94 8 75 8 85 11 53*   HEMOGLOBIN g/dL 8 3* 9 3* 10 4* 9 9* 9 4*   HEMATOCRIT % 25 7* 28 9* 32 4* 30 1* 29 4*   PLATELETS Thousands/uL 208 215 250 228 197   NEUTROS PCT %  --   --  78* 74 80*   MONOS PCT %  --   --  8 9 6      Results from last 7 days   Lab Units 01/29/21  0457 01/28/21  0512 01/27/21  0501  01/23/21  0552   POTASSIUM mmol/L 4 0 4 2 4 2   < > 3 9   CHLORIDE mmol/L 105 102 102   < > 104   CO2 mmol/L 28 28 29   < > 27   BUN mg/dL 30* 26* 27*   < > 25   CREATININE mg/dL 1 27 1 11 1 17   < > 1 42*   CALCIUM mg/dL 9 2 8 8 9 6   < > 9 0   ALK PHOS U/L  --  98  --   --  124*   ALT U/L  --  21  --   --  15   AST U/L  --  30  --   --  20    < > = values in this interval not displayed  Results from last 7 days   Lab Units 01/26/21  1019   INR  1 10   PTT seconds 39*               IMAGING & DIAGNOSTIC TESTING     Radiology Results: I have personally reviewed pertinent reports  Ct Abdomen Pelvis Wo Contrast    Result Date: 1/24/2021  Impression: 1  Extensive osseous metastasis involving the spine and pelvis with large destructive soft tissue lesions involving the right superior pubic ramus and anterior acetabulum and posterior left acetabulum  Linear lucency in the right superior pubic ramus described on radiograph is likely related to cortical destruction from the anterior acetabular mass  2   Mottled lucency and sclerosis in the bilateral hips without osseous destruction  This may represent treated metastasis  3   Compression deformities of L1, L2, L4, and L5, likely pathologic given mottled appearance of the vertebral bodies  4   Right pleural effusion   Workstation performed: [id number]     Xr Hip/pelv 2-3 Vws Left If Performed    Result Date: 1/24/2021  Impression: Osseous metastatic disease again seen  Lucency in the right superior pubic ramus concerning for nondisplaced fracture  CT or MRI could be obtained for further evaluation if warranted  The study was marked in [facility] for immediate notification  Workstation performed: [id number]     Xr Femur 2 Vw Left    Result Date: 1/28/2021  Impression: Procedure guidance  Please refer to the separate procedure notes for additional details  Workstation performed: [id number]     Xr Femur 2 Vw Left    Result Date: 1/25/2021  Impression: Ill-defined lucency within the proximal femoral shaft with endosteal scalloping suspicious for lytic metastasis  No pathologic fracture identified  Workstation performed: [id number]     Xr Knee 4+ Vw Left Injury    Result Date: 1/24/2021  Impression: No acute osseous abnormality  Workstation performed: [id number]     Ct Head Without Contrast    Result Date: 1/23/2021  Impression: 1  Stable cerebral atrophy with chronic small vessel ischemic white matter disease  No acute intracranial abnormality  2   Stable calcifications in the occipital lobes bilaterally and right frontal centrum semiovale  Differential includes calcification within vascular malformations versus treated metastatic disease  Workstation performed: [id number]     Ct Chest Without Contrast    Result Date: 1/23/2021  Impression: 1  Slight increase in size in right lower lobe lung mass, most compatible with patient's known carcinoma  2   Slight increase in the size of the right pleural effusion  3   Stable osseous metastatic disease  The study was marked in [facility] for immediate notification  Workstation performed: [id number]     Ct Needle Biopsy Bone    Result Date: 1/26/2021  Impression: CT-guided biopsy of left posterior acetabulum lytic lesion  Plan: Specimen(s) sent for evaluation   _______________________________________________________________ PROCEDURE SUMMARY: - Percutaneous CT-guided left acetabulum biopsy PROCEDURE DETAILS: Pre-procedure Reference imaging for biopsy target: CT abdomen pelvis 1/24/2021 Consent: Informed consent for the procedure including risks, benefits and alternatives was obtained and time-out was performed prior to the procedure  Preparation: The site was prepared and draped using maximal sterile barrier technique including cutaneous antisepsis  Anesthesia/sedation Level of anesthesia/sedation: Moderate sedation (conscious sedation) Anesthesia/sedation administered by: IR nurse under attending supervision with continuous monitoring of the patient's level of consciousness and physiologic status Total intra-service sedation time (minutes): 25 Imaging prior to biopsy The patient was positioned prone  Initial imaging was performed using noncontrast CT  Biopsy target: - Maximal diameter (cm): 4 8 - Location: Left posterior acetabulum Biopsy Local anesthesia was administered  Under CT guidance, the biopsy needle was advanced to the target and biopsy was performed  Coaxial needle: 17 gauge Core needle biopsy device: [employer] Core needle size: 18 gauge Number of core specimens: 4 On-site biopsy touch preparation: Yes  Additional sampling recommendations: None Preliminary assessment of sample adequacy: Adequate Needle removal The biopsy needle was removed and a sterile dressing was applied  Tract embolization: D-Stat flowable hemostat Imaging following biopsy Immediate post-biopsy imaging was performed using noncontrast CT  Post-biopsy imaging findings: No hematoma Radiation Dose CT dose length product (mGy-cm): 1030 74 Additional Details Specimens removed: Biopsy samples as detailed above Estimated blood loss (mL): 1 Standardized report: [email or web address] Attestation Signer name: [facility] I attest that I was present for the entire procedure  I reviewed the stored images and agree with the report as written   Workstation performed: [id number]     Other Diagnostic Testing: I have personally reviewed pertinent reports  ACTIVE MEDICATIONS     Current Facility-Administered Medications   Medication Dose Route Frequency    acetaminophen (TYLENOL) tablet 650 mg  650 mg Oral Q6H PRN    Diclofenac Sodium (VOLTAREN) 1 % topical gel 2 g  2 g Topical TID PRN    docusate sodium (COLACE) capsule 100 mg  100 mg Oral BID    enoxaparin (LOVENOX) subcutaneous injection 30 mg  30 mg Subcutaneous BID    gabapentin (NEURONTIN) capsule 100 mg  100 mg Oral HS    guaiFENesin (MUCINEX) 12 hr tablet 600 mg  600 mg Oral Q12H JOSÉ MIGUEL    HYDROmorphone (DILAUDID) injection 0 2 mg  0 2 mg Intravenous Q4H PRN    lidocaine (PF) (XYLOCAINE-MPF) 1 % injection 0 5 mL  0 5 mL Infiltration Once PRN    magnesium hydroxide (MILK OF MAGNESIA) oral suspension 30 mL  30 mL Oral Daily PRN    nystatin (MYCOSTATIN) cream   Topical BID    ondansetron (ZOFRAN) injection 4 mg  4 mg Intravenous Q6H PRN    Osimertinib Mesylate TABS 80 mg  80 mg Oral Daily    oxyCODONE (ROXICODONE) IR tablet 2 5 mg  2 5 mg Oral Q4H PRN    oxyCODONE (ROXICODONE) IR tablet 5 mg  5 mg Oral Q4H PRN    polyethylene glycol (MIRALAX) packet 17 g  17 g Oral Once PRN    senna (SENOKOT) tablet 8 6 mg  1 tablet Oral Daily       VTE Pharmacologic Prophylaxis: Enoxaparin (Lovenox)  VTE Mechanical Prophylaxis: sequential compression device and foot pump applied    Portions of the record may have been created with voice recognition software  Occasional wrong word or "sound a like" substitutions may have occurred due to the inherent limitations of voice recognition software    Read the chart carefully and recognize, using context, where substitutions have occurred   ==  Radha Simeon, [facility]  Internal Medicine Residency PGY-1

## 2021-01-29 NOTE — DISCHARGE INSTRUCTIONS
POST BIOPSY    Care after your procedure:    1  Limit your activities for 24 hours after your biopsy  2  No driving day of biopsy  3  Return to your normal diet  Small sips of flat soda will help with mild nausea  4  Remove band-aid or dressing 24 hours after procedure  Contact Interventional Radiology at [phone] Peg PATIENTS: Contact Interventional Radiology at [phone]) Debi Rico PATIENTS: Contact Interventional Radiology at [phone]) if:    1  Difficulty breathing, nausea or vomiting  2  Chills or fever above 101 degrees F      3  Pain at biopsy site not relieved by medication  4  Develop any redness, swelling, heat, unusual drainage, heavy bruising or bleeding from biopsy site  Discharge Instructions - Orthopedics  Mera Cruz [de-identified] y o  male MRN: [id number]  [facility]    Weight Bearing Status:                                           Weight bearing as tolerated to left lower extremity     DVT prophylaxis  Lovenox 30mg twice daily for 42 days following surgery (6 weeks)     Pain:  Continue analgesics as directed by primary team     Dressing Instructions:   Please keep clean, dry and intact until follow up     Appt Instructions: If you do not have your appointment, please call the clinic at [phone] to schedule follow up with Dr Megan Thornton in 2 weeks   Otherwise followup as scheduled     Contact the office sooner if you experience any increased numbness/tingling in the extremities        Miscellaneous:  None

## 2021-01-29 NOTE — CASE MANAGEMENT
CM spoke with Joselyn from [facility]  Pt has accepting bed at [facility] 2021  CM spoke with pt and daughter, Sharonda Goyal [phone]  Family accepts bed  CM arranged wheelchair transport for 11:30 am tomorrow with [facility]  CM faxed facesheet to [facility]  Family is aware of transport time and cost of wheelchair  [facility] to completed auth  COVID test is pending  Dr Donna Kay, RN, pt, and pt's daughter are aware of transport time      COVID test in normal     No other CM needs noted

## 2021-01-29 NOTE — PLAN OF CARE
Problem: Potential for Falls  Goal: Patient will remain free of falls  Description: INTERVENTIONS:  - Assess patient frequently for physical needs  -  Identify cognitive and physical deficits and behaviors that affect risk of falls    -  [place] fall precautions as indicated by assessment   - Educate patient/family on patient safety including physical limitations  - Instruct patient to call for assistance with activity based on assessment  - Modify environment to reduce risk of injury  - Consider OT/PT consult to assist with strengthening/mobility  Outcome: Progressing     Problem: PAIN - ADULT  Goal: Verbalizes/displays adequate comfort level or baseline comfort level  Description: Interventions:  - Encourage patient to monitor pain and request assistance  - Assess pain using appropriate pain scale  - Administer analgesics based on type and severity of pain and evaluate response  - Implement non-pharmacological measures as appropriate and evaluate response  - Consider cultural and social influences on pain and pain management  - Notify physician/advanced practitioner if interventions unsuccessful or patient reports new pain  Outcome: Progressing     Problem: INFECTION - ADULT  Goal: Absence or prevention of progression during hospitalization  Description: INTERVENTIONS:  - Assess and monitor for signs and symptoms of infection  - Monitor lab/diagnostic results  - Monitor all insertion sites, i e  indwelling lines, tubes, and drains  - Monitor endotracheal if appropriate and nasal secretions for changes in amount and color  - [place] appropriate cooling/warming therapies per order  - Administer medications as ordered  - Instruct and encourage patient and family to use good hand hygiene technique  - Identify and instruct in appropriate isolation precautions for identified infection/condition  Outcome: Progressing  Goal: Absence of fever/infection during neutropenic period  Description: INTERVENTIONS:  - Monitor WBC    Outcome: Progressing     Problem: SAFETY ADULT  Goal: Patient will remain free of falls  Description: INTERVENTIONS:  - Assess patient frequently for physical needs  -  Identify cognitive and physical deficits and behaviors that affect risk of falls    -  [place] fall precautions as indicated by assessment   - Educate patient/family on patient safety including physical limitations  - Instruct patient to call for assistance with activity based on assessment  - Modify environment to reduce risk of injury  - Consider OT/PT consult to assist with strengthening/mobility  Outcome: Progressing  Goal: Maintain or return to baseline ADL function  Description: INTERVENTIONS:  -  Assess patient's ability to carry out ADLs; assess patient's baseline for ADL function and identify physical deficits which impact ability to perform ADLs (bathing, care of mouth/teeth, toileting, grooming, dressing, etc )  - Assess/evaluate cause of self-care deficits   - Assess range of motion  - Assess patient's mobility; develop plan if impaired  - Assess patient's need for assistive devices and provide as appropriate  - Encourage maximum independence but intervene and supervise when necessary  - Involve family in performance of ADLs  - Assess for home care needs following discharge   - Consider OT consult to assist with ADL evaluation and planning for discharge  - Provide patient education as appropriate  Outcome: Progressing  Goal: Maintain or return mobility status to optimal level  Description: INTERVENTIONS:  - Assess patient's baseline mobility status (ambulation, transfers, stairs, etc )    - Identify cognitive and physical deficits and behaviors that affect mobility  - Identify mobility aids required to assist with transfers and/or ambulation (gait belt, sit-to-stand, lift, walker, cane, etc )  - [place] fall precautions as indicated by assessment  - Record patient progress and toleration of activity level on Mobility SBAR; progress patient to next Phase/Stage  - Instruct patient to call for assistance with activity based on assessment  - Consider rehabilitation consult to assist with strengthening/weightbearing, etc   Outcome: Progressing     Problem: DISCHARGE PLANNING  Goal: Discharge to home or other facility with appropriate resources  Description: INTERVENTIONS:  - Identify barriers to discharge w/patient and caregiver  - Arrange for needed discharge resources and transportation as appropriate  - Identify discharge learning needs (meds, wound care, etc )  - Arrange for interpretive services to assist at discharge as needed  - Refer to Case Management Department for coordinating discharge planning if the patient needs post-hospital services based on physician/advanced practitioner order or complex needs related to functional status, cognitive ability, or social support system  Outcome: Progressing     Problem: Knowledge Deficit  Goal: Patient/family/caregiver demonstrates understanding of disease process, treatment plan, medications, and discharge instructions  Description: Complete learning assessment and assess knowledge base    Interventions:  - Provide teaching at level of understanding  - Provide teaching via preferred learning methods  Outcome: Progressing     Problem: Prexisting or High Potential for Compromised Skin Integrity  Goal: Skin integrity is maintained or improved  Description: INTERVENTIONS:  - Identify patients at risk for skin breakdown  - Assess and monitor skin integrity  - Assess and monitor nutrition and hydration status  - Monitor labs   - Assess for incontinence   - Turn and reposition patient  - Assist with mobility/ambulation  - Relieve pressure over bony prominences  - Avoid friction and shearing  - Provide appropriate hygiene as needed including keeping skin clean and dry  - Evaluate need for skin moisturizer/barrier cream  - Collaborate with interdisciplinary team   - Patient/family teaching  - Consider wound care consult   Outcome: Progressing     Problem: RESPIRATORY - ADULT  Goal: Achieves optimal ventilation and oxygenation  Description: INTERVENTIONS:  - Assess for changes in respiratory status  - Assess for changes in mentation and behavior  - Position to facilitate oxygenation and minimize respiratory effort  - Oxygen administered by appropriate delivery if ordered  - Initiate smoking cessation education as indicated  - Encourage broncho-pulmonary hygiene including cough, deep breathe, Incentive Spirometry  - Assess the need for suctioning and aspirate as needed  - Assess and instruct to report SOB or any respiratory difficulty  - Respiratory Therapy support as indicated  Outcome: Progressing     Problem: SKIN/TISSUE INTEGRITY - ADULT  Goal: Skin integrity remains intact  Description: INTERVENTIONS  - Identify patients at risk for skin breakdown  - Assess and monitor skin integrity  - Assess and monitor nutrition and hydration status  - Monitor labs (i e  albumin)  - Assess for incontinence   - Turn and reposition patient  - Assist with mobility/ambulation  - Relieve pressure over bony prominences  - Avoid friction and shearing  - Provide appropriate hygiene as needed including keeping skin clean and dry  - Evaluate need for skin moisturizer/barrier cream  - Collaborate with interdisciplinary team (i e  Nutrition, Rehabilitation, etc )   - Patient/family teaching  Outcome: Progressing  Goal: Incision(s), wounds(s) or drain site(s) healing without S/S of infection  Description: INTERVENTIONS  - Assess and document risk factors for skin impairment   - Assess and document dressing, incision, wound bed, drain sites and surrounding tissue  - Consider nutrition services referral as needed  - Oral mucous membranes remain intact  - Provide patient/ family education  Outcome: Progressing  Goal: Oral mucous membranes remain intact  Description: INTERVENTIONS  - Assess oral mucosa and hygiene practices  - Implement preventative oral hygiene regimen  - Implement oral medicated treatments as ordered  - Initiate Nutrition services referral as needed  Outcome: Progressing

## 2021-01-30 VITALS
TEMPERATURE: 98.1 F | DIASTOLIC BLOOD PRESSURE: 69 MMHG | HEIGHT: 74 IN | OXYGEN SATURATION: 95 % | RESPIRATION RATE: 18 BRPM | BODY MASS INDEX: 24 KG/M2 | HEART RATE: 89 BPM | WEIGHT: 187 LBS | SYSTOLIC BLOOD PRESSURE: 132 MMHG

## 2021-01-30 PROBLEM — K59.00 CONSTIPATION: Status: ACTIVE | Noted: 2021-01-30

## 2021-01-30 PROBLEM — R05.8 COUGH PRODUCTIVE OF CLEAR SPUTUM: Status: ACTIVE | Noted: 2021-01-30

## 2021-01-30 LAB
BASOPHILS # BLD AUTO: 0.02 THOUSANDS/ΜL (ref 0–0.1)
BASOPHILS NFR BLD AUTO: 0 % (ref 0–1)
EOSINOPHIL # BLD AUTO: 0.31 THOUSAND/ΜL (ref 0–0.61)
EOSINOPHIL NFR BLD AUTO: 4 % (ref 0–6)
ERYTHROCYTE [DISTWIDTH] IN BLOOD BY AUTOMATED COUNT: 14.6 % (ref 11.6–15.1)
HCT VFR BLD AUTO: 25.7 % (ref 36.5–49.3)
HGB BLD-MCNC: 8.1 G/DL (ref 12–17)
IMM GRANULOCYTES # BLD AUTO: 0.03 THOUSAND/UL (ref 0–0.2)
IMM GRANULOCYTES NFR BLD AUTO: 0 % (ref 0–2)
LYMPHOCYTES # BLD AUTO: 0.97 THOUSANDS/ΜL (ref 0.6–4.47)
LYMPHOCYTES NFR BLD AUTO: 13 % (ref 14–44)
MCH RBC QN AUTO: 27.8 PG (ref 26.8–34.3)
MCHC RBC AUTO-ENTMCNC: 31.5 G/DL (ref 31.4–37.4)
MCV RBC AUTO: 88 FL (ref 82–98)
MONOCYTES # BLD AUTO: 0.83 THOUSAND/ΜL (ref 0.17–1.22)
MONOCYTES NFR BLD AUTO: 11 % (ref 4–12)
NEUTROPHILS # BLD AUTO: 5.24 THOUSANDS/ΜL (ref 1.85–7.62)
NEUTS SEG NFR BLD AUTO: 72 % (ref 43–75)
NRBC BLD AUTO-RTO: 0 /100 WBCS
PLATELET # BLD AUTO: 211 THOUSANDS/UL (ref 149–390)
PMV BLD AUTO: 9.9 FL (ref 8.9–12.7)
RBC # BLD AUTO: 2.91 MILLION/UL (ref 3.88–5.62)
WBC # BLD AUTO: 7.4 THOUSAND/UL (ref 4.31–10.16)

## 2021-01-30 PROCEDURE — 85025 COMPLETE CBC W/AUTO DIFF WBC: CPT | Performed by: STUDENT IN AN ORGANIZED HEALTH CARE EDUCATION/TRAINING PROGRAM

## 2021-01-30 PROCEDURE — 99232 SBSQ HOSP IP/OBS MODERATE 35: CPT | Performed by: INTERNAL MEDICINE

## 2021-01-30 PROCEDURE — ND001 PR NO DOCUMENTATION: Performed by: INTERNAL MEDICINE

## 2021-01-30 RX ORDER — DOCUSATE SODIUM 100 MG/1
100 CAPSULE, LIQUID FILLED ORAL DAILY
Qty: 30 CAPSULE | Refills: 1
Start: 2021-01-30

## 2021-01-30 RX ORDER — SENNOSIDES 8.6 MG
8.6 TABLET ORAL DAILY
Qty: 30 TABLET | Refills: 1
Start: 2021-01-30

## 2021-01-30 RX ORDER — GUAIFENESIN 600 MG
600 TABLET, EXTENDED RELEASE 12 HR ORAL EVERY 12 HOURS SCHEDULED
Qty: 10 TABLET | Refills: 0
Start: 2021-01-30 | End: 2021-02-04

## 2021-01-30 RX ORDER — ACETAMINOPHEN 325 MG/1
650 TABLET ORAL EVERY 6 HOURS PRN
Qty: 30 TABLET | Refills: 1
Start: 2021-01-30

## 2021-01-30 RX ORDER — NYSTATIN 100000 U/G
CREAM TOPICAL 2 TIMES DAILY
Qty: 30 G | Refills: 0
Start: 2021-01-30

## 2021-01-30 RX ORDER — GABAPENTIN 100 MG/1
100 CAPSULE ORAL
Qty: 30 CAPSULE | Refills: 1
Start: 2021-01-30 | End: 2021-03-01

## 2021-01-30 RX ADMIN — NYSTATIN 1 APPLICATION: 100000 CREAM TOPICAL at 08:58

## 2021-01-30 RX ADMIN — SENNOSIDES 8.6 MG: 8.6 TABLET, FILM COATED ORAL at 08:56

## 2021-01-30 RX ADMIN — ENOXAPARIN SODIUM 30 MG: 30 INJECTION SUBCUTANEOUS at 08:56

## 2021-01-30 RX ADMIN — DOCUSATE SODIUM 100 MG: 100 CAPSULE, LIQUID FILLED ORAL at 08:56

## 2021-01-30 RX ADMIN — GUAIFENESIN 600 MG: 600 TABLET, EXTENDED RELEASE ORAL at 08:56

## 2021-01-30 NOTE — PLAN OF CARE
Problem: Potential for Falls  Goal: Patient will remain free of falls  Description: INTERVENTIONS:  - Assess patient frequently for physical needs  -  Identify cognitive and physical deficits and behaviors that affect risk of falls    -  [place] fall precautions as indicated by assessment   - Educate patient/family on patient safety including physical limitations  - Instruct patient to call for assistance with activity based on assessment  - Modify environment to reduce risk of injury  - Consider OT/PT consult to assist with strengthening/mobility  Outcome: Progressing     Problem: PAIN - ADULT  Goal: Verbalizes/displays adequate comfort level or baseline comfort level  Description: Interventions:  - Encourage patient to monitor pain and request assistance  - Assess pain using appropriate pain scale  - Administer analgesics based on type and severity of pain and evaluate response  - Implement non-pharmacological measures as appropriate and evaluate response  - Consider cultural and social influences on pain and pain management  - Notify physician/advanced practitioner if interventions unsuccessful or patient reports new pain  Outcome: Progressing     Problem: INFECTION - ADULT  Goal: Absence or prevention of progression during hospitalization  Description: INTERVENTIONS:  - Assess and monitor for signs and symptoms of infection  - Monitor lab/diagnostic results  - Monitor all insertion sites, i e  indwelling lines, tubes, and drains  - Monitor endotracheal if appropriate and nasal secretions for changes in amount and color  - Orient appropriate cooling/warming therapies per order  - Administer medications as ordered  - Instruct and encourage patient and family to use good hand hygiene technique  - Identify and instruct in appropriate isolation precautions for identified infection/condition  Outcome: Progressing  Goal: Absence of fever/infection during neutropenic period  Description: INTERVENTIONS:  - Monitor WBC    Outcome: Progressing     Problem: SAFETY ADULT  Goal: Patient will remain free of falls  Description: INTERVENTIONS:  - Assess patient frequently for physical needs  -  Identify cognitive and physical deficits and behaviors that affect risk of falls    -  [place] fall precautions as indicated by assessment   - Educate patient/family on patient safety including physical limitations  - Instruct patient to call for assistance with activity based on assessment  - Modify environment to reduce risk of injury  - Consider OT/PT consult to assist with strengthening/mobility  Outcome: Progressing  Goal: Maintain or return to baseline ADL function  Description: INTERVENTIONS:  -  Assess patient's ability to carry out ADLs; assess patient's baseline for ADL function and identify physical deficits which impact ability to perform ADLs (bathing, care of mouth/teeth, toileting, grooming, dressing, etc )  - Assess/evaluate cause of self-care deficits   - Assess range of motion  - Assess patient's mobility; develop plan if impaired  - Assess patient's need for assistive devices and provide as appropriate  - Encourage maximum independence but intervene and supervise when necessary  - Involve family in performance of ADLs  - Assess for home care needs following discharge   - Consider OT consult to assist with ADL evaluation and planning for discharge  - Provide patient education as appropriate  Outcome: Progressing  Goal: Maintain or return mobility status to optimal level  Description: INTERVENTIONS:  - Assess patient's baseline mobility status (ambulation, transfers, stairs, etc )    - Identify cognitive and physical deficits and behaviors that affect mobility  - Identify mobility aids required to assist with transfers and/or ambulation (gait belt, sit-to-stand, lift, walker, cane, etc )  - [place] fall precautions as indicated by assessment  - Record patient progress and toleration of activity level on Mobility SBAR; progress patient to next Phase/Stage  - Instruct patient to call for assistance with activity based on assessment  - Consider rehabilitation consult to assist with strengthening/weightbearing, etc   Outcome: Progressing     Problem: DISCHARGE PLANNING  Goal: Discharge to home or other facility with appropriate resources  Description: INTERVENTIONS:  - Identify barriers to discharge w/patient and caregiver  - Arrange for needed discharge resources and transportation as appropriate  - Identify discharge learning needs (meds, wound care, etc )  - Arrange for interpretive services to assist at discharge as needed  - Refer to Case Management Department for coordinating discharge planning if the patient needs post-hospital services based on physician/advanced practitioner order or complex needs related to functional status, cognitive ability, or social support system  Outcome: Progressing     Problem: Knowledge Deficit  Goal: Patient/family/caregiver demonstrates understanding of disease process, treatment plan, medications, and discharge instructions  Description: Complete learning assessment and assess knowledge base    Interventions:  - Provide teaching at level of understanding  - Provide teaching via preferred learning methods  Outcome: Progressing     Problem: Prexisting or High Potential for Compromised Skin Integrity  Goal: Skin integrity is maintained or improved  Description: INTERVENTIONS:  - Identify patients at risk for skin breakdown  - Assess and monitor skin integrity  - Assess and monitor nutrition and hydration status  - Monitor labs   - Assess for incontinence   - Turn and reposition patient  - Assist with mobility/ambulation  - Relieve pressure over bony prominences  - Avoid friction and shearing  - Provide appropriate hygiene as needed including keeping skin clean and dry  - Evaluate need for skin moisturizer/barrier cream  - Collaborate with interdisciplinary team   - Patient/family teaching  - Consider wound care consult   Outcome: Progressing     Problem: RESPIRATORY - ADULT  Goal: Achieves optimal ventilation and oxygenation  Description: INTERVENTIONS:  - Assess for changes in respiratory status  - Assess for changes in mentation and behavior  - Position to facilitate oxygenation and minimize respiratory effort  - Oxygen administered by appropriate delivery if ordered  - Initiate smoking cessation education as indicated  - Encourage broncho-pulmonary hygiene including cough, deep breathe, Incentive Spirometry  - Assess the need for suctioning and aspirate as needed  - Assess and instruct to report SOB or any respiratory difficulty  - Respiratory Therapy support as indicated  Outcome: Progressing     Problem: SKIN/TISSUE INTEGRITY - ADULT  Goal: Skin integrity remains intact  Description: INTERVENTIONS  - Identify patients at risk for skin breakdown  - Assess and monitor skin integrity  - Assess and monitor nutrition and hydration status  - Monitor labs (i e  albumin)  - Assess for incontinence   - Turn and reposition patient  - Assist with mobility/ambulation  - Relieve pressure over bony prominences  - Avoid friction and shearing  - Provide appropriate hygiene as needed including keeping skin clean and dry  - Evaluate need for skin moisturizer/barrier cream  - Collaborate with interdisciplinary team (i e  Nutrition, Rehabilitation, etc )   - Patient/family teaching  Outcome: Progressing  Goal: Incision(s), wounds(s) or drain site(s) healing without S/S of infection  Description: INTERVENTIONS  - Assess and document risk factors for skin impairment   - Assess and document dressing, incision, wound bed, drain sites and surrounding tissue  - Consider nutrition services referral as needed  - Oral mucous membranes remain intact  - Provide patient/ family education  Outcome: Progressing  Goal: Oral mucous membranes remain intact  Description: INTERVENTIONS  - Assess oral mucosa and hygiene practices  - Implement preventative oral hygiene regimen  - Implement oral medicated treatments as ordered  - Initiate Nutrition services referral as needed  Outcome: Progressing     Problem: MUSCULOSKELETAL - ADULT  Goal: Maintain or return mobility to safest level of function  Description: INTERVENTIONS:  - Assess patient's ability to carry out ADLs; assess patient's baseline for ADL function and identify physical deficits which impact ability to perform ADLs (bathing, care of mouth/teeth, toileting, grooming, dressing, etc )  - Assess/evaluate cause of self-care deficits   - Assess range of motion  - Assess patient's mobility  - Assess patient's need for assistive devices and provide as appropriate  - Encourage maximum independence but intervene and supervise when necessary  - Involve family in performance of ADLs  - Assess for home care needs following discharge   - Consider OT consult to assist with ADL evaluation and planning for discharge  - Provide patient education as appropriate  Outcome: Progressing  Goal: Maintain proper alignment of affected body part  Description: INTERVENTIONS:  - Support, maintain and protect limb and body alignment  - Provide patient/ family with appropriate education  Outcome: Progressing

## 2021-01-30 NOTE — INCIDENTAL FINDINGS
The following findings require follow up:  Radiographic finding   Finding:   Xray Hip L 01/23  Osseous metastatic disease again seen  Lucency in the right superior pubic ramus concerning for nondisplaced fracture  CT or MRI could be obtained for further evaluation if warranted        CT Abdomen Pelvis wo contrast 01/24/2021  1  Extensive osseous metastasis involving the spine and pelvis with large destructive soft tissue lesions involving the right superior pubic ramus and anterior acetabulum and posterior left acetabulum  Linear lucency in the right superior pubic ramus   described on radiograph is likely related to cortical destruction from the anterior acetabular mass      2  Mottled lucency and sclerosis in the bilateral hips without osseous destruction  This may represent treated metastasis      3  Compression deformities of L1, L2, L4, and L5, likely pathologic given mottled appearance of the vertebral bodies      4   Right pleural effusion       Follow up required: Ortho and Hem/Onc   Patient is s/p Prophylactic L femur IM Nail Insertion & f/u outpatient with Dr Payam Galvan, on Osimertinib      Follow up should be done within 2  week(s)

## 2021-01-31 NOTE — DISCHARGE SUMMARY
[facility] Discharge Summary - Medical Christen Del Cid [de-identified] y o  male MRN: [id number]    [address] [facility]  Room / Bed: [facility] [address] Encounter: [id number]    BRIEF OVERVIEW    Admitting Provider: Christelle Mcmahon,   Discharge Provider: No att  providers found  Primary Care Physician at Discharge: Dr Sujey Kaiser     Discharge To: [address] / Family Member Name: Tung Bermudez  Phone Number: [phone]     Admission Date: 1/23/2021     Discharge Date: 1/30/2021 12:10 PM    Primary Discharge Diagnosis  Principal Problem:    Ambulatory dysfunction  Active Problems:    Metastatic primary lung cancer ([address] )    Acute pain of left knee    Hip pain    Mixed hyperlipidemia    Atrial fibrillation (HCC)    Peripheral vascular disease (HCC)    CKD (chronic kidney disease) stage 3, GFR 30-59 ml/min    Anemia of chronic disease    Prediabetes    Preoperative clearance    Constipation    Cough productive of clear sputum  Resolved Problems:    Type 2 diabetes mellitus with complication (HCC)      Other Problems Addressed: n/a    Consulting Providers   Ortho   Hem/Onc   Radiology    Therapeutic Operative Procedures Performed  Prophylactic Left Femur IM nail placement     Diagnostic Procedures Performed  Labs and Diagnostics, including CT scans - see incidental findings and EMR for more details   Bone Biopsy     Discharge Disposition: Non [facility] SNF/TCU/SNU  Discharged With Lines: no    Test Results Pending at Discharge: Bone Biopsy     Outpatient Follow-Up  Yes, with PCP, Orthopedics and Hem/Onc   Active issues- see problem list above    Code Status: Prior  Advance Directive and Living Will: Received  Power of :    POLST:      Medications   See after visit summary for reconciled discharge medications provided to patient and family      Allergies  No Known Allergies  Discharge Diet: regular diet  Activity restrictions: Left LE weigh baring as tolerated     [address] [facility] Course  Patient is [de-identified]years old male with past medical history significant of metastatic primary lung cancer brain and bones, AFib not on anticoagulation, prefer vascular disease, CKD who presented with recurrent falls and generalized weakness CT scan shows meds to bilateral pelvis and right superior pubic ramus fracture, Ortho was consulted and patient status post prophylactic left femur intramedullary nail placement 1/29, left hip and lower extremity pain gradually improved after procedure on day of discharge rated 4 on a scale 1-10, weight bearing as tolerated, started and to continue on DVT prophylaxis per ortho recommendations , to follow up with Ortho outpatient as planned, Osimertinib was contaminated during admission and patient to continue follow-up was Shira Kaplan outpatient  Vitals:    01/29/21 1521 01/29/21 2311 01/30/21 0253 01/30/21 0741   BP: 117/81 127/66 124/68 132/69   BP Location:    Right arm   Pulse: 94 97 95 89   Resp: 16 20 16 18   Temp: 97 5 °F (36 4 °C) 98 °F (36 7 °C) 98 °F (36 7 °C) 98 1 °F (36 7 °C)   TempSrc:    Oral   SpO2: 96% 95% 97% 95%   Weight:       Height:           Physical Exam  Constitutional:       General: He is not in acute distress  HENT:      Head: Normocephalic and atraumatic  Mouth/Throat:      Mouth: Mucous membranes are moist    Eyes:      Extraocular Movements: Extraocular movements intact  Pupils: Pupils are equal, round, and reactive to light  Cardiovascular:      Rate and Rhythm: Normal rate and regular rhythm  Pulses: Normal pulses  Heart sounds: No murmur  No gallop  Pulmonary:      Effort: Pulmonary effort is normal  No respiratory distress  Breath sounds: Normal breath sounds  No wheezing  Musculoskeletal:      Right lower leg: No edema  Left lower leg: No edema        Comments: Left thigh dressing clean and intact no drainage, left knee abrasion, left lower extremity motion limited due to pain, pulses intact and equal bilaterally, vascular refill less than 2 seconds both feet    Skin:     Capillary Refill: Capillary refill takes less than 2 seconds  Neurological:      General: No focal deficit present  Mental Status: He is alert and oriented to person, place, and time  Psychiatric:         Mood and Affect: Mood normal          Behavior: Behavior normal          Thought Content: Thought content normal     Presenting Problem/History of Present Illness  Principal Problem:    Ambulatory dysfunction  Active Problems:    Metastatic primary lung cancer (HCC)    Acute pain of left knee    Hip pain    Mixed hyperlipidemia    Atrial fibrillation (HCC)    Peripheral vascular disease (HCC)    CKD (chronic kidney disease) stage 3, GFR 30-59 ml/min    Anemia of chronic disease    Prediabetes    Preoperative clearance    Constipation    Cough productive of clear sputum  Resolved Problems:    Type 2 diabetes mellitus with complication [facility])        Other Pertinent Test Results  Bone Biopsy "Bone, left acetabular, core needle biopsy:       -  Metastatic squamous cell carcinoma" 01/26     Discharge Condition: stable      Discharge  Statement   I spent 20 minutes minutes discharging the patient  This time was spent on the day of discharge  I had direct contact with the patient on the day of discharge  Additional documentation is required if more than 30 minutes were spent on discharge

## 2021-02-01 ENCOUNTER — TRANSITIONAL CARE MANAGEMENT (OUTPATIENT)
Dept: INTERNAL MEDICINE CLINIC | Facility: CLINIC | Age: 81
End: 2021-02-01

## 2021-02-11 ENCOUNTER — OFFICE VISIT (OUTPATIENT)
Dept: OBGYN CLINIC | Facility: HOSPITAL | Age: 81
End: 2021-02-11

## 2021-02-11 VITALS — HEART RATE: 78 BPM | SYSTOLIC BLOOD PRESSURE: 102 MMHG | DIASTOLIC BLOOD PRESSURE: 66 MMHG

## 2021-02-11 DIAGNOSIS — Z87.81 S/P ORIF (OPEN REDUCTION INTERNAL FIXATION) FRACTURE: Primary | ICD-10-CM

## 2021-02-11 DIAGNOSIS — Z98.890 S/P ORIF (OPEN REDUCTION INTERNAL FIXATION) FRACTURE: Primary | ICD-10-CM

## 2021-02-11 PROCEDURE — 99024 POSTOP FOLLOW-UP VISIT: CPT | Performed by: ORTHOPAEDIC SURGERY

## 2021-02-11 NOTE — PROGRESS NOTES
Assessment:   Diagnosis ICD-10-CM Associated Orders   1  S/P ORIF (open reduction internal fixation) fracture  Z98 [id number]     Z87 81     Prophylactic IM nail left femur for proximal metastatic lesion  01/27/2021       Plan:  Surgical incisions are healed, staples removed successfully  Do not need to keep them covered  Complete Lovenox for complete 28 days  Weight-bearing and activities as tolerated on left lower extremity  PT/OT at [facility]  Can get incisions wet, do not submerge  To do next visit:  Return in about 4 weeks (around 3/11/2021) for re-check with x-rays left femur  The above stated was discussed in layman's terms and the patient expressed understanding  All questions were answered to the patient's satisfaction  Scribe Attestation    I,:  Manuel Velázquez am acting as a scribe while in the presence of the attending physician :       I,:  Jeanetet Bhakta MD personally performed the services described in this documentation    as scribed in my presence :             Subjective:   nAgelica Peters is a [de-identified] y o  male who presents 1st postoperative visit 2 weeks status post prophylactic IM nail left femur for proximal femur lesion, 1/27/21  Patient has been residing at [facility]  Patient states he has not been doing any weight-bearing on his left lower extremity  He has pain throughout his entire lower extremity however most of his pain is posteriorly at both heels  He has what appears to be the start of ulcers posteriorly at both heels in which [facility] is advised of and is aware  Per transport papers he has been receiving Lovenox for DVT prophylaxis        Review of systems negative unless otherwise specified in HPI  Review of Systems    Past Medical History:   Diagnosis Date    At[facility] )     Cancer of lung ([facility] )     brain and bones    Diabetes mellitus ([facility] )     Hypercholesteremia     Hypertension     Lung neoplasm     Proteinuria     LAST ASSESSED [id number]    PVD (peripheral vascular disease) [facility])        Past Surgical History:   Procedure Laterality Date    CT NEEDLE BIOPSY BONE  1/26/2021    [place] BRONCHOSCOPY,DIAGNOSTIC N/A 4/24/2018    Procedure: Lige Ing;  Surgeon: Arti Mccrary MD;  [facility]; Service: Pulmonary    [place] OPEN RX FEMUR FX+INTRAMED TERESA Left 1/27/2021    Procedure: PROPHYLACTIC INSERTION NAIL IM FEMUR ANTEGRADE (TROCHANTERIC); Surgeon: Aris Vidal MD;  [facility];  Service: Orthopedics    THORACENTESIS N/A 4/24/2018    Procedure: Leonardo David;  Surgeon: Arti Mccrary MD;  [facility];   Service: Pulmonary       Family History   Problem Relation Age of Onset    Diabetes Mother     Diabetes Father     Diabetes Family        Social History     Occupational History    Occupation: RETIRED   Tobacco Use    Smoking status: Former Smoker     Types: Cigarettes    Smokeless tobacco: Never Used    Tobacco comment: quit 39 yrs ago as of 2018   Substance and Sexual Activity    Alcohol use: Yes     Comment: rare    Drug use: No    Sexual activity: Not on file         Current Outpatient Medications:     acetaminophen (TYLENOL) 325 mg tablet, Take 2 tablets (650 mg total) by mouth every 6 (six) hours as needed for mild pain, headaches or fever, Disp: 30 tablet, Rfl: 1    Diclofenac Sodium (VOLTAREN) 1 %, Apply 2 g topically 3 (three) times a day as needed (L knee pain), Disp: 150 g, Rfl: 1    docusate sodium (COLACE) 100 mg capsule, Take 1 capsule (100 mg total) by mouth daily, Disp: 30 capsule, Rfl: 1    enoxaparin (LOVENOX) 30 mg/0 3 mL, Inject 0 3 mL (30 mg total) under the skin 2 (two) times a day, Disp: 18 mL, Rfl: 0    gabapentin (NEURONTIN) 100 mg capsule, Take 1 capsule (100 mg total) by mouth daily at bedtime, Disp: 30 capsule, Rfl: 1    glucose blood (JEANNE CONTOUR NEXT TEST) test strip, 3 each by Other route 3 (three) times a week, Disp: 50 each, Rfl: 3    lisinopril (ZESTRIL) 2 5 mg tablet, Take 1 tablet (2 5 mg total) by mouth daily, Disp: 90 tablet, Rfl: 3    magnesium hydroxide (MILK OF MAGNESIA) 400 mg/5 mL oral suspension, Take 30 mL by mouth daily as needed (constipation), Disp: 473 mL, Rfl: 1    nystatin (MYCOSTATIN) cream, Apply topically 2 (two) times a day, Disp: 30 g, Rfl: 0    Osimertinib Mesylate 80 MG TABS, Take 1 tablet (80 mg total) by mouth daily, Disp: 90 tablet, Rfl: 1    senna (SENOKOT) 8 6 mg, Take 1 tablet (8 6 mg total) by mouth daily, Disp: 30 tablet, Rfl: 1    No Known Allergies         Vitals:    02/11/21 1058   BP: 102/66   Pulse: 78       Objective:                    Left Hip Exam     Other   Erythema: absent  Sensation: normal    Comments: All incisions at his left hip and thigh are healed with staples in place which removed successfully  No signs of infection  No erythema  No ecchymosis  Diagnostics, reviewed and taken today if performed as documented:    None performed          Procedures, if performed today:    Procedures    None performed      Portions of the record may have been created with voice recognition software  Occasional wrong word or "sound a like" substitutions may have occurred due to the inherent limitations of voice recognition software  Read the chart carefully and recognize, using context, where substitutions have occurred

## 2021-02-25 ENCOUNTER — TRANSITIONAL CARE MANAGEMENT (OUTPATIENT)
Dept: INTERNAL MEDICINE CLINIC | Age: 81
End: 2021-02-25

## 2021-03-08 ENCOUNTER — TELEPHONE (OUTPATIENT)
Dept: HEMATOLOGY ONCOLOGY | Facility: CLINIC | Age: 81
End: 2021-03-08

## 2021-03-08 DIAGNOSIS — Z98.890 S/P ORIF (OPEN REDUCTION INTERNAL FIXATION) FRACTURE: Primary | ICD-10-CM

## 2021-03-08 DIAGNOSIS — Z87.81 S/P ORIF (OPEN REDUCTION INTERNAL FIXATION) FRACTURE: Primary | ICD-10-CM

## 2021-03-10 ENCOUNTER — TELEPHONE (OUTPATIENT)
Dept: HEMATOLOGY ONCOLOGY | Facility: CLINIC | Age: 81
End: 2021-03-10

## 2021-03-10 ENCOUNTER — DOCUMENTATION (OUTPATIENT)
Dept: HEMATOLOGY ONCOLOGY | Facility: CLINIC | Age: 81
End: 2021-03-10

## 2021-03-10 NOTE — PROGRESS NOTES
3-9-21  Received email from providers office  Patient will no longer need this medication  Patient has transitioned to hopice  3-10-21  Call to [place]-Spoke with [facility], patient profile has been updated to reflect patient on hospice and no additional shipments should be made

## 2023-05-17 NOTE — ED PROVIDER NOTES
Final Diagnosis:  1  Fall    2  Ambulatory dysfunction    3  Left knee pain        Chief Complaint   Patient presents with    Fall     Patient brought in by EMS  Per EMS, patient fell 3 times this week  Last fall was this AM around 0130  Mechanical fall out of bed  C/O left hip pain  Patient able to ambulate, but states his gait is very "unsteady  " -thinners -LOC     HPI  Patient presents for evaluation after multiple falls  Patient states that he had 3 falls in the last week, 15 in last month  He tells me that his last fall was when he was getting out of his bed and have been to get to his walker, he fell forward, landing mostly on his left knee  He is unsure if he struck his head, no loss conscious, change in vision, nausea/vomiting, use of blood thinners  As I discussed the patient's current health with him he tells me that on this last instance when EMS came to pick him up and help him get off the ground that he could not deal any more  He currently lives with his wife at their home and she was unable to help get him up off the ground  He patient does have history of lung cancer with metastatic brain and spine cancers, status post whole-brain radiation  Patient states that he is otherwise in his normal health without any shortness of breath, palpitations, chest pain, fever chills, change in bowel or bladder habits     - No language barrier    - History obtained from patient  - There are no limitations to the history obtained  - Previous charting was reviewed    PMH:   has a past medical history of Atrial fibrillation ([facility] ), Cancer of lung ([facility] ), Diabetes mellitus ([facility] ), Hypercholesteremia, Hypertension, Lung neoplasm, Proteinuria, and PVD (peripheral vascular disease) ([facility] )  PSH:   has a past surgical history that includes pr bronchoscopy,diagnostic (N/A, 4/24/2018) and Thoracentesis (N/A, 4/24/2018)         ROS:  Review of Systems   Constitutional: Negative for activity change, chills, fatigue and fever  Respiratory: Negative for cough and shortness of breath  Cardiovascular: Negative for chest pain and palpitations  Gastrointestinal: Negative for abdominal distention, abdominal pain, constipation, diarrhea, nausea and vomiting  Genitourinary: Negative for dysuria and hematuria  Musculoskeletal: Positive for arthralgias  Negative for myalgias and neck pain  Neurological: Negative for dizziness, syncope, light-headedness and headaches  All other systems reviewed and are negative  PE:   Vitals:    01/23/21 0234 01/23/21 0245   BP:  136/68   BP Location:  Right arm   Pulse:  88   Resp:  22   Temp: 97 5 °F (36 4 °C)    TempSrc: Oral    SpO2:  98%   Weight: 84 8 kg (187 lb)    Height: 6' 2" (1 88 m)      Vitals reviewed by me  Physical Exam  Vitals signs reviewed  Constitutional:       General: He is not in acute distress  Appearance: He is well-developed  He is not diaphoretic  HENT:      Head: Normocephalic and atraumatic  Right Ear: External ear normal       Left Ear: External ear normal    Eyes:      General:         Right eye: No discharge  Left eye: No discharge  Conjunctiva/sclera: Conjunctivae normal       Pupils: Pupils are equal, round, and reactive to light  Neck:      Musculoskeletal: Normal range of motion and neck supple  Vascular: No JVD  Trachea: No tracheal deviation  Cardiovascular:      Rate and Rhythm: Normal rate and regular rhythm  Heart sounds: Normal heart sounds  No murmur  No friction rub  No gallop  Pulmonary:      Effort: Pulmonary effort is normal  No respiratory distress  Breath sounds: Normal breath sounds  No wheezing or rales  Abdominal:      General: Bowel sounds are normal  There is no distension  Palpations: Abdomen is soft  There is no mass  Tenderness: There is no abdominal tenderness  There is no guarding  Musculoskeletal: Normal range of motion  General: Tenderness present  No deformity  Comments: Patient with mild tenderness palpation left knee  Superficial abrasion  Full range of motion  Superficial abrasion to lateral aspect of left ankle as well, no tenderness  Neurological:      Mental Status: He is alert and oriented to person, place, and time  Cranial Nerves: No cranial nerve deficit  Sensory: No sensory deficit  Motor: No abnormal muscle tone  Coordination: Coordination normal    Psychiatric:         Behavior: Behavior normal          Thought Content: Thought content normal          Judgment: Judgment normal           A:  - Nursing note reviewed    -                      XR knee 4+ vw left injury    (Results Pending)   CT head without contrast    (Results Pending)   CT chest without contrast    (Results Pending)     Orders Placed This Encounter   Procedures    XR knee 4+ vw left injury    CT head without contrast    CT chest without contrast    CBC and differential    Basic metabolic panel    Insert peripheral IV    ECG 12 lead    Place in Observation     Labs Reviewed   CBC AND DIFFERENTIAL - Abnormal       Result Value Ref Range Status    WBC 6 44  4 31 - 10 16 Thousand/uL Final    RBC 3 68 (*) 3 88 - 5 62 Million/uL Final    Hemoglobin 10 5 (*) 12 0 - 17 0 g/dL Final    Hematocrit 32 2 (*) 36 5 - 49 3 % Final    MCV 88  82 - 98 fL Final    MCH 28 5  26 8 - 34 3 pg Final    MCHC 32 6  31 4 - 37 4 g/dL Final    RDW 14 5  11 6 - 15 1 % Final    MPV 9 2  8 9 - 12 7 fL Final    Platelets 534  100 - 390 Thousands/uL Final    nRBC 0  /100 WBCs Final    Neutrophils Relative 67  43 - 75 % Final    Immat GRANS % 0  0 - 2 % Final    Lymphocytes Relative 19  14 - 44 % Final    Monocytes Relative 11  4 - 12 % Final    Eosinophils Relative 3  0 - 6 % Final    Basophils Relative 0  0 - 1 % Final    Neutrophils Absolute 4 28  1 85 - 7 62 Thousands/µL Final    Immature Grans Absolute 0 02  0 00 - 0 20 Thousand/uL Final    Lymphocytes Absolute 1 23  0 60 - 4 47 Thousands/µL Final    Monocytes Absolute 0 70  0 17 - 1 22 Thousand/µL Final    Eosinophils Absolute 0 19  0 00 - 0 61 Thousand/µL Final    Basophils Absolute 0 02  0 00 - 0 10 Thousands/µL Final   BASIC METABOLIC PANEL - Abnormal    Sodium 137  136 - 145 mmol/L Final    Potassium 4 1  3 5 - 5 3 mmol/L Final    Chloride 103  100 - 108 mmol/L Final    CO2 29  21 - 32 mmol/L Final    ANION GAP 5  4 - 13 mmol/L Final    BUN 27 (*) 5 - 25 mg/dL Final    Creatinine 1 52 (*) 0 60 - 1 30 mg/dL Final    Comment: Standardized to IDMS reference method    Glucose 87  65 - 140 mg/dL Final    Comment: If the patient is fasting, the ADA then defines impaired fasting glucose as > 100 mg/dL and diabetes as > or equal to 123 mg/dL  Specimen collection should occur prior to Sulfasalazine administration due to the potential for falsely depressed results  Specimen collection should occur prior to Sulfapyridine administration due to the potential for falsely elevated results  Calcium 9 2  8 3 - 10 1 mg/dL Final    eGFR 43  ml/min/1 73sq m Final    Narrative:     [place] guidelines for Chronic Kidney Disease (CKD):     Stage 1 with normal or high GFR (GFR > 90 mL/min/1 73 square meters)    Stage 2 Mild CKD (GFR = 60-89 mL/min/1 73 square meters)    Stage 3A Moderate CKD (GFR = 45-59 mL/min/1 73 square meters)    Stage 3B Moderate CKD (GFR = 30-44 mL/min/1 73 square meters)    Stage 4 Severe CKD (GFR = 15-29 mL/min/1 73 square meters)    Stage 5 End Stage CKD (GFR <15 mL/min/1 73 square meters)  Note: GFR calculation is accurate only with a steady state creatinine         Final Diagnosis:  1  Fall    2  Ambulatory dysfunction    3  Left knee pain        P:  - x-ray left knee for patient's current complaints of pain  Low likelihood or suspicion of fracture here    Based on patient's current presentation I had a lengthy discussion with him about knees come into the hospital for evaluation by therapy and the possibility that he may need placement  At this point he does not feel that he can continue to go on at home with his current level of physical health   -basic labs show patient's kidney function is approximately at his normal level, no acute findings  -discussed with Medicine, will admit for further evaluation likely placement    Medications - No data to display  Time reflects when diagnosis was documented in both MDM as applicable and the Disposition within this note     Time User Action Codes Description Comment    1/23/2021  4:38 AM Michaelkyle Judyvenecia Nelson Add [W19  XXXA] Fall     1/23/2021  4:38 AM Shelley Gilbert Add [R26 2] Ambulatory dysfunction     1/23/2021  4:38 AM Shelley Gilbert Add [J60 [id number]] Left knee pain       ED Disposition     ED Disposition Condition Date/Time Comment    Admit Stable Sat Jan 23, 2021  4:38 AM Case was discussed with sod and the patient's admission status was agreed to be Admission Status: observation status to the service of Dr Ria Mckenzie   Follow-up Information    None       Patient's Medications   Discharge Prescriptions    No medications on file     No discharge procedures on file  Prior to Admission Medications   Prescriptions Last Dose Informant Patient Reported? Taking? Osimertinib Mesylate 80 MG TABS 1/22/2021 at Unknown time  No Yes   Sig: Take 1 tablet (80 mg total) by mouth daily   glucose blood (JEANNE CONTOUR NEXT TEST) test strip  Self No Yes   Sig: 3 each by Other route 3 (three) times a week   lisinopril (ZESTRIL) 2 5 mg tablet 1/22/2021 at Unknown time Self No Yes   Sig: Take 1 tablet (2 5 mg total) by mouth daily      Facility-Administered Medications: None       Portions of the record may have been created with voice recognition software  Occasional wrong word or "sound a like" substitutions may have occurred due to the inherent limitations of voice recognition software   Read the chart carefully and recognize, using context, where substitutions have occurred      Electronically signed by:  Alina Osborne, PGY 3, MD Joey Kirby MD  01/23/21 0113 Wartpeel Counseling:  I discussed with the patient the risks of Wartpeel including but not limited to erythema, scaling, itching, weeping, crusting, and pain.

## 2024-03-20 NOTE — PROGRESS NOTES
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, bony metastases, diagnosed in April 2018, molecular tests on the liquid biopsy found to have EGFR mutation, treated initially with Alimta/carboplatin/Pembrolizumab however after liquid biopsy came back with EGFR mutation    L858R treatment was changed to Omisertinib 80 mg p o  Daily since June 2018, with significant response on the CT scan, continue treatment with Omisertinib 80 mg p o  Daily    Brain metastasis status post brain radiation, followed by radiation oncology, CT scan of the brain on January 2019 showed no evidence of new recurrence    Follow-up in 2 months with CBC, CMP and CT scan of the chest without IV contrast               HPI:  Jose Hudson is a 66-year-old  male who used to smoke 2 pack of cigarettes daily for 20 years, quit 40 years ago  He noticed dyspnea with cough without hemoptysis, 10 lb weight loss with intermittent anterior chest pain with radiation to the right lateral side in the beginning of 2018  CT scan in April 2018 showed dried large hilar mass with bronchial obstruction, small right pleural effusion, multiple lumbar spine metastatic disease status post right thoracentesis with atypical cellular changes cannot exclude neoplasia, the patient had bronchoscopy and right lower lobe bronchial brushing showed conclusive evidence of malignancy with non-small cell lung cancer favor adenocarcinoma that stained positive for TTF 1, napsin and absent for p40  CT scan of the abdomen and pelvis showed small pericardial effusion, osseous metastases at L1, L2, L4, L5, obstructive collapse of the right middle lobe and the lower lobe    MRI of the brain showed widespread brain metastases LDL (mg/dL)   Date Value   12/05/2023 83   On lipitor   involving the supra and the infratentorial compartments  The patient underwent radiation therapy to the brain finished in May 2018  He received 2 cycles of Pembrolizumab, Alimta, carboplatin however 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, the therapy was changed to tagresso 80 mg p o  Daily by the end of June 2018  The last thoracentesis on the right side was done in June 14, 2018      Repeat CT scan of the chest in September 2018 showed significant decrease in the size of the mediastinal, subcarinal lymphadenopathy decrease in the size of the right lung mass, stable or slightly increased right pleural effusion          Interval History:    CT chest 11/28/2018 compared to 8/30/2018 scan:  Stable minimal right hilar soft tissue compatible with treated/residual mass    Decreased size of right hilar and mediastinal nodes    Previously noted right hilar lymph node currently measuring 1 3 cm (previous 1 6 cm)    Other nodes are slightly diminished in size including left paratracheal node measuring 1 8 cm (previous 2 cm) and a prevascular node measuring    Interval History:        Previous Treatment:         Test Results:    Imaging: Ct Head W Wo Contrast    Result Date: 1/3/2019  Narrative: CT BRAIN - WITH AND WITHOUT CONTRAST INDICATION:   C79 31: Secondary malignant neoplasm of brain C79 49: Secondary malignant neoplasm of other parts of nervous system C34 91: Malignant neoplasm of unspecified part of right bronchus or lung  Lobe, COMPARISON:  9/21/2018, 6/14/2018 and 4/22/2018  TECHNIQUE:  CT examination of the brain was performed both prior to and after the administration of intravenous contrast   In addition to axial images, coronal reformatted images were created and submitted for interpretation  Radiation dose length product (DLP) for this visit:  2271 mGy-cm     This examination, like all CT scans performed in the [facility], was performed utilizing techniques to minimize radiation dose exposure, including the use of iterative reconstruction and automated exposure control  IV Contrast:  100 mL of iohexol (OMNIPAQUE)  IMAGE QUALITY:  Diagnostic  FINDINGS: PARENCHYMA:  Subtle focus of enhancement in the left posterior parietal occipital region, best visualized on coronal series 700, image 88 and corresponding series 5 image 22  No corresponding enhancing lesion on the prior to CTs though finding likely corresponds to an enhancing lesion demonstrated on the MRI from 4/22/2018  Subtle area of hypoattenuation in the left frontal region corresponding to treated lesion  Stable hypoattenuating periventricular and subcortical lesions consistent with microangiopathic disease  No intracranial hemorrhage or mass effect  VENTRICLES AND EXTRA-AXIAL SPACES:  No hydrocephalus or extra-axial collection  VISUALIZED ORBITS AND PARANASAL SINUSES:  No retro-orbital lesion  No paranasal sinus disease  CALVARIUM:  Chronic, tiny bilateral mastoid effusions  No evidence of lytic or blastic lesion or soft tissue mass  Impression: Subtle focus of enhancement in the left posterior parietal occipital region, corresponding to enhancing lesion on the MRI from 4/22/2018, though without evidence of corresponding enhancement on subsequent CTs  Possible residual or recurrent disease  Workstation performed: [id number]       Labs:   Lab Results   Component Value Date    WBC 7 86 11/28/2018    HGB 12 7 11/28/2018    HCT 38 8 11/28/2018    MCV 94 11/28/2018     (L) 11/28/2018     Lab Results   Component Value Date    K 4 0 11/28/2018     11/28/2018    CO2 29 11/28/2018    BUN 23 11/28/2018    CREATININE 0 99 11/28/2018    GLUF 90 11/28/2018    CALCIUM 8 7 11/28/2018    AST 15 11/28/2018    ALT 18 11/28/2018    ALKPHOS 70 11/28/2018    EGFR 73 11/28/2018       No results found for: IRON, TIBC, FERRITIN    No results found for: [id number]      ROS:   Review of Systems   Constitutional: Positive for fatigue   Negative for activity change, appetite change, chills, diaphoresis, fever and unexpected weight change  HENT: Negative for congestion, dental problem, facial swelling, hearing loss, mouth sores, nosebleeds, postnasal drip, rhinorrhea, sore throat, trouble swallowing and voice change  Eyes: Negative for photophobia, pain, discharge, redness, itching and visual disturbance  Respiratory: Negative for cough, choking, chest tightness, shortness of breath and wheezing  Cardiovascular: Negative for chest pain, palpitations and leg swelling  Gastrointestinal: Negative for abdominal distention, abdominal pain, anal bleeding, blood in stool, constipation, diarrhea, nausea, rectal pain and vomiting  Endocrine: Negative for cold intolerance and heat intolerance  Genitourinary: Negative for decreased urine volume, difficulty urinating, dysuria, flank pain, frequency, hematuria and urgency  Musculoskeletal: Negative for arthralgias, back pain, gait problem, joint swelling, myalgias, neck pain and neck stiffness  Skin: Negative for color change, pallor, rash and wound  Allergic/Immunologic: Negative for immunocompromised state  Neurological: Negative for dizziness, tremors, seizures, syncope, facial asymmetry, speech difficulty, weakness, light-headedness, numbness and headaches  Hematological: Negative for adenopathy  Does not bruise/bleed easily  Psychiatric/Behavioral: Negative for agitation, confusion, decreased concentration, dysphoric mood and sleep disturbance  The patient is not nervous/anxious  All other systems reviewed and are negative  Current Medications: Reviewed  Allergies: Reviewed  PMH/FH/SH:  Reviewed      Physical Exam:    Body surface area is 2 13 meters squared      Wt Readings from Last 3 Encounters:   01/22/19 92 2 kg (203 lb 3 2 oz)   01/07/19 92 8 kg (204 lb 9 6 oz)   12/31/18 93 1 kg (205 lb 3 2 oz)        Temp Readings from Last 3 Encounters:   01/22/19 97 9 °F (36 6 °C) (Tympanic Core)   01/07/19 97 7 °F (36 5 °C) (Temporal)   12/31/18 98 2 °F (36 8 °C) (Tympanic)        BP Readings from Last 3 Encounters:   01/22/19 128/78   01/07/19 124/82   12/31/18 112/60         Pulse Readings from Last 3 Encounters:   01/22/19 (!) 109   01/07/19 94   12/31/18 90        Physical Exam   Constitutional: He is oriented to person, place, and time  He appears well-developed and well-nourished  No distress  HENT:   Head: Normocephalic and atraumatic  Mouth/Throat: Oropharynx is clear and moist  No oropharyngeal exudate  Eyes: Pupils are equal, round, and reactive to light  Conjunctivae and EOM are normal    Neck: Normal range of motion  Neck supple  No tracheal deviation present  No thyromegaly present  Cardiovascular: Normal rate and regular rhythm  Exam reveals no gallop and no friction rub  No murmur heard  Pulmonary/Chest: Effort normal and breath sounds normal  No respiratory distress  He has no wheezes  He has no rales  He exhibits no tenderness  Abdominal: Soft  Bowel sounds are normal  He exhibits no distension and no mass  There is no tenderness  There is no rebound and no guarding  Musculoskeletal: Normal range of motion  He exhibits no edema  Lymphadenopathy:     He has no cervical adenopathy  Neurological: He is alert and oriented to person, place, and time  Skin: Skin is warm and dry  No rash noted  He is not diaphoretic  No erythema  No pallor  Psychiatric: He has a normal mood and affect  His behavior is normal  Judgment and thought content normal    Vitals reviewed  Goals and Barriers:  Current Goal: Minimize effects of disease  Barriers: None  Patient's Capacity to Self Care:  Patient is able to self care      Code Status: [unfilled]

## 2024-10-31 NOTE — PLAN OF CARE
Problem: Potential for Falls  Goal: Patient will remain free of falls  Description: INTERVENTIONS:  - Assess patient frequently for physical needs  -  Identify cognitive and physical deficits and behaviors that affect risk of falls    -  [place] fall precautions as indicated by assessment   - Educate patient/family on patient safety including physical limitations  - Instruct patient to call for assistance with activity based on assessment  - Modify environment to reduce risk of injury  - Consider OT/PT consult to assist with strengthening/mobility  Outcome: Progressing     Problem: PAIN - ADULT  Goal: Verbalizes/displays adequate comfort level or baseline comfort level  Description: Interventions:  - Encourage patient to monitor pain and request assistance  - Assess pain using appropriate pain scale  - Administer analgesics based on type and severity of pain and evaluate response  - Implement non-pharmacological measures as appropriate and evaluate response  - Consider cultural and social influences on pain and pain management  - Notify physician/advanced practitioner if interventions unsuccessful or patient reports new pain  Outcome: Progressing     Problem: INFECTION - ADULT  Goal: Absence or prevention of progression during hospitalization  Description: INTERVENTIONS:  - Assess and monitor for signs and symptoms of infection  - Monitor lab/diagnostic results  - Monitor all insertion sites, i e  indwelling lines, tubes, and drains  - Monitor endotracheal if appropriate and nasal secretions for changes in amount and color  - [place] appropriate cooling/warming therapies per order  - Administer medications as ordered  - Instruct and encourage patient and family to use good hand hygiene technique  - Identify and instruct in appropriate isolation precautions for identified infection/condition  Outcome: Progressing  Goal: Absence of fever/infection during neutropenic period  Description: INTERVENTIONS:  - Monitor WBC    Outcome: Progressing     Problem: SAFETY ADULT  Goal: Patient will remain free of falls  Description: INTERVENTIONS:  - Assess patient frequently for physical needs  -  Identify cognitive and physical deficits and behaviors that affect risk of falls    -  [place] fall precautions as indicated by assessment   - Educate patient/family on patient safety including physical limitations  - Instruct patient to call for assistance with activity based on assessment  - Modify environment to reduce risk of injury  - Consider OT/PT consult to assist with strengthening/mobility  Outcome: Progressing  Goal: Maintain or return to baseline ADL function  Description: INTERVENTIONS:  -  Assess patient's ability to carry out ADLs; assess patient's baseline for ADL function and identify physical deficits which impact ability to perform ADLs (bathing, care of mouth/teeth, toileting, grooming, dressing, etc )  - Assess/evaluate cause of self-care deficits   - Assess range of motion  - Assess patient's mobility; develop plan if impaired  - Assess patient's need for assistive devices and provide as appropriate  - Encourage maximum independence but intervene and supervise when necessary  - Involve family in performance of ADLs  - Assess for home care needs following discharge   - Consider OT consult to assist with ADL evaluation and planning for discharge  - Provide patient education as appropriate  Outcome: Progressing  Goal: Maintain or return mobility status to optimal level  Description: INTERVENTIONS:  - Assess patient's baseline mobility status (ambulation, transfers, stairs, etc )    - Identify cognitive and physical deficits and behaviors that affect mobility  - Identify mobility aids required to assist with transfers and/or ambulation (gait belt, sit-to-stand, lift, walker, cane, etc )  - [place] fall precautions as indicated by assessment  - Record patient progress and toleration of activity level on Mobility SBAR; progress patient to next Phase/Stage  - Instruct patient to call for assistance with activity based on assessment  - Consider rehabilitation consult to assist with strengthening/weightbearing, etc   Outcome: Progressing     Problem: DISCHARGE PLANNING  Goal: Discharge to home or other facility with appropriate resources  Description: INTERVENTIONS:  - Identify barriers to discharge w/patient and caregiver  - Arrange for needed discharge resources and transportation as appropriate  - Identify discharge learning needs (meds, wound care, etc )  - Arrange for interpretive services to assist at discharge as needed  - Refer to Case Management Department for coordinating discharge planning if the patient needs post-hospital services based on physician/advanced practitioner order or complex needs related to functional status, cognitive ability, or social support system  Outcome: Progressing     Problem: Knowledge Deficit  Goal: Patient/family/caregiver demonstrates understanding of disease process, treatment plan, medications, and discharge instructions  Description: Complete learning assessment and assess knowledge base    Interventions:  - Provide teaching at level of understanding  - Provide teaching via preferred learning methods  Outcome: Progressing     Problem: Prexisting or High Potential for Compromised Skin Integrity  Goal: Skin integrity is maintained or improved  Description: INTERVENTIONS:  - Identify patients at risk for skin breakdown  - Assess and monitor skin integrity  - Assess and monitor nutrition and hydration status  - Monitor labs   - Assess for incontinence   - Turn and reposition patient  - Assist with mobility/ambulation  - Relieve pressure over bony prominences  - Avoid friction and shearing  - Provide appropriate hygiene as needed including keeping skin clean and dry  - Evaluate need for skin moisturizer/barrier cream  - Collaborate with interdisciplinary team   - Patient/family teaching  - Consider wound care consult   Outcome: Progressing     Problem: RESPIRATORY - ADULT  Goal: Achieves optimal ventilation and oxygenation  Description: INTERVENTIONS:  - Assess for changes in respiratory status  - Assess for changes in mentation and behavior  - Position to facilitate oxygenation and minimize respiratory effort  - Oxygen administered by appropriate delivery if ordered  - Initiate smoking cessation education as indicated  - Encourage broncho-pulmonary hygiene including cough, deep breathe, Incentive Spirometry  - Assess the need for suctioning and aspirate as needed  - Assess and instruct to report SOB or any respiratory difficulty  - Respiratory Therapy support as indicated  Outcome: Progressing     Problem: SKIN/TISSUE INTEGRITY - ADULT  Goal: Skin integrity remains intact  Description: INTERVENTIONS  - Identify patients at risk for skin breakdown  - Assess and monitor skin integrity  - Assess and monitor nutrition and hydration status  - Monitor labs (i e  albumin)  - Assess for incontinence   - Turn and reposition patient  - Assist with mobility/ambulation  - Relieve pressure over bony prominences  - Avoid friction and shearing  - Provide appropriate hygiene as needed including keeping skin clean and dry  - Evaluate need for skin moisturizer/barrier cream  - Collaborate with interdisciplinary team (i e  Nutrition, Rehabilitation, etc )   - Patient/family teaching  Outcome: Progressing  Goal: Incision(s), wounds(s) or drain site(s) healing without S/S of infection  Description: INTERVENTIONS  - Assess and document risk factors for skin impairment   - Assess and document dressing, incision, wound bed, drain sites and surrounding tissue  - Consider nutrition services referral as needed  - Oral mucous membranes remain intact  - Provide patient/ family education  Outcome: Progressing  Goal: Oral mucous membranes remain intact  Description: INTERVENTIONS  - Assess oral mucosa and hygiene practices  - Implement preventative oral hygiene regimen  - Implement oral medicated treatments as ordered  - Initiate Nutrition services referral as needed  Outcome: Progressing     Problem: MUSCULOSKELETAL - ADULT  Goal: Maintain or return mobility to safest level of function  Description: INTERVENTIONS:  - Assess patient's ability to carry out ADLs; assess patient's baseline for ADL function and identify physical deficits which impact ability to perform ADLs (bathing, care of mouth/teeth, toileting, grooming, dressing, etc )  - Assess/evaluate cause of self-care deficits   - Assess range of motion  - Assess patient's mobility  - Assess patient's need for assistive devices and provide as appropriate  - Encourage maximum independence but intervene and supervise when necessary  - Involve family in performance of ADLs  - Assess for home care needs following discharge   - Consider OT consult to assist with ADL evaluation and planning for discharge  - Provide patient education as appropriate  Outcome: Progressing  Goal: Maintain proper alignment of affected body part  Description: INTERVENTIONS:  - Support, maintain and protect limb and body alignment  - Provide patient/ family with appropriate education  Outcome: Progressing abnormal lab result

## 2024-12-19 NOTE — PROGRESS NOTES
Dimitri Denton, advising stone risk profile order in [facility], go to any [facility] main labs to  the jug.   Mr Savanna Neil stopped by the office to inquire about financial paperwork he may need to submit in order to renew his application with [employer] for [employer] for Jan 2020-Dec 2020  He is aware I will reach out to [employer] for information on the process so that his monthly script is not interrupted  He is aware I will follow up with him by the end of the week  I called Hortensia Ford at [phone] to ask about the process for reapplying for [employer]  I left  and asked her to return my call  Hortensia Ford returned my call  She said patients who were in the [employer] program in 2019 will be auto renewed for 2020  All that is needed is for physicians to send in a new script for 2020  Dr Amado Gutierrez team notified  She said patients will get a letter in the mail informing them of the auto renewal       Mr Savanna Neil notified nothing of this information

## (undated) DEVICE — SPONGE SCRUB 4 PCT CHLORHEXIDINE

## (undated) DEVICE — ARTHROSCOPY FLOOR MAT

## (undated) DEVICE — 3M™ TEGADERM™ TRANSPARENT FILM DRESSING FRAME STYLE, 1626W, 4 IN X 4-3/4 IN (10 CM X 12 CM), 50/CT 4CT/CASE: Brand: 3M™ TEGADERM™

## (undated) DEVICE — DRAPE SURGIKIT SADDLE BAG

## (undated) DEVICE — ABDOMINAL PAD: Brand: DERMACEA

## (undated) DEVICE — STERILE ORIF HIP PACK: Brand: CARDINAL HEALTH

## (undated) DEVICE — INTENDED FOR TISSUE SEPARATION, AND OTHER PROCEDURES THAT REQUIRE A SHARP SURGICAL BLADE TO PUNCTURE OR CUT.: Brand: BARD-PARKER SAFETY BLADES SIZE 15, STERILE

## (undated) DEVICE — SUT VICRYL PLUS 2-0 CTB-1 27 IN VCPB259H

## (undated) DEVICE — 6617 IOBAN II PATIENT ISOLATION DRAPE 5/BX,4BX/CS: Brand: STERI-DRAPE™ IOBAN™ 2

## (undated) DEVICE — 3.2MM GUIDE WIRE 400MM

## (undated) DEVICE — 2.5MM REAMING ROD WITH BALL TIP/950MM-STERILE

## (undated) DEVICE — PLUMEPEN PRO 10FT

## (undated) DEVICE — GLOVE INDICATOR PI UNDERGLOVE SZ 8 BLUE

## (undated) DEVICE — GLOVE SRG BIOGEL 7.5

## (undated) DEVICE — PAD CAST 4 IN COTTON NON STERILE

## (undated) DEVICE — CHLORAPREP HI-LITE 26ML ORANGE

## (undated) DEVICE — PAD GROUNDING ADULT

## (undated) DEVICE — 4.2MM THREE-FLUTED DRILL BIT QC/NEEDLE POINT/145MM

## (undated) DEVICE — DRAPE C-ARMOUR

## (undated) DEVICE — SUT VICRYL PLUS 0 CTB-1 27 IN VCPB260H

## (undated) DEVICE — DRESSING MEPILEX AG BORDER 4 X 4 IN

## (undated) DEVICE — INTENDED FOR TISSUE SEPARATION, AND OTHER PROCEDURES THAT REQUIRE A SHARP SURGICAL BLADE TO PUNCTURE OR CUT.: Brand: BARD-PARKER SAFETY BLADES SIZE 10, STERILE

## (undated) DEVICE — MEDI-VAC SUCTION FINE CAPACITY: Brand: CARDINAL HEALTH